# Patient Record
Sex: FEMALE | Race: WHITE | NOT HISPANIC OR LATINO | Employment: OTHER | URBAN - METROPOLITAN AREA
[De-identification: names, ages, dates, MRNs, and addresses within clinical notes are randomized per-mention and may not be internally consistent; named-entity substitution may affect disease eponyms.]

---

## 2018-03-07 ENCOUNTER — OFFICE VISIT (OUTPATIENT)
Dept: FAMILY MEDICINE CLINIC | Facility: CLINIC | Age: 78
End: 2018-03-07

## 2018-03-07 VITALS
DIASTOLIC BLOOD PRESSURE: 88 MMHG | OXYGEN SATURATION: 98 % | SYSTOLIC BLOOD PRESSURE: 168 MMHG | HEIGHT: 64 IN | WEIGHT: 149 LBS | RESPIRATION RATE: 16 BRPM | HEART RATE: 70 BPM | BODY MASS INDEX: 25.44 KG/M2 | TEMPERATURE: 98.2 F

## 2018-03-07 DIAGNOSIS — E78.00 PURE HYPERCHOLESTEROLEMIA: ICD-10-CM

## 2018-03-07 DIAGNOSIS — E11.9 TYPE 2 DIABETES MELLITUS WITHOUT COMPLICATION, WITHOUT LONG-TERM CURRENT USE OF INSULIN (HCC): ICD-10-CM

## 2018-03-07 DIAGNOSIS — I10 ESSENTIAL HYPERTENSION: Primary | ICD-10-CM

## 2018-03-07 PROBLEM — E78.5 HYPERLIPIDEMIA: Status: ACTIVE | Noted: 2018-03-07

## 2018-03-07 PROCEDURE — 99204 OFFICE O/P NEW MOD 45 MIN: CPT | Performed by: FAMILY MEDICINE

## 2018-03-07 RX ORDER — UBIDECARENONE 100 MG
100 CAPSULE ORAL DAILY
COMMUNITY
End: 2021-01-11

## 2018-03-07 RX ORDER — CLONIDINE HYDROCHLORIDE 0.1 MG/1
0.1 TABLET ORAL 2 TIMES DAILY
COMMUNITY
End: 2018-03-07

## 2018-03-07 RX ORDER — IRBESARTAN AND HYDROCHLOROTHIAZIDE 300; 12.5 MG/1; MG/1
TABLET, FILM COATED ORAL
COMMUNITY
Start: 2018-02-27 | End: 2018-03-07 | Stop reason: ALTCHOICE

## 2018-03-07 RX ORDER — TRIAMTERENE AND HYDROCHLOROTHIAZIDE 37.5; 25 MG/1; MG/1
1 TABLET ORAL DAILY
Qty: 30 TABLET | Refills: 6 | Status: SHIPPED | OUTPATIENT
Start: 2018-03-07 | End: 2018-03-27 | Stop reason: SDUPTHER

## 2018-03-07 RX ORDER — MELATONIN
1000 DAILY
COMMUNITY

## 2018-03-07 RX ORDER — DILTIAZEM HYDROCHLORIDE 180 MG/1
CAPSULE, EXTENDED RELEASE ORAL
COMMUNITY
Start: 2018-02-27 | End: 2018-03-27 | Stop reason: DRUGHIGH

## 2018-03-07 RX ORDER — POTASSIUM CHLORIDE 750 MG/1
TABLET, EXTENDED RELEASE ORAL
COMMUNITY
Start: 2018-01-19 | End: 2018-03-07 | Stop reason: DRUGHIGH

## 2018-03-07 RX ORDER — PRAVASTATIN SODIUM 40 MG
TABLET ORAL
COMMUNITY
Start: 2018-02-15 | End: 2018-03-27 | Stop reason: SDUPTHER

## 2018-03-07 RX ORDER — IRBESARTAN 300 MG/1
300 TABLET ORAL NIGHTLY
Qty: 90 TABLET | Refills: 1 | Status: SHIPPED | OUTPATIENT
Start: 2018-03-07 | End: 2018-03-27 | Stop reason: SDUPTHER

## 2018-03-07 RX ORDER — CARVEDILOL PHOSPHATE 10 MG/1
10 CAPSULE, EXTENDED RELEASE ORAL DAILY
Qty: 30 CAPSULE | Refills: 6 | Status: SHIPPED | OUTPATIENT
Start: 2018-03-07 | End: 2018-03-27 | Stop reason: SDUPTHER

## 2018-03-07 NOTE — PROGRESS NOTES
Subjective   Cheryl Tomas is a 77 y.o. female.     Pt is here to [facility].  Pt previously seen by Dr Slade in [place].  Pt sees Dr Stef GRIFFIN in [place].  Pt had pneumonia vaccine about 3 years ago. Not sure of one.    Pt is concerned about blood pressure. This morning bp was 180/98 this morning at [facility] which is what it has ran for past couple months since she moved here from [place] in [place]eme[place].    Hypertension   This is a new problem. The current episode started 1 to 4 weeks ago. The problem has been gradually worsening since onset. The problem is uncontrolled. Pertinent negatives include no anxiety, blurred vision, chest pain, headaches, malaise/fatigue, neck pain, palpitations, peripheral edema, PND, shortness of breath or sweats. There are no associated agents to hypertension. Risk factors for coronary artery disease include diabetes mellitus, dyslipidemia and family history. Past treatments include angiotensin blockers and diuretics. The current treatment provides no improvement. There are no compliance problems.  There is no history of angina, kidney disease, CAD/MI, CVA, heart failure, left ventricular hypertrophy, PVD or retinopathy. There is no history of chronic renal disease.   Hyperlipidemia   This is a new problem. The current episode started more than 1 year ago. The problem is controlled. Recent lipid tests were reviewed and are normal. Exacerbating diseases include diabetes. She has no history of chronic renal disease, hypothyroidism, liver disease, obesity or nephrotic syndrome. Pertinent negatives include no chest pain, focal weakness, leg pain or shortness of breath. Current antihyperlipidemic treatment includes statins. The current treatment provides moderate improvement of lipids. There are no compliance problems.  Risk factors for coronary artery disease include diabetes mellitus and dyslipidemia.        The following portions of the patient's history were reviewed and  "updated as appropriate: allergies, current medications, past family history, past medical history, past social history, past surgical history and problem list.    Review of Systems   Constitutional: Negative.  Negative for malaise/fatigue.   HENT: Negative.    Eyes: Negative.  Negative for blurred vision.   Respiratory: Negative.  Negative for shortness of breath.    Cardiovascular: Negative.  Negative for chest pain, palpitations and PND.   Gastrointestinal: Negative.    Endocrine: Negative.    Genitourinary: Negative.    Musculoskeletal: Negative.  Negative for neck pain.   Skin: Negative.    Allergic/Immunologic: Negative.    Neurological: Negative.  Negative for focal weakness and headaches.   Hematological: Negative.    Psychiatric/Behavioral: Negative.    All other systems reviewed and are negative.      Objective     Vitals:    03/07/18 1404   BP: 168/88   Pulse: 70   Resp: 16   Temp: 98.2 °F (36.8 °C)   SpO2: 98%   Weight: 67.6 kg (149 lb)   Height: 162.6 cm (64\")       Physical Exam   Constitutional: She is oriented to person, place, and time. She appears well-developed and well-nourished.   HENT:   Head: Normocephalic and atraumatic.   Eyes: EOM are normal. Pupils are equal, round, and reactive to light. Right eye exhibits no discharge. Left eye exhibits no discharge.   Neck: Normal range of motion. Neck supple.   Cardiovascular: Normal rate, regular rhythm, normal heart sounds and intact distal pulses.    Pulmonary/Chest: Effort normal and breath sounds normal.   Abdominal: Soft. Bowel sounds are normal. She exhibits no mass. There is no tenderness.   Musculoskeletal: Normal range of motion.        Right shoulder: She exhibits no swelling.   Neurological: She is alert and oriented to person, place, and time. She has normal reflexes.   Skin: Skin is warm and dry. No cyanosis. Nails show no clubbing.   Psychiatric: She has a normal mood and affect. Her behavior is normal. Judgment and thought content " normal.   Nursing note and vitals reviewed.      Assessment/Plan     Problem List Items Addressed This Visit        Cardiovascular and Mediastinum    Hypertension - Primary    Relevant Medications    CARTIA  MG 24 hr capsule    irbesartan (AVAPRO) 300 MG tablet    triamterene-hydrochlorothiazide (MAXZIDE-25) 37.5-25 MG per tablet    carvedilol CR (COREG CR) 10 MG 24 hr capsule    Hyperlipidemia    Relevant Medications    pravastatin (PRAVACHOL) 40 MG tablet       Endocrine    Diabetes mellitus    Relevant Medications    metFORMIN (GLUCOPHAGE) 1000 MG tablet        I have reviewed labs from prior pcp 1/18.

## 2018-03-27 ENCOUNTER — OFFICE VISIT (OUTPATIENT)
Dept: FAMILY MEDICINE CLINIC | Facility: CLINIC | Age: 78
End: 2018-03-27

## 2018-03-27 VITALS
HEIGHT: 64 IN | SYSTOLIC BLOOD PRESSURE: 164 MMHG | WEIGHT: 147.8 LBS | BODY MASS INDEX: 25.23 KG/M2 | HEART RATE: 75 BPM | DIASTOLIC BLOOD PRESSURE: 78 MMHG | OXYGEN SATURATION: 95 %

## 2018-03-27 DIAGNOSIS — I10 ESSENTIAL HYPERTENSION: ICD-10-CM

## 2018-03-27 DIAGNOSIS — F41.9 ANXIETY AND DEPRESSION: ICD-10-CM

## 2018-03-27 DIAGNOSIS — F32.A ANXIETY AND DEPRESSION: ICD-10-CM

## 2018-03-27 DIAGNOSIS — E78.00 PURE HYPERCHOLESTEROLEMIA: ICD-10-CM

## 2018-03-27 DIAGNOSIS — E11.9 TYPE 2 DIABETES MELLITUS WITHOUT COMPLICATION, WITHOUT LONG-TERM CURRENT USE OF INSULIN (HCC): Primary | ICD-10-CM

## 2018-03-27 LAB
EXPIRATION DATE: NORMAL
HBA1C MFR BLD: 5.6 %
Lab: NORMAL
POC CREATININE URINE: 200
POC MICROALBUMIN URINE: 30

## 2018-03-27 PROCEDURE — 83036 HEMOGLOBIN GLYCOSYLATED A1C: CPT | Performed by: FAMILY MEDICINE

## 2018-03-27 PROCEDURE — 82044 UR ALBUMIN SEMIQUANTITATIVE: CPT | Performed by: FAMILY MEDICINE

## 2018-03-27 PROCEDURE — 99214 OFFICE O/P EST MOD 30 MIN: CPT | Performed by: FAMILY MEDICINE

## 2018-03-27 RX ORDER — PRAVASTATIN SODIUM 40 MG
40 TABLET ORAL NIGHTLY
Qty: 90 TABLET | Refills: 3 | Status: SHIPPED | OUTPATIENT
Start: 2018-03-27 | End: 2019-02-20 | Stop reason: SDUPTHER

## 2018-03-27 RX ORDER — CARVEDILOL PHOSPHATE 10 MG/1
10 CAPSULE, EXTENDED RELEASE ORAL DAILY
Qty: 90 CAPSULE | Refills: 3 | Status: SHIPPED | OUTPATIENT
Start: 2018-03-27 | End: 2018-07-26 | Stop reason: DRUGHIGH

## 2018-03-27 RX ORDER — DILTIAZEM HYDROCHLORIDE 240 MG/1
240 CAPSULE, COATED, EXTENDED RELEASE ORAL DAILY
Qty: 90 CAPSULE | Refills: 3 | Status: SHIPPED | OUTPATIENT
Start: 2018-03-27 | End: 2019-06-03

## 2018-03-27 RX ORDER — TRIAMTERENE AND HYDROCHLOROTHIAZIDE 37.5; 25 MG/1; MG/1
1 TABLET ORAL DAILY
Qty: 90 TABLET | Refills: 3 | Status: SHIPPED | OUTPATIENT
Start: 2018-03-27 | End: 2019-02-20 | Stop reason: SDUPTHER

## 2018-03-27 RX ORDER — IRBESARTAN 300 MG/1
300 TABLET ORAL NIGHTLY
Qty: 90 TABLET | Refills: 3 | Status: SHIPPED | OUTPATIENT
Start: 2018-03-27 | End: 2019-02-20 | Stop reason: SDUPTHER

## 2018-03-27 RX ORDER — DILTIAZEM HYDROCHLORIDE 240 MG/1
240 CAPSULE, COATED, EXTENDED RELEASE ORAL DAILY
Qty: 90 CAPSULE | Refills: 2 | Status: SHIPPED | OUTPATIENT
Start: 2018-03-27 | End: 2018-03-27 | Stop reason: SDUPTHER

## 2018-03-27 NOTE — PROGRESS NOTES
Subjective   Cheryl Tomas is a 77 y.o. female.     Pt is here for fu on htn.    Has been runnings 150s/80s at home.    Diabetes   She presents for her follow-up diabetic visit. She has type 2 diabetes mellitus. Her disease course has been stable. There are no hypoglycemic associated symptoms. There are no diabetic associated symptoms. Pertinent negatives for diabetes include no blurred vision and no chest pain. There are no hypoglycemic complications. Symptoms are improving. There are no diabetic complications. Risk factors for coronary artery disease include diabetes mellitus, dyslipidemia and hypertension. Current diabetic treatment includes oral agent (monotherapy). She is compliant with treatment all of the time. Her weight is stable. She is following a generally healthy diet. She participates in exercise intermittently. There is no change in her home blood glucose trend. Her breakfast blood glucose is taken between 7-8 am. Her breakfast blood glucose range is generally 110-130 mg/dl. An ACE inhibitor/angiotensin II receptor blocker is being taken. She does not see a podiatrist.Eye exam is not current.   Hypertension   This is a chronic problem. The current episode started more than 1 year ago. The problem has been gradually improving since onset. The problem is controlled. Pertinent negatives include no anxiety, blurred vision, chest pain, malaise/fatigue, peripheral edema or shortness of breath. There are no associated agents to hypertension.        The following portions of the patient's history were reviewed and updated as appropriate: allergies, current medications, past family history, past medical history, past social history, past surgical history and problem list.    Review of Systems   Constitutional: Negative for malaise/fatigue.   Eyes: Negative for blurred vision.   Respiratory: Negative for shortness of breath.    Cardiovascular: Negative for chest pain.       Objective     Vitals:    03/27/18  "1332   BP: 164/78   Pulse: 75   SpO2: 95%   Weight: 67 kg (147 lb 12.8 oz)   Height: 162.6 cm (64\")       Physical Exam   Constitutional: She is oriented to person, place, and time. She appears well-developed and well-nourished.   HENT:   Head: Normocephalic and atraumatic.   Eyes: EOM are normal. Pupils are equal, round, and reactive to light. Right eye exhibits no discharge. Left eye exhibits no discharge.   Neck: Normal range of motion. Neck supple.   Cardiovascular: Normal rate, regular rhythm, normal heart sounds and intact distal pulses.    Pulmonary/Chest: Effort normal and breath sounds normal.   Abdominal: Soft. Bowel sounds are normal. She exhibits no mass. There is no tenderness.   Musculoskeletal: Normal range of motion.        Right shoulder: She exhibits no swelling.   Neurological: She is alert and oriented to person, place, and time. She has normal reflexes.   Skin: Skin is warm and dry. No cyanosis. Nails show no clubbing.   Psychiatric: She has a normal mood and affect. Her behavior is normal. Judgment and thought content normal.   Nursing note and vitals reviewed.      Assessment/Plan     Problem List Items Addressed This Visit        Cardiovascular and Mediastinum    Hypertension    Relevant Medications    diltiaZEM CD (CARTIA XT) 240 MG 24 hr capsule    triamterene-hydrochlorothiazide (MAXZIDE-25) 37.5-25 MG per tablet    irbesartan (AVAPRO) 300 MG tablet    carvedilol CR (COREG CR) 10 MG 24 hr capsule    Hyperlipidemia    Relevant Medications    pravastatin (PRAVACHOL) 40 MG tablet       Endocrine    Diabetes mellitus - Primary    Relevant Medications    metFORMIN (GLUCOPHAGE) 1000 MG tablet    Other Relevant Orders    POC Glycosylated Hemoglobin (Hb A1C) (Completed)    POC Microalbumin (Completed)       Other    Anxiety and depression    Relevant Medications    sertraline (ZOLOFT) 50 MG tablet      Other Visit Diagnoses    None.       Increase diltiazem XT to 240 mg from 180.    Fu 3 mo  a1c " today.   I have reviewed labs from 12/17  I have sent refills of all meds to mail order pharmacy.   Make fu appt for medicare wellness

## 2018-03-28 RX ORDER — DILTIAZEM HYDROCHLORIDE 240 MG/1
240 CAPSULE, EXTENDED RELEASE ORAL DAILY
Qty: 90 CAPSULE | Refills: 1 | Status: SHIPPED | OUTPATIENT
Start: 2018-03-28 | End: 2018-06-27 | Stop reason: SDUPTHER

## 2018-06-27 ENCOUNTER — OFFICE VISIT (OUTPATIENT)
Dept: FAMILY MEDICINE CLINIC | Facility: CLINIC | Age: 78
End: 2018-06-27

## 2018-06-27 VITALS
BODY MASS INDEX: 25.81 KG/M2 | HEIGHT: 64 IN | OXYGEN SATURATION: 95 % | SYSTOLIC BLOOD PRESSURE: 188 MMHG | TEMPERATURE: 98.8 F | HEART RATE: 70 BPM | DIASTOLIC BLOOD PRESSURE: 96 MMHG | WEIGHT: 151.2 LBS

## 2018-06-27 DIAGNOSIS — F32.A ANXIETY AND DEPRESSION: ICD-10-CM

## 2018-06-27 DIAGNOSIS — I10 ESSENTIAL HYPERTENSION: ICD-10-CM

## 2018-06-27 DIAGNOSIS — Z00.00 MEDICARE ANNUAL WELLNESS VISIT, INITIAL: Primary | ICD-10-CM

## 2018-06-27 DIAGNOSIS — Z23 NEED FOR VACCINATION WITH 13-POLYVALENT PNEUMOCOCCAL CONJUGATE VACCINE: ICD-10-CM

## 2018-06-27 DIAGNOSIS — F41.9 ANXIETY AND DEPRESSION: ICD-10-CM

## 2018-06-27 PROCEDURE — 99397 PER PM REEVAL EST PAT 65+ YR: CPT | Performed by: FAMILY MEDICINE

## 2018-06-27 PROCEDURE — G0439 PPPS, SUBSEQ VISIT: HCPCS | Performed by: FAMILY MEDICINE

## 2018-06-27 PROCEDURE — 90670 PCV13 VACCINE IM: CPT | Performed by: FAMILY MEDICINE

## 2018-06-27 PROCEDURE — G0009 ADMIN PNEUMOCOCCAL VACCINE: HCPCS | Performed by: FAMILY MEDICINE

## 2018-06-27 PROCEDURE — 96160 PT-FOCUSED HLTH RISK ASSMT: CPT | Performed by: FAMILY MEDICINE

## 2018-06-27 RX ORDER — SERTRALINE HYDROCHLORIDE 100 MG/1
100 TABLET, FILM COATED ORAL DAILY
Qty: 90 TABLET | Refills: 3 | Status: SHIPPED | OUTPATIENT
Start: 2018-06-27 | End: 2018-11-07

## 2018-06-27 RX ORDER — CARVEDILOL PHOSPHATE 20 MG/1
20 CAPSULE, EXTENDED RELEASE ORAL DAILY
Qty: 90 CAPSULE | Refills: 3 | Status: SHIPPED | OUTPATIENT
Start: 2018-06-27 | End: 2018-07-06 | Stop reason: SDUPTHER

## 2018-06-27 NOTE — PATIENT INSTRUCTIONS
Medicare Wellness  Personal Prevention Plan of Service     Date of Office Visit:  2018  Encounter Provider:  Marian Perea MD  Place of Service:  [facility]  Patient Name: Cheryl Tomas  :  1940    As part of the Medicare Wellness portion of your visit today, we are providing you with this personalized preventive plan of services (PPPS). This plan is based upon recommendations of the [place] Preventive Services Task Force (USPSTF) and the Advisory Committee on Immunization Practices (ACIP).    This lists the preventive care services that should be considered, and provides dates of when you are due. Items listed as completed are up-to-date and do not require any further intervention.    Health Maintenance   Topic Date Due   • PNEUMOCOCCAL VACCINES (65+ LOW/MEDIUM RISK) (1 of 2 - PCV13) 2007   • ZOSTER VACCINE (2 of 2) 2017   • DIABETIC FOOT EXAM  2018   • INFLUENZA VACCINE  2018   • HEMOGLOBIN A1C  2018   • LIPID PANEL  2019   • URINE MICROALBUMIN  2019   • MEDICARE ANNUAL WELLNESS  2019   • DXA SCAN  2019   • COLONOSCOPY  2026   • TDAP/TD VACCINES  Excluded       No orders of the defined types were placed in this encounter.      No Follow-up on file.      Pneumococcal Conjugate Vaccine (PCV13) What You Need to Know  1. Why get vaccinated?  Vaccination can protect both children and adults from pneumococcal disease.  Pneumococcal disease is caused by bacteria that can spread from person to person through close contact. It can cause ear infections, and it can also lead to more serious infections of the:  · Lungs (pneumonia),  · Blood (bacteremia), and  · Covering of the brain and spinal cord (meningitis).    Pneumococcal pneumonia is most common among adults. Pneumococcal meningitis can cause deafness and brain damage, and it kills about 1 child in 10 who get it.  Anyone can get pneumococcal disease,  but children under 2 years of age and adults 65 years and older, people with certain medical conditions, and cigarette smokers are at the highest risk.  Before there was a vaccine, the [place] saw:  · more than 700 cases of meningitis,  · about 13,000 blood infections,  · about 5 million ear infections, and  · about 200 deaths    in children under 5 each year from pneumococcal disease. Since vaccine became available, severe pneumococcal disease in these children has fallen by 88%.  About 18,000 older adults die of pneumococcal disease each year in the [place].  Treatment of pneumococcal infections with penicillin and other drugs is not as effective as it used to be, because some strains of the disease have become resistant to these drugs. This makes prevention of the disease, through vaccination, even more important.  2. PCV13 vaccine  Pneumococcal conjugate vaccine (called PCV13) protects against 13 types of pneumococcal bacteria.  PCV13 is routinely given to children at 2, 4, 6, and 12-15 months of age. It is also recommended for children and adults 2 to 64 years of age with certain health conditions, and for all adults 65 years of age and older. Your doctor can give you details.  3. Some people should not get this vaccine  Anyone who has ever had a life-threatening allergic reaction to a dose of this vaccine, to an earlier pneumococcal vaccine called PCV7, or to any vaccine containing diphtheria toxoid (for example, DTaP), should not get PCV13.  Anyone with a severe allergy to any component of PCV13 should not get the vaccine. Tell your doctor if the person being vaccinated has any severe allergies.  If the person scheduled for vaccination is not feeling well, your healthcare provider might decide to reschedule the shot on another day.  4. Risks of a vaccine reaction  With any medicine, including vaccines, there is a chance of reactions. These are usually mild and go away on their own, but serious  reactions are also possible.  Problems reported following PCV13 varied by age and dose in the series. The most common problems reported among children were:  · About half became drowsy after the shot, had a temporary loss of appetite, or had redness or tenderness where the shot was given.  · About 1 out of 3 had swelling where the shot was given.  · About 1 out of 3 had a mild fever, and about 1 in 20 had a fever over 102.2°F.  · Up to about 8 out of 10 became fussy or irritable.    Adults have reported pain, redness, and swelling where the shot was given; also mild fever, fatigue, headache, chills, or muscle pain.  Young children who get PCV13 along with inactivated flu vaccine at the same time may be at increased risk for seizures caused by fever. Ask your doctor for more information.  Problems that could happen after any vaccine:  · People sometimes faint after a medical procedure, including vaccination. Sitting or lying down for about 15 minutes can help prevent fainting, and injuries caused by a fall. Tell your doctor if you feel dizzy, or have vision changes or ringing in the ears.  · Some older children and adults get severe pain in the shoulder and have difficulty moving the arm where a shot was given. This happens very rarely.  · Any medication can cause a severe allergic reaction. Such reactions from a vaccine are very rare, estimated at about 1 in a million doses, and would happen within a few minutes to a few hours after the vaccination.  As with any medicine, there is a very small chance of a vaccine causing a serious injury or death.  The safety of vaccines is always being monitored. For more information, visit: www.cdc.gov/vaccinesafety/  5. What if there is a serious reaction?  What should I look for?  Look for anything that concerns you, such as signs of a severe allergic reaction, very high fever, or unusual behavior.  Signs of a severe allergic reaction can include hives, swelling of the face and  throat, difficulty breathing, a fast heartbeat, dizziness, and weakness--usually within a few minutes to a few hours after the vaccination.  What should I do?  · If you think it is a severe allergic reaction or other emergency that can’t wait, call 9-1-1 or get the person to the nearest hospital. Otherwise, call your doctor.  · Reactions should be reported to the Vaccine Adverse Event Reporting System (VAERS). Your doctor should file this report, or you can do it yourself through the VAERS web site at www.vaers.[facility].gov, or by calling 1-725.946.7998.  ? VAERS does not give medical advice.  6. The National Vaccine Injury Compensation Program  The National Vaccine Injury Compensation Program (VICP) is a federal program that was created to compensate people who may have been injured by certain vaccines.  Persons who believe they may have been injured by a vaccine can learn about the program and about filing a claim by calling 1-518.449.9816 or visiting the VICP website at www.[facility].gov/vaccinecompensation. There is a time limit to file a claim for compensation.  7. How can I learn more?  · Ask your healthcare provider. He or she can give you the vaccine package insert or suggest other sources of information.  · Call your local or state health department.  · Contact the Centers for Disease Control and Prevention (CDC):  ? Call 1-498.770.5363 (7-051-ZNR-INFO) or  ? Visit CDC's website at www.cdc.gov/vaccines  Vaccine Information Statement, PCV13 Vaccine (11/05/2015)  This information is not intended to replace advice given to you by your health care provider. Make sure you discuss any questions you have with your health care provider.  Document Released: 10/15/2007 Document Revised: 09/07/2017 Document Reviewed: 09/07/2017  [employer]vier Interactive Patient Education © 2017 [employer]

## 2018-06-27 NOTE — PROGRESS NOTES
QUICK REFERENCE INFORMATION:    Pt last wellness prior to establishing care March 2018.  Pt last mammogram 2017  Pt last pap not sure of last date.  Pt last colonoscopy 1/4/16.  Shingles vaccine 2/1/2017.  Pneumonia vaccine pt think she had 2 in the past however not sure.  tdap 10 years prior.  DEXA 12/4/17        The ABCs of the Annual Wellness Visit    Subsequent Medicare Wellness Visit    HEALTH RISK ASSESSMENT    1940    Recent Hospitalizations:  No hospitalization(s) within the last year..        Current Medical Providers:  Patient Care Team:  Marian Perea MD as PCP - General (Family Medicine)        Smoking Status:  History   Smoking Status   • Never Smoker   Smokeless Tobacco   • Never Used       Alcohol Consumption:  History   Alcohol Use No       Depression Screen:   PHQ-2/PHQ-9 Depression Screening 6/27/2018   Little interest or pleasure in doing things 0   Feeling down, depressed, or hopeless 0   Trouble falling or staying asleep, or sleeping too much -   Feeling tired or having little energy -   Poor appetite or overeating -   Feeling bad about yourself - or that you are a failure or have let yourself or your family down -   Trouble concentrating on things, such as reading the newspaper or watching television -   Moving or speaking so slowly that other people could have noticed. Or the opposite - being so fidgety or restless that you have been moving around a lot more than usual -   Thoughts that you would be better off dead, or of hurting yourself in some way -   Total Score 0   If you checked off any problems, how difficult have these problems made it for you to do your work, take care of things at home, or get along with other people? -       Health Habits and Functional and Cognitive Screening:  Functional & Cognitive Status 6/27/2018   Do you have difficulty preparing food and eating? No   Do you have difficulty bathing yourself, getting dressed or grooming yourself? No   Do you have  difficulty using the toilet? No   Do you have difficulty moving around from place to place? No   Do you have trouble with steps or getting out of a bed or a chair? Yes   In the past year have you fallen or experienced a near fall? No   Current Diet Well Balanced Diet   Dental Exam Up to date   Eye Exam Up to date   Exercise (times per week) 7 times per week   Current Exercise Activities Include Walking   Do you need help using the phone?  No   Are you deaf or do you have serious difficulty hearing?  Yes   Do you need help with transportation? No   Do you need help shopping? No   Do you need help preparing meals?  No   Do you need help with housework?  No   Do you need help with laundry? No   Do you need help taking your medications? No   Do you need help managing money? No   Do you ever drive or ride in a car without wearing a seat belt? No   Have you felt unusual stress, anger or loneliness in the last month? Yes   Who do you live with? Community   If you need help, do you have trouble finding someone available to you? No   Have you been bothered in the last four weeks by sexual problems? No   Do you have difficulty concentrating, remembering or making decisions? Yes           Does the patient have evidence of cognitive impairment? Yes    Aspirin use counseling: Taking ASA appropriately as indicated      Recent Lab Results:  CMP:     Lipid Panel:     HbA1c:  Lab Results   Component Value Date    HGBA1C 5.6 03/27/2018       Visual Acuity:   Visual Acuity Screening    Right eye Left eye Both eyes   Without correction:      With correction: 20/20 20/20 20/20       Age-appropriate Screening Schedule:  Refer to the list below for future screening recommendations based on patient's age, sex and/or medical conditions. Orders for these recommended tests are listed in the plan section. The patient has been provided with a written plan.    Health Maintenance   Topic Date Due   • PNEUMOCOCCAL VACCINES (65+ LOW/MEDIUM RISK) (1  of 2 - PCV13) 04/12/2007   • INFLUENZA VACCINE  08/01/2018   • HEMOGLOBIN A1C  09/27/2018   • LIPID PANEL  03/07/2019   • URINE MICROALBUMIN  03/27/2019   • DIABETIC FOOT EXAM  06/27/2019   • DXA SCAN  12/04/2019   • COLONOSCOPY  01/04/2026   • TDAP/TD VACCINES  Excluded   • ZOSTER VACCINE  Excluded        Subjective   History of Present Illness    Cheryl Tomas is a 78 y.o. female who presents for an Subsequent Wellness Visit.      The following portions of the patient's history were reviewed and updated as appropriate: allergies, current medications, past family history, past medical history, past social history, past surgical history and problem list.    Outpatient Medications Prior to Visit   Medication Sig Dispense Refill   • carvedilol CR (COREG CR) 10 MG 24 hr capsule Take 1 capsule by mouth Daily. 90 capsule 3   • cholecalciferol (VITAMIN D3) 1000 units tablet Take 1,000 Units by mouth Daily.     • coenzyme Q10 100 MG capsule Take 100 mg by mouth Daily.     • diltiaZEM CD (CARTIA XT) 240 MG 24 hr capsule Take 1 capsule by mouth Daily. 90 capsule 3   • irbesartan (AVAPRO) 300 MG tablet Take 1 tablet by mouth Every Night. 90 tablet 3   • metFORMIN (GLUCOPHAGE) 1000 MG tablet Take 1 tablet by mouth 2 (Two) Times a Day With Meals. 180 tablet 3   • pravastatin (PRAVACHOL) 40 MG tablet Take 1 tablet by mouth Every Night. 90 tablet 3   • triamterene-hydrochlorothiazide (MAXZIDE-25) 37.5-25 MG per tablet Take 1 tablet by mouth Daily. 90 tablet 3   • sertraline (ZOLOFT) 50 MG tablet Take 1 tablet by mouth Daily. 90 tablet 3   • diltiazem XR (DILACOR XR) 240 MG 24 hr capsule Take 1 capsule by mouth Daily. 90 capsule 1     No facility-administered medications prior to visit.        Patient Active Problem List   Diagnosis   • Hypertension   • Diabetes mellitus   • Hyperlipidemia   • Anxiety and depression   • Medicare annual wellness visit, initial       Advance Care Planning:  has an advance directive - a copy HAS NOT  "been provided. Have asked the patient to send this to us to add to record.    Identification of Risk Factors:  Risk factors include: increased fall risk, cognitive impairment, financial stress, vision limitations, hearing limitations and polypharmacy.    Review of Systems    Compared to one year ago, the patient feels her physical health is better.  Compared to one year ago, the patient feels her mental health is the same.    Objective     Physical Exam   Constitutional: She is oriented to person, place, and time. She appears well-developed and well-nourished.   HENT:   Head: Normocephalic and atraumatic.   Eyes: EOM are normal. Pupils are equal, round, and reactive to light. Right eye exhibits no discharge. Left eye exhibits no discharge.   Neck: Normal range of motion. Neck supple.   Cardiovascular: Normal rate, regular rhythm, normal heart sounds and intact distal pulses.    Pulmonary/Chest: Effort normal and breath sounds normal.   Abdominal: Soft. Bowel sounds are normal. She exhibits no mass. There is no tenderness.   Musculoskeletal: Normal range of motion.        Right shoulder: She exhibits no swelling.    Cheryl had a diabetic foot exam performed today.   During the foot exam she had a monofilament test performed.  Neurological: She is alert and oriented to person, place, and time. She has normal reflexes.   Skin: Skin is warm and dry. No cyanosis. Nails show no clubbing.   Psychiatric: She has a normal mood and affect. Her behavior is normal. Judgment and thought content normal.   Nursing note and vitals reviewed.      Vitals:    06/27/18 1300   BP: (!) 188/96   Pulse: 70   Temp: 98.8 °F (37.1 °C)   SpO2: 95%   Weight: 68.6 kg (151 lb 3.2 oz)   Height: 162.6 cm (64\")   PainSc: 0-No pain  Comment: no pain       Patient's Body mass index is 25.95 kg/m². BMI is within normal parameters. No follow-up required.      Assessment/Plan   Patient Self-Management and Personalized Health Advice  The patient has been " provided with information about: prevention of cardiac or vascular disease, fall prevention and mental health concerns and preventive services including:   · Fall Risk assessment done, Fall Risk plan of care done.    Visit Diagnoses:    ICD-10-CM ICD-9-CM   1. Medicare annual wellness visit, initial Z00.00 V70.0   2. Essential hypertension I10 401.9   3. Anxiety and depression F41.8 300.00     311   4. Need for vaccination with 13-polyvalent pneumococcal conjugate vaccine Z23 V03.82       No orders of the defined types were placed in this encounter.      Outpatient Encounter Prescriptions as of 6/27/2018   Medication Sig Dispense Refill   • carvedilol CR (COREG CR) 10 MG 24 hr capsule Take 1 capsule by mouth Daily. 90 capsule 3   • cholecalciferol (VITAMIN D3) 1000 units tablet Take 1,000 Units by mouth Daily.     • coenzyme Q10 100 MG capsule Take 100 mg by mouth Daily.     • diltiaZEM CD (CARTIA XT) 240 MG 24 hr capsule Take 1 capsule by mouth Daily. 90 capsule 3   • irbesartan (AVAPRO) 300 MG tablet Take 1 tablet by mouth Every Night. 90 tablet 3   • metFORMIN (GLUCOPHAGE) 1000 MG tablet Take 1 tablet by mouth 2 (Two) Times a Day With Meals. 180 tablet 3   • pravastatin (PRAVACHOL) 40 MG tablet Take 1 tablet by mouth Every Night. 90 tablet 3   • sertraline (ZOLOFT) 100 MG tablet Take 1 tablet by mouth Daily. 90 tablet 3   • triamterene-hydrochlorothiazide (MAXZIDE-25) 37.5-25 MG per tablet Take 1 tablet by mouth Daily. 90 tablet 3   • [DISCONTINUED] sertraline (ZOLOFT) 50 MG tablet Take 1 tablet by mouth Daily. 90 tablet 3   • carvedilol CR (COREG CR) 20 MG 24 hr capsule Take 1 capsule by mouth Daily. 90 capsule 3   • [DISCONTINUED] diltiazem XR (DILACOR XR) 240 MG 24 hr capsule Take 1 capsule by mouth Daily. 90 capsule 1     Facility-Administered Encounter Medications as of 6/27/2018   Medication Dose Route Frequency Provider Last Rate Last Dose   • pneumococcal conj. 13-valent (PREVNAR-13) vaccine 0.5 mL  0.5  mL Intramuscular Once Marian Perea MD         Reviewed use of high risk medication in the elderly: yes  Reviewed for potential of harmful drug interactions in the elderly: yes    Follow Up:  Return in about 3 months (around 9/27/2018) for Recheck.     An After Visit Summary and PPPS with all of these plans were given to the patient.       increase carvedilol cr to 20 mg.    Increase zoloft to 100 mg    MMSE 29/30 today.   Improve depression symptoms.

## 2018-07-06 ENCOUNTER — TELEPHONE (OUTPATIENT)
Dept: FAMILY MEDICINE CLINIC | Facility: CLINIC | Age: 78
End: 2018-07-06

## 2018-07-06 DIAGNOSIS — I10 ESSENTIAL HYPERTENSION: ICD-10-CM

## 2018-07-06 RX ORDER — CARVEDILOL PHOSPHATE 20 MG/1
20 CAPSULE, EXTENDED RELEASE ORAL DAILY
Qty: 90 CAPSULE | Refills: 3 | Status: SHIPPED | OUTPATIENT
Start: 2018-07-06 | End: 2018-10-01 | Stop reason: SDUPTHER

## 2018-07-06 NOTE — TELEPHONE ENCOUNTER
----- Message from Shahrzad Goldberg sent at 7/6/2018  9:11 AM EDT -----  Regarding: PLEASE CALL  Contact: [phone]  carvedilol CR (COREG CR) 20 MG 24 hr capsule WILL COST HER $500 AT [employer] DELIV[facility]. CAN SHE GET IT SENT TO [facility] ON [address] SO SHE CAN GET IT AT A LOWER COST?

## 2018-07-11 ENCOUNTER — TELEPHONE (OUTPATIENT)
Dept: FAMILY MEDICINE CLINIC | Facility: CLINIC | Age: 78
End: 2018-07-11

## 2018-07-26 ENCOUNTER — OFFICE VISIT (OUTPATIENT)
Dept: FAMILY MEDICINE CLINIC | Facility: CLINIC | Age: 78
End: 2018-07-26

## 2018-07-26 VITALS
DIASTOLIC BLOOD PRESSURE: 84 MMHG | WEIGHT: 153 LBS | HEART RATE: 68 BPM | OXYGEN SATURATION: 98 % | TEMPERATURE: 96.9 F | RESPIRATION RATE: 18 BRPM | SYSTOLIC BLOOD PRESSURE: 172 MMHG | HEIGHT: 64 IN | BODY MASS INDEX: 26.12 KG/M2

## 2018-07-26 DIAGNOSIS — F32.A ANXIETY AND DEPRESSION: ICD-10-CM

## 2018-07-26 DIAGNOSIS — F41.9 ANXIETY AND DEPRESSION: ICD-10-CM

## 2018-07-26 DIAGNOSIS — I10 ESSENTIAL HYPERTENSION: Primary | ICD-10-CM

## 2018-07-26 PROCEDURE — 99213 OFFICE O/P EST LOW 20 MIN: CPT | Performed by: NURSE PRACTITIONER

## 2018-07-26 NOTE — PATIENT INSTRUCTIONS

## 2018-07-26 NOTE — PROGRESS NOTES
Subjective   Cheryl Tomas is a 78 y.o. female.   Chief Complaint   Patient presents with   • Hypertension   • Anxiety      Hypertension   This is a chronic problem. The current episode started more than 1 year ago. The problem has been gradually improving since onset. The problem is uncontrolled. Associated symptoms include anxiety. Pertinent negatives include no blurred vision, headaches, malaise/fatigue, neck pain, orthopnea, palpitations, peripheral edema, PND, shortness of breath or sweats. Risk factors for coronary artery disease include diabetes mellitus, dyslipidemia, family history and sedentary lifestyle. Past treatments include ACE inhibitors, alpha 1 blockers, calcium channel blockers, diuretics and lifestyle changes. Current antihypertension treatment includes ACE inhibitors, alpha 1 blockers, calcium channel blockers, diuretics and lifestyle changes. The current treatment provides mild improvement. There are no compliance problems.  There is no history of angina, kidney disease, CAD/MI, CVA, heart failure, left ventricular hypertrophy, PVD or retinopathy.   Anxiety   Presents for follow-up visit. Patient reports no compulsions, confusion, decreased concentration, depressed mood, dizziness, dry mouth, excessive worry, feeling of choking, hyperventilation, impotence, insomnia, irritability, malaise, muscle tension, nausea, obsessions, palpitations, panic, restlessness, shortness of breath or suicidal ideas. Symptoms occur constantly. The severity of symptoms is mild. The quality of sleep is fair. Nighttime awakenings: occasional.     Compliance with medications is %. Side effects of treatment include visual problems (cataracts ).    Patient reports she has not taken her diltizem in the past weeks.   Her blood pressure is elevated this am. She reports she just took her medication 30 minutes prior to arrival.   Will recheck before leaving- at recheck her BP has improved. 172/84      The following  "portions of the patient's history were reviewed and updated as appropriate: allergies, current medications, past family history, past medical history, past social history, past surgical history and problem list.    Review of Systems   Constitutional: Negative for irritability and malaise/fatigue.   Eyes: Negative for blurred vision.   Respiratory: Negative for shortness of breath.    Cardiovascular: Negative for palpitations, orthopnea and PND.   Gastrointestinal: Negative for nausea.   Genitourinary: Negative for impotence.   Musculoskeletal: Negative for neck pain.   Neurological: Negative for dizziness and headaches.   Psychiatric/Behavioral: Negative for confusion, decreased concentration and suicidal ideas. The patient does not have insomnia.        Objective   Allergies   Allergen Reactions   • Keflex [Cephalexin] Itching and Rash     Visit Vitals  /84   Pulse 68   Temp 96.9 °F (36.1 °C)   Resp 18   Ht 162.6 cm (64\")   Wt 69.4 kg (153 lb)   SpO2 98%   BMI 26.26 kg/m²       Physical Exam   Constitutional: She is oriented to person, place, and time. She appears well-developed and well-nourished.   HENT:   Head: Normocephalic.   Cardiovascular: Normal rate and regular rhythm.    Pulmonary/Chest: Effort normal and breath sounds normal.   Neurological: She is alert and oriented to person, place, and time.   Skin: Skin is warm and dry. Capillary refill takes less than 2 seconds.   Psychiatric: She has a normal mood and affect. Her behavior is normal.   Patient is very pleasant   Calm cooperative.      Vitals reviewed.      Assessment/Plan   Cheryl was seen today for hypertension and anxiety.    Diagnoses and all orders for this visit:    Essential hypertension    Anxiety and depression        Follow up with Dr. Perea in 2 weeks for bp recheck   Continue to take - diltiazem  as per Dr. Perea instructions.   There is some confusion about the medication. On the AVS it said to stop the cartia 180 - but she " did not see it was to increase to 240 daily.   Discussed medications - any questions voiced were discussed.   See patient HTN instructions          Josey Gonzales, APRN

## 2018-08-07 ENCOUNTER — OFFICE VISIT (OUTPATIENT)
Dept: FAMILY MEDICINE CLINIC | Facility: CLINIC | Age: 78
End: 2018-08-07

## 2018-08-07 VITALS
TEMPERATURE: 98.4 F | WEIGHT: 156.8 LBS | HEART RATE: 67 BPM | SYSTOLIC BLOOD PRESSURE: 140 MMHG | OXYGEN SATURATION: 98 % | BODY MASS INDEX: 26.77 KG/M2 | DIASTOLIC BLOOD PRESSURE: 86 MMHG | HEIGHT: 64 IN

## 2018-08-07 DIAGNOSIS — E78.00 PURE HYPERCHOLESTEROLEMIA: ICD-10-CM

## 2018-08-07 DIAGNOSIS — R07.89 CHEST WALL DISCOMFORT: ICD-10-CM

## 2018-08-07 DIAGNOSIS — E11.9 TYPE 2 DIABETES MELLITUS WITHOUT COMPLICATION, WITHOUT LONG-TERM CURRENT USE OF INSULIN (HCC): ICD-10-CM

## 2018-08-07 DIAGNOSIS — I10 ESSENTIAL HYPERTENSION: Primary | ICD-10-CM

## 2018-08-07 PROCEDURE — 93000 ELECTROCARDIOGRAM COMPLETE: CPT | Performed by: FAMILY MEDICINE

## 2018-08-07 PROCEDURE — 99214 OFFICE O/P EST MOD 30 MIN: CPT | Performed by: FAMILY MEDICINE

## 2018-08-07 RX ORDER — ASPIRIN 81 MG/1
81 TABLET ORAL DAILY
Qty: 90 TABLET | Refills: 3 | Status: SHIPPED | OUTPATIENT
Start: 2018-08-07

## 2018-08-07 RX ORDER — NITROGLYCERIN 0.3 MG/1
0.3 TABLET SUBLINGUAL
Qty: 30 TABLET | Refills: 3 | Status: SHIPPED | OUTPATIENT
Start: 2018-08-07 | End: 2022-04-13

## 2018-08-07 NOTE — PROGRESS NOTES
Subjective   Cheryl Tomas is a 78 y.o. female. .    Pt is here for 2 week fu on htn.    Hypertension   This is a recurrent problem. The current episode started more than 1 month ago. The problem has been gradually improving since onset. The problem is controlled. Associated symptoms include chest pain. Pertinent negatives include no anxiety, malaise/fatigue, orthopnea or shortness of breath. There are no associated agents to hypertension. Risk factors for coronary artery disease include diabetes mellitus, dyslipidemia, family history and post-menopausal state. Past treatments include beta blockers, calcium channel blockers, angiotensin blockers and diuretics. Current antihypertension treatment includes beta blockers, angiotensin blockers, calcium channel blockers and diuretics. The current treatment provides mild improvement. There are no compliance problems.  There is no history of kidney disease, CAD/MI or heart failure. There is no history of sleep apnea or a thyroid problem.   Chest Pain    This is a recurrent problem. The current episode started more than 1 year ago. The onset quality is gradual. The problem occurs intermittently. The problem has been waxing and waning. The pain is present in the substernal region. The pain is at a severity of 3/10. The pain is mild. The quality of the pain is described as dull, heavy and pressure. The pain does not radiate. Pertinent negatives include no abdominal pain, back pain, claudication, diaphoresis, dizziness, exertional chest pressure, hemoptysis, irregular heartbeat, lower extremity edema, malaise/fatigue, nausea, orthopnea, shortness of breath or weakness. Associated with: only in mornings when waking and resolves after 10 min. She has tried nothing for the symptoms. The treatment provided no relief.   Pertinent negatives for past medical history include no aneurysm, no anxiety/panic attacks, no aortic aneurysm, no aortic dissection, no arrhythmia, no bicuspid  "aortic valve, no CAD, no congenital heart disease, no CHF, no sleep apnea and no thyroid problem. Prior diagnostic workup includes cardiac catheterization, chest x-ray, echocardiogram and exercise treadmill test.      6mo to 1 year.  Substernal fullness in morning when wakes.  No cordero.  No pnd.  No nv.  No pain just fullness.  Resolves spontaneously after she wakes up and moves around. She prev saw card for many years and has had extensive work up per her history.      The following portions of the patient's history were reviewed and updated as appropriate: allergies, current medications, past family history, past medical history, past social history, past surgical history and problem list.    Review of Systems   Constitutional: Negative for diaphoresis and malaise/fatigue.   Respiratory: Negative for hemoptysis and shortness of breath.    Cardiovascular: Positive for chest pain. Negative for orthopnea and claudication.   Gastrointestinal: Negative for abdominal pain and nausea.   Musculoskeletal: Negative for back pain.   Neurological: Negative for dizziness and weakness.       Objective     Vitals:    08/07/18 1059   BP: 140/86   Pulse: 67   Temp: 98.4 °F (36.9 °C)   SpO2: 98%   Weight: 71.1 kg (156 lb 12.8 oz)   Height: 162.6 cm (64\")       Physical Exam   Constitutional: She is oriented to person, place, and time. She appears well-developed and well-nourished.   HENT:   Head: Normocephalic and atraumatic.   Eyes: Pupils are equal, round, and reactive to light. EOM are normal. Right eye exhibits no discharge. Left eye exhibits no discharge.   Neck: Normal range of motion. Neck supple.   Cardiovascular: Normal rate, regular rhythm, normal heart sounds and intact distal pulses.    Pulmonary/Chest: Effort normal and breath sounds normal.   Abdominal: Soft. Bowel sounds are normal. She exhibits no mass. There is no tenderness.   Musculoskeletal: Normal range of motion.        Right shoulder: She exhibits no swelling. "   Neurological: She is alert and oriented to person, place, and time. She has normal reflexes.   Skin: Skin is warm and dry. No cyanosis. Nails show no clubbing.   Psychiatric: She has a normal mood and affect. Her behavior is normal. Judgment and thought content normal.   Nursing note and vitals reviewed.      ECG 12 Lead  Date/Time: 8/7/2018 11:12 AM  Performed by: IVAN THURSTON  Authorized by: IVAN THURSTON   Comparison: not compared with previous ECG   Previous ECG: no previous ECG available  Rhythm: sinus rhythm  Rate: normal  BPM: 62  Conduction: LAFB  ST Segments: ST segments normal  T Waves: T waves normal  Other: no other findings  Clinical impression: non-specific ECG  Comments: Chest discomfort             Assessment/Plan     Problem List Items Addressed This Visit        Cardiovascular and Mediastinum    Hypertension - Primary    Current Assessment & Plan     Hypertension is improving with treatment.  Continue current treatment regimen.  Weight loss.  Regular aerobic exercise.  Stop smoking.  Continue current medications.  Blood pressure will be reassessed at the next regular appointment.         Relevant Medications    aspirin 81 MG EC tablet    Other Relevant Orders    ECG 12 Lead    XR Chest PA & Lateral    Hyperlipidemia    Relevant Medications    aspirin 81 MG EC tablet    Other Relevant Orders    ECG 12 Lead       Endocrine    Diabetes mellitus (CMS/HCC)    Relevant Medications    aspirin 81 MG EC tablet    Other Relevant Orders    ECG 12 Lead       Nervous and Auditory    Chest wall discomfort    Relevant Medications    aspirin 81 MG EC tablet    nitroglycerin (NITROSTAT) 0.3 MG SL tablet    Other Relevant Orders    Ambulatory Referral to Cardiology    ECG 12 Lead    XR Chest PA & Lateral          She is going to go to pharm and get Hep A and shingles vaccines.  Refer cardiology  She has card risk factors.  I have given rx for nitro if cp reoccurs.

## 2018-08-07 NOTE — ASSESSMENT & PLAN NOTE
Hypertension is improving with treatment.  Continue current treatment regimen.  Weight loss.  Regular aerobic exercise.  Stop smoking.  Continue current medications.  Blood pressure will be reassessed at the next regular appointment.

## 2018-08-23 ENCOUNTER — HOSPITAL ENCOUNTER (OUTPATIENT)
Dept: GENERAL RADIOLOGY | Facility: HOSPITAL | Age: 78
Discharge: HOME | End: 2018-08-23
Attending: FAMILY MEDICINE | Admitting: FAMILY MEDICINE

## 2018-08-23 DIAGNOSIS — R07.89 CHEST WALL DISCOMFORT: ICD-10-CM

## 2018-08-23 DIAGNOSIS — I10 ESSENTIAL HYPERTENSION: ICD-10-CM

## 2018-08-23 PROCEDURE — 71046 X-RAY EXAM CHEST 2 VIEWS: CPT

## 2018-09-05 ENCOUNTER — TELEPHONE (OUTPATIENT)
Dept: FAMILY MEDICINE CLINIC | Facility: CLINIC | Age: 78
End: 2018-09-05

## 2018-09-05 DIAGNOSIS — I10 ESSENTIAL HYPERTENSION: ICD-10-CM

## 2018-09-05 DIAGNOSIS — R07.89 CHEST WALL DISCOMFORT: Primary | ICD-10-CM

## 2018-09-05 NOTE — TELEPHONE ENCOUNTER
LMOM @ 450 PM FOR PT TO RETURN CALL TO OFFICE REGARDING RECENT TEST RESULTS.  ----- Message from Marian Perea MD sent at 9/5/2018  2:44 PM EDT -----  5 the patient that her chest x-ray does show some enlargement of the heart and some chronic emphysematous changes in the lung fields.  We will proceed with a heart echo

## 2018-09-10 ENCOUNTER — TELEPHONE (OUTPATIENT)
Dept: FAMILY MEDICINE CLINIC | Facility: CLINIC | Age: 78
End: 2018-09-10

## 2018-09-10 NOTE — TELEPHONE ENCOUNTER
PT HAS APPOINTMENT WITH CARDIOLOGIST ON 9/18 AND HAS ECHO ORDERED.  ----- Message from Shahrzad Goldberg sent at 9/5/2018  5:14 PM EDT -----  Regarding: PLEASE CALL  Contact: [phone]  PT IS RETURNING YOUR CALL REGARDING CHEST XRAY RESULTS

## 2018-10-01 ENCOUNTER — OFFICE VISIT (OUTPATIENT)
Dept: FAMILY MEDICINE CLINIC | Facility: CLINIC | Age: 78
End: 2018-10-01

## 2018-10-01 VITALS
HEIGHT: 64 IN | TEMPERATURE: 99 F | DIASTOLIC BLOOD PRESSURE: 92 MMHG | WEIGHT: 158 LBS | OXYGEN SATURATION: 98 % | SYSTOLIC BLOOD PRESSURE: 152 MMHG | HEART RATE: 70 BPM | BODY MASS INDEX: 26.98 KG/M2

## 2018-10-01 DIAGNOSIS — E11.9 TYPE 2 DIABETES MELLITUS WITHOUT COMPLICATION, WITHOUT LONG-TERM CURRENT USE OF INSULIN (HCC): ICD-10-CM

## 2018-10-01 DIAGNOSIS — I10 ESSENTIAL HYPERTENSION: Primary | ICD-10-CM

## 2018-10-01 DIAGNOSIS — E78.00 PURE HYPERCHOLESTEROLEMIA: ICD-10-CM

## 2018-10-01 DIAGNOSIS — Z23 NEEDS FLU SHOT: ICD-10-CM

## 2018-10-01 PROCEDURE — 83036 HEMOGLOBIN GLYCOSYLATED A1C: CPT | Performed by: FAMILY MEDICINE

## 2018-10-01 PROCEDURE — 90662 IIV NO PRSV INCREASED AG IM: CPT | Performed by: FAMILY MEDICINE

## 2018-10-01 PROCEDURE — 99213 OFFICE O/P EST LOW 20 MIN: CPT | Performed by: FAMILY MEDICINE

## 2018-10-01 PROCEDURE — G0008 ADMIN INFLUENZA VIRUS VAC: HCPCS | Performed by: FAMILY MEDICINE

## 2018-10-01 RX ORDER — CARVEDILOL PHOSPHATE 20 MG/1
40 CAPSULE, EXTENDED RELEASE ORAL DAILY
Qty: 90 CAPSULE | Refills: 3 | Status: SHIPPED | OUTPATIENT
Start: 2018-10-01 | End: 2018-10-09 | Stop reason: SDUPTHER

## 2018-10-01 NOTE — PROGRESS NOTES
"Subjective   Cheryl Tomas is a 78 y.o. female.     Pt is here for routine fu. No problems to discuss today. Has blood pressure log with her that she is not happy with readings.    Hypertension   This is a recurrent problem. The current episode started more than 1 year ago. The problem has been gradually worsening since onset. The problem is uncontrolled. Pertinent negatives include no anxiety, blurred vision, chest pain, malaise/fatigue or shortness of breath. There are no associated agents to hypertension. Risk factors for coronary artery disease include diabetes mellitus and dyslipidemia. Past treatments include angiotensin blockers, diuretics and beta blockers. Current antihypertension treatment includes angiotensin blockers, diuretics and beta blockers. There are no compliance problems.  There is no history of angina or kidney disease. There is no history of chronic renal disease, sleep apnea or a thyroid problem.        The following portions of the patient's history were reviewed and updated as appropriate: allergies, current medications, past family history, past medical history, past social history, past surgical history and problem list.    Review of Systems   Constitutional: Negative for malaise/fatigue.   Eyes: Negative for blurred vision.   Respiratory: Negative for shortness of breath.    Cardiovascular: Negative for chest pain.       Objective     Vitals:    10/01/18 1340   BP: 152/92   Pulse: 70   Temp: 99 °F (37.2 °C)   SpO2: 98%   Weight: 71.7 kg (158 lb)   Height: 162.6 cm (64\")       Physical Exam   Constitutional: She is oriented to person, place, and time. She appears well-developed and well-nourished.   HENT:   Head: Normocephalic and atraumatic.   Eyes: Pupils are equal, round, and reactive to light. EOM are normal. Right eye exhibits no discharge. Left eye exhibits no discharge.   Neck: Normal range of motion. Neck supple.   Cardiovascular: Normal rate, regular rhythm, normal heart sounds " and intact distal pulses.    Pulmonary/Chest: Effort normal and breath sounds normal.   Abdominal: Soft. Bowel sounds are normal. She exhibits no mass. There is no tenderness.   Musculoskeletal: Normal range of motion.        Right shoulder: She exhibits no swelling.   Neurological: She is alert and oriented to person, place, and time. She has normal reflexes.   Skin: Skin is warm and dry. No cyanosis. Nails show no clubbing.   Psychiatric: She has a normal mood and affect. Her behavior is normal. Judgment and thought content normal.   Nursing note and vitals reviewed.      Assessment/Plan     Problem List Items Addressed This Visit        Cardiovascular and Mediastinum    Hypertension - Primary    Relevant Medications    carvedilol CR (COREG CR) 20 MG 24 hr capsule    Hyperlipidemia       Endocrine    Diabetes mellitus (CMS/HCC)    Relevant Orders    POC Glycosylated Hemoglobin (Hb A1C)       Other    Needs flu shot    Relevant Orders    Flu Vaccine High Dose PF 65YR+ ([id number]) (Completed)        Increase carvedilol 20 mg today.   Fu 3 mo  Keep bp log  Flu shot today

## 2018-10-02 LAB — HBA1C MFR BLD: 5.4 %

## 2018-10-09 ENCOUNTER — PRIOR AUTHORIZATION (OUTPATIENT)
Dept: FAMILY MEDICINE CLINIC | Facility: CLINIC | Age: 78
End: 2018-10-09

## 2018-10-09 DIAGNOSIS — I10 ESSENTIAL HYPERTENSION: ICD-10-CM

## 2018-10-09 RX ORDER — CARVEDILOL PHOSPHATE 40 MG/1
40 CAPSULE, EXTENDED RELEASE ORAL DAILY
Qty: 30 CAPSULE | Refills: 3 | Status: SHIPPED | OUTPATIENT
Start: 2018-10-09 | End: 2018-10-17 | Stop reason: SDUPTHER

## 2018-10-09 NOTE — TELEPHONE ENCOUNTER
PA submitted via [email or web address] 10-4-18.    PA denied 10-8-18, insurance will cover 40mg 1qd instead of 20mg 2 capsules qd. Spoke with Scarlet Pitts and she gave verbal ok to change this per Dr. Perea. New RX sent to pharmacy

## 2018-10-17 DIAGNOSIS — I10 ESSENTIAL HYPERTENSION: ICD-10-CM

## 2018-10-17 RX ORDER — CARVEDILOL PHOSPHATE 40 MG/1
40 CAPSULE, EXTENDED RELEASE ORAL DAILY
Qty: 30 CAPSULE | Refills: 3 | Status: SHIPPED | OUTPATIENT
Start: 2018-10-17 | End: 2019-02-17 | Stop reason: SDUPTHER

## 2018-10-22 DIAGNOSIS — Z12.4 PAP SMEAR FOR CERVICAL CANCER SCREENING: Primary | ICD-10-CM

## 2018-11-07 ENCOUNTER — OFFICE VISIT (OUTPATIENT)
Dept: FAMILY MEDICINE CLINIC | Facility: CLINIC | Age: 78
End: 2018-11-07

## 2018-11-07 VITALS
DIASTOLIC BLOOD PRESSURE: 90 MMHG | WEIGHT: 159.8 LBS | OXYGEN SATURATION: 98 % | TEMPERATURE: 98.3 F | SYSTOLIC BLOOD PRESSURE: 188 MMHG | BODY MASS INDEX: 27.28 KG/M2 | HEART RATE: 74 BPM | HEIGHT: 64 IN

## 2018-11-07 DIAGNOSIS — E78.00 PURE HYPERCHOLESTEROLEMIA: ICD-10-CM

## 2018-11-07 DIAGNOSIS — I10 ESSENTIAL HYPERTENSION: Primary | ICD-10-CM

## 2018-11-07 DIAGNOSIS — E11.9 TYPE 2 DIABETES MELLITUS WITHOUT COMPLICATION, WITHOUT LONG-TERM CURRENT USE OF INSULIN (HCC): ICD-10-CM

## 2018-11-07 PROCEDURE — 99213 OFFICE O/P EST LOW 20 MIN: CPT | Performed by: FAMILY MEDICINE

## 2018-11-07 RX ORDER — HYDRALAZINE HYDROCHLORIDE 10 MG/1
10 TABLET, FILM COATED ORAL 3 TIMES DAILY
Qty: 30 TABLET | Refills: 3 | Status: SHIPPED | OUTPATIENT
Start: 2018-11-07 | End: 2019-01-08 | Stop reason: SDUPTHER

## 2018-11-07 NOTE — PROGRESS NOTES
"Subjective   Cheryl Tomas is a 78 y.o. female.     Pt is here to fu on htn. Pt carvedilol dose was increased to 20mg.  Pt stopped taking sertraline due to not liking not having emotions. Pt states she is doing better by keeping busy.  yesteday bp 150/78.    Hypertension   This is a recurrent problem. The current episode started 1 to 4 weeks ago. The problem has been waxing and waning since onset. The problem is uncontrolled. Pertinent negatives include no anxiety, blurred vision, chest pain, headaches, malaise/fatigue, peripheral edema or shortness of breath. Risk factors for coronary artery disease include diabetes mellitus, dyslipidemia and post-menopausal state. Past treatments include beta blockers, calcium channel blockers, angiotensin blockers and diuretics. Current antihypertension treatment includes angiotensin blockers, beta blockers and calcium channel blockers. The current treatment provides mild improvement. There are no compliance problems.  There is no history of angina or kidney disease. There is no history of sleep apnea or a thyroid problem.        The following portions of the patient's history were reviewed and updated as appropriate: allergies, current medications, past family history, past medical history, past social history, past surgical history and problem list.    Review of Systems   Constitutional: Negative.  Negative for malaise/fatigue.   Eyes: Negative for blurred vision.   Respiratory: Negative.  Negative for shortness of breath.    Cardiovascular: Negative.  Negative for chest pain.   Neurological: Negative.  Negative for headaches.       Objective     Vitals:    11/07/18 1115   BP: (!) 188/90   Pulse: 74   Temp: 98.3 °F (36.8 °C)   SpO2: 98%   Weight: 72.5 kg (159 lb 12.8 oz)   Height: 162.6 cm (64\")       Physical Exam   Constitutional: She is oriented to person, place, and time. She appears well-developed and well-nourished.   HENT:   Head: Normocephalic and atraumatic.   Eyes: " Pupils are equal, round, and reactive to light. EOM are normal. Right eye exhibits no discharge. Left eye exhibits no discharge.   Neck: Normal range of motion. Neck supple.   Cardiovascular: Normal rate, regular rhythm, normal heart sounds and intact distal pulses.    Pulmonary/Chest: Effort normal and breath sounds normal.   Abdominal: Soft. Bowel sounds are normal. She exhibits no mass. There is no tenderness.   Musculoskeletal: Normal range of motion.        Right shoulder: She exhibits no swelling.   Neurological: She is alert and oriented to person, place, and time. She has normal reflexes.   Skin: Skin is warm and dry. No cyanosis. Nails show no clubbing.   Psychiatric: She has a normal mood and affect. Her behavior is normal. Judgment and thought content normal.   Nursing note and vitals reviewed.      Assessment/Plan     Problem List Items Addressed This Visit        Cardiovascular and Mediastinum    Hypertension - Primary    Current Assessment & Plan     Hypertension is worsening.  Continue current treatment regimen.  Regular aerobic exercise.  Medication changes per orders.  Blood pressure will be reassessed at the next regular appointment.         Relevant Medications    hydrALAZINE (APRESOLINE) 10 MG tablet    Hyperlipidemia       Endocrine    Diabetes mellitus (CMS/HCC)

## 2018-11-07 NOTE — ASSESSMENT & PLAN NOTE
Hypertension is worsening.  Continue current treatment regimen.  Regular aerobic exercise.  Medication changes per orders.  Blood pressure will be reassessed at the next regular appointment.

## 2018-12-03 ENCOUNTER — TRANSCRIBE ORDERS (OUTPATIENT)
Dept: ADMINISTRATIVE | Facility: HOSPITAL | Age: 78
End: 2018-12-03

## 2018-12-03 DIAGNOSIS — Z12.31 VISIT FOR SCREENING MAMMOGRAM: Primary | ICD-10-CM

## 2018-12-10 ENCOUNTER — OFFICE VISIT (OUTPATIENT)
Dept: FAMILY MEDICINE CLINIC | Facility: CLINIC | Age: 78
End: 2018-12-10

## 2018-12-10 VITALS
HEIGHT: 64 IN | BODY MASS INDEX: 27.01 KG/M2 | WEIGHT: 158.2 LBS | SYSTOLIC BLOOD PRESSURE: 128 MMHG | HEART RATE: 69 BPM | DIASTOLIC BLOOD PRESSURE: 68 MMHG | OXYGEN SATURATION: 98 %

## 2018-12-10 DIAGNOSIS — I10 ESSENTIAL HYPERTENSION: Primary | ICD-10-CM

## 2018-12-10 PROCEDURE — 99212 OFFICE O/P EST SF 10 MIN: CPT | Performed by: FAMILY MEDICINE

## 2018-12-10 NOTE — PROGRESS NOTES
"Subjective   Cheryl Tomas is a 78 y.o. female.     Pt is here to fu on htn.    Hypertension   This is a chronic problem. The current episode started more than 1 year ago. The problem has been gradually improving since onset. The problem is controlled. Pertinent negatives include no anxiety, chest pain, headaches, malaise/fatigue, peripheral edema or shortness of breath. There are no associated agents to hypertension. The current treatment provides moderate improvement. There are no compliance problems.  There is no history of angina or kidney disease.      At last appt she was started on hydralizine.      The following portions of the patient's history were reviewed and updated as appropriate: allergies, current medications, past family history, past medical history, past social history, past surgical history and problem list.    Review of Systems   Constitutional: Negative for malaise/fatigue.   Respiratory: Negative for shortness of breath.    Cardiovascular: Negative for chest pain.   Neurological: Negative for headaches.       Objective     Vitals:    12/10/18 1113   BP: 128/68   Pulse: 69   SpO2: 98%   Weight: 71.8 kg (158 lb 3.2 oz)   Height: 162.6 cm (64\")       Physical Exam   Constitutional: She is oriented to person, place, and time. She appears well-developed and well-nourished.   HENT:   Head: Normocephalic and atraumatic.   Eyes: EOM are normal. Pupils are equal, round, and reactive to light. Right eye exhibits no discharge. Left eye exhibits no discharge.   Neck: Normal range of motion. Neck supple.   Cardiovascular: Normal rate, regular rhythm, normal heart sounds and intact distal pulses.   Pulmonary/Chest: Effort normal and breath sounds normal.   Abdominal: Soft. Bowel sounds are normal. She exhibits no mass. There is no tenderness.   Musculoskeletal: Normal range of motion.        Right shoulder: She exhibits no swelling.   Neurological: She is alert and oriented to person, place, and time. She " has normal reflexes.   Skin: Skin is warm and dry. No cyanosis. Nails show no clubbing.   Psychiatric: She has a normal mood and affect. Her behavior is normal. Judgment and thought content normal.   Nursing note and vitals reviewed.      Assessment/Plan     Problem List Items Addressed This Visit        Cardiovascular and Mediastinum    Hypertension - Primary    Current Assessment & Plan     Hypertension is improving with treatment.  Continue current treatment regimen.  Continue current medications.  Blood pressure will be reassessed at the next regular appointment.

## 2019-01-08 DIAGNOSIS — I10 ESSENTIAL HYPERTENSION: ICD-10-CM

## 2019-01-08 RX ORDER — HYDRALAZINE HYDROCHLORIDE 10 MG/1
10 TABLET, FILM COATED ORAL 3 TIMES DAILY
Qty: 30 TABLET | Refills: 3 | Status: SHIPPED | OUTPATIENT
Start: 2019-01-08 | End: 2019-02-20 | Stop reason: SDUPTHER

## 2019-02-17 DIAGNOSIS — I10 ESSENTIAL HYPERTENSION: ICD-10-CM

## 2019-02-19 RX ORDER — CARVEDILOL PHOSPHATE 40 MG/1
CAPSULE, EXTENDED RELEASE ORAL
Qty: 30 CAPSULE | Refills: 3 | Status: SHIPPED | OUTPATIENT
Start: 2019-02-19 | End: 2019-06-03 | Stop reason: SDUPTHER

## 2019-02-20 DIAGNOSIS — E11.9 TYPE 2 DIABETES MELLITUS WITHOUT COMPLICATION, WITHOUT LONG-TERM CURRENT USE OF INSULIN (HCC): ICD-10-CM

## 2019-02-20 DIAGNOSIS — I10 ESSENTIAL HYPERTENSION: ICD-10-CM

## 2019-02-20 DIAGNOSIS — E78.00 PURE HYPERCHOLESTEROLEMIA: ICD-10-CM

## 2019-02-20 RX ORDER — TRIAMTERENE AND HYDROCHLOROTHIAZIDE 37.5; 25 MG/1; MG/1
TABLET ORAL
Qty: 90 TABLET | Refills: 3 | Status: SHIPPED | OUTPATIENT
Start: 2019-02-20 | End: 2019-06-03

## 2019-02-20 RX ORDER — IRBESARTAN 300 MG/1
300 TABLET ORAL NIGHTLY
Qty: 90 TABLET | Refills: 3 | Status: SHIPPED | OUTPATIENT
Start: 2019-02-20 | End: 2019-02-21 | Stop reason: SDUPTHER

## 2019-02-20 RX ORDER — HYDRALAZINE HYDROCHLORIDE 10 MG/1
TABLET, FILM COATED ORAL
Qty: 120 TABLET | Refills: 3 | Status: SHIPPED | OUTPATIENT
Start: 2019-02-20 | End: 2019-06-03

## 2019-02-20 RX ORDER — PRAVASTATIN SODIUM 40 MG
40 TABLET ORAL NIGHTLY
Qty: 90 TABLET | Refills: 3 | Status: SHIPPED | OUTPATIENT
Start: 2019-02-20 | End: 2019-02-21 | Stop reason: SDUPTHER

## 2019-02-21 DIAGNOSIS — E78.00 PURE HYPERCHOLESTEROLEMIA: ICD-10-CM

## 2019-02-21 DIAGNOSIS — I10 ESSENTIAL HYPERTENSION: ICD-10-CM

## 2019-02-25 RX ORDER — PRAVASTATIN SODIUM 40 MG
40 TABLET ORAL NIGHTLY
Qty: 90 TABLET | Refills: 3 | Status: SHIPPED | OUTPATIENT
Start: 2019-02-25 | End: 2020-02-12

## 2019-02-25 RX ORDER — IRBESARTAN 300 MG/1
TABLET ORAL
Qty: 90 TABLET | Refills: 3 | Status: SHIPPED | OUTPATIENT
Start: 2019-02-25 | End: 2019-06-03

## 2019-02-26 ENCOUNTER — OFFICE VISIT (OUTPATIENT)
Dept: FAMILY MEDICINE CLINIC | Facility: CLINIC | Age: 79
End: 2019-02-26

## 2019-02-26 VITALS
SYSTOLIC BLOOD PRESSURE: 130 MMHG | WEIGHT: 158 LBS | TEMPERATURE: 97.9 F | HEIGHT: 64 IN | DIASTOLIC BLOOD PRESSURE: 86 MMHG | HEART RATE: 90 BPM | OXYGEN SATURATION: 97 % | BODY MASS INDEX: 26.98 KG/M2

## 2019-02-26 DIAGNOSIS — M25.531 RIGHT WRIST PAIN: Primary | ICD-10-CM

## 2019-02-26 PROCEDURE — 99213 OFFICE O/P EST LOW 20 MIN: CPT | Performed by: FAMILY MEDICINE

## 2019-02-26 PROCEDURE — 36415 COLL VENOUS BLD VENIPUNCTURE: CPT | Performed by: FAMILY MEDICINE

## 2019-02-26 RX ORDER — PREDNISONE 20 MG/1
40 TABLET ORAL DAILY
Qty: 10 TABLET | Refills: 0 | Status: SHIPPED | OUTPATIENT
Start: 2019-02-26 | End: 2019-04-22

## 2019-02-26 NOTE — PROGRESS NOTES
"Subjective   Cheryl Tomas is a 78 y.o. female.     Right hand and wrist that started 3 days ago. Hurts to move fingers and having hard time lifiting with hand. Pt has been using friends cream for arthritis OTC and taking ibuprofen and helps some.    Wrist Pain    The pain is present in the right wrist. This is a new problem. The current episode started in the past 7 days. There has been no history of extremity trauma. The problem occurs constantly. The problem has been waxing and waning. The quality of the pain is described as aching. The pain is at a severity of 3/10. The pain is mild. Associated symptoms include joint swelling and stiffness. Pertinent negatives include no fever, inability to bear weight, itching, joint locking or limited range of motion. The symptoms are aggravated by activity. She has tried NSAIDS for the symptoms. The treatment provided moderate relief.        The following portions of the patient's history were reviewed and updated as appropriate: allergies, current medications, past family history, past medical history, past social history, past surgical history and problem list.    Review of Systems   Constitutional: Negative for fever.   Musculoskeletal: Positive for stiffness.   Skin: Negative for itching.       Objective     Vitals:    02/26/19 1503   BP: 130/86   Pulse: 90   Temp: 97.9 °F (36.6 °C)   SpO2: 97%   Weight: 71.7 kg (158 lb)   Height: 162.6 cm (64\")       Physical Exam   Constitutional: She appears well-developed and well-nourished.   Musculoskeletal: Normal range of motion. She exhibits tenderness. She exhibits no edema or deformity.   She has full range of motion of the wrist there is no swelling there is no erythema.  She is tender over the right hypothenar eminence.    Nursing note and vitals reviewed.      Assessment/Plan     Problem List Items Addressed This Visit        Nervous and Auditory    Right wrist pain - Primary    Relevant Medications    predniSONE " (DELTASONE) 20 MG tablet    Other Relevant Orders    XR Wrist 3+ View Right        Check a CBC CMP uric acid and CRP today.

## 2019-02-27 LAB
BASOPHILS # BLD AUTO: 0.04 10*3/MM3 (ref 0–0.2)
BASOPHILS NFR BLD AUTO: 0.5 % (ref 0–1)
CRP SERPL-MCNC: 0.19 MG/DL (ref 0–1)
DIFFERENTIAL COMMENT: NORMAL
EOSINOPHIL # BLD AUTO: 0.1 10*3/MM3 (ref 0–0.3)
EOSINOPHIL NFR BLD AUTO: 1.3 % (ref 0–3)
ERYTHROCYTE [DISTWIDTH] IN BLOOD BY AUTOMATED COUNT: 13.1 % (ref 11.3–14.5)
ERYTHROCYTE [SEDIMENTATION RATE] IN BLOOD BY WESTERGREN METHOD: 19 MM/HR (ref 0–30)
HCT VFR BLD AUTO: 40 % (ref 34.5–44)
HGB BLD-MCNC: 13 G/DL (ref 11.5–15.5)
IMM GRANULOCYTES # BLD AUTO: 0.05 10*3/MM3 (ref 0–0.05)
IMM GRANULOCYTES NFR BLD AUTO: 0.7 % (ref 0–0.6)
LYMPHOCYTES # BLD AUTO: 1.88 10*3/MM3 (ref 0.6–4.8)
LYMPHOCYTES NFR BLD AUTO: 25.1 % (ref 24–44)
MCH RBC QN AUTO: 28.8 PG (ref 27–31)
MCHC RBC AUTO-ENTMCNC: 32.5 G/DL (ref 32–36)
MCV RBC AUTO: 88.5 FL (ref 80–99)
MONOCYTES # BLD AUTO: 0.58 10*3/MM3 (ref 0–1)
MONOCYTES NFR BLD AUTO: 7.8 % (ref 0–12)
NEUTROPHILS # BLD AUTO: 4.88 10*3/MM3 (ref 1.5–8.3)
NEUTROPHILS NFR BLD AUTO: 65.3 % (ref 41–71)
PLATELET # BLD AUTO: 230 10*3/MM3 (ref 150–450)
PLATELET BLD QL SMEAR: NORMAL
RBC # BLD AUTO: 4.52 10*6/MM3 (ref 3.89–5.14)
RBC MORPH BLD: NORMAL
URATE SERPL-MCNC: 8.7 MG/DL (ref 3.1–7.8)
WBC # BLD AUTO: 7.48 10*3/MM3 (ref 3.5–10.8)

## 2019-03-04 ENCOUNTER — TELEPHONE (OUTPATIENT)
Dept: FAMILY MEDICINE CLINIC | Facility: CLINIC | Age: 79
End: 2019-03-04

## 2019-03-04 NOTE — TELEPHONE ENCOUNTER
Patient notified of results. States her wrist is better but if she needs to follow up she will call back.    ----- Message from Marian Perea MD sent at 3/4/2019  9:43 AM EST -----  Notify the patient that her lab results show an elevation of her uric acid level.  This was likely acute gout in her wrist.  If her symptoms have not resolved she should notify the office.

## 2019-04-22 ENCOUNTER — OFFICE VISIT (OUTPATIENT)
Dept: FAMILY MEDICINE CLINIC | Facility: CLINIC | Age: 79
End: 2019-04-22

## 2019-04-22 VITALS
SYSTOLIC BLOOD PRESSURE: 160 MMHG | WEIGHT: 157.8 LBS | TEMPERATURE: 97.8 F | HEART RATE: 67 BPM | HEIGHT: 64 IN | DIASTOLIC BLOOD PRESSURE: 78 MMHG | BODY MASS INDEX: 26.94 KG/M2 | OXYGEN SATURATION: 98 %

## 2019-04-22 DIAGNOSIS — Z12.11 COLON CANCER SCREENING: ICD-10-CM

## 2019-04-22 DIAGNOSIS — Z12.39 BREAST CANCER SCREENING: ICD-10-CM

## 2019-04-22 DIAGNOSIS — E11.9 TYPE 2 DIABETES MELLITUS WITHOUT COMPLICATION, WITHOUT LONG-TERM CURRENT USE OF INSULIN (HCC): ICD-10-CM

## 2019-04-22 DIAGNOSIS — I10 ESSENTIAL HYPERTENSION: Primary | ICD-10-CM

## 2019-04-22 DIAGNOSIS — E79.0 ELEVATED URIC ACID IN BLOOD: ICD-10-CM

## 2019-04-22 LAB
EXPIRATION DATE: NORMAL
HBA1C MFR BLD: 5.5 %
Lab: NORMAL

## 2019-04-22 PROCEDURE — 99214 OFFICE O/P EST MOD 30 MIN: CPT | Performed by: FAMILY MEDICINE

## 2019-04-22 PROCEDURE — 83036 HEMOGLOBIN GLYCOSYLATED A1C: CPT | Performed by: FAMILY MEDICINE

## 2019-04-22 RX ORDER — ALLOPURINOL 100 MG/1
100 TABLET ORAL DAILY
Qty: 30 TABLET | Refills: 5 | Status: SHIPPED | OUTPATIENT
Start: 2019-04-22 | End: 2019-06-03

## 2019-04-22 NOTE — PROGRESS NOTES
Subjective   Cheryl Tomas is a 79 y.o. female.     Pt is here fore 6 month fu on htn. Has been 140s/70s at home.  Pt would like order for colonoscopy and mammogram.    Pt would like moles and skin tags looked at due to some of them being painful and more appearing.    Hypertension   This is a chronic problem. The current episode started more than 1 year ago. The problem has been waxing and waning since onset. The problem is controlled. Associated symptoms include headaches, palpitations and peripheral edema. Pertinent negatives include no anxiety, blurred vision, chest pain, malaise/fatigue or shortness of breath. Risk factors for coronary artery disease include post-menopausal state and dyslipidemia. Current antihypertension treatment includes direct vasodilators, calcium channel blockers, beta blockers and angiotensin blockers. The current treatment provides mild improvement. There is no history of angina, kidney disease or CAD/MI. Identifiable causes of hypertension include a thyroid problem. There is no history of chronic renal disease.        Patient states that she has no medical concerns today. States that she may need to address her gout. States that she has had no exacerbated issues. States that she received a medication for a week that helped her symptoms. Note normal arthritic pain in her hands. Denies worsening symptoms. No pain in wrist.     States that she has her blood pressures taken twice per week. States that it has been around 140/50s but cant remember exactly.   Blood pressure recheck was 162/70.  The following portions of the patient's history were reviewed and updated as appropriate: allergies, current medications, past family history, past medical history, past social history, past surgical history and problem list.    Review of Systems   Constitutional: Negative for chills, fatigue, fever, malaise/fatigue and unexpected weight change.   HENT: Positive for rhinorrhea. Negative for  "congestion, ear pain, nosebleeds, sinus pressure, sneezing and sore throat.    Eyes: Negative for blurred vision and itching.   Respiratory: Negative for cough, chest tightness, shortness of breath and wheezing.    Cardiovascular: Positive for palpitations and leg swelling. Negative for chest pain.   Gastrointestinal: Positive for diarrhea. Negative for abdominal pain, constipation, nausea and vomiting.        Diarrhea with chronic diverticulosis     Genitourinary: Negative for difficulty urinating, frequency, hematuria and urgency.   Musculoskeletal: Positive for arthralgias. Negative for back pain, gait problem and myalgias.        Hand pain   Skin: Negative for rash and wound.   Allergic/Immunologic: Positive for environmental allergies.   Neurological: Positive for headaches. Negative for dizziness, weakness and numbness.   Psychiatric/Behavioral: Negative for agitation, confusion, decreased concentration and suicidal ideas. The patient is nervous/anxious.        Objective     Vitals:    04/22/19 1048   BP: 160/78   Pulse: 67   Temp: 97.8 °F (36.6 °C)   SpO2: 98%   Weight: 71.6 kg (157 lb 12.8 oz)   Height: 162.6 cm (64\")       Physical Exam   Constitutional: She is oriented to person, place, and time. She appears well-developed and well-nourished. No distress.   HENT:   Head: Normocephalic and atraumatic.   Mouth/Throat: Oropharynx is clear and moist.   Eyes: Conjunctivae, EOM and lids are normal. Pupils are equal, round, and reactive to light. Right eye exhibits no discharge. Left eye exhibits no discharge. No scleral icterus.   Neck: No JVD present. Carotid bruit is not present. No tracheal deviation present. No thyroid mass and no thyromegaly present.   Cardiovascular: Normal rate, regular rhythm, normal heart sounds and intact distal pulses. Exam reveals no gallop and no friction rub.   No murmur heard.  Pulmonary/Chest: Effort normal and breath sounds normal. No respiratory distress. She has no decreased " breath sounds. She has no wheezes. She has no rhonchi. She has no rales. She exhibits no tenderness.   Abdominal: Soft. Bowel sounds are normal. She exhibits no distension and no mass. There is no hepatosplenomegaly. There is no tenderness. There is no rebound, no guarding and no CVA tenderness.   Musculoskeletal: Normal range of motion. She exhibits no edema.      During the foot exam she had a monofilament test not performed.  Lymphadenopathy:     She has no cervical adenopathy.        Right: No supraclavicular adenopathy present.        Left: No supraclavicular adenopathy present.   Neurological: She is alert and oriented to person, place, and time. She has normal strength and normal reflexes.   Skin: No bruising and no rash noted. She is not diaphoretic. No cyanosis or erythema. Nails show no clubbing.   Psychiatric: She has a normal mood and affect. Her speech is normal and behavior is normal. Judgment and thought content normal. Cognition and memory are normal.       Assessment/Plan     Problem List Items Addressed This Visit        Cardiovascular and Mediastinum    Hypertension - Primary       Endocrine    Diabetes mellitus (CMS/HCC)    Relevant Orders    POC Glycosylated Hemoglobin (Hb A1C) (Completed)       Other    Elevated uric acid in blood    Relevant Medications    allopurinol (ZYLOPRIM) 100 MG tablet    Breast cancer screening    Relevant Orders    Mammo Screening Digital Tomosynthesis Bilateral With CAD    Colon cancer screening    Relevant Orders    Ambulatory Referral For Screening Colonoscopy        poct a1c 5.5  Her blood pressure is elevated today.  I have given her blood pressure log to have her blood pressure monitored daily.  Will recheck blood pressure in 4-6 weeks.  Consider nephrology referral for difficult to control blood pressure.

## 2019-06-03 ENCOUNTER — OFFICE VISIT (OUTPATIENT)
Dept: FAMILY MEDICINE CLINIC | Facility: CLINIC | Age: 79
End: 2019-06-03

## 2019-06-03 VITALS
HEART RATE: 68 BPM | BODY MASS INDEX: 27.09 KG/M2 | WEIGHT: 157.8 LBS | TEMPERATURE: 97.2 F | OXYGEN SATURATION: 99 % | DIASTOLIC BLOOD PRESSURE: 78 MMHG | SYSTOLIC BLOOD PRESSURE: 130 MMHG

## 2019-06-03 DIAGNOSIS — I10 ESSENTIAL HYPERTENSION: Primary | ICD-10-CM

## 2019-06-03 DIAGNOSIS — H16.139 ACTINIC KERATITIS, UNSPECIFIED LATERALITY: ICD-10-CM

## 2019-06-03 DIAGNOSIS — E79.0 ELEVATED URIC ACID IN BLOOD: ICD-10-CM

## 2019-06-03 PROCEDURE — 99213 OFFICE O/P EST LOW 20 MIN: CPT | Performed by: FAMILY MEDICINE

## 2019-06-03 RX ORDER — HYDRALAZINE HYDROCHLORIDE 10 MG/1
10 TABLET, FILM COATED ORAL 3 TIMES DAILY
Qty: 120 TABLET | Refills: 3 | Status: SHIPPED | OUTPATIENT
Start: 2019-06-03 | End: 2019-10-07 | Stop reason: SDUPTHER

## 2019-06-03 RX ORDER — ALLOPURINOL 100 MG/1
100 TABLET ORAL DAILY
Qty: 90 TABLET | Refills: 3 | Status: SHIPPED | OUTPATIENT
Start: 2019-06-03 | End: 2019-06-03

## 2019-06-03 RX ORDER — TRIAMTERENE AND HYDROCHLOROTHIAZIDE 37.5; 25 MG/1; MG/1
1 TABLET ORAL DAILY
Qty: 90 TABLET | Refills: 3 | Status: SHIPPED | OUTPATIENT
Start: 2019-06-03 | End: 2020-04-27

## 2019-06-03 RX ORDER — CARVEDILOL PHOSPHATE 40 MG/1
40 CAPSULE, EXTENDED RELEASE ORAL DAILY
Qty: 90 CAPSULE | Refills: 3 | Status: SHIPPED | OUTPATIENT
Start: 2019-06-03 | End: 2020-04-14 | Stop reason: SDUPTHER

## 2019-06-03 RX ORDER — IRBESARTAN 300 MG/1
300 TABLET ORAL NIGHTLY
Qty: 90 TABLET | Refills: 3 | Status: SHIPPED | OUTPATIENT
Start: 2019-06-03 | End: 2020-04-04

## 2019-06-03 RX ORDER — DILTIAZEM HYDROCHLORIDE 240 MG/1
240 CAPSULE, COATED, EXTENDED RELEASE ORAL DAILY
Qty: 90 CAPSULE | Refills: 3 | Status: SHIPPED | OUTPATIENT
Start: 2019-06-03 | End: 2020-04-14

## 2019-06-03 RX ORDER — ALLOPURINOL 300 MG/1
300 TABLET ORAL DAILY
Qty: 90 TABLET | Refills: 3 | Status: SHIPPED | OUTPATIENT
Start: 2019-06-03 | End: 2020-04-14

## 2019-06-03 NOTE — PROGRESS NOTES
Subjective   Cheryl Tomas is a 79 y.o. female.     Pt is here for 4 week fu on htn.    Refill needed on carvediolol diltiazem and allopurinol and hydralazine.    carvediolol to [facility] others to [facility].    History of Present Illness she is here for a hypertension follow-up.  She is tolerating her medications well.  She denies chest pain or shortness of breath.  She denies numbness tingling or weakness.    She is requesting a refill on carvedilol, diltiazem, and hydralazine for her blood pressure.  Apparently for cough she needs the carvedilol sent to [facility] and the others including allopurinol sent to [facility].    She is tolerating the allopurinol well no recent gout attacks or flares.  No swelling of the joints.    She also reports that she has some dry scaling skin lesions on her face and neck that she would like to have checked.    The following portions of the patient's history were reviewed and updated as appropriate: allergies, current medications, past family history, past medical history, past social history, past surgical history and problem list.    Review of Systems   Constitutional: Negative.    HENT: Negative.    Eyes: Negative.    Respiratory: Negative.    Cardiovascular: Negative.    Gastrointestinal: Negative.    Endocrine: Negative.    Genitourinary: Negative.    Musculoskeletal: Negative.    Skin: Negative.    Allergic/Immunologic: Negative.    Neurological: Negative.    Hematological: Negative.    Psychiatric/Behavioral: Negative.    All other systems reviewed and are negative.      Objective     Vitals:    06/03/19 1031   BP: 130/78   Pulse: 68   Temp: 97.2 °F (36.2 °C)   SpO2: 99%   Weight: 71.6 kg (157 lb 12.8 oz)       Physical Exam   Constitutional: She is oriented to person, place, and time. She appears well-developed and well-nourished.   HENT:   Head: Normocephalic and atraumatic.   Eyes: EOM are normal. Pupils are equal, round, and reactive to light. Right eye exhibits no discharge. Left  eye exhibits no discharge.   Neck: Normal range of motion. Neck supple.   Cardiovascular: Normal rate, regular rhythm, normal heart sounds and intact distal pulses.   Pulmonary/Chest: Effort normal and breath sounds normal.   Abdominal: Soft. Bowel sounds are normal. She exhibits no mass. There is no tenderness.   Musculoskeletal: Normal range of motion.        Right shoulder: She exhibits no swelling.   Neurological: She is alert and oriented to person, place, and time. She has normal reflexes.   Skin: Skin is warm and dry. No cyanosis. Nails show no clubbing.   Psychiatric: She has a normal mood and affect. Her behavior is normal. Judgment and thought content normal.   Nursing note and vitals reviewed.  She has a combination of some red erythematous rough scaly lesions on her cheeks and some stuck on keratotic lesions.      Assessment/Plan     Problem List Items Addressed This Visit        Cardiovascular and Mediastinum    Hypertension - Primary    Relevant Medications    diltiaZEM CD (CARTIA XT) 240 MG 24 hr capsule    hydrALAZINE (APRESOLINE) 10 MG tablet    irbesartan (AVAPRO) 300 MG tablet    triamterene-hydrochlorothiazide (MAXZIDE-25) 37.5-25 MG per tablet    carvedilol CR (COREG CR) 40 MG 24 hr capsule       Other    Elevated uric acid in blood    Relevant Medications    allopurinol (ZYLOPRIM) 300 MG tablet    Actinic keratitis    Relevant Orders    Ambulatory Referral to Dermatology

## 2019-06-05 ENCOUNTER — OFFICE VISIT (OUTPATIENT)
Dept: FAMILY MEDICINE CLINIC | Facility: CLINIC | Age: 79
End: 2019-06-05

## 2019-06-05 VITALS
SYSTOLIC BLOOD PRESSURE: 102 MMHG | OXYGEN SATURATION: 98 % | TEMPERATURE: 97.8 F | WEIGHT: 155.8 LBS | BODY MASS INDEX: 26.74 KG/M2 | DIASTOLIC BLOOD PRESSURE: 72 MMHG | HEART RATE: 75 BPM

## 2019-06-05 DIAGNOSIS — G44.201 ACUTE INTRACTABLE TENSION-TYPE HEADACHE: Primary | ICD-10-CM

## 2019-06-05 DIAGNOSIS — Z82.49 FAMILY HISTORY OF BRAIN ANEURYSM: ICD-10-CM

## 2019-06-05 PROBLEM — H16.139 ACTINIC KERATITIS: Status: ACTIVE | Noted: 2019-06-05

## 2019-06-05 PROCEDURE — 99214 OFFICE O/P EST MOD 30 MIN: CPT | Performed by: FAMILY MEDICINE

## 2019-06-05 NOTE — PROGRESS NOTES
Subjective   Cheryl Tomas is a 79 y.o. female.     Pt is here due to neck pain that radiates up behind ears on both sides that goes all the way to top of head. Was not able to sleep last night just flayed in bed due to horrible pain.   Pt is concerned bc daughter had an aneurysm.  Was seen at [facility] on Friday for same pain.    Headache    This is a new problem. The current episode started in the past 7 days. The problem occurs constantly. The problem has been gradually worsening (better now since she took aspirin). The pain is located in the bilateral, occipital and parietal region. The pain does not radiate. The pain quality is not similar to prior headaches. The quality of the pain is described as shooting. The pain is at a severity of 9/10. The pain is moderate. Pertinent negatives include no abdominal pain, blurred vision, coughing, dizziness, fever, hearing loss, insomnia, loss of balance, neck pain, numbness, phonophobia, photophobia, rhinorrhea, scalp tenderness, seizures, sinus pressure, tinnitus or visual change. Nothing aggravates the symptoms. Treatments tried: muscle relaxers and asa that helps with pain. The treatment provided mild relief. There is no history of cancer, migraine headaches, migraines in the family, pseudotumor cerebri or recent head traumas. (Daughter had anerysm.  )        The following portions of the patient's history were reviewed and updated as appropriate: allergies, current medications, past family history, past medical history, past social history, past surgical history and problem list.    Review of Systems   Constitutional: Negative for fever.   HENT: Negative for hearing loss, rhinorrhea, sinus pressure and tinnitus.    Eyes: Negative for blurred vision and photophobia.   Respiratory: Negative for cough.    Gastrointestinal: Negative for abdominal pain.   Musculoskeletal: Negative for neck pain.   Neurological: Positive for headaches. Negative for dizziness, seizures, numbness  and loss of balance.   Psychiatric/Behavioral: The patient does not have insomnia.        Objective     Vitals:    06/05/19 1604   BP: 102/72   Pulse: 75   Temp: 97.8 °F (36.6 °C)   SpO2: 98%   Weight: 70.7 kg (155 lb 12.8 oz)       Physical Exam   Constitutional: She is oriented to person, place, and time. She appears well-developed and well-nourished.   HENT:   Head: Normocephalic and atraumatic.   Right Ear: External ear normal.   Left Ear: External ear normal.   Nose: Nose normal.   Mouth/Throat: Oropharynx is clear and moist. No oropharyngeal exudate.   Eyes: EOM are normal. Pupils are equal, round, and reactive to light. Right eye exhibits no discharge. Left eye exhibits no discharge.   Fundoscopic exam:       The right eye shows no arteriolar narrowing, no AV nicking, no exudate, no hemorrhage and no papilledema. The right eye shows no red reflex and no venous pulsations.        The left eye shows no arteriolar narrowing, no AV nicking, no exudate, no hemorrhage and no papilledema. The left eye shows no red reflex and no venous pulsations.   Neck: Normal range of motion. Neck supple.   Cardiovascular: Normal rate, regular rhythm, normal heart sounds and intact distal pulses.   Pulmonary/Chest: Effort normal and breath sounds normal.   Abdominal: Soft. Bowel sounds are normal. She exhibits no mass. There is no tenderness.   Musculoskeletal: Normal range of motion.        Right shoulder: She exhibits no swelling.   Neurological: She is alert and oriented to person, place, and time. She has normal reflexes.   Skin: Skin is warm and dry. No cyanosis. Nails show no clubbing.   Psychiatric: She has a normal mood and affect. Her behavior is normal. Judgment and thought content normal.       Assessment/Plan     Problem List Items Addressed This Visit        Cardiovascular and Mediastinum    Family history of brain aneurysm       Nervous and Auditory    Acute intractable tension-type headache - Primary    Relevant  Orders    CT Head Without Contrast        While she has been taking aspirin the headache has been improved.  She denies any blurred vision.  No sensitivity to light or sound.    I have recommended if the headache worsens at all she should go to the ER we will schedule a stat CT scan to rule out aneurysm or hemorrhage.

## 2019-06-11 ENCOUNTER — APPOINTMENT (OUTPATIENT)
Dept: OTHER | Facility: HOSPITAL | Age: 79
End: 2019-06-11

## 2019-06-11 ENCOUNTER — HOSPITAL ENCOUNTER (OUTPATIENT)
Dept: MAMMOGRAPHY | Facility: HOSPITAL | Age: 79
Discharge: HOME | End: 2019-06-11
Admitting: FAMILY MEDICINE

## 2019-06-11 DIAGNOSIS — Z92.89 H/O MAMMOGRAM: ICD-10-CM

## 2019-06-11 DIAGNOSIS — Z12.39 BREAST CANCER SCREENING: ICD-10-CM

## 2019-06-11 PROCEDURE — 77067 SCR MAMMO BI INCL CAD: CPT | Performed by: RADIOLOGY

## 2019-06-11 PROCEDURE — 77063 BREAST TOMOSYNTHESIS BI: CPT

## 2019-06-11 PROCEDURE — 77063 BREAST TOMOSYNTHESIS BI: CPT | Performed by: RADIOLOGY

## 2019-06-11 PROCEDURE — 77067 SCR MAMMO BI INCL CAD: CPT

## 2019-06-17 ENCOUNTER — TELEPHONE (OUTPATIENT)
Dept: FAMILY MEDICINE CLINIC | Facility: CLINIC | Age: 79
End: 2019-06-17

## 2019-06-17 NOTE — TELEPHONE ENCOUNTER
PT NOTIFIED OF NORMAL CT SCAN OF HEAD. 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  ----- Message from Shahrzad Goldberg sent at 6/13/2019  3:02 PM EDT -----  Regarding: PLEASE CALL   Contact: [phone]  PLEASE CALL WITH CT RESULTS

## 2019-09-03 ENCOUNTER — OFFICE VISIT (OUTPATIENT)
Dept: FAMILY MEDICINE CLINIC | Facility: CLINIC | Age: 79
End: 2019-09-03

## 2019-09-03 VITALS
OXYGEN SATURATION: 97 % | HEIGHT: 64 IN | WEIGHT: 157.8 LBS | SYSTOLIC BLOOD PRESSURE: 142 MMHG | BODY MASS INDEX: 26.94 KG/M2 | DIASTOLIC BLOOD PRESSURE: 88 MMHG | HEART RATE: 77 BPM

## 2019-09-03 DIAGNOSIS — E79.0 ELEVATED URIC ACID IN BLOOD: ICD-10-CM

## 2019-09-03 DIAGNOSIS — H25.9 SENILE CATARACT, UNSPECIFIED AGE-RELATED CATARACT TYPE, UNSPECIFIED LATERALITY: ICD-10-CM

## 2019-09-03 DIAGNOSIS — E11.9 TYPE 2 DIABETES MELLITUS WITHOUT COMPLICATION, WITHOUT LONG-TERM CURRENT USE OF INSULIN (HCC): ICD-10-CM

## 2019-09-03 DIAGNOSIS — I10 ESSENTIAL HYPERTENSION: Primary | ICD-10-CM

## 2019-09-03 DIAGNOSIS — E78.00 PURE HYPERCHOLESTEROLEMIA: ICD-10-CM

## 2019-09-03 LAB
EXPIRATION DATE: NORMAL
Lab: NORMAL
POC CREATININE URINE: 100
POC MICROALBUMIN URINE: 10

## 2019-09-03 PROCEDURE — 99214 OFFICE O/P EST MOD 30 MIN: CPT | Performed by: FAMILY MEDICINE

## 2019-09-03 PROCEDURE — 36415 COLL VENOUS BLD VENIPUNCTURE: CPT | Performed by: FAMILY MEDICINE

## 2019-09-03 PROCEDURE — 82044 UR ALBUMIN SEMIQUANTITATIVE: CPT | Performed by: FAMILY MEDICINE

## 2019-09-03 NOTE — PROGRESS NOTES
Subjective   Cheryl Tomas is a 79 y.o. female.     Pt is here to fu on htn and dm. Pt woud like to discuss prolonged use of metformin and risk on kidneys.     Hypertension   This is a chronic problem. The current episode started more than 1 year ago. The problem has been waxing and waning since onset. The problem is controlled. Pertinent negatives include no anxiety, blurred vision, chest pain, headaches, malaise/fatigue, peripheral edema or shortness of breath. Risk factors for coronary artery disease include diabetes mellitus, dyslipidemia, family history and post-menopausal state. The current treatment provides mild improvement. There are no compliance problems.  There is no history of angina, kidney disease, CAD/MI or heart failure. There is no history of chronic renal disease, sleep apnea or a thyroid problem.   Diabetes   She presents for her follow-up diabetic visit. She has type 2 diabetes mellitus. Her disease course has been stable. Pertinent negatives for hypoglycemia include no confusion, headaches, mood changes or nervousness/anxiousness. Pertinent negatives for diabetes include no blurred vision, no chest pain, no fatigue, no foot paresthesias, no foot ulcerations, no polyphagia, no visual change and no weight loss. There are no hypoglycemic complications. Symptoms are stable. There are no known risk factors for coronary artery disease. Current diabetic treatment includes oral agent (monotherapy). She is compliant with treatment all of the time. Her weight is stable. She is following a generally healthy and diabetic diet. There is no change in her home blood glucose trend. Her breakfast blood glucose is taken between 7-8 am. Her breakfast blood glucose range is generally  mg/dl.        The following portions of the patient's history were reviewed and updated as appropriate: allergies, current medications, past family history, past medical history, past social history, past surgical history and  "problem list.    Review of Systems   Constitutional: Negative for fatigue, malaise/fatigue and weight loss.   Eyes: Negative for blurred vision.   Respiratory: Negative for shortness of breath.    Cardiovascular: Negative for chest pain.   Endocrine: Negative for polyphagia.   Neurological: Negative for headaches.   Psychiatric/Behavioral: Negative for confusion. The patient is not nervous/anxious.        Objective     Vitals:    09/03/19 1006   BP: 142/88   Pulse: 77   SpO2: 97%   Weight: 71.6 kg (157 lb 12.8 oz)   Height: 162.6 cm (64\")       Physical Exam   Constitutional: She is oriented to person, place, and time. She appears well-developed and well-nourished.   HENT:   Head: Normocephalic and atraumatic.   Eyes: EOM are normal. Pupils are equal, round, and reactive to light. Right eye exhibits no discharge. Left eye exhibits no discharge.   Neck: Normal range of motion. Neck supple.   Cardiovascular: Normal rate, regular rhythm, normal heart sounds and intact distal pulses.   Pulmonary/Chest: Effort normal and breath sounds normal.   Abdominal: Soft. Bowel sounds are normal. She exhibits no mass. There is no tenderness.   Musculoskeletal: Normal range of motion.        Right shoulder: She exhibits no swelling.   Neurological: She is alert and oriented to person, place, and time. She has normal reflexes.   Skin: Skin is warm and dry. No cyanosis. Nails show no clubbing.   Psychiatric: She has a normal mood and affect. Her behavior is normal. Judgment and thought content normal.   Nursing note and vitals reviewed.      Assessment/Plan     Problem List Items Addressed This Visit        Cardiovascular and Mediastinum    Hypertension - Primary    Current Assessment & Plan     Hypertension is improving with treatment.  Continue current treatment regimen.  Dietary sodium restriction.  Regular aerobic exercise.  Continue current medications.  Blood pressure will be reassessed at the next regular appointment.         " Hyperlipidemia    Current Assessment & Plan     Lipid abnormalities are improving with treatment.  Pharmacotherapy as ordered.  Lipids will be reassessed in 6 months.         Relevant Orders    Lipid Panel       Endocrine    Diabetes mellitus (CMS/[facility])    Current Assessment & Plan     Diabetes is improving with treatment.   Medication changes per orders.  Ophthalmology referral.  Diabetes will be reassessed in 6 months.         Relevant Orders    CBC & Differential    Comprehensive Metabolic Panel    POC Microalbumin (Completed)    Hemoglobin A1c    Ambulatory Referral to Ophthalmology       Other    Elevated uric acid in blood    Relevant Orders    Uric Acid    Age-related cataract    Relevant Orders    Ambulatory Referral to Ophthalmology        I have reviewed the patient's concerns regarding metformin today.  We will check her kidney function today in the office.  Continue to monitor blood pressure closely.  She is slightly elevated today.  We will recheck that in a couple months.  Her point-of-care microalbumin was normal today.  She reports that her insurance is changed and she needs a new referral for ophthalmologist.

## 2019-09-03 NOTE — ASSESSMENT & PLAN NOTE
Diabetes is improving with treatment.   Medication changes per orders.  Ophthalmology referral.  Diabetes will be reassessed in 6 months.

## 2019-09-03 NOTE — ASSESSMENT & PLAN NOTE
Hypertension is improving with treatment.  Continue current treatment regimen.  Dietary sodium restriction.  Regular aerobic exercise.  Continue current medications.  Blood pressure will be reassessed at the next regular appointment.

## 2019-09-04 LAB
ALBUMIN SERPL-MCNC: 4.5 G/DL (ref 3.5–5.2)
ALBUMIN/GLOB SERPL: 2 G/DL
ALP SERPL-CCNC: 62 U/L (ref 39–117)
ALT SERPL-CCNC: 10 U/L (ref 1–33)
AST SERPL-CCNC: 10 U/L (ref 1–32)
BASOPHILS # BLD AUTO: 0.05 10*3/MM3 (ref 0–0.2)
BASOPHILS NFR BLD AUTO: 0.7 % (ref 0–1.5)
BILIRUB SERPL-MCNC: 0.3 MG/DL (ref 0.2–1.2)
BUN SERPL-MCNC: 22 MG/DL (ref 8–23)
BUN/CREAT SERPL: 21.8 (ref 7–25)
CALCIUM SERPL-MCNC: 9.9 MG/DL (ref 8.6–10.5)
CHLORIDE SERPL-SCNC: 105 MMOL/L (ref 98–107)
CHOLEST SERPL-MCNC: 176 MG/DL (ref 0–200)
CO2 SERPL-SCNC: 23.3 MMOL/L (ref 22–29)
CREAT SERPL-MCNC: 1.01 MG/DL (ref 0.57–1)
EOSINOPHIL # BLD AUTO: 0.11 10*3/MM3 (ref 0–0.4)
EOSINOPHIL NFR BLD AUTO: 1.6 % (ref 0.3–6.2)
ERYTHROCYTE [DISTWIDTH] IN BLOOD BY AUTOMATED COUNT: 13.8 % (ref 12.3–15.4)
GLOBULIN SER CALC-MCNC: 2.3 GM/DL
GLUCOSE SERPL-MCNC: 104 MG/DL (ref 65–99)
HBA1C MFR BLD: 5.57 % (ref 4.8–5.6)
HCT VFR BLD AUTO: 42.8 % (ref 34–46.6)
HDLC SERPL-MCNC: 51 MG/DL (ref 40–60)
HGB BLD-MCNC: 13.2 G/DL (ref 12–15.9)
IMM GRANULOCYTES # BLD AUTO: 0.01 10*3/MM3 (ref 0–0.05)
IMM GRANULOCYTES NFR BLD AUTO: 0.1 % (ref 0–0.5)
LDLC SERPL CALC-MCNC: 101 MG/DL (ref 0–100)
LYMPHOCYTES # BLD AUTO: 1.81 10*3/MM3 (ref 0.7–3.1)
LYMPHOCYTES NFR BLD AUTO: 26.1 % (ref 19.6–45.3)
MCH RBC QN AUTO: 28.3 PG (ref 26.6–33)
MCHC RBC AUTO-ENTMCNC: 30.8 G/DL (ref 31.5–35.7)
MCV RBC AUTO: 91.6 FL (ref 79–97)
MONOCYTES # BLD AUTO: 0.51 10*3/MM3 (ref 0.1–0.9)
MONOCYTES NFR BLD AUTO: 7.3 % (ref 5–12)
NEUTROPHILS # BLD AUTO: 4.45 10*3/MM3 (ref 1.7–7)
NEUTROPHILS NFR BLD AUTO: 64.2 % (ref 42.7–76)
NRBC BLD AUTO-RTO: 0 /100 WBC (ref 0–0.2)
PLATELET # BLD AUTO: 258 10*3/MM3 (ref 140–450)
POTASSIUM SERPL-SCNC: 4.3 MMOL/L (ref 3.5–5.2)
PROT SERPL-MCNC: 6.8 G/DL (ref 6–8.5)
RBC # BLD AUTO: 4.67 10*6/MM3 (ref 3.77–5.28)
SODIUM SERPL-SCNC: 144 MMOL/L (ref 136–145)
TRIGL SERPL-MCNC: 121 MG/DL (ref 0–150)
URATE SERPL-MCNC: 4.1 MG/DL (ref 2.4–5.7)
VLDLC SERPL CALC-MCNC: 24.2 MG/DL
WBC # BLD AUTO: 6.94 10*3/MM3 (ref 3.4–10.8)

## 2019-09-09 ENCOUNTER — TELEPHONE (OUTPATIENT)
Dept: FAMILY MEDICINE CLINIC | Facility: CLINIC | Age: 79
End: 2019-09-09

## 2019-09-09 NOTE — TELEPHONE ENCOUNTER
PT NOTIFIED TO TAKE ALOLPURINOL 300MG ONCE DAILY.  ----- Message from Shahrzad Goldberg sent at 9/9/2019  9:15 AM EDT -----  Regarding: PLEASE CALL   Contact: [phone]  CAN YOU PLEASE CALL AND CLARIFY HOW DR CAMPOS WANTS HER TO TAKE allopurinol (ZYLOPRIM) 300 MG tablet

## 2019-10-07 DIAGNOSIS — I10 ESSENTIAL HYPERTENSION: ICD-10-CM

## 2019-10-07 RX ORDER — HYDRALAZINE HYDROCHLORIDE 10 MG/1
10 TABLET, FILM COATED ORAL 3 TIMES DAILY
Qty: 120 TABLET | Refills: 3 | Status: CANCELLED | OUTPATIENT
Start: 2019-10-07

## 2019-10-07 RX ORDER — HYDRALAZINE HYDROCHLORIDE 10 MG/1
10 TABLET, FILM COATED ORAL 3 TIMES DAILY
Qty: 120 TABLET | Refills: 3 | Status: SHIPPED | OUTPATIENT
Start: 2019-10-07 | End: 2020-04-14

## 2019-10-07 RX ORDER — HYDRALAZINE HYDROCHLORIDE 10 MG/1
TABLET, FILM COATED ORAL
Qty: 270 TABLET | Refills: 3 | OUTPATIENT
Start: 2019-10-07

## 2019-10-30 ENCOUNTER — OFFICE VISIT (OUTPATIENT)
Dept: FAMILY MEDICINE CLINIC | Facility: CLINIC | Age: 79
End: 2019-10-30

## 2019-10-30 VITALS
HEIGHT: 64 IN | HEART RATE: 74 BPM | SYSTOLIC BLOOD PRESSURE: 138 MMHG | DIASTOLIC BLOOD PRESSURE: 78 MMHG | OXYGEN SATURATION: 97 % | BODY MASS INDEX: 27.35 KG/M2 | WEIGHT: 160.2 LBS

## 2019-10-30 DIAGNOSIS — I13.0 HYPERTENSIVE HEART AND CHRONIC KIDNEY DISEASE WITH HEART FAILURE AND STAGE 1 THROUGH STAGE 4 CHRONIC KIDNEY DISEASE, OR UNSPECIFIED CHRONIC KIDNEY DISEASE (HCC): ICD-10-CM

## 2019-10-30 DIAGNOSIS — I45.2 RIGHT BUNDLE BRANCH BLOCK WITH LEFT ANTERIOR FASCICULAR BLOCK: ICD-10-CM

## 2019-10-30 DIAGNOSIS — M25.471 ANKLE EDEMA, BILATERAL: Primary | ICD-10-CM

## 2019-10-30 DIAGNOSIS — I10 ESSENTIAL HYPERTENSION: ICD-10-CM

## 2019-10-30 DIAGNOSIS — M25.472 ANKLE EDEMA, BILATERAL: Primary | ICD-10-CM

## 2019-10-30 DIAGNOSIS — R06.01 ORTHOPNEA: ICD-10-CM

## 2019-10-30 DIAGNOSIS — E11.9 TYPE 2 DIABETES MELLITUS WITHOUT COMPLICATION, WITHOUT LONG-TERM CURRENT USE OF INSULIN (HCC): ICD-10-CM

## 2019-10-30 LAB
HCT VFR BLDA CALC: 39.4 % (ref 38–51)
HGB BLDA-MCNC: 12.5 G/DL (ref 12–17)
MCH, POC: 28.8
MCHC, POC: 31.7
MCV, POC: 90.7
PLATELET # BLD AUTO: 278 10*3/MM3
PMV BLD: 8.1 FL
RBC, POC: 4.34
RDW, POC: 12.9
WBC # BLD: 6.7 10*3/UL

## 2019-10-30 PROCEDURE — 99214 OFFICE O/P EST MOD 30 MIN: CPT | Performed by: FAMILY MEDICINE

## 2019-10-30 PROCEDURE — 93000 ELECTROCARDIOGRAM COMPLETE: CPT | Performed by: FAMILY MEDICINE

## 2019-10-30 PROCEDURE — 36415 COLL VENOUS BLD VENIPUNCTURE: CPT | Performed by: FAMILY MEDICINE

## 2019-10-30 PROCEDURE — 85027 COMPLETE CBC AUTOMATED: CPT | Performed by: FAMILY MEDICINE

## 2019-10-30 RX ORDER — FUROSEMIDE 20 MG/1
20 TABLET ORAL DAILY
Qty: 10 TABLET | Refills: 0 | Status: SHIPPED | OUTPATIENT
Start: 2019-10-30 | End: 2019-12-04 | Stop reason: SDUPTHER

## 2019-10-30 NOTE — PROGRESS NOTES
"Subjective   Cheryl Tomas is a 79 y.o. female.     Pt is here due to bilateral swelling in ankles. She has cut back on salt with no change.  started about 3 weeks ago.    Had a recent bp reading of 168/72    Shortness of Breath   This is a new problem. The current episode started 1 to 4 weeks ago. The problem occurs intermittently. Associated symptoms include leg swelling and orthopnea. Pertinent negatives include no abdominal pain, chest pain, claudication, coryza, ear pain, fever, headaches, leg pain, neck pain, PND, rhinorrhea, sputum production or wheezing. The symptoms are aggravated by lying flat. The patient has no known risk factors for DVT/PE. She has tried body position changes for the symptoms. The treatment provided no relief. There is no history of allergies, aspirin allergies, asthma, chronic lung disease or COPD.    she sees cardiology her last echo was 2 years ago.  jordan fermin.  She reports more swelling in ankles and feet.  Worse throughout the day.  Better in mronings. No pain.  No soa.  Slight orthopnea when she first lays down that resolves after a few minutes.  No cp.     The following portions of the patient's history were reviewed and updated as appropriate: allergies, current medications, past family history, past medical history, past social history, past surgical history and problem list.    Review of Systems   Constitutional: Negative for fever.   HENT: Negative for ear pain and rhinorrhea.    Respiratory: Positive for shortness of breath. Negative for sputum production and wheezing.    Cardiovascular: Positive for orthopnea and leg swelling. Negative for chest pain, claudication and PND.   Gastrointestinal: Negative for abdominal pain.   Musculoskeletal: Negative for neck pain.   Neurological: Negative for headaches.       Objective     Vitals:    10/30/19 1109   BP: 138/78   Pulse: 74   SpO2: 97%   Weight: 72.7 kg (160 lb 3.2 oz)   Height: 162.6 cm (64\")       Physical Exam "   Constitutional: She is oriented to person, place, and time. She appears well-developed and well-nourished.   HENT:   Head: Normocephalic and atraumatic.   Eyes: EOM are normal. Pupils are equal, round, and reactive to light. Right eye exhibits no discharge. Left eye exhibits no discharge.   Neck: Normal range of motion. Neck supple.   Cardiovascular: Normal rate, regular rhythm, normal heart sounds and intact distal pulses.   No murmur heard.  Pulmonary/Chest: Effort normal and breath sounds normal. No stridor. No respiratory distress. She has no wheezes. She has no rales. She exhibits no tenderness.   Abdominal: Soft. Bowel sounds are normal. She exhibits no mass. There is no tenderness.   Musculoskeletal: Normal range of motion. She exhibits edema.        Right shoulder: She exhibits no swelling.   Trace edema bilateral ankles.  No pitting edema      Neurological: She is alert and oriented to person, place, and time. She has normal reflexes.   Skin: Skin is warm and dry. No cyanosis. Nails show no clubbing.   Psychiatric: She has a normal mood and affect. Her behavior is normal. Judgment and thought content normal.   Nursing note and vitals reviewed.      ECG 12 Lead  Date/Time: 10/30/2019 11:36 AM  Performed by: Marian Perea MD  Authorized by: Marian Perea MD   Comparison: compared with previous ECG from 8/7/2018  Rhythm: sinus bradycardia  Rate: bradycardic  BPM: 58  Conduction: right bundle branch block and left anterior fascicular block    Clinical impression: abnormal EKG            Assessment/Plan     Problem List Items Addressed This Visit        Cardiovascular and Mediastinum    Hypertension    Relevant Medications    furosemide (LASIX) 20 MG tablet    Other Relevant Orders    Adult Transthoracic Echo Complete W/ Cont if Necessary Per Protocol    ECG 12 Lead    XR Chest PA & Lateral    POC CBC    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, or unspecified chronic kidney disease (CMS/HCC)     Relevant Medications    furosemide (LASIX) 20 MG tablet    Other Relevant Orders    BNP    Adult Transthoracic Echo Complete W/ Cont if Necessary Per Protocol    ECG 12 Lead    XR Chest PA & Lateral    POC CBC    Right bundle branch block with left anterior fascicular block       Respiratory    Orthopnea    Relevant Medications    furosemide (LASIX) 20 MG tablet    Other Relevant Orders    Adult Transthoracic Echo Complete W/ Cont if Necessary Per Protocol    ECG 12 Lead    XR Chest PA & Lateral    POC CBC       Endocrine    Diabetes mellitus (CMS/[facility])    Relevant Medications    furosemide (LASIX) 20 MG tablet    Other Relevant Orders    ECG 12 Lead    XR Chest PA & Lateral    POC CBC       Other    Ankle edema, bilateral - Primary    Relevant Medications    furosemide (LASIX) 20 MG tablet    Other Relevant Orders    Basic Metabolic Panel    BNP    Adult Transthoracic Echo Complete W/ Cont if Necessary Per Protocol    ECG 12 Lead    XR Chest PA & Lateral    POC CBC        Pt to notify cardiology of new RBBB.  She is not experiencing any chest pain.  Echo and labs with cxr.    She is to call her cardiologist today   If she has chest pain go to ED  Lasix 20 mg daily for 5 days.   I have [facility] and made her appt to see cardiology monday 11/4 at 1:45

## 2019-10-31 LAB
BNP SERPL-MCNC: 106.2 PG/ML (ref 0–100)
BUN SERPL-MCNC: 23 MG/DL (ref 8–23)
BUN/CREAT SERPL: 23.2 (ref 7–25)
CALCIUM SERPL-MCNC: 9.6 MG/DL (ref 8.6–10.5)
CHLORIDE SERPL-SCNC: 102 MMOL/L (ref 98–107)
CO2 SERPL-SCNC: 22.1 MMOL/L (ref 22–29)
CREAT SERPL-MCNC: 0.99 MG/DL (ref 0.57–1)
GLUCOSE SERPL-MCNC: 57 MG/DL (ref 65–99)
POTASSIUM SERPL-SCNC: 4.7 MMOL/L (ref 3.5–5.2)
SODIUM SERPL-SCNC: 145 MMOL/L (ref 136–145)

## 2019-11-05 ENCOUNTER — TELEPHONE (OUTPATIENT)
Dept: FAMILY MEDICINE CLINIC | Facility: CLINIC | Age: 79
End: 2019-11-05

## 2019-11-05 NOTE — TELEPHONE ENCOUNTER
Patient states that she was sent to [facility] for EKG but when she got there she was informed that it hadn't been approved yet.     Patient wants to know if she was to receive insurance pre authorization if she could go ahead and get that EKG done.     Please advise.

## 2019-12-04 ENCOUNTER — OFFICE VISIT (OUTPATIENT)
Dept: FAMILY MEDICINE CLINIC | Facility: CLINIC | Age: 79
End: 2019-12-04

## 2019-12-04 VITALS
DIASTOLIC BLOOD PRESSURE: 66 MMHG | WEIGHT: 161.6 LBS | TEMPERATURE: 97.9 F | OXYGEN SATURATION: 99 % | HEIGHT: 64 IN | SYSTOLIC BLOOD PRESSURE: 128 MMHG | BODY MASS INDEX: 27.59 KG/M2 | HEART RATE: 60 BPM

## 2019-12-04 DIAGNOSIS — R06.01 ORTHOPNEA: ICD-10-CM

## 2019-12-04 DIAGNOSIS — M25.471 ANKLE EDEMA, BILATERAL: ICD-10-CM

## 2019-12-04 DIAGNOSIS — E11.9 TYPE 2 DIABETES MELLITUS WITHOUT COMPLICATION, WITHOUT LONG-TERM CURRENT USE OF INSULIN (HCC): Primary | ICD-10-CM

## 2019-12-04 DIAGNOSIS — M25.472 ANKLE EDEMA, BILATERAL: ICD-10-CM

## 2019-12-04 DIAGNOSIS — I10 ESSENTIAL HYPERTENSION: ICD-10-CM

## 2019-12-04 DIAGNOSIS — I13.0 HYPERTENSIVE HEART AND CHRONIC KIDNEY DISEASE WITH HEART FAILURE AND STAGE 1 THROUGH STAGE 4 CHRONIC KIDNEY DISEASE, OR UNSPECIFIED CHRONIC KIDNEY DISEASE (HCC): ICD-10-CM

## 2019-12-04 LAB
EXPIRATION DATE: NORMAL
HBA1C MFR BLD: 5.3 %
Lab: NORMAL

## 2019-12-04 PROCEDURE — 99213 OFFICE O/P EST LOW 20 MIN: CPT | Performed by: FAMILY MEDICINE

## 2019-12-04 PROCEDURE — 83036 HEMOGLOBIN GLYCOSYLATED A1C: CPT | Performed by: FAMILY MEDICINE

## 2019-12-04 RX ORDER — FUROSEMIDE 20 MG/1
20 TABLET ORAL DAILY
Qty: 10 TABLET | Refills: 3 | Status: SHIPPED | OUTPATIENT
Start: 2019-12-04 | End: 2020-06-16

## 2019-12-04 NOTE — PROGRESS NOTES
"Subjective   Cheryl Tomas is a 79 y.o. female.     Pt is here to fu on a1c and swelling in ankles. Swelling went down while on lasix however returned 2 days after taking it.  Would like to discuss decreasing metformin dosage.    History of Present Illness reports that she has been feeling well.  After she took the Lasix for couple days the swelling improved however it came back after a couple days.  She Marty has some compression hose that she wears that does help with the symptoms but she has a hard time getting the compression hose on and off.  She denies any chest pain or shortness of breath.  She does not get short of breath if she lays flat no paroxysmal nocturnal dyspnea no orthopnea.    She is also here to follow-up with diabetes.  She is tolerating metformin well she is requesting to decrease her metformin dose.  She denies any low glucose levels or high fingerstick blood glucose levels.  No polyuria or polydipsia.    The following portions of the patient's history were reviewed and updated as appropriate: allergies, current medications, past family history, past medical history, past social history, past surgical history and problem list.    Review of Systems   Constitutional: Negative.    HENT: Negative.    Eyes: Negative.    Respiratory: Negative.    Cardiovascular: Positive for leg swelling.   Gastrointestinal: Negative.    Endocrine: Negative.    Genitourinary: Negative.    Musculoskeletal: Negative.    Skin: Negative.    Allergic/Immunologic: Negative.    Neurological: Negative.    Hematological: Negative.    Psychiatric/Behavioral: Negative.    All other systems reviewed and are negative.      Objective     Vitals:    12/04/19 1023   BP: 128/66   Pulse: 60   Temp: 97.9 °F (36.6 °C)   SpO2: 99%   Weight: 73.3 kg (161 lb 9.6 oz)   Height: 162.6 cm (64\")       Physical Exam   Constitutional: She is oriented to person, place, and time. She appears well-developed and well-nourished.   HENT:   Head: " Normocephalic and atraumatic.   Eyes: EOM are normal. Pupils are equal, round, and reactive to light. Right eye exhibits no discharge. Left eye exhibits no discharge.   Neck: Normal range of motion. Neck supple.   Cardiovascular: Normal rate, regular rhythm, normal heart sounds and intact distal pulses.   Pulmonary/Chest: Effort normal and breath sounds normal. No stridor. No respiratory distress. She has no wheezes.   Abdominal: Soft. Bowel sounds are normal. She exhibits no mass. There is no tenderness.   Musculoskeletal: Normal range of motion.        Right shoulder: She exhibits no swelling.   Neurological: She is alert and oriented to person, place, and time. She has normal reflexes.   Skin: Skin is warm and dry. No rash noted. No cyanosis or erythema. Nails show no clubbing.   Psychiatric: She has a normal mood and affect. Her behavior is normal. Judgment and thought content normal.   Nursing note and vitals reviewed.      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   Decrease metformin to 1000 mg once daily.   Lasix as needed for swelling.  No more than 1 or 2 doses a week  Discussed kidney damage with lasix  Recheck a1c in 3 months.

## 2020-02-12 DIAGNOSIS — E11.9 TYPE 2 DIABETES MELLITUS WITHOUT COMPLICATION, WITHOUT LONG-TERM CURRENT USE OF INSULIN (HCC): ICD-10-CM

## 2020-02-12 DIAGNOSIS — E78.00 PURE HYPERCHOLESTEROLEMIA: ICD-10-CM

## 2020-02-12 RX ORDER — PRAVASTATIN SODIUM 40 MG
40 TABLET ORAL NIGHTLY
Qty: 90 TABLET | Refills: 3 | Status: SHIPPED | OUTPATIENT
Start: 2020-02-12 | End: 2020-12-09

## 2020-04-03 DIAGNOSIS — I10 ESSENTIAL HYPERTENSION: ICD-10-CM

## 2020-04-04 RX ORDER — IRBESARTAN 300 MG/1
TABLET ORAL
Qty: 90 TABLET | Refills: 3 | Status: SHIPPED | OUTPATIENT
Start: 2020-04-04 | End: 2021-01-25 | Stop reason: SDUPTHER

## 2020-04-14 ENCOUNTER — TELEPHONE (OUTPATIENT)
Dept: FAMILY MEDICINE CLINIC | Facility: CLINIC | Age: 80
End: 2020-04-14

## 2020-04-14 DIAGNOSIS — I10 ESSENTIAL HYPERTENSION: ICD-10-CM

## 2020-04-14 DIAGNOSIS — E79.0 ELEVATED URIC ACID IN BLOOD: ICD-10-CM

## 2020-04-14 RX ORDER — HYDRALAZINE HYDROCHLORIDE 10 MG/1
TABLET, FILM COATED ORAL
Qty: 270 TABLET | Refills: 3 | Status: SHIPPED | OUTPATIENT
Start: 2020-04-14 | End: 2020-04-21

## 2020-04-14 RX ORDER — DILTIAZEM HYDROCHLORIDE 240 MG/1
CAPSULE, COATED, EXTENDED RELEASE ORAL
Qty: 90 CAPSULE | Refills: 3 | Status: SHIPPED | OUTPATIENT
Start: 2020-04-14 | End: 2021-01-21

## 2020-04-14 RX ORDER — ALLOPURINOL 300 MG/1
TABLET ORAL
Qty: 90 TABLET | Refills: 3 | Status: SHIPPED | OUTPATIENT
Start: 2020-04-14 | End: 2021-01-21

## 2020-04-14 RX ORDER — CARVEDILOL PHOSPHATE 40 MG/1
40 CAPSULE, EXTENDED RELEASE ORAL DAILY
Qty: 90 CAPSULE | Refills: 3 | Status: SHIPPED | OUTPATIENT
Start: 2020-04-14 | End: 2020-06-16 | Stop reason: SDUPTHER

## 2020-04-14 NOTE — TELEPHONE ENCOUNTER
Patient requested a call back. Patient stated she would like to keep her prescription of carvedilol CR (COREG CR) 40 MG 24 hr capsule going to [facility] not through mail order. Patient stated she had talked to Larissa yesterday and found out how expensive this medication would be through mail order and would prefer to keep this at the [facility].    Please call and advise. Patient call back [phone]

## 2020-04-21 DIAGNOSIS — I10 ESSENTIAL HYPERTENSION: ICD-10-CM

## 2020-04-21 RX ORDER — HYDRALAZINE HYDROCHLORIDE 10 MG/1
TABLET, FILM COATED ORAL
Qty: 120 TABLET | Refills: 3 | Status: SHIPPED | OUTPATIENT
Start: 2020-04-21 | End: 2020-12-10 | Stop reason: SDUPTHER

## 2020-04-27 DIAGNOSIS — I10 ESSENTIAL HYPERTENSION: ICD-10-CM

## 2020-04-27 RX ORDER — TRIAMTERENE AND HYDROCHLOROTHIAZIDE 37.5; 25 MG/1; MG/1
TABLET ORAL
Qty: 90 TABLET | Refills: 0 | Status: SHIPPED | OUTPATIENT
Start: 2020-04-27 | End: 2020-08-06

## 2020-06-05 DIAGNOSIS — I10 ESSENTIAL HYPERTENSION: ICD-10-CM

## 2020-06-08 RX ORDER — HYDRALAZINE HYDROCHLORIDE 10 MG/1
TABLET, FILM COATED ORAL
Qty: 270 TABLET | OUTPATIENT
Start: 2020-06-08

## 2020-06-16 ENCOUNTER — OFFICE VISIT (OUTPATIENT)
Dept: FAMILY MEDICINE CLINIC | Facility: CLINIC | Age: 80
End: 2020-06-16

## 2020-06-16 VITALS
WEIGHT: 160 LBS | SYSTOLIC BLOOD PRESSURE: 118 MMHG | DIASTOLIC BLOOD PRESSURE: 84 MMHG | TEMPERATURE: 98.9 F | HEART RATE: 62 BPM | OXYGEN SATURATION: 97 % | BODY MASS INDEX: 27.31 KG/M2 | HEIGHT: 64 IN

## 2020-06-16 DIAGNOSIS — I10 ESSENTIAL HYPERTENSION: ICD-10-CM

## 2020-06-16 DIAGNOSIS — I45.2 RIGHT BUNDLE BRANCH BLOCK WITH LEFT ANTERIOR FASCICULAR BLOCK: ICD-10-CM

## 2020-06-16 DIAGNOSIS — E11.9 TYPE 2 DIABETES MELLITUS WITHOUT COMPLICATION, WITHOUT LONG-TERM CURRENT USE OF INSULIN (HCC): Primary | ICD-10-CM

## 2020-06-16 DIAGNOSIS — E78.00 PURE HYPERCHOLESTEROLEMIA: ICD-10-CM

## 2020-06-16 PROCEDURE — 99213 OFFICE O/P EST LOW 20 MIN: CPT | Performed by: FAMILY MEDICINE

## 2020-06-16 RX ORDER — CARVEDILOL PHOSPHATE 40 MG/1
40 CAPSULE, EXTENDED RELEASE ORAL DAILY
Qty: 90 CAPSULE | Refills: 3 | Status: SHIPPED | OUTPATIENT
Start: 2020-06-16 | End: 2021-06-21

## 2020-06-16 NOTE — PROGRESS NOTES
"Subjective   Cheryl Tomas is a 80 y.o. female.     Refill needed on carvediolol.  Has not been checking finger stick at home.    Diabetes   She presents for her follow-up diabetic visit. She has type 2 diabetes mellitus. Her disease course has been stable. There are no hypoglycemic associated symptoms. There are no diabetic associated symptoms. There are no hypoglycemic complications. Symptoms are stable. Risk factors for coronary artery disease include diabetes mellitus, dyslipidemia and hypertension. Current diabetic treatment includes oral agent (monotherapy). She is compliant with treatment all of the time. She is following a diabetic diet. Meal planning includes avoidance of concentrated sweets. She rarely participates in exercise. Home blood sugar record trend: not checking. An ACE inhibitor/angiotensin II receptor blocker is being taken.        The following portions of the patient's history were reviewed and updated as appropriate: allergies, current medications, past family history, past medical history, past social history, past surgical history and problem list.    Review of Systems   Constitutional: Negative.    HENT: Negative.    Eyes: Negative.    Respiratory: Negative.    Cardiovascular: Negative.    Gastrointestinal: Negative.    Endocrine: Negative.    Genitourinary: Negative.    Musculoskeletal: Negative.    Skin: Negative.    Allergic/Immunologic: Negative.    Neurological: Negative.    Hematological: Negative.    Psychiatric/Behavioral: Negative.    All other systems reviewed and are negative.      Objective     Vitals:    06/16/20 1324   BP: 118/84   Pulse: 62   Temp: 98.9 °F (37.2 °C)   SpO2: 97%   Weight: 72.6 kg (160 lb)   Height: 162.6 cm (64\")       Physical Exam   Constitutional: She is oriented to person, place, and time. She appears well-developed and well-nourished.   HENT:   Head: Normocephalic and atraumatic.   Eyes: Pupils are equal, round, and reactive to light. EOM are normal. " Right eye exhibits no discharge. Left eye exhibits no discharge.   Neck: Normal range of motion. Neck supple.   Cardiovascular: Normal rate, regular rhythm, normal heart sounds and intact distal pulses.   Pulmonary/Chest: Effort normal and breath sounds normal.   Abdominal: Soft. Bowel sounds are normal. She exhibits no mass. There is no tenderness.   Musculoskeletal: Normal range of motion.        Right shoulder: She exhibits no swelling.   Neurological: She is alert and oriented to person, place, and time. She has normal reflexes.   Skin: Skin is warm and dry. No cyanosis. Nails show no clubbing.   Psychiatric: She has a normal mood and affect. Her behavior is normal. Judgment and thought content normal.   Nursing note and vitals reviewed.      Assessment/Plan     Problem List Items Addressed This Visit        Cardiovascular and Mediastinum    Hypertension    Relevant Medications    carvedilol CR (COREG CR) 40 MG 24 hr capsule    Hyperlipidemia    Right bundle branch block with left anterior fascicular block    Relevant Medications    carvedilol CR (COREG CR) 40 MG 24 hr capsule       Endocrine    Diabetes mellitus (CMS/HCC) - Primary    Relevant Medications    metFORMIN (Glucophage) 500 MG tablet        poct a1c today 5.5  We will decrease metformin to 500 mg bid.    She will try to cut the 1000 mg tablets.

## 2020-06-24 DIAGNOSIS — I10 ESSENTIAL HYPERTENSION: ICD-10-CM

## 2020-06-25 RX ORDER — TRIAMTERENE AND HYDROCHLOROTHIAZIDE 37.5; 25 MG/1; MG/1
TABLET ORAL
Qty: 90 TABLET | Refills: 0 | OUTPATIENT
Start: 2020-06-25

## 2020-08-05 DIAGNOSIS — I10 ESSENTIAL HYPERTENSION: ICD-10-CM

## 2020-08-06 RX ORDER — TRIAMTERENE AND HYDROCHLOROTHIAZIDE 37.5; 25 MG/1; MG/1
TABLET ORAL
Qty: 90 TABLET | Refills: 1 | Status: SHIPPED | OUTPATIENT
Start: 2020-08-06 | End: 2021-06-24

## 2020-08-07 ENCOUNTER — TRANSCRIBE ORDERS (OUTPATIENT)
Dept: ADMINISTRATIVE | Facility: HOSPITAL | Age: 80
End: 2020-08-07

## 2020-08-07 DIAGNOSIS — Z12.31 VISIT FOR SCREENING MAMMOGRAM: Primary | ICD-10-CM

## 2020-09-16 ENCOUNTER — OFFICE VISIT (OUTPATIENT)
Dept: FAMILY MEDICINE CLINIC | Facility: CLINIC | Age: 80
End: 2020-09-16

## 2020-09-16 VITALS
DIASTOLIC BLOOD PRESSURE: 74 MMHG | OXYGEN SATURATION: 95 % | HEART RATE: 66 BPM | SYSTOLIC BLOOD PRESSURE: 156 MMHG | WEIGHT: 160.2 LBS | HEIGHT: 64 IN | BODY MASS INDEX: 27.35 KG/M2 | TEMPERATURE: 97.1 F

## 2020-09-16 DIAGNOSIS — E79.0 ELEVATED URIC ACID IN BLOOD: ICD-10-CM

## 2020-09-16 DIAGNOSIS — Z78.0 POSTMENOPAUSAL: ICD-10-CM

## 2020-09-16 DIAGNOSIS — I45.2 RIGHT BUNDLE BRANCH BLOCK WITH LEFT ANTERIOR FASCICULAR BLOCK: ICD-10-CM

## 2020-09-16 DIAGNOSIS — I10 ESSENTIAL HYPERTENSION: ICD-10-CM

## 2020-09-16 DIAGNOSIS — Z23 NEEDS FLU SHOT: Primary | ICD-10-CM

## 2020-09-16 DIAGNOSIS — E11.9 TYPE 2 DIABETES MELLITUS WITHOUT COMPLICATION, WITHOUT LONG-TERM CURRENT USE OF INSULIN (HCC): ICD-10-CM

## 2020-09-16 PROCEDURE — G0439 PPPS, SUBSEQ VISIT: HCPCS | Performed by: FAMILY MEDICINE

## 2020-09-16 PROCEDURE — 96160 PT-FOCUSED HLTH RISK ASSMT: CPT | Performed by: FAMILY MEDICINE

## 2020-09-16 PROCEDURE — G0008 ADMIN INFLUENZA VIRUS VAC: HCPCS | Performed by: FAMILY MEDICINE

## 2020-09-16 PROCEDURE — 90694 VACC AIIV4 NO PRSRV 0.5ML IM: CPT | Performed by: FAMILY MEDICINE

## 2020-09-17 ENCOUNTER — TELEPHONE (OUTPATIENT)
Dept: FAMILY MEDICINE CLINIC | Facility: CLINIC | Age: 80
End: 2020-09-17

## 2020-09-17 NOTE — TELEPHONE ENCOUNTER
PATIENT CALLED TO REQUEST MEDICATION TO HELP HER SLEEP.  STATES THAT SHE LIES AWAKE AT NIGHT AND IS TIRED ALL THROUGH THE DAY.  STATES THIS HAS BEEN ON GOING FOR SOME TIME NOW.   SHE FORGOT TO MENTION WHEN SHE WAS IN OFFICE YESTERDAY.  PATIENT HAS HAD SLEEP MEDICATION IN THE PAST BUT HAS NOT USED FOR FIVE OR SIX YEARS NOW.     PATIENT PH: [phone]       [address] [phone] [facility] [phone]

## 2020-09-21 ENCOUNTER — LAB (OUTPATIENT)
Dept: FAMILY MEDICINE CLINIC | Facility: CLINIC | Age: 80
End: 2020-09-21

## 2020-09-21 DIAGNOSIS — E11.9 TYPE 2 DIABETES MELLITUS WITHOUT COMPLICATION, WITHOUT LONG-TERM CURRENT USE OF INSULIN (HCC): ICD-10-CM

## 2020-09-21 DIAGNOSIS — E79.0 ELEVATED URIC ACID IN BLOOD: ICD-10-CM

## 2020-09-22 DIAGNOSIS — G47.00 INSOMNIA, UNSPECIFIED TYPE: Primary | ICD-10-CM

## 2020-09-22 LAB
ALBUMIN SERPL-MCNC: 4.8 G/DL (ref 3.5–5.2)
ALBUMIN/GLOB SERPL: 2.8 G/DL
ALP SERPL-CCNC: 79 U/L (ref 39–117)
ALT SERPL-CCNC: 11 U/L (ref 1–33)
AST SERPL-CCNC: 11 U/L (ref 1–32)
BASOPHILS # BLD AUTO: 0.06 10*3/MM3 (ref 0–0.2)
BASOPHILS NFR BLD AUTO: 0.9 % (ref 0–1.5)
BILIRUB SERPL-MCNC: 0.4 MG/DL (ref 0–1.2)
BUN SERPL-MCNC: 28 MG/DL (ref 8–23)
BUN/CREAT SERPL: 24.3 (ref 7–25)
CALCIUM SERPL-MCNC: 9.8 MG/DL (ref 8.6–10.5)
CHLORIDE SERPL-SCNC: 103 MMOL/L (ref 98–107)
CHOLEST SERPL-MCNC: 166 MG/DL (ref 0–200)
CO2 SERPL-SCNC: 26.3 MMOL/L (ref 22–29)
CREAT SERPL-MCNC: 1.15 MG/DL (ref 0.57–1)
EOSINOPHIL # BLD AUTO: 0.12 10*3/MM3 (ref 0–0.4)
EOSINOPHIL NFR BLD AUTO: 1.8 % (ref 0.3–6.2)
ERYTHROCYTE [DISTWIDTH] IN BLOOD BY AUTOMATED COUNT: 13.1 % (ref 12.3–15.4)
GLOBULIN SER CALC-MCNC: 1.7 GM/DL
GLUCOSE SERPL-MCNC: 117 MG/DL (ref 65–99)
HCT VFR BLD AUTO: 41.8 % (ref 34–46.6)
HCV AB S/CO SERPL IA: <0.1 S/CO RATIO (ref 0–0.9)
HDLC SERPL-MCNC: 45 MG/DL (ref 40–60)
HGB BLD-MCNC: 14 G/DL (ref 12–15.9)
IMM GRANULOCYTES # BLD AUTO: 0.01 10*3/MM3 (ref 0–0.05)
IMM GRANULOCYTES NFR BLD AUTO: 0.2 % (ref 0–0.5)
LDLC SERPL CALC-MCNC: 91 MG/DL (ref 0–100)
LYMPHOCYTES # BLD AUTO: 1.71 10*3/MM3 (ref 0.7–3.1)
LYMPHOCYTES NFR BLD AUTO: 26.3 % (ref 19.6–45.3)
MCH RBC QN AUTO: 29.3 PG (ref 26.6–33)
MCHC RBC AUTO-ENTMCNC: 33.5 G/DL (ref 31.5–35.7)
MCV RBC AUTO: 87.4 FL (ref 79–97)
MONOCYTES # BLD AUTO: 0.42 10*3/MM3 (ref 0.1–0.9)
MONOCYTES NFR BLD AUTO: 6.5 % (ref 5–12)
NEUTROPHILS # BLD AUTO: 4.19 10*3/MM3 (ref 1.7–7)
NEUTROPHILS NFR BLD AUTO: 64.3 % (ref 42.7–76)
NRBC BLD AUTO-RTO: 0 /100 WBC (ref 0–0.2)
PLATELET # BLD AUTO: 252 10*3/MM3 (ref 140–450)
POTASSIUM SERPL-SCNC: 4 MMOL/L (ref 3.5–5.2)
PROT SERPL-MCNC: 6.5 G/DL (ref 6–8.5)
RBC # BLD AUTO: 4.78 10*6/MM3 (ref 3.77–5.28)
SODIUM SERPL-SCNC: 142 MMOL/L (ref 136–145)
TRIGL SERPL-MCNC: 150 MG/DL (ref 0–150)
URATE SERPL-MCNC: 4.1 MG/DL (ref 2.4–5.7)
VLDLC SERPL CALC-MCNC: 30 MG/DL
WBC # BLD AUTO: 6.51 10*3/MM3 (ref 3.4–10.8)

## 2020-09-22 RX ORDER — QUETIAPINE FUMARATE 25 MG/1
25 TABLET, FILM COATED ORAL NIGHTLY
Qty: 20 TABLET | Refills: 0 | Status: SHIPPED | OUTPATIENT
Start: 2020-09-22 | End: 2020-10-12

## 2020-10-01 ENCOUNTER — TELEPHONE (OUTPATIENT)
Dept: FAMILY MEDICINE CLINIC | Facility: CLINIC | Age: 80
End: 2020-10-01

## 2020-10-01 NOTE — TELEPHONE ENCOUNTER
DELETE AFTER REVIEWING: Telephone encounter to be sent to the clinical pool.    Caller: Cheryl Tomas    Relationship: Self    Best call back number: [phone]    What test was performed: Blood Work     When was the test performed: 09/21/2020    Where was the test performed: in office    Additional notes: Patient is curious on the results of her last labs and is wanting to discuss them with the clinical staff.

## 2020-10-12 DIAGNOSIS — G47.00 INSOMNIA, UNSPECIFIED TYPE: ICD-10-CM

## 2020-10-12 RX ORDER — QUETIAPINE FUMARATE 25 MG/1
TABLET, FILM COATED ORAL
Qty: 30 TABLET | Refills: 3 | Status: SHIPPED | OUTPATIENT
Start: 2020-10-12 | End: 2021-01-11 | Stop reason: SDUPTHER

## 2020-10-19 ENCOUNTER — CONSULT (OUTPATIENT)
Dept: CARDIOLOGY | Facility: CLINIC | Age: 80
End: 2020-10-19

## 2020-10-19 VITALS
SYSTOLIC BLOOD PRESSURE: 178 MMHG | HEART RATE: 67 BPM | OXYGEN SATURATION: 98 % | WEIGHT: 157 LBS | DIASTOLIC BLOOD PRESSURE: 80 MMHG | BODY MASS INDEX: 24.64 KG/M2 | HEIGHT: 67 IN

## 2020-10-19 DIAGNOSIS — E78.00 PURE HYPERCHOLESTEROLEMIA: ICD-10-CM

## 2020-10-19 DIAGNOSIS — M25.472 ANKLE EDEMA, BILATERAL: ICD-10-CM

## 2020-10-19 DIAGNOSIS — M25.471 ANKLE EDEMA, BILATERAL: ICD-10-CM

## 2020-10-19 DIAGNOSIS — I10 ESSENTIAL HYPERTENSION: ICD-10-CM

## 2020-10-19 DIAGNOSIS — I47.1 PSVT (PAROXYSMAL SUPRAVENTRICULAR TACHYCARDIA) (HCC): Primary | ICD-10-CM

## 2020-10-19 DIAGNOSIS — I45.2 RIGHT BUNDLE BRANCH BLOCK WITH LEFT ANTERIOR FASCICULAR BLOCK: ICD-10-CM

## 2020-10-19 DIAGNOSIS — I35.1 NONRHEUMATIC AORTIC VALVE INSUFFICIENCY: ICD-10-CM

## 2020-10-19 PROBLEM — I47.10 PSVT (PAROXYSMAL SUPRAVENTRICULAR TACHYCARDIA): Status: ACTIVE | Noted: 2020-10-19

## 2020-10-19 PROCEDURE — 99204 OFFICE O/P NEW MOD 45 MIN: CPT | Performed by: INTERNAL MEDICINE

## 2020-10-19 PROCEDURE — 93000 ELECTROCARDIOGRAM COMPLETE: CPT | Performed by: INTERNAL MEDICINE

## 2020-10-19 NOTE — PROGRESS NOTES
[facility]  Consultation H&P  Cheryl Tomas  1940  [phone]  There is no work phone number on file..    VISIT DATE:  10/19/2020    PCP: Marian Perea MD  [address]  [place] [address]    CC:  Chief Complaint   Patient presents with   • Hypertension     consult     Previous cardiac studies and procedures:  November 2019 echo: EF 60%, mild concentric LVH, moderate aortic insufficiency.  LV end-diastolic dimension 4.3 cm.    ASSESSMENT:   Diagnosis Plan   1. PSVT (paroxysmal supraventricular tachycardia) (CMS/HCC)     2. Nonrheumatic aortic valve insufficiency     3. Essential hypertension     4. Right bundle branch block with left anterior fascicular block     5. Pure hypercholesterolemia     6. Ankle edema, bilateral         PLAN:  PSVT: Currently stable and asymptomatic.  Continue combination of beta-blockade and diltiazem.    Aortic insufficiency, moderate: Surveillance echocardiography every 1 to 2 years.  Annual clinical exam.  Currently asymptomatic.    Hypertension: Goal less than 130/80 mmHg.  Suspect whitecoat hypertension today.  Patient will keep a home blood pressure log and follow-up in 6 weeks to consider further medication titration if indicated.  Encouraged regular exercise.    Hyperlipidemia: Goal LDL less than 100, currently well controlled.  Continue pravastatin 40 mg p.o. daily.    Bilateral ankle edema: Mild venous insufficiency.  Continue triamterene hydrochlorothiazide, elevating feet when necessary, as needed use of compression stockings and limiting sodium intake.  Currently not affecting quality of life.  If worsens may need to down titrate diltiazem.    History of Present Illness   80-year-old female with history of hypertension, dyslipidemia, aortic insufficiency and PSVT.  Reports stable functional capacity.  Has not been participating in regular exercise but has no limitations.  Denies chest pain, dyspnea or or sustained  "palpitations.  Has not been tracking home blood pressures recently.  She does find it difficult to take the midday hydralazine but is otherwise compliant with medical therapy for hypertension.  EKG reveals stable bifascicular block.  Reviewed most recent laboratory evaluation.    PHYSICAL EXAMINATION:  Vitals:    10/19/20 1036   BP: 178/80   BP Location: Right arm   Patient Position: Sitting   Pulse: 67   SpO2: 98%   Weight: 71.2 kg (157 lb)   Height: 170.2 cm (67\")     General Appearance:    Alert, cooperative, no distress, appears stated age   Head:    Normocephalic, without obvious abnormality, atraumatic   Eyes:    conjunctiva/corneas clear, EOM's intact, fundi     benign, both eyes   Ears:    Normal TM's and external ear canals, both ears   Nose:   Nares normal, septum midline, mucosa normal, no drainage    or sinus tenderness   Throat:   Lips, mucosa, and tongue normal; teeth and gums normal   Neck:   Supple, symmetrical, trachea midline, no adenopathy;     thyroid:  no enlargement/tenderness/nodules; no carotid    bruit or JVD, prominent right carotid impulse   Back:     Symmetric, no curvature, ROM normal, no CVA tenderness   Lungs:     Clear to auscultation bilaterally, respirations unlabored   Chest Wall:    No tenderness or deformity    Heart:    Regular rate and rhythm, S1 and S2 normal, 2/6 holodiastolic murmur right upper sternal border, rub   or gallop, normal carotid impulse bilaterally without bruit.   Abdomen:     Soft, non-tender, bowel sounds active all four quadrants,     no masses, no organomegaly   Extremities:   Extremities normal, atraumatic, no cyanosis.  Trivial bilateral ankle edema   Pulses:   2+ and symmetric all extremities   Skin:   Skin color, texture, turgor normal, no rashes or lesions   Lymph nodes:   Cervical, supraclavicular, and axillary nodes normal   Neurologic:   normal strength, sensation intact     throughout       Diagnostic Data:    ECG 12 Lead    Date/Time: 10/19/2020 " 11:01 AM  Performed by: Jani Devi III, MD  Authorized by: Jani Devi III, MD   Comparison: compared with previous ECG from 10/30/2019  Similar to previous ECG  Rhythm: sinus rhythm  Conduction: right bundle branch block and left anterior fascicular block  Other findings: left ventricular hypertrophy    Clinical impression: abnormal EKG          Lab Results   Component Value Date    CHLPL 166 09/21/2020    TRIG 150 09/21/2020    HDL 45 09/21/2020     Lab Results   Component Value Date    BUN 28 (H) 09/21/2020    CREATININE 1.15 (H) 09/21/2020     09/21/2020    K 4.0 09/21/2020     09/21/2020    CO2 26.3 09/21/2020     Lab Results   Component Value Date    HGBA1C 5.3 12/04/2019     Lab Results   Component Value Date    WBC 6.51 09/21/2020    HGB 14.0 09/21/2020    HCT 41.8 09/21/2020     09/21/2020       PROBLEM LIST:  Patient Active Problem List   Diagnosis   • Hypertension   • Diabetes mellitus (CMS/[facility])   • Hyperlipidemia   • Anxiety and depression   • Medicare annual wellness visit, initial   • Chest wall discomfort   • Needs flu shot   • Right wrist pain   • Elevated uric acid in blood   • Breast cancer screening   • Colon cancer screening   • Actinic keratitis   • Acute intractable tension-type headache   • Family history of brain aneurysm   • Age-related cataract   • Ankle edema, bilateral   • Hypertensive heart and chronic kidney disease with heart failure and stage 1 through stage 4 chronic kidney disease, or unspecified chronic kidney disease (CMS/[facility])    • Orthopnea   • Right bundle branch block with left anterior fascicular block   • Postmenopausal   • PSVT (paroxysmal supraventricular tachycardia) (CMS/[facility])   • Nonrheumatic aortic valve insufficiency       PAST MEDICAL HX  Past Medical History:   Diagnosis Date   • Diabetes mellitus (CMS/[facility])    • Hyperlipidemia    • Hypertension    • Osteoporosis        Allergies  Allergies   Allergen Reactions   • Cephalexin Itching and Rash        Current Medications    Current Outpatient Medications:   •  allopurinol (ZYLOPRIM) 300 MG tablet, TAKE 1 TABLET EVERY DAY, Disp: 90 tablet, Rfl: 3  •  aspirin 81 MG EC tablet, Take 1 tablet by mouth Daily., Disp: 90 tablet, Rfl: 3  •  carvedilol CR (COREG CR) 40 MG 24 hr capsule, Take 1 capsule by mouth Daily., Disp: 90 capsule, Rfl: 3  •  cholecalciferol (VITAMIN D3) 1000 units tablet, Take 1,000 Units by mouth Daily., Disp: , Rfl:   •  coenzyme Q10 100 MG capsule, Take 100 mg by mouth Daily., Disp: , Rfl:   •  dilTIAZem CD (CARDIZEM CD) 240 MG 24 hr capsule, TAKE 1 CAPSULE EVERY DAY, Disp: 90 capsule, Rfl: 3  •  hydrALAZINE (APRESOLINE) 10 MG tablet, TAKE 1 TABLET BY MOUTH THREE TIMES DAILY, Disp: 120 tablet, Rfl: 3  •  irbesartan (AVAPRO) 300 MG tablet, TAKE 1 TABLET EVERY NIGHT, Disp: 90 tablet, Rfl: 3  •  metFORMIN (Glucophage) 500 MG tablet, Take 1 tablet by mouth 2 (Two) Times a Day With Meals., Disp: 180 tablet, Rfl: 3  •  nitroglycerin (NITROSTAT) 0.3 MG SL tablet, Place 1 tablet under the tongue Every 5 (Five) Minutes As Needed for Chest Pain. Take no more than 3 doses in 15 minutes., Disp: 30 tablet, Rfl: 3  •  pravastatin (PRAVACHOL) 40 MG tablet, TAKE 1 TABLET BY MOUTH EVERY NIGHT., Disp: 90 tablet, Rfl: 3  •  QUEtiapine (SEROquel) 25 MG tablet, TAKE 1 TABLET BY MOUTH ONCE DAILY AT NIGHT, Disp: 30 tablet, Rfl: 3  •  triamterene-hydrochlorothiazide (MAXZIDE-25) 37.5-25 MG per tablet, TAKE 1 TABLET EVERY DAY, Disp: 90 tablet, Rfl: 1         ROS  Review of Systems   Constitution: Negative for malaise/fatigue.   Cardiovascular: Positive for palpitations. Negative for chest pain and dyspnea on exertion.   Respiratory: Negative for cough and shortness of breath.        All other body systems reviewed and are negative    SOCIAL HX  Social History     Socioeconomic History   • Marital status:      Spouse name: Not on file   • Number of children: Not on file   • Years of education: Not on file   •  Highest education level: Not on file   Tobacco Use   • Smoking status: Never Smoker   • Smokeless tobacco: Never Used   Substance and Sexual Activity   • Alcohol use: No   • Drug use: No   • Sexual activity: Never       FAMILY HX  Family History   Problem Relation Age of Onset   • Heart disease Mother    • Hypertension Mother    • Breast cancer Mother         70's   • Heart disease Father    • Stroke Father    • Breast cancer Other         great aunt             Jani Devi III, MD, FACC

## 2020-11-10 ENCOUNTER — OFFICE VISIT (OUTPATIENT)
Dept: FAMILY MEDICINE CLINIC | Facility: CLINIC | Age: 80
End: 2020-11-10

## 2020-11-10 VITALS
OXYGEN SATURATION: 98 % | DIASTOLIC BLOOD PRESSURE: 52 MMHG | HEIGHT: 67 IN | RESPIRATION RATE: 18 BRPM | TEMPERATURE: 97.3 F | SYSTOLIC BLOOD PRESSURE: 132 MMHG | HEART RATE: 65 BPM | WEIGHT: 160 LBS | BODY MASS INDEX: 25.11 KG/M2

## 2020-11-10 DIAGNOSIS — R30.9 URINARY PAIN: ICD-10-CM

## 2020-11-10 DIAGNOSIS — R30.0 DYSURIA: Primary | ICD-10-CM

## 2020-11-10 DIAGNOSIS — M54.50 ACUTE BILATERAL LOW BACK PAIN WITHOUT SCIATICA: ICD-10-CM

## 2020-11-10 PROBLEM — G47.00 INSOMNIA: Status: ACTIVE | Noted: 2020-11-10

## 2020-11-10 PROBLEM — Z79.899 HIGH RISK MEDICATION USE: Status: ACTIVE | Noted: 2017-12-29

## 2020-11-10 PROBLEM — I99.8 OTHER SPECIFIED CIRCULATORY SYSTEM DISORDERS: Status: ACTIVE | Noted: 2020-11-10

## 2020-11-10 PROBLEM — E78.2 MIXED HYPERLIPIDEMIA: Status: ACTIVE | Noted: 2017-10-12

## 2020-11-10 PROBLEM — I10 BENIGN ESSENTIAL HYPERTENSION: Status: ACTIVE | Noted: 2017-10-12

## 2020-11-10 PROBLEM — Z79.899 ENCOUNTER FOR LONG-TERM (CURRENT) USE OF OTHER MEDICATIONS: Status: ACTIVE | Noted: 2020-11-10

## 2020-11-10 PROBLEM — R60.0 EDEMA OF LOWER EXTREMITY: Status: ACTIVE | Noted: 2019-11-04

## 2020-11-10 PROBLEM — IMO0002 SPRAIN AND STRAIN OF KNEE AND LEG: Status: ACTIVE | Noted: 2020-11-10

## 2020-11-10 PROBLEM — M89.49 OSTEOARTHROSIS MULTIPLE SITES, NOT SPECIFIED AS GENERALIZED: Status: ACTIVE | Noted: 2020-11-10

## 2020-11-10 PROBLEM — J40 BRONCHITIS: Status: ACTIVE | Noted: 2020-11-10

## 2020-11-10 PROBLEM — F32.89 OTHER DEPRESSIVE DISORDER: Status: ACTIVE | Noted: 2017-10-12

## 2020-11-10 PROBLEM — R55 SYNCOPE AND COLLAPSE: Status: ACTIVE | Noted: 2020-11-10

## 2020-11-10 PROBLEM — M81.8 OTHER OSTEOPOROSIS: Status: ACTIVE | Noted: 2020-11-10

## 2020-11-10 PROBLEM — E55.9 VITAMIN D DEFICIENCY: Status: ACTIVE | Noted: 2017-12-29

## 2020-11-10 PROBLEM — L57.0 ACTINIC KERATOSIS: Status: ACTIVE | Noted: 2020-11-10

## 2020-11-10 PROBLEM — E66.9 OBESITY, UNSPECIFIED: Status: ACTIVE | Noted: 2017-10-12

## 2020-11-10 PROBLEM — R53.83 FATIGUE: Status: ACTIVE | Noted: 2017-12-29

## 2020-11-10 LAB
BILIRUB BLD-MCNC: NEGATIVE MG/DL
CLARITY, POC: CLEAR
COLOR UR: YELLOW
GLUCOSE UR STRIP-MCNC: NEGATIVE MG/DL
KETONES UR QL: NEGATIVE
LEUKOCYTE EST, POC: ABNORMAL
NITRITE UR-MCNC: NEGATIVE MG/ML
PH UR: 5.5 [PH] (ref 5–8)
PROT UR STRIP-MCNC: NEGATIVE MG/DL
RBC # UR STRIP: NEGATIVE /UL
SP GR UR: 1.02 (ref 1–1.03)
UROBILINOGEN UR QL: NORMAL

## 2020-11-10 PROCEDURE — 81003 URINALYSIS AUTO W/O SCOPE: CPT | Performed by: NURSE PRACTITIONER

## 2020-11-10 PROCEDURE — 99213 OFFICE O/P EST LOW 20 MIN: CPT | Performed by: NURSE PRACTITIONER

## 2020-11-10 RX ORDER — NITROFURANTOIN 25; 75 MG/1; MG/1
100 CAPSULE ORAL 2 TIMES DAILY
Qty: 14 CAPSULE | Refills: 0 | Status: SHIPPED | OUTPATIENT
Start: 2020-11-10 | End: 2021-01-11

## 2020-11-10 RX ORDER — SENNOSIDES 8.6 MG
1300 CAPSULE ORAL EVERY 8 HOURS PRN
COMMUNITY

## 2020-11-10 NOTE — PROGRESS NOTES
Subjective   Cheryl Tomas is a 80 y.o. female.     History of Present Illness Complains of low back pain that is worse with movement and activity. Symptoms for 4 days. Sharp in nature. Has not had this in the past. No dysuria or frequency. No change in odor or color of urine. Treated with Tylenol 650mg with relief. Pain is worse with movement. Hard to move. No new activities that would have aggravated her back. Has history of renal stones.      Outpatient Encounter Medications as of 11/10/2020   Medication Sig Dispense Refill   • acetaminophen (TYLENOL) 650 MG 8 hr tablet Take 1,300 mg by mouth Every 8 (Eight) Hours As Needed for Mild Pain .     • allopurinol (ZYLOPRIM) 300 MG tablet TAKE 1 TABLET EVERY DAY 90 tablet 3   • aspirin 81 MG EC tablet Take 1 tablet by mouth Daily. 90 tablet 3   • carvedilol CR (COREG CR) 40 MG 24 hr capsule Take 1 capsule by mouth Daily. 90 capsule 3   • cholecalciferol (VITAMIN D3) 1000 units tablet Take 1,000 Units by mouth Daily.     • coenzyme Q10 100 MG capsule Take 100 mg by mouth Daily.     • dilTIAZem CD (CARDIZEM CD) 240 MG 24 hr capsule TAKE 1 CAPSULE EVERY DAY 90 capsule 3   • hydrALAZINE (APRESOLINE) 10 MG tablet TAKE 1 TABLET BY MOUTH THREE TIMES DAILY 120 tablet 3   • irbesartan (AVAPRO) 300 MG tablet TAKE 1 TABLET EVERY NIGHT 90 tablet 3   • metFORMIN (Glucophage) 500 MG tablet Take 1 tablet by mouth 2 (Two) Times a Day With Meals. 180 tablet 3   • nitroglycerin (NITROSTAT) 0.3 MG SL tablet Place 1 tablet under the tongue Every 5 (Five) Minutes As Needed for Chest Pain. Take no more than 3 doses in 15 minutes. 30 tablet 3   • pravastatin (PRAVACHOL) 40 MG tablet TAKE 1 TABLET BY MOUTH EVERY NIGHT. 90 tablet 3   • QUEtiapine (SEROquel) 25 MG tablet TAKE 1 TABLET BY MOUTH ONCE DAILY AT NIGHT 30 tablet 3   • triamterene-hydrochlorothiazide (MAXZIDE-25) 37.5-25 MG per tablet TAKE 1 TABLET EVERY DAY 90 tablet 1   • Coenzyme Q10 10 MG capsule coenzyme Q10   100mg     •  "nitrofurantoin, macrocrystal-monohydrate, (Macrobid) 100 MG capsule Take 1 capsule by mouth 2 (Two) Times a Day. 14 capsule 0     No facility-administered encounter medications on file as of 11/10/2020.        The following portions of the patient's history were reviewed and updated as appropriate: allergies, current medications, past family history, past medical history, past social history, past surgical history and problem list.    Review of Systems   Constitutional: Negative for appetite change, fever, unexpected weight gain and unexpected weight loss.   HENT: Negative for congestion, nosebleeds, sore throat and trouble swallowing.    Eyes: Negative for visual disturbance.   Respiratory: Negative for cough, shortness of breath and wheezing.    Cardiovascular: Negative for chest pain, palpitations and leg swelling.   Gastrointestinal: Negative for abdominal pain, blood in stool, constipation, diarrhea, nausea and vomiting.   Endocrine: Negative for polydipsia, polyphagia and polyuria.   Genitourinary: Negative for dysuria, frequency and hematuria.   Musculoskeletal: Positive for back pain. Negative for arthralgias, joint swelling and myalgias.   Skin: Negative for rash.   Neurological: Negative for dizziness, seizures, syncope and numbness.   Hematological: Negative for adenopathy. Does not bruise/bleed easily.   Psychiatric/Behavioral: Negative for behavioral problems, sleep disturbance and depressed mood. The patient is not nervous/anxious.        Objective     Visit Vitals  /52   Pulse 65   Temp 97.3 °F (36.3 °C)   Resp 18   Ht 170.2 cm (67\")   Wt 72.6 kg (160 lb)   SpO2 98%   BMI 25.06 kg/m²       Physical Exam  Vitals signs and nursing note reviewed.   Constitutional:       General: She is not in acute distress.     Appearance: She is well-developed.   HENT:      Head: Normocephalic and atraumatic.      Right Ear: External ear normal.      Left Ear: External ear normal.      Nose: Nose normal.   Eyes: "      General: No scleral icterus.        Right eye: No discharge.         Left eye: No discharge.      Conjunctiva/sclera: Conjunctivae normal.      Pupils: Pupils are equal, round, and reactive to light.   Neck:      Musculoskeletal: Neck supple.   Cardiovascular:      Rate and Rhythm: Normal rate and regular rhythm.   Pulmonary:      Effort: Pulmonary effort is normal.   Abdominal:      General: Abdomen is flat. Bowel sounds are normal. There is no distension.      Palpations: There is no mass.      Tenderness: There is no abdominal tenderness. There is no right CVA tenderness, left CVA tenderness, guarding or rebound.      Hernia: No hernia is present.   Musculoskeletal:         General: Tenderness present. No deformity.      Comments: Grimaces with laying on the table.  Neg SLR test bilat. +2 patellar DTR bilat. Strong dorsiflexion of the toes bilat. Toe/heel gait is intact. Pain with palpation of bilateral SI joint   Skin:     General: Skin is warm and dry.      Findings: No rash.   Neurological:      Mental Status: She is alert and oriented to person, place, and time.      Motor: No abnormal muscle tone.      Coordination: Coordination normal.   Psychiatric:         Behavior: Behavior normal.         Thought Content: Thought content normal.         Judgment: Judgment normal.           Assessment/Plan   Diagnoses and all orders for this visit:    1. Dysuria (Primary)  -     nitrofurantoin, macrocrystal-monohydrate, (Macrobid) 100 MG capsule; Take 1 capsule by mouth 2 (Two) Times a Day.  Dispense: 14 capsule; Refill: 0    2. Urinary pain  -     POC Urinalysis Dipstick, Automated    3. Acute bilateral low back pain without sciatica    Increase water. Decrease caffeine. Discussed proper personal hygiene. Follow up for fever, nausea, flank pain or worsening symptoms.  Likely musculoskeletal.  Use tylenol 650mg po q4-6 hours prn with heat.  Follow up if symptoms persist  Drink plenty of water.

## 2020-12-01 ENCOUNTER — APPOINTMENT (OUTPATIENT)
Dept: MAMMOGRAPHY | Facility: HOSPITAL | Age: 80
End: 2020-12-01

## 2020-12-09 DIAGNOSIS — E78.00 PURE HYPERCHOLESTEROLEMIA: ICD-10-CM

## 2020-12-09 RX ORDER — PRAVASTATIN SODIUM 40 MG
40 TABLET ORAL NIGHTLY
Qty: 90 TABLET | Refills: 1 | Status: SHIPPED | OUTPATIENT
Start: 2020-12-09 | End: 2021-04-15

## 2020-12-10 DIAGNOSIS — I10 ESSENTIAL HYPERTENSION: ICD-10-CM

## 2020-12-10 RX ORDER — HYDRALAZINE HYDROCHLORIDE 10 MG/1
10 TABLET, FILM COATED ORAL 3 TIMES DAILY
Qty: 120 TABLET | Refills: 0 | Status: SHIPPED | OUTPATIENT
Start: 2020-12-10 | End: 2020-12-16 | Stop reason: SDUPTHER

## 2020-12-16 ENCOUNTER — TELEPHONE (OUTPATIENT)
Dept: FAMILY MEDICINE CLINIC | Facility: CLINIC | Age: 80
End: 2020-12-16

## 2020-12-16 DIAGNOSIS — I10 ESSENTIAL HYPERTENSION: ICD-10-CM

## 2020-12-16 RX ORDER — HYDRALAZINE HYDROCHLORIDE 10 MG/1
10 TABLET, FILM COATED ORAL 3 TIMES DAILY
Qty: 120 TABLET | Refills: 0 | Status: CANCELLED | OUTPATIENT
Start: 2020-12-16

## 2020-12-16 RX ORDER — HYDRALAZINE HYDROCHLORIDE 10 MG/1
10 TABLET, FILM COATED ORAL 3 TIMES DAILY
Qty: 90 TABLET | Refills: 0 | Status: SHIPPED | OUTPATIENT
Start: 2020-12-16 | End: 2021-01-11 | Stop reason: SDUPTHER

## 2020-12-16 NOTE — TELEPHONE ENCOUNTER
Pharmacy Name: [facility] - [facility] [address] - [phone]  - [phone]      Pharmacy representative name: TITUS    Pharmacy representative phone number: [phone]       What medication are you calling in regards to: hydrALAZINE (APRESOLINE) 10 MG tablet    What question does the pharmacy have: PATIENT NEEDS REFILLS    Who is the provider that prescribed the medication: IVAN THURSTON    Additional notes:

## 2021-01-11 ENCOUNTER — OFFICE VISIT (OUTPATIENT)
Dept: FAMILY MEDICINE CLINIC | Facility: CLINIC | Age: 81
End: 2021-01-11

## 2021-01-11 VITALS
RESPIRATION RATE: 18 BRPM | SYSTOLIC BLOOD PRESSURE: 128 MMHG | WEIGHT: 160 LBS | TEMPERATURE: 97.3 F | HEART RATE: 74 BPM | OXYGEN SATURATION: 99 % | BODY MASS INDEX: 25.11 KG/M2 | DIASTOLIC BLOOD PRESSURE: 72 MMHG | HEIGHT: 67 IN

## 2021-01-11 DIAGNOSIS — I10 ESSENTIAL HYPERTENSION: ICD-10-CM

## 2021-01-11 DIAGNOSIS — G47.00 INSOMNIA, UNSPECIFIED TYPE: ICD-10-CM

## 2021-01-11 DIAGNOSIS — E11.9 TYPE 2 DIABETES MELLITUS WITHOUT COMPLICATION, WITHOUT LONG-TERM CURRENT USE OF INSULIN (HCC): Primary | ICD-10-CM

## 2021-01-11 LAB
HBA1C MFR BLD: 5.7 %
POC CREATININE URINE: 300
POC MICROALBUMIN URINE: 30

## 2021-01-11 PROCEDURE — 83036 HEMOGLOBIN GLYCOSYLATED A1C: CPT | Performed by: NURSE PRACTITIONER

## 2021-01-11 PROCEDURE — 99214 OFFICE O/P EST MOD 30 MIN: CPT | Performed by: NURSE PRACTITIONER

## 2021-01-11 PROCEDURE — 82044 UR ALBUMIN SEMIQUANTITATIVE: CPT | Performed by: NURSE PRACTITIONER

## 2021-01-11 RX ORDER — HYDRALAZINE HYDROCHLORIDE 10 MG/1
10 TABLET, FILM COATED ORAL 3 TIMES DAILY
Qty: 90 TABLET | Refills: 3 | Status: SHIPPED | OUTPATIENT
Start: 2021-01-11 | End: 2021-05-17

## 2021-01-11 RX ORDER — QUETIAPINE FUMARATE 25 MG/1
25 TABLET, FILM COATED ORAL NIGHTLY
Qty: 90 TABLET | Refills: 3 | Status: SHIPPED | OUTPATIENT
Start: 2021-01-11 | End: 2021-10-18 | Stop reason: SDUPTHER

## 2021-01-11 NOTE — PROGRESS NOTES
"Chief Complaint  Diabetes    Subjective          Cheryl Tomas presents to [facility] for   History of Present Illness Kyle Tomas is a patient of Dr Perea who lives at [facility] with her cat Marissa.  She is in good health and has not concerns. She is here today to follow up on diabetes htn, and insomnia.    Currently taking metformin. Compliant with meds, no side effects. Tries to eat a healthy diet but finds it difficult to cook for one. No exercise due to weather.  Taking seroquel for insomnia and it works great. Sleeps will with no drowsiness in am.  Taking maxzide for htn. Compliant with meds. No side effects. Denies headaches, chest pain and edema. Also taking coreg, Asa, cardizem, Avapro and hydralazine      Objective   Vital Signs:   /72   Pulse 74   Temp 97.3 °F (36.3 °C)   Resp 18   Ht 170.2 cm (67\")   Wt 72.6 kg (160 lb)   SpO2 99%   BMI 25.06 kg/m²     Physical Exam  Vitals signs and nursing note reviewed.   Constitutional:       General: She is not in acute distress.     Appearance: She is well-developed.   HENT:      Head: Normocephalic and atraumatic.      Right Ear: Tympanic membrane and external ear normal.      Left Ear: Tympanic membrane and external ear normal.      Nose: Nose normal.      Mouth/Throat:      Pharynx: No oropharyngeal exudate.   Eyes:      General: No scleral icterus.        Right eye: No discharge.         Left eye: No discharge.      Conjunctiva/sclera: Conjunctivae normal.      Pupils: Pupils are equal, round, and reactive to light.   Neck:      Musculoskeletal: Neck supple.      Thyroid: No thyromegaly.      Trachea: No tracheal deviation.   Cardiovascular:      Rate and Rhythm: Normal rate and regular rhythm.      Heart sounds: Normal heart sounds. No murmur. No friction rub. No gallop.    Pulmonary:      Effort: Pulmonary effort is normal. No respiratory distress.      Breath sounds: Normal breath sounds. No " wheezing.   Abdominal:      General: Bowel sounds are normal. There is no distension.      Palpations: Abdomen is soft. There is no mass.      Tenderness: There is no abdominal tenderness.   Musculoskeletal:         General: No deformity.   Lymphadenopathy:      Cervical: No cervical adenopathy.   Skin:     General: Skin is warm and dry.      Capillary Refill: Capillary refill takes less than 2 seconds.      Findings: No erythema or rash.   Neurological:      Mental Status: She is alert and oriented to person, place, and time.      Coordination: Coordination normal.   Psychiatric:         Speech: Speech normal.         Behavior: Behavior normal.         Thought Content: Thought content normal.         Judgment: Judgment normal.        Result Review :       \plain     POC Glycosylated Hemoglobin (Hb A1C) (12/04/2019 10:43)       Assessment and Plan    Problem List Items Addressed This Visit        Cardiac and Vasculature    Hypertension    Relevant Medications    hydrALAZINE (APRESOLINE) 10 MG tablet       Endocrine and Metabolic    Diabetes mellitus (CMS/HCC) - Primary    Relevant Orders    POC Microalbumin (Completed)    POC Glycosylated Hemoglobin (Hb A1C) (Completed)    Comprehensive Metabolic Panel       Sleep    Insomnia    Relevant Medications    QUEtiapine (SEROquel) 25 MG tablet      Discussed the nature of the disease including, risks, complications, implications, management, safe and proper use of medications. Encouraged therapeutic lifestyle changes including low calorie diet with plenty of fruits and vegetables, daily exercise, medication compliance, and keeping scheduled follow up appointments with me and any other providers. Encouraged patient to have appointment for complete physical, fasting labs, appropriate screenings, and immunizations on an annual basis.    Discussed the importance of low carb diet, annual dilated eye exam, nightly foot checks, annual dentist visit, daily exercise. Monitor blood  sugar at least twice a week. Maintain BMI under 25. Have regular follow up appointments for labs and screenings.  Discussed the nature of the disease including, risks, complications, implications, management, safe and proper use of medications. Encouraged therapeutic lifestyle changes including low calorie diet with plenty of fruits and vegetables, daily exercise, medication compliance, and keeping scheduled follow up appointments with me and any other providers. Encouraged patient to have appointment for complete physical, fasting labs, appropriate screenings, and immunizations on an annual basis.      Follow Up   Return in about 3 months (around 4/11/2021).  Patient was given instructions and counseling regarding her condition or for health maintenance advice. Please see specific information pulled into the AVS if appropriate.

## 2021-01-12 ENCOUNTER — TELEPHONE (OUTPATIENT)
Dept: FAMILY MEDICINE CLINIC | Facility: CLINIC | Age: 81
End: 2021-01-12

## 2021-01-12 LAB
ALBUMIN SERPL-MCNC: 4.4 G/DL (ref 3.5–5.2)
ALBUMIN/GLOB SERPL: 2 G/DL
ALP SERPL-CCNC: 74 U/L (ref 39–117)
ALT SERPL-CCNC: 12 U/L (ref 1–33)
AST SERPL-CCNC: 20 U/L (ref 1–32)
BILIRUB SERPL-MCNC: 0.4 MG/DL (ref 0–1.2)
BUN SERPL-MCNC: 22 MG/DL (ref 8–23)
BUN/CREAT SERPL: 18.6 (ref 7–25)
CALCIUM SERPL-MCNC: 9.9 MG/DL (ref 8.6–10.5)
CHLORIDE SERPL-SCNC: 105 MMOL/L (ref 98–107)
CO2 SERPL-SCNC: 26.8 MMOL/L (ref 22–29)
CREAT SERPL-MCNC: 1.18 MG/DL (ref 0.57–1)
GLOBULIN SER CALC-MCNC: 2.2 GM/DL
GLUCOSE SERPL-MCNC: 102 MG/DL (ref 65–99)
POTASSIUM SERPL-SCNC: 4.7 MMOL/L (ref 3.5–5.2)
PROT SERPL-MCNC: 6.6 G/DL (ref 6–8.5)
SODIUM SERPL-SCNC: 143 MMOL/L (ref 136–145)

## 2021-01-12 NOTE — TELEPHONE ENCOUNTER
Left message for patient to call. eGFR is lower. A1c < 6.0. Decrease metformin to 500mg daily and repeat labs in 3 months. Letter sent.

## 2021-01-19 DIAGNOSIS — E79.0 ELEVATED URIC ACID IN BLOOD: ICD-10-CM

## 2021-01-19 DIAGNOSIS — I10 ESSENTIAL HYPERTENSION: ICD-10-CM

## 2021-01-21 RX ORDER — ALLOPURINOL 300 MG/1
TABLET ORAL
Qty: 90 TABLET | Refills: 3 | Status: SHIPPED | OUTPATIENT
Start: 2021-01-21 | End: 2021-11-01

## 2021-01-21 RX ORDER — DILTIAZEM HYDROCHLORIDE 240 MG/1
CAPSULE, COATED, EXTENDED RELEASE ORAL
Qty: 90 CAPSULE | Refills: 3 | Status: SHIPPED | OUTPATIENT
Start: 2021-01-21 | End: 2021-11-01

## 2021-01-22 DIAGNOSIS — I10 ESSENTIAL HYPERTENSION: ICD-10-CM

## 2021-01-25 DIAGNOSIS — I10 ESSENTIAL HYPERTENSION: ICD-10-CM

## 2021-01-25 RX ORDER — IRBESARTAN 300 MG/1
TABLET ORAL
Qty: 90 TABLET | Refills: 3 | Status: SHIPPED | OUTPATIENT
Start: 2021-01-25 | End: 2021-10-18 | Stop reason: SDUPTHER

## 2021-01-25 RX ORDER — IRBESARTAN 300 MG/1
300 TABLET ORAL NIGHTLY
Qty: 90 TABLET | Refills: 3 | Status: SHIPPED | OUTPATIENT
Start: 2021-01-25 | End: 2021-01-25 | Stop reason: SDUPTHER

## 2021-01-25 NOTE — TELEPHONE ENCOUNTER
Caller: Cheryl Tomas    Relationship: Self    Best call back number: [phone]    Medication needed:   Requested Prescriptions     Pending Prescriptions Disp Refills   • irbesartan (AVAPRO) 300 MG tablet 90 tablet 3     Sig: Take 1 tablet by mouth Every Night.       When do you need the refill by: 01/26/21    What details did the patient provide when requesting the medication:     Does the patient have less than a 3 day supply:  [x] Yes  [] No    What is the patient's preferred pharmacy: [facility] - [facility] [address] - [phone]  - [phone] FX

## 2021-02-26 DIAGNOSIS — E11.9 TYPE 2 DIABETES MELLITUS WITHOUT COMPLICATION, WITHOUT LONG-TERM CURRENT USE OF INSULIN (HCC): ICD-10-CM

## 2021-03-02 ENCOUNTER — TELEPHONE (OUTPATIENT)
Dept: FAMILY MEDICINE CLINIC | Facility: CLINIC | Age: 81
End: 2021-03-02

## 2021-03-02 NOTE — TELEPHONE ENCOUNTER
PATIENT CALLED STATING THAT SHE RECEIVED A LETTER STATING THAT DR. COREY WAS LEAVING THE PRACTICE AND SHE NEEDS TO SELECT A NEW PCP.    HOWEVER PATIENT STATED SHE ONLY SEEN HER ABOUT 2-3 TIMES AND DR. IVAN THURSTON IS HER PRIMARY DOCTOR AND SHE WOULD LIKE THAT TO REMAIN.    ANY QUESTIONS AND OR CONCERNS PLEASE CONTACT PATIENT [phone]

## 2021-03-25 ENCOUNTER — OFFICE VISIT (OUTPATIENT)
Dept: FAMILY MEDICINE CLINIC | Facility: CLINIC | Age: 81
End: 2021-03-25

## 2021-03-25 VITALS
BODY MASS INDEX: 25.27 KG/M2 | DIASTOLIC BLOOD PRESSURE: 78 MMHG | WEIGHT: 161 LBS | RESPIRATION RATE: 20 BRPM | HEART RATE: 74 BPM | SYSTOLIC BLOOD PRESSURE: 130 MMHG | OXYGEN SATURATION: 98 % | HEIGHT: 67 IN | TEMPERATURE: 97.7 F

## 2021-03-25 DIAGNOSIS — G89.29 CHRONIC PAIN OF LEFT KNEE: Primary | ICD-10-CM

## 2021-03-25 DIAGNOSIS — I83.92 VARICOSE VEINS OF LEFT LOWER EXTREMITY, UNSPECIFIED WHETHER COMPLICATED: ICD-10-CM

## 2021-03-25 DIAGNOSIS — M25.562 CHRONIC PAIN OF LEFT KNEE: Primary | ICD-10-CM

## 2021-03-25 DIAGNOSIS — Z87.828 HISTORY OF TORN MENISCUS OF LEFT KNEE: ICD-10-CM

## 2021-03-25 PROCEDURE — 99214 OFFICE O/P EST MOD 30 MIN: CPT | Performed by: NURSE PRACTITIONER

## 2021-03-25 PROCEDURE — 96372 THER/PROPH/DIAG INJ SC/IM: CPT | Performed by: NURSE PRACTITIONER

## 2021-03-25 RX ORDER — METHYLPREDNISOLONE ACETATE 40 MG/ML
40 INJECTION, SUSPENSION INTRA-ARTICULAR; INTRALESIONAL; INTRAMUSCULAR; SOFT TISSUE ONCE
Status: COMPLETED | OUTPATIENT
Start: 2021-03-25 | End: 2021-03-25

## 2021-03-25 RX ADMIN — METHYLPREDNISOLONE ACETATE 40 MG: 40 INJECTION, SUSPENSION INTRA-ARTICULAR; INTRALESIONAL; INTRAMUSCULAR; SOFT TISSUE at 15:03

## 2021-03-27 NOTE — PATIENT INSTRUCTIONS
Chronic Knee Pain, Adult  Chronic knee pain is pain in one or both knees that lasts longer than 3 months. Symptoms of chronic knee pain may include swelling, stiffness, and discomfort. Age-related wear and tear (osteoarthritis) of the knee joint is the most common cause of chronic knee pain. Other possible causes include:  · A long-term immune-related disease that causes inflammation of the knee (rheumatoid arthritis). This usually affects both knees.  · Inflammatory arthritis, such as gout or pseudogout.  · An injury to the knee that causes arthritis.  · An injury to the knee that damages the ligaments. Ligaments are strong tissues that connect bones to each other.  · Runner's knee or pain behind the kneecap.  Treatment for chronic knee pain depends on the cause. The main treatments for chronic knee pain are physical therapy and weight loss. This condition may also be treated with medicines, injections, a knee sleeve or brace, and by using crutches. Rest, ice, compression (pressure), and elevation (RICE) therapy may also be recommended.  Follow these instructions at home:  If you have a knee sleeve or brace:    · Wear it as told by your health care provider. Remove it only as told by your health care provider.  · Loosen it if your toes tingle, become numb, or turn cold and blue.  · Keep it clean.  · If the sleeve or brace is not waterproof:  ? Do not let it get wet.  ? Remove it if allowed by your health care provider, or cover it with a watertight covering when you take a bath or a shower.  Managing pain, stiffness, and swelling         · If directed, apply heat to the affected area as often as told by your health care provider. Use the heat source that your health care provider recommends, such as a moist heat pack or a heating pad.  ? If you have a removable sleeve or brace, remove it as told by your health care provider.  ? Place a towel between your skin and the heat source.  ? Leave the heat on for 20-30  minutes.  ? Remove the heat if your skin turns bright red. This is especially important if you are unable to feel pain, heat, or cold. You may have a greater risk of getting burned.  · If directed, put ice on the affected area.  ? If you have a removable sleeve or brace, remove it as told by your health care provider.  ? Put ice in a plastic bag.  ? Place a towel between your skin and the bag.  ? Leave the ice on for 20 minutes, 2-3 times a day.  · Move your toes often to reduce stiffness and swelling.  · Raise (elevate) the injured area above the level of your heart while you are sitting or lying down.  Activity  · Avoid activities where both feet leave the ground at the same time (high-impact activities). Examples are running, jumping rope, and doing jumping jacks.  · Return to your normal activities as told by your health care provider. Ask your health care provider what activities are safe for you.  · Follow the exercise plan that your health care provider designed for you. Your health care provider may suggest that you:  ? Avoid activities that make knee pain worse. This may require you to change your exercise routines, sport participation, or job duties.  ? Wear shoes with cushioned soles.  ? Avoid high-impact activities or sports that require running and sudden changes in direction.  ? Do physical therapy as told by your health care provider. Physical therapy is planned to match your needs and abilities. It may include exercises for strength, flexibility, stability, and endurance.  ? Do exercises that increase balance and strength, such as anthony chi and yoga.  · Do not use the injured limb to support your body weight until your health care provider says that you can. Use crutches, a cane, or a walker, as told by your health care provider.  General instructions  · Take over-the-counter and prescription medicines only as told by your health care provider.  · Lose weight if you are overweight. Losing even a little  weight can reduce knee pain. Ask your health care provider what your ideal weight is, and how to safely lose extra weight. A food expert (dietitian) may be able to help you plan your meals.  · Do not use any products that contain nicotine or tobacco, such as cigarettes, e-cigarettes, and chewing tobacco. These can delay healing. If you need help quitting, ask your health care provider.  · Keep all follow-up visits as told by your health care provider. This is important.  Contact a health care provider if:  · You have knee pain that is not getting better or gets worse.  · You are unable to do your physical therapy exercises due to knee pain.  Get help right away if:  · Your knee swells and the swelling becomes worse.  · You cannot move your knee.  · You have severe knee pain.  Summary  · Knee pain that lasts more than 3 months is considered chronic knee pain.  · The main treatments for chronic knee pain are physical therapy and weight loss. You may also need to take medicines, wear a knee sleeve or brace, use crutches, and apply ice or heat.  · Losing even a little weight can reduce knee pain. Ask your health care provider what your ideal weight is, and how to safely lose extra weight. A food expert (dietitian) may be able to help you plan your meals.  · Work with a physical therapist to make a safe exercise program, as told by your health care provider.  This information is not intended to replace advice given to you by your health care provider. Make sure you discuss any questions you have with your health care provider.  Document Revised: 02/27/2020 Document Reviewed: 02/27/2020  [employer]vier Patient Education © 2021 [employer]    Nonsurgical Procedures for Varicose Veins  Various nonsurgical procedures can be used to treat varicose veins. Varicose veins are swollen, twisted veins that are visible under the skin. They occur most often in the legs. These veins may appear blue and bulging.  Varicose veins are caused  by damage to the valves in veins. All veins have a valve that makes blood flow in only one direction. If a valve gets weak or damaged, blood can pool and cause varicose veins.  You may need a procedure to treat your varicose veins if they are causing symptoms or complications, or if lifestyle changes have not helped. These procedures can reduce pain, aching, and the risk of bleeding and blood clots. They can also improve the way the affected area looks (cosmetic appearance).  The three common nonsurgical procedures are:  · Sclerotherapy. A chemical is injected to close off a vein.  · Laser treatment. Light energy is applied to close off the vein.  · Radiofrequency vein ablation. Electrical energy is used to produce heat that closes off the vein.  Your health care provider will discuss the method that is best for you based on your condition.  Tell a health care provider about:  · Any allergies you have.  · All medicines you are taking, including vitamins, herbs, eye drops, creams, and over-the-counter medicines.  · Any problems you or family members have had with anesthetic medicines.  · Any blood disorders you have.  · Any surgeries you have had.  · Any medical conditions you have.  · Whether you are pregnant or may be pregnant.  What are the risks?  Generally, this is a safe procedure. However, problems may occur, including:  · Damage to nearby nerves, tissues, or veins.  · Skin irritation, sores, or dark spots.  · Numbness.  · Clotting.  · Infection.  · Allergic reactions to medicines.  · Scarring.  · Leg swelling.  · Need for additional treatments.  · Bruising.  What happens before the procedure?  · Ask your health care provider about:  ? Changing or stopping your regular medicines. This is especially important if you are taking diabetes medicines or blood thinners.  ? Taking over-the-counter medicines, vitamins, herbs, and supplements.  ? Taking medicines such as aspirin and ibuprofen. These medicines can thin  your blood. Do not take these medicines unless your health care provider tells you to take them.  · You may have an exam or testing. This can include a tests to:  ? Check for clots and check blood flow using sound waves (Doppler ultrasound).  ? Observe how blood flows through your veins by injecting a dye that outlines your veins on X-rays (angiogram). This test is used in rare cases.  What happens during the procedure?  One of the following procedures will be performed:  Sclerotherapy  This procedure is often used for small to medium veins.  · A chemical (sclerosant) that irritates the lining of the vein will be injected into the vein. This will cause the varicose vein to be closed off. Sclerosants in different amounts and strengths can be used, depending on the size and location of the vein.  · All of the varicose vein sites will be injected. You may need more than one treatment because new varicose veins may develop, or more than one injection may be needed for each varicose vein.    Laser treatment  There are two ways that lasers are used to treat varicose veins:  · Light energy from a laser may be directed onto the vein through the skin.  · A needle may be used to pass a thin laser catheter into the vein to cause it to close.  You may need more than one treatment if the vein re-opens. In some cases, laser treatment may be combined with sclerotherapy.  Radiofrequency vein ablation    · You will be given a medicine that numbs the area (local anesthetic).  · A small incision will be made near the varicose vein.  · A thin tube (catheter) will be threaded into your vein.  · The tip of the catheter will deploy electrodes.  · The electrodes will deliver electrical energy to produce heat that closes off the vein.  What happens after the procedure?  · A bandage (dressing) may be used to cover the injection site or incisions.  · You may have to wear compression stockings. These stockings help to prevent blood clots and  reduce swelling in your legs.  · Return to your normal activities as told by your health care provider.  Summary  · Varicose veins are swollen, twisted veins that are visible under the skin. They occur most often in the legs.  · Various procedures can be used to treat varicose veins. You may need a procedure to treat your varicose veins if they are causing symptoms or complications, or if lifestyle changes have not helped.  · Your health care provider will discuss the method that is best for you based on your condition.  This information is not intended to replace advice given to you by your health care provider. Make sure you discuss any questions you have with your health care provider.  Document Revised: 04/10/2020 Document Reviewed: 03/30/2018  [employer] Patient Education © 2021 [employer]    Varicose Veins  Varicose veins are veins that have become enlarged, bulged, and twisted. They most often appear in the legs.  What are the causes?  This condition is caused by damage to the valves in the vein. These valves help blood return to your heart. When they are damaged and they stop working properly, blood may flow backward and back up in the veins near the skin, causing the veins to get larger and appear twisted.  The condition can result from any issue that causes blood to back up, like pregnancy, prolonged standing, or obesity.  What increases the risk?  This condition is more likely to develop in people who are:  · On their feet a lot.  · Pregnant.  · Overweight.  What are the signs or symptoms?  Symptoms of this condition include:  · Bulging, twisted, and bluish veins.  · A feeling of heaviness. This may be worse at the end of the day.  · Leg pain. This may be worse at the end of the day.  · Swelling in the leg.  · Changes in skin color over the veins.  How is this diagnosed?  This condition may be diagnosed based on your symptoms, a physical exam, and an ultrasound test.  How is this treated?  Treatment for  this condition may involve:  · Avoiding sitting or standing in one position for long periods of time.  · Wearing compression stockings. These stockings help to prevent blood clots and reduce swelling in the legs.  · Raising (elevating) the legs when resting.  · Losing weight.  · Exercising regularly.  If you have persistent symptoms or want to improve the way your varicose veins look, you may choose to have a procedure to close the varicose veins off or to remove them.  Treatments to close off the veins include:  · Sclerotherapy. In this treatment, a solution is injected into a vein to close it off.  · Laser treatment. In this treatment, the vein is heated with a laser to close it off.  · Radiofrequency vein ablation. In this treatment, an electrical current produced by radio waves is used to close off the vein.  Treatments to remove the veins include:  · Phlebectomy. In this treatment, the veins are removed through small incisions made over the veins.  · Vein ligation and stripping. In this treatment, incisions are made over the veins. The veins are then removed after being tied (ligated) with stitches (sutures).  Follow these instructions at home:  Activity  · Walk as much as possible. Walking increases blood flow. This helps blood return to the heart and takes pressure off your veins. It also increases your cardiovascular strength.  · Follow your health care provider's instructions about exercising.  · Do not stand or sit in one position for a long period of time.  · Do not sit with your legs crossed.  · Rest with your legs raised during the day.  General instructions    · Follow any diet instructions given to you by your health care provider.  · Wear compression stockings as directed by your health care provider. Do not wear other kinds of tight clothing around your legs, pelvis, or waist.  · Elevate your legs at night to above the level of your heart.  · If you get a cut in the skin over the varicose vein and  the vein bleeds:  ? Lie down with your leg raised.  ? Apply firm pressure to the cut with a clean cloth until the bleeding stops.  ? Place a bandage (dressing) on the cut.  Contact a health care provider if:  · The skin around your varicose veins starts to break down.  · You have pain, redness, tenderness, or hard swelling over a vein.  · You are uncomfortable because of pain.  · You get a cut in the skin over a varicose vein and it will not stop bleeding.  Summary  · Varicose veins are veins that have become enlarged, bulged, and twisted. They most often appear in the legs.  · This condition is caused by damage to the valves in the vein. These valves help blood return to your heart.  · Treatment for this condition includes frequent movements, wearing compression stockings, losing weight, and exercising regularly. In some cases, procedures are done to close off or remove the veins.  · Treatment for this condition may include wearing compression stockings, elevating the legs, losing weight, and engaging in regular activity. In some cases, procedures are done to close off or remove the veins.  This information is not intended to replace advice given to you by your health care provider. Make sure you discuss any questions you have with your health care provider.  Document Revised: 02/13/2020 Document Reviewed: 01/10/2018  [employer] Patient Education © 2021 [employer]    Surgical Procedures for Varicose Veins    Surgical procedures can be used to treat varicose veins. Varicose veins are swollen, twisted veins that are visible under the skin. They occur most often in the legs. These veins may appear blue and bulging.  Varicose veins are caused by damage to the valves in veins. All veins have a valve that keeps blood flowing in only one direction. If a valve gets weak or damaged, blood can pool and cause varicose veins.  You may need surgery to treat your varicose veins if they are causing symptoms or complications, or  if lifestyle changes have not helped. These procedures can lower the risk of bleeding and blood clots, and reduce pain and aching. They can also improve the way the affected area looks (cosmetic appearance).  Two common surgical procedures are:  · Phlebectomy. The vein is surgically removed through a small incision. This procedure is used to remove the veins closest to the skin.  · Vein ligation and stripping. The vein is surgically removed through incisions after the vein has been tied off. This procedure is used to treat severe cases.  Based on your overall condition, your health care provider will discuss the method that is best for you.  Tell a health care provider about:  · Any allergies you have.  · All medicines you are taking, including vitamins, herbs, eye drops, creams, and over-the-counter medicines.  · Any problems you or family members have had with anesthetic medicines.  · Any blood disorders you have.  · Any surgeries you have had.  · Any medical conditions you have.  · Whether you are pregnant or may be pregnant.  What are the risks?  Generally, this is a safe procedure. However, problems may occur, including:  · Damage to nearby nerves, tissues, or veins.  · Skin irritation, sores, or dark spots.  · Numbness.  · Clotting.  · Infection.  · Allergic reactions to medicines.  · Scarring.  · Leg swelling.  · Need for additional treatments.  What happens before the procedure?  · Follow instructions from your health care provider about eating or drinking restrictions.  · Ask your health care provider about:  ? Changing or stopping your regular medicines. This is especially important if you are taking diabetes medicines or blood thinners.  ? Taking over-the-counter medicines, vitamins, herbs, and supplements.  ? Taking medicines such as aspirin and ibuprofen. These medicines can thin your blood. Do not take these medicines unless your health care provider tells you to take them.  · You may have an exam or  testing. This can include a test to:  ? Check for clots and check blood flow using sound waves (Doppler ultrasound).  ? Observe how blood flows through your veins by injecting a dye that outlines your veins on X-rays (angiogram). This test is used in rare cases.  · Plan to have someone take you home from the hospital or clinic.  · Ask your health care provider how your surgical site will be marked or identified.  · You may be given antibiotic medicine to help prevent infection.  · You may be asked to shower with a germ-killing soap.  What happens during the procedure?  · To lower your risk of infection:  ? Your health care team will wash or sanitize their hands.  ? Hair may be removed from the surgical area.  ? Your skin will be washed with soap.  · One of the following procedures will be performed:  Phlebectomy  · You will be given a medicine to numb the area (local anesthetic).  · A small puncture will be made close to each of the varicose veins.  · A tiny hook will be used to pull out the vein.  · Bandages (dressings) or adhesive strips will be used to cover the puncture sites.  Vein ligation and stripping  · You will be given a medicine to make you fall asleep (general anesthetic).  · A small incision will be made near the vein in your groin (saphenous vein).  · The surgeon will tie off (ligate) the vein.  · Several more incisions will then be made along the vein.  · The vein will be removed (stripped).  · The incisions will be closed with stitches (sutures) under the skin.  · Bandages or adhesive strips will be used to cover the incision sites.  What happens after the procedure?  · Your blood pressure, heart rate, breathing rate, and blood oxygen level will be monitored until the medicines you were given have worn off.  · You may have to wear compression stockings. These stockings help to prevent blood clots and reduce swelling in your legs.  Summary  · Varicose veins are swollen, twisted veins that are visible  under the skin. They occur most often in the legs.  · You may need surgery to treat your varicose veins if they are causing symptoms or complications, or if lifestyle changes have not helped.  · Your health care provider will discuss the method that is best for you based on your condition.  This information is not intended to replace advice given to you by your health care provider. Make sure you discuss any questions you have with your health care provider.  Document Revised: 11/30/2018 Document Reviewed: 03/16/2018  [employer] Patient Education © 2021 [employer]  Methylprednisolone Suspension for Injection  What is this medicine?  METHYLPREDNISOLONE (meth ill pred NISS oh lone) is a corticosteroid. It is commonly used to treat inflammation of the skin, joints, lungs, and other organs. Common conditions treated include asthma, allergies, and arthritis. It is also used for other conditions, such as blood disorders and diseases of the adrenal glands.  This medicine may be used for other purposes; ask your health care provider or pharmacist if you have questions.  COMMON BRAND NAME(S): Depmedalone-40, Depmedalone-80, Depo-Medrol  What should I tell my health care provider before I take this medicine?  They need to know if you have any of these conditions:  · Cushing's syndrome  · eye disease, vision problems  · diabetes  · glaucoma  · heart disease  · high blood pressure  · infection (especially a virus infection such as chickenpox, cold sores, or herpes)  · liver disease  · mental illness  · myasthenia gravis  · osteoporosis  · recently received or scheduled to receive a vaccine  · seizures  · stomach or intestine problems  · thyroid disease  · an unusual or allergic reaction to lactose, methylprednisolone, other medicines, foods, dyes, or preservatives  · pregnant or trying to get pregnant  · breast-feeding  How should I use this medicine?  This medicine is for injection into a muscle, joint, or other tissue. It is  given by a health care professional in a hospital or clinic setting.  Talk to your pediatrician regarding the use of this medicine in children. While this drug may be prescribed for selected conditions, precautions do apply.  Overdosage: If you think you have taken too much of this medicine contact a poison control center or emergency room at once.  NOTE: This medicine is only for you. Do not share this medicine with others.  What if I miss a dose?  This does not apply.  What may interact with this medicine?  Do not take this medicine with any of the following medications:  · alefacept  · echinacea  · iopamidol  · live virus vaccines  · metyrapone  · mifepristone  This medicine may also interact with the following medications:  · amphotericin B  · aspirin and aspirin-like medicines  · certain antibiotics like erythromycin, clarithromycin, troleandomycin  · certain medicines for diabetes  · certain medicines for fungal infections like ketoconazole  · certain medicines for seizures like carbamazepine, phenobarbital, phenytoin  · certain medicines that treat or prevent blood clots like warfarin  · cyclosporine  · digoxin  · diuretics  · female hormones, like estrogens and birth control pills  · isoniazid  · NSAIDs, medicines for pain inflammation, like ibuprofen or naproxen  · other medicines for myasthenia gravis  · rifampin  · vaccines  This list may not describe all possible interactions. Give your health care provider a list of all the medicines, herbs, non-prescription drugs, or dietary supplements you use. Also tell them if you smoke, drink alcohol, or use illegal drugs. Some items may interact with your medicine.  What should I watch for while using this medicine?  Tell your doctor or healthcare professional if your symptoms do not start to get better or if they get worse. Do not stop taking except on your doctor's advice. You may develop a severe reaction. Your doctor will tell you how much medicine to  take.  Your condition will be monitored carefully while you are receiving this medicine.  This medicine may increase your risk of getting an infection. Tell your doctor or health care professional if you are around anyone with measles or chickenpox, or if you develop sores or blisters that do not heal properly.  This medicine may increase blood sugar. Ask your healthcare provider if changes in diet or medicines are needed if you have diabetes.  Tell your doctor or health care professional right away if you have any change in your eyesight.  Using this medicine for a long time may increase your risk of low bone mass. Talk to your doctor about bone health.  What side effects may I notice from receiving this medicine?  Side effects that you should report to your doctor or health care professional as soon as possible:  · allergic reactions like skin rash, itching or hives, swelling of the face, lips, or tongue  · bloody or tarry stools  · hallucination, loss of contact with reality  · muscle cramps  · muscle pain  · palpitations  · signs and symptoms of high blood sugar such as being more thirsty or hungry or having to urinate more than normal. You may also feel very tired or have blurry vision.  · signs and symptoms of infection like fever or chills; cough; sore throat; pain or trouble passing urine  · trouble passing urine  Side effects that usually do not require medical attention (report to your doctor or health care professional if they continue or are bothersome):  · changes in emotions or mood  · constipation  · diarrhea  · excessive hair growth on the face or body  · headache  · nausea, vomiting  · pain, redness, or irritation at site where injected  · trouble sleeping  · weight gain  This list may not describe all possible side effects. Call your doctor for medical advice about side effects. You may report side effects to FDA at 8-909-FDA-7642.  Where should I keep my medicine?  This drug is given in a hospital  or clinic and will not be stored at home.  NOTE: This sheet is a summary. It may not cover all possible information. If you have questions about this medicine, talk to your doctor, pharmacist, or health care provider.  © 2021 Elsevier/Gold Standard (2019-09-19 09:16:30)

## 2021-03-27 NOTE — PROGRESS NOTES
Follow Up Office Note     Patient Name: Cheryl Tomas  : 1940   MRN: [id number]     Chief Complaint:    Chief Complaint   Patient presents with   • Varicose Veins   • Knee Pain     pt would like referral       History of Present Illness: Cheryl Tomas is a 80 y.o. female who presents today with c/o chronic left knee pain. Patient has a history of torn meniscus left knee. Patient also has a new complaint of varicosities in her left lower leg. She is concerned because the veins are protruding significantly and she is having some mild pain. She is requesting a referral to a vascular specialist.    Knee Pain   Incident onset: more than one year ago. The pain is present in the left knee. The pain is moderate. Pertinent negatives include no inability to bear weight, loss of motion, loss of sensation, muscle weakness, numbness or tingling. She reports no foreign bodies present. The symptoms are aggravated by weight bearing. She has tried acetaminophen for the symptoms. The treatment provided no relief.   Varicose Veins  This is a new problem. The current episode started more than 1 month ago. The problem occurs daily. The problem has been gradually worsening. Associated symptoms include arthralgias (left knee pain). Pertinent negatives include no abdominal pain, chest pain, chills, diaphoresis, fatigue, fever, joint swelling or numbness. Nothing aggravates the symptoms. She has tried nothing for the symptoms.        Subjective      Review of Systems:   Review of Systems   Constitutional: Negative for activity change, appetite change, chills, diaphoresis, fatigue, fever and unexpected weight change.   Respiratory: Negative.  Negative for chest tightness and shortness of breath.    Cardiovascular: Negative for chest pain, palpitations and leg swelling.   Gastrointestinal: Negative.  Negative for abdominal pain.   Musculoskeletal: Positive for arthralgias (left knee pain). Negative for joint swelling.   Skin:  Negative.    Neurological: Negative.  Negative for tingling and numbness.   Hematological:        Varicose veins left lower leg        Past Medical History:   Past Medical History:   Diagnosis Date   • Diabetes mellitus (CMS/HCC)    • Hyperlipidemia    • Hypertension    • Osteoporosis          Medications:     Current Outpatient Medications:   •  acetaminophen (TYLENOL) 650 MG 8 hr tablet, Take 1,300 mg by mouth Every 8 (Eight) Hours As Needed for Mild Pain ., Disp: , Rfl:   •  allopurinol (ZYLOPRIM) 300 MG tablet, TAKE 1 TABLET EVERY DAY, Disp: 90 tablet, Rfl: 3  •  aspirin 81 MG EC tablet, Take 1 tablet by mouth Daily., Disp: 90 tablet, Rfl: 3  •  carvedilol CR (COREG CR) 40 MG 24 hr capsule, Take 1 capsule by mouth Daily., Disp: 90 capsule, Rfl: 3  •  cholecalciferol (VITAMIN D3) 1000 units tablet, Take 1,000 Units by mouth Daily., Disp: , Rfl:   •  Coenzyme Q10 10 MG capsule, coenzyme Q10  100mg, Disp: , Rfl:   •  dilTIAZem CD (CARDIZEM CD) 240 MG 24 hr capsule, TAKE 1 CAPSULE EVERY DAY, Disp: 90 capsule, Rfl: 3  •  hydrALAZINE (APRESOLINE) 10 MG tablet, Take 1 tablet by mouth 3 (Three) Times a Day., Disp: 90 tablet, Rfl: 3  •  irbesartan (AVAPRO) 300 MG tablet, TAKE 1 TABLET EVERY NIGHT, Disp: 90 tablet, Rfl: 3  •  metFORMIN (GLUCOPHAGE) 500 MG tablet, TAKE 1 TABLET BY MOUTH 2 (TWO) TIMES A DAY WITH MEALS., Disp: 180 tablet, Rfl: 3  •  nitroglycerin (NITROSTAT) 0.3 MG SL tablet, Place 1 tablet under the tongue Every 5 (Five) Minutes As Needed for Chest Pain. Take no more than 3 doses in 15 minutes., Disp: 30 tablet, Rfl: 3  •  pravastatin (PRAVACHOL) 40 MG tablet, TAKE 1 TABLET BY MOUTH EVERY NIGHT., Disp: 90 tablet, Rfl: 1  •  QUEtiapine (SEROquel) 25 MG tablet, Take 1 tablet by mouth Every Night., Disp: 90 tablet, Rfl: 3  •  triamterene-hydrochlorothiazide (MAXZIDE-25) 37.5-25 MG per tablet, TAKE 1 TABLET EVERY DAY, Disp: 90 tablet, Rfl: 1    Allergies:   Allergies   Allergen Reactions   • Cephalexin Itching  "and Rash         Objective     Physical Exam:  Vital Signs:   Vitals:    03/25/21 1408   BP: 130/78   BP Location: Left arm   Patient Position: Sitting   Cuff Size: Adult   Pulse: 74   Resp: 20   Temp: 97.7 °F (36.5 °C)   SpO2: 98%   Weight: 73 kg (161 lb)   Height: 170.2 cm (67\")   PainSc: 8  Comment: knee     Body mass index is 25.22 kg/m².     Physical Exam  Vitals and nursing note reviewed.   Constitutional:       General: She is not in acute distress.     Appearance: Normal appearance. She is well-developed. She is not ill-appearing, toxic-appearing or diaphoretic.   HENT:      Head: Normocephalic and atraumatic.   Cardiovascular:      Rate and Rhythm: Normal rate and regular rhythm.      Heart sounds: Normal heart sounds.   Pulmonary:      Effort: Pulmonary effort is normal. No respiratory distress.      Breath sounds: Normal breath sounds. No stridor. No wheezing.   Musculoskeletal:      Right knee: No swelling, effusion or crepitus. Normal range of motion. No tenderness.      Left knee: Crepitus present. No swelling or effusion. Decreased range of motion. No tenderness.   Skin:     General: Skin is warm and dry.          Neurological:      General: No focal deficit present.      Mental Status: She is alert and oriented to person, place, and time.   Psychiatric:         Mood and Affect: Mood normal.         Behavior: Behavior normal. Behavior is cooperative.         Thought Content: Thought content normal.         Judgment: Judgment normal.         Assessment / Plan      Assessment/Plan:   Diagnoses and all orders for this visit:    1. Chronic pain of left knee (Primary)  -     methylPREDNISolone acetate (DEPO-medrol) injection 40 mg        -     Patient declines ortho referral at this time    2. History of torn meniscus of left knee  -     methylPREDNISolone acetate (DEPO-medrol) injection 40 mg    3. Varicose veins of left lower extremity, unspecified whether complicated  -     Ambulatory Referral to " Vascular Surgery       Follow Up:   PRN and at next scheduled appointment(s) with PCP Dr. Perea    Discussed the nature of the medical condition(s) risks, complications, implications, management, safe and proper use of medications. Encouraged medication compliance, and keeping scheduled follow up appointments with me and any other providers.      RTC if symptoms fail to improve, to ER if symptoms worsen.      JESSICA Abbasi  [facility]       Please note that portions of this note may have been completed with a voice recognition program. Efforts were made to edit the dictations, but occasionally words are mistranscribed.

## 2021-04-05 ENCOUNTER — OFFICE VISIT (OUTPATIENT)
Dept: CARDIOLOGY | Facility: CLINIC | Age: 81
End: 2021-04-05

## 2021-04-05 VITALS
HEART RATE: 60 BPM | WEIGHT: 155 LBS | SYSTOLIC BLOOD PRESSURE: 136 MMHG | HEIGHT: 67 IN | BODY MASS INDEX: 24.33 KG/M2 | OXYGEN SATURATION: 97 % | DIASTOLIC BLOOD PRESSURE: 70 MMHG

## 2021-04-05 DIAGNOSIS — I10 BENIGN ESSENTIAL HYPERTENSION: Primary | ICD-10-CM

## 2021-04-05 DIAGNOSIS — I35.1 NONRHEUMATIC AORTIC VALVE INSUFFICIENCY: ICD-10-CM

## 2021-04-05 DIAGNOSIS — I47.1 PSVT (PAROXYSMAL SUPRAVENTRICULAR TACHYCARDIA) (HCC): ICD-10-CM

## 2021-04-05 DIAGNOSIS — E78.00 PURE HYPERCHOLESTEROLEMIA: ICD-10-CM

## 2021-04-05 PROCEDURE — 99214 OFFICE O/P EST MOD 30 MIN: CPT | Performed by: INTERNAL MEDICINE

## 2021-04-05 NOTE — PROGRESS NOTES
[facility]  Office visit  Cheryl Tomas  1940  [phone]  There is no work phone number on file.    VISIT DATE:  4/5/2021    PCP: Marian Perea MD  [address]  [place] [address]    CC:  Chief Complaint   Patient presents with   • Hypertension   • Hyperlipidemia       Previous cardiac studies and procedures:  November 2019 echo: EF 60%, mild concentric LVH, moderate aortic insufficiency.  LV end-diastolic dimension 4.3 cm.      ASSESSMENT:   Diagnosis Plan   1. Benign essential hypertension     2. Pure hypercholesterolemia     3. Nonrheumatic aortic valve insufficiency     4. PSVT (paroxysmal supraventricular tachycardia) (CMS/[facility])         PLAN:  PSVT: Currently stable and asymptomatic.  Continue combination of beta-blockade and diltiazem.     Aortic insufficiency, moderate: Surveillance echocardiography every 1 to 2 years.  Repeat assessment pending prior to follow-up in 6 months. Annual clinical exam.  Currently asymptomatic.     Hypertension: Goal less than 130/80 mmHg.    Well controlled, continue current medical therapy.  Encouraged regular exercise.     Hyperlipidemia: Goal LDL less than 100, currently well controlled.  Continue pravastatin 40 mg p.o. daily.     Bilateral ankle edema: Mild venous insufficiency.    Associated varicosities.  Continue triamterene hydrochlorothiazide, elevating feet when necessary, daily use of compression stockings and limiting sodium intake.  Currently not affecting quality of life.     Subjective  Interval assessment: Blood pressures running less than 130/80 mmHg.  Reports stable functional capacity.  She is using her thigh-high compression stockings on a regular basis and has received some relief from symptomatic varicosities.  Denies sustained palpitations.    Initial consultation: 80-year-old female with history of hypertension, dyslipidemia, aortic insufficiency and PSVT.  Reports stable functional capacity.   "Has not been participating in regular exercise but has no limitations.  Denies chest pain, dyspnea or or sustained palpitations.  Has not been tracking home blood pressures recently.  She does find it difficult to take the midday hydralazine but is otherwise compliant with medical therapy for hypertension.  EKG reveals stable bifascicular block.  Reviewed most recent laboratory evaluation.    PHYSICAL EXAMINATION:  Vitals:    04/05/21 1420   BP: 136/70   BP Location: Left arm   Patient Position: Sitting   Pulse: 60   SpO2: 97%   Weight: 70.3 kg (155 lb)   Height: 170.2 cm (67\")     General Appearance:    Alert, cooperative, no distress, appears stated age   Head:    Normocephalic, without obvious abnormality, atraumatic   Eyes:    conjunctiva/corneas clear   Nose:   Nares normal, septum midline, mucosa normal, no drainage   Throat:   Lips, teeth and gums normal   Neck:   Supple, symmetrical, trachea midline, no carotid    bruit or JVD   Lungs:     Clear to auscultation bilaterally, respirations unlabored   Chest Wall:    No tenderness or deformity    Heart:    Regular rate and rhythm, S1 and S2 normal, no murmur, rub   or gallop, normal carotid impulse bilaterally without bruit.   Abdomen:     Soft, non-tender   Extremities:   Extremities normal, atraumatic, no cyanosis or edema   Pulses:   2+ and symmetric all extremities   Skin:   Skin color, texture, turgor normal, no rashes or lesions       Diagnostic Data:  Procedures  Lab Results   Component Value Date    CHLPL 166 09/21/2020    TRIG 150 09/21/2020    HDL 45 09/21/2020     Lab Results   Component Value Date    BUN 22 01/11/2021    CREATININE 1.18 (H) 01/11/2021     01/11/2021    K 4.7 01/11/2021     01/11/2021    CO2 26.8 01/11/2021     Lab Results   Component Value Date    HGBA1C 5.7 01/11/2021     Lab Results   Component Value Date    WBC 6.51 09/21/2020    HGB 14.0 09/21/2020    HCT 41.8 09/21/2020     09/21/2020 "       Allergies  Allergies   Allergen Reactions   • Cephalexin Itching and Rash       Current Medications    Current Outpatient Medications:   •  acetaminophen (TYLENOL) 650 MG 8 hr tablet, Take 1,300 mg by mouth Every 8 (Eight) Hours As Needed for Mild Pain ., Disp: , Rfl:   •  allopurinol (ZYLOPRIM) 300 MG tablet, TAKE 1 TABLET EVERY DAY, Disp: 90 tablet, Rfl: 3  •  aspirin 81 MG EC tablet, Take 1 tablet by mouth Daily., Disp: 90 tablet, Rfl: 3  •  carvedilol CR (COREG CR) 40 MG 24 hr capsule, Take 1 capsule by mouth Daily., Disp: 90 capsule, Rfl: 3  •  cholecalciferol (VITAMIN D3) 1000 units tablet, Take 1,000 Units by mouth Daily., Disp: , Rfl:   •  Coenzyme Q10 10 MG capsule, coenzyme Q10  100mg, Disp: , Rfl:   •  dilTIAZem CD (CARDIZEM CD) 240 MG 24 hr capsule, TAKE 1 CAPSULE EVERY DAY, Disp: 90 capsule, Rfl: 3  •  hydrALAZINE (APRESOLINE) 10 MG tablet, Take 1 tablet by mouth 3 (Three) Times a Day., Disp: 90 tablet, Rfl: 3  •  irbesartan (AVAPRO) 300 MG tablet, TAKE 1 TABLET EVERY NIGHT, Disp: 90 tablet, Rfl: 3  •  metFORMIN (GLUCOPHAGE) 500 MG tablet, TAKE 1 TABLET BY MOUTH 2 (TWO) TIMES A DAY WITH MEALS., Disp: 180 tablet, Rfl: 3  •  nitroglycerin (NITROSTAT) 0.3 MG SL tablet, Place 1 tablet under the tongue Every 5 (Five) Minutes As Needed for Chest Pain. Take no more than 3 doses in 15 minutes., Disp: 30 tablet, Rfl: 3  •  pravastatin (PRAVACHOL) 40 MG tablet, TAKE 1 TABLET BY MOUTH EVERY NIGHT., Disp: 90 tablet, Rfl: 1  •  QUEtiapine (SEROquel) 25 MG tablet, Take 1 tablet by mouth Every Night., Disp: 90 tablet, Rfl: 3  •  triamterene-hydrochlorothiazide (MAXZIDE-25) 37.5-25 MG per tablet, TAKE 1 TABLET EVERY DAY, Disp: 90 tablet, Rfl: 1          ROS  ROS      SOCIAL HX  Social History     Socioeconomic History   • Marital status:      Spouse name: Not on file   • Number of children: Not on file   • Years of education: Not on file   • Highest education level: Not on file   Tobacco Use   • Smoking  status: Never Smoker   • Smokeless tobacco: Never Used   Substance and Sexual Activity   • Alcohol use: No   • Drug use: No   • Sexual activity: Never       FAMILY HX  Family History   Problem Relation Age of Onset   • Heart disease Mother    • Hypertension Mother    • Breast cancer Mother         70's   • Heart disease Father    • Stroke Father    • Breast cancer Other         great aunt             Jani Devi III, MD, FACC

## 2021-04-14 ENCOUNTER — OFFICE VISIT (OUTPATIENT)
Dept: FAMILY MEDICINE CLINIC | Facility: CLINIC | Age: 81
End: 2021-04-14

## 2021-04-14 VITALS
BODY MASS INDEX: 24.33 KG/M2 | DIASTOLIC BLOOD PRESSURE: 80 MMHG | TEMPERATURE: 98 F | HEIGHT: 67 IN | WEIGHT: 155 LBS | SYSTOLIC BLOOD PRESSURE: 130 MMHG | HEART RATE: 71 BPM

## 2021-04-14 DIAGNOSIS — Z78.0 POSTMENOPAUSAL: ICD-10-CM

## 2021-04-14 DIAGNOSIS — I83.812 VARICOSE VEINS OF LEFT LOWER EXTREMITY WITH PAIN: Primary | ICD-10-CM

## 2021-04-14 PROCEDURE — 99213 OFFICE O/P EST LOW 20 MIN: CPT | Performed by: FAMILY MEDICINE

## 2021-04-14 NOTE — PROGRESS NOTES
"Subjective   Cheryl Tomas is a 81 y.o. female.     History of Present Illness left leg varicose veins.  Were hurting.  1 month ago.  She was given a steroid injection that was alleviated with knee pain and she has not had pain in vein.      Saw dr mackey and has appt for echo in 10/2021.    [facility].     The following portions of the patient's history were reviewed and updated as appropriate: allergies, current medications, past family history, past medical history, past social history, past surgical history and problem list.    Review of Systems   Constitutional: Negative.    HENT: Negative.    Eyes: Negative.    Respiratory: Negative.    Cardiovascular: Negative.    Gastrointestinal: Negative.    Endocrine: Negative.    Genitourinary: Negative.    Musculoskeletal: Negative.    Skin: Negative.    Allergic/Immunologic: Negative.    Neurological: Negative.    Hematological: Negative.    Psychiatric/Behavioral: Negative.    All other systems reviewed and are negative.      Objective     Vitals:    04/14/21 1315   BP: 130/80   Pulse: 71   Temp: 98 °F (36.7 °C)   Weight: 70.3 kg (155 lb)   Height: 170.2 cm (67\")       Physical Exam  Vitals and nursing note reviewed.   Musculoskeletal:         General: No tenderness or signs of injury.      Right lower leg: No edema.   Skin:     General: Skin is warm.      Findings: No bruising, erythema, lesion or rash.         Assessment/Plan     Problem List Items Addressed This Visit        Cardiac and Vasculature    Varicose veins of left lower extremity with pain - Primary       Genitourinary and Reproductive     Postmenopausal    Relevant Orders    DEXA Bone Density Axial        I have recommended she go ahead and keep the appointment for vascular surgery she just wants to make sure this does not increase her risk of having a blood clot.  She is no longer having pain in her knee that she was experiencing when she first came in and that has been resolved after the " steroid injection.  Otherwise she does not report any new complaints today.

## 2021-04-15 DIAGNOSIS — E78.00 PURE HYPERCHOLESTEROLEMIA: ICD-10-CM

## 2021-04-15 RX ORDER — PRAVASTATIN SODIUM 40 MG
TABLET ORAL
Qty: 90 TABLET | Refills: 1 | Status: SHIPPED | OUTPATIENT
Start: 2021-04-15 | End: 2021-09-14 | Stop reason: SDUPTHER

## 2021-05-04 ENCOUNTER — HOSPITAL ENCOUNTER (OUTPATIENT)
Dept: GENERAL RADIOLOGY | Facility: HOSPITAL | Age: 81
Discharge: HOME | End: 2021-05-04
Admitting: NURSE PRACTITIONER

## 2021-05-04 ENCOUNTER — OFFICE VISIT (OUTPATIENT)
Dept: FAMILY MEDICINE CLINIC | Facility: CLINIC | Age: 81
End: 2021-05-04

## 2021-05-04 VITALS
BODY MASS INDEX: 24.23 KG/M2 | HEIGHT: 67 IN | SYSTOLIC BLOOD PRESSURE: 126 MMHG | DIASTOLIC BLOOD PRESSURE: 60 MMHG | HEART RATE: 87 BPM | TEMPERATURE: 96.8 F | OXYGEN SATURATION: 96 % | RESPIRATION RATE: 20 BRPM | WEIGHT: 154.4 LBS

## 2021-05-04 DIAGNOSIS — R22.31 LOCALIZED SWELLING OF FINGER OF RIGHT HAND: ICD-10-CM

## 2021-05-04 DIAGNOSIS — R22.31 LOCALIZED SWELLING OF FINGER OF RIGHT HAND: Primary | ICD-10-CM

## 2021-05-04 PROCEDURE — 73130 X-RAY EXAM OF HAND: CPT

## 2021-05-04 PROCEDURE — 99213 OFFICE O/P EST LOW 20 MIN: CPT | Performed by: NURSE PRACTITIONER

## 2021-05-06 ENCOUNTER — TELEPHONE (OUTPATIENT)
Dept: FAMILY MEDICINE CLINIC | Facility: CLINIC | Age: 81
End: 2021-05-06

## 2021-05-06 NOTE — TELEPHONE ENCOUNTER
Contacted pt and notified her of the finding on her xray of her hand. Also notified pt that xray and message regarding xray from Josey, will be visible in her my chart account. Pt verbally understood.

## 2021-05-06 NOTE — TELEPHONE ENCOUNTER
Caller: Cheryl Tomas    Relationship: Self    Best call back number:[phone]     Caller requesting test results: SELF    What test was performed: X RAY ON FINGERS    When was the test performed:5/4/21    Where was the test performed:[facility]     Additional notes: PATIENT STATED THAT SHE WOULD LIKE TO KNOW HER TEST RESULTS WHEN AVAILABLE

## 2021-05-14 DIAGNOSIS — I10 ESSENTIAL HYPERTENSION: ICD-10-CM

## 2021-05-17 ENCOUNTER — TELEPHONE (OUTPATIENT)
Dept: FAMILY MEDICINE CLINIC | Facility: CLINIC | Age: 81
End: 2021-05-17

## 2021-05-17 DIAGNOSIS — I10 ESSENTIAL HYPERTENSION: ICD-10-CM

## 2021-05-17 RX ORDER — HYDRALAZINE HYDROCHLORIDE 10 MG/1
TABLET, FILM COATED ORAL
Qty: 270 TABLET | Refills: 3 | Status: SHIPPED | OUTPATIENT
Start: 2021-05-17 | End: 2022-02-15

## 2021-05-17 NOTE — TELEPHONE ENCOUNTER
Called pharmacy and they stated this rx has been shipped out today. LVM for pt that rx has been shipped advised to call back with any questions

## 2021-05-17 NOTE — TELEPHONE ENCOUNTER
PATIENT CALLED TO REPORT THAT PHARMACY WAS UNABLE TO FILL HER hydrALAZINE (APRESOLINE) 10 MG tablet. PATIENT IS ASKING THAT PCP CONTACT THE [facility] - [place], [place] - [address] - [phone]  - [phone] FX    TO FIND OUT WHAT EXACTLY IT IS THAT SHE IS NEEDING. PATIENT CALL BACK NUMBER VERIFIED FOR QUESTIONS [phone]. PATIENT IS NOT OUT OF THE MEDICATIONS BUT IT USUALLY IS TAKEN CARE OF BEFORE SHE RUNS OUT. PLEASE RETURN CALL.

## 2021-06-04 ENCOUNTER — OFFICE VISIT (OUTPATIENT)
Dept: FAMILY MEDICINE CLINIC | Facility: CLINIC | Age: 81
End: 2021-06-04

## 2021-06-04 VITALS
OXYGEN SATURATION: 98 % | DIASTOLIC BLOOD PRESSURE: 62 MMHG | BODY MASS INDEX: 24.48 KG/M2 | TEMPERATURE: 97 F | HEART RATE: 69 BPM | SYSTOLIC BLOOD PRESSURE: 122 MMHG | WEIGHT: 156 LBS | HEIGHT: 67 IN

## 2021-06-04 DIAGNOSIS — M54.50 ACUTE RIGHT-SIDED LOW BACK PAIN WITHOUT SCIATICA: Primary | ICD-10-CM

## 2021-06-04 DIAGNOSIS — N30.00 ACUTE CYSTITIS WITHOUT HEMATURIA: ICD-10-CM

## 2021-06-04 LAB
BILIRUB BLD-MCNC: NEGATIVE MG/DL
CLARITY, POC: CLEAR
COLOR UR: YELLOW
EXPIRATION DATE: ABNORMAL
GLUCOSE UR STRIP-MCNC: NEGATIVE MG/DL
KETONES UR QL: NEGATIVE
LEUKOCYTE EST, POC: ABNORMAL
Lab: ABNORMAL
NITRITE UR-MCNC: NEGATIVE MG/ML
PH UR: 5.5 [PH] (ref 5–8)
PROT UR STRIP-MCNC: NEGATIVE MG/DL
RBC # UR STRIP: NEGATIVE /UL
SP GR UR: 1.02 (ref 1–1.03)
UROBILINOGEN UR QL: NORMAL

## 2021-06-04 PROCEDURE — 81003 URINALYSIS AUTO W/O SCOPE: CPT | Performed by: NURSE PRACTITIONER

## 2021-06-04 PROCEDURE — 87086 URINE CULTURE/COLONY COUNT: CPT | Performed by: NURSE PRACTITIONER

## 2021-06-04 PROCEDURE — 99213 OFFICE O/P EST LOW 20 MIN: CPT | Performed by: NURSE PRACTITIONER

## 2021-06-04 RX ORDER — NITROFURANTOIN 25; 75 MG/1; MG/1
100 CAPSULE ORAL 2 TIMES DAILY
Qty: 14 CAPSULE | Refills: 0 | Status: SHIPPED | OUTPATIENT
Start: 2021-06-04 | End: 2021-06-11

## 2021-06-04 NOTE — PATIENT INSTRUCTIONS

## 2021-06-04 NOTE — PROGRESS NOTES
"Chief Complaint  Back Pain    Subjective          Cheryl Tomas presents to [facility]  Back Pain  This is a new problem. The current episode started in the past 7 days. The problem occurs constantly. The quality of the pain is described as aching. The pain does not radiate. The pain is at a severity of 3/10 (was 7 or 8 yesterday. ). The pain is mild. The pain is the same all the time. The symptoms are aggravated by position. Stiffness is present in the morning. Pertinent negatives include no dysuria, fever or leg pain. (More urgency, frequency , fatigue ) Risk factors include sedentary lifestyle. Treatments tried: increased fluids. tylenol   The treatment provided mild relief.       Objective   Vital Signs:   /62   Pulse 69   Temp 97 °F (36.1 °C)   Ht 170.2 cm (67\")   Wt 70.8 kg (156 lb)   SpO2 98%   BMI 24.43 kg/m²     Physical Exam  Vitals reviewed.   Constitutional:       Appearance: She is well-developed. She is not ill-appearing.   HENT:      Head: Normocephalic.   Eyes:      Conjunctiva/sclera: Conjunctivae normal.      Pupils: Pupils are equal, round, and reactive to light.   Cardiovascular:      Rate and Rhythm: Normal rate.   Pulmonary:      Effort: Pulmonary effort is normal.   Abdominal:      General: Bowel sounds are normal.      Tenderness: There is no abdominal tenderness. There is no right CVA tenderness or left CVA tenderness.   Musculoskeletal:      Cervical back: Normal range of motion.      Lumbar back: Tenderness present.   Lymphadenopathy:      Cervical: No cervical adenopathy.   Skin:     General: Skin is warm and dry.      Capillary Refill: Capillary refill takes less than 2 seconds.   Neurological:      Mental Status: She is alert and oriented to person, place, and time.   Psychiatric:         Mood and Affect: Mood normal.         Speech: Speech normal.         Behavior: Behavior normal. Behavior is cooperative.         Thought Content: " Thought content normal.         Judgment: Judgment normal.        Result Review :                 Assessment and Plan    Diagnoses and all orders for this visit:    1. Acute right-sided low back pain without sciatica (Primary)  -     POC Urinalysis Dipstick, Automated    2. Acute cystitis without hematuria  -     Urine Culture - Urine, Urine, Clean Catch  -     nitrofurantoin, macrocrystal-monohydrate, (Macrobid) 100 MG capsule; Take 1 capsule by mouth 2 (Two) Times a Day for 7 days.  Dispense: 14 capsule; Refill: 0        POCT urinalysis: trace leukoctyes.   Will send for urine culture. Will treat based on symptoms.   Follow up is no improvement.   Will notify if needing to change antibiotics.   Continue to increase fluids.   Tylenol or ibuprofen prn for back pain.       Follow Up   Return if symptoms worsen or fail to improve.  Patient was given instructions and counseling regarding her condition or for health maintenance advice. Please see specific information pulled into the AVS if appropriate.

## 2021-06-05 LAB — BACTERIA SPEC AEROBE CULT: NO GROWTH

## 2021-06-08 ENCOUNTER — TELEPHONE (OUTPATIENT)
Dept: FAMILY MEDICINE CLINIC | Facility: CLINIC | Age: 81
End: 2021-06-08

## 2021-06-08 NOTE — TELEPHONE ENCOUNTER
Caller: Cheryl Tomas    Relationship: Self    Best call back number: [phone]    Caller requesting test results: YES    What test was performed: URINALYSIS    When was the test performed: 6/4/21    Additional notes: PATIENT WOULD LIKE A CALL BACK WITH HER RESULTS AS SOON AS POSSIBLE

## 2021-06-08 NOTE — TELEPHONE ENCOUNTER
Pt notified of results. Pt was asking if you wouldn't mind to place an order for her mammogram. She stated she did not get one done last year because of covid. Please advise.

## 2021-06-09 DIAGNOSIS — Z12.31 ENCOUNTER FOR SCREENING MAMMOGRAM FOR MALIGNANT NEOPLASM OF BREAST: Primary | ICD-10-CM

## 2021-06-20 DIAGNOSIS — I10 ESSENTIAL HYPERTENSION: ICD-10-CM

## 2021-06-21 RX ORDER — CARVEDILOL PHOSPHATE 40 MG/1
CAPSULE, EXTENDED RELEASE ORAL
Qty: 30 CAPSULE | Refills: 5 | Status: SHIPPED | OUTPATIENT
Start: 2021-06-21 | End: 2021-10-18 | Stop reason: SDUPTHER

## 2021-06-23 DIAGNOSIS — I10 ESSENTIAL HYPERTENSION: ICD-10-CM

## 2021-06-24 RX ORDER — TRIAMTERENE AND HYDROCHLOROTHIAZIDE 37.5; 25 MG/1; MG/1
TABLET ORAL
Qty: 90 TABLET | Refills: 1 | Status: SHIPPED | OUTPATIENT
Start: 2021-06-24 | End: 2021-10-18 | Stop reason: SDUPTHER

## 2021-07-07 ENCOUNTER — HOSPITAL ENCOUNTER (OUTPATIENT)
Dept: BONE DENSITY | Facility: HOSPITAL | Age: 81
Discharge: HOME | End: 2021-07-07
Admitting: FAMILY MEDICINE

## 2021-07-07 DIAGNOSIS — Z78.0 POSTMENOPAUSAL: ICD-10-CM

## 2021-07-07 PROCEDURE — 77080 DXA BONE DENSITY AXIAL: CPT

## 2021-08-10 ENCOUNTER — HOSPITAL ENCOUNTER (OUTPATIENT)
Dept: MAMMOGRAPHY | Facility: HOSPITAL | Age: 81
Discharge: HOME | End: 2021-08-10
Admitting: FAMILY MEDICINE

## 2021-08-10 DIAGNOSIS — Z12.31 ENCOUNTER FOR SCREENING MAMMOGRAM FOR MALIGNANT NEOPLASM OF BREAST: ICD-10-CM

## 2021-08-10 PROCEDURE — 77067 SCR MAMMO BI INCL CAD: CPT

## 2021-08-10 PROCEDURE — 77067 SCR MAMMO BI INCL CAD: CPT | Performed by: RADIOLOGY

## 2021-08-10 PROCEDURE — 77063 BREAST TOMOSYNTHESIS BI: CPT

## 2021-08-10 PROCEDURE — 77063 BREAST TOMOSYNTHESIS BI: CPT | Performed by: RADIOLOGY

## 2021-09-14 DIAGNOSIS — E78.00 PURE HYPERCHOLESTEROLEMIA: ICD-10-CM

## 2021-09-14 RX ORDER — PRAVASTATIN SODIUM 40 MG
40 TABLET ORAL NIGHTLY
Qty: 90 TABLET | Refills: 0 | Status: SHIPPED | OUTPATIENT
Start: 2021-09-14 | End: 2021-11-15

## 2021-09-14 NOTE — TELEPHONE ENCOUNTER
Caller: Cheryl Tomas    Relationship: Self    Best call back number: [phone]    Medication needed:   Requested Prescriptions     Pending Prescriptions Disp Refills   • pravastatin (PRAVACHOL) 40 MG tablet 90 tablet 1     Sig: Take 1 tablet by mouth Every Night.       When do you need the refill by: ASAP    What additional details did the patient provide when requesting the medication: PATIENT STATES THAT SHE HAS A WEEK SUPPLY.    Does the patient have less than a 3 day supply:  [] Yes  [x] No    What is the patient's preferred pharmacy: [facility] - [facility] [address] - [phone]  - [phone] FX

## 2021-10-11 ENCOUNTER — OFFICE VISIT (OUTPATIENT)
Dept: CARDIOLOGY | Facility: CLINIC | Age: 81
End: 2021-10-11

## 2021-10-11 ENCOUNTER — HOSPITAL ENCOUNTER (OUTPATIENT)
Dept: CARDIOLOGY | Facility: HOSPITAL | Age: 81
Discharge: HOME | End: 2021-10-11
Admitting: INTERNAL MEDICINE

## 2021-10-11 VITALS — BODY MASS INDEX: 24.48 KG/M2 | WEIGHT: 156 LBS | HEIGHT: 67 IN

## 2021-10-11 VITALS
BODY MASS INDEX: 26.46 KG/M2 | HEART RATE: 65 BPM | OXYGEN SATURATION: 95 % | DIASTOLIC BLOOD PRESSURE: 70 MMHG | HEIGHT: 64 IN | WEIGHT: 155 LBS | SYSTOLIC BLOOD PRESSURE: 144 MMHG

## 2021-10-11 DIAGNOSIS — I10 PRIMARY HYPERTENSION: Primary | ICD-10-CM

## 2021-10-11 DIAGNOSIS — R06.09 DYSPNEA ON EXERTION: ICD-10-CM

## 2021-10-11 DIAGNOSIS — E78.2 MIXED HYPERLIPIDEMIA: ICD-10-CM

## 2021-10-11 DIAGNOSIS — I35.1 NONRHEUMATIC AORTIC VALVE INSUFFICIENCY: ICD-10-CM

## 2021-10-11 DIAGNOSIS — R42 DIZZINESS: ICD-10-CM

## 2021-10-11 DIAGNOSIS — I10 BENIGN ESSENTIAL HYPERTENSION: ICD-10-CM

## 2021-10-11 LAB
ASCENDING AORTA: 3.6 CM
BH CV ECHO MEAS - AI DEC SLOPE: 244.5 CM/SEC^2
BH CV ECHO MEAS - AI MAX PG: 81.7 MMHG
BH CV ECHO MEAS - AI MAX VEL: 451.7 CM/SEC
BH CV ECHO MEAS - AI P1/2T: 541.1 MSEC
BH CV ECHO MEAS - AO MAX PG (FULL): 1.8 MMHG
BH CV ECHO MEAS - AO MAX PG: 13 MMHG
BH CV ECHO MEAS - AO MEAN PG (FULL): 0.21 MMHG
BH CV ECHO MEAS - AO MEAN PG: 5.7 MMHG
BH CV ECHO MEAS - AO ROOT AREA (BSA CORRECTED): 2
BH CV ECHO MEAS - AO ROOT AREA: 10.5 CM^2
BH CV ECHO MEAS - AO ROOT DIAM: 3.6 CM
BH CV ECHO MEAS - AO V2 MAX: 180.7 CM/SEC
BH CV ECHO MEAS - AO V2 MEAN: 110.2 CM/SEC
BH CV ECHO MEAS - AO V2 VTI: 40 CM
BH CV ECHO MEAS - ASC AORTA: 3.6 CM
BH CV ECHO MEAS - AVA(I,A): 3 CM^2
BH CV ECHO MEAS - AVA(I,D): 3 CM^2
BH CV ECHO MEAS - AVA(V,A): 2.9 CM^2
BH CV ECHO MEAS - AVA(V,D): 2.9 CM^2
BH CV ECHO MEAS - BSA(HAYCOCK): 1.8 M^2
BH CV ECHO MEAS - BSA: 1.8 M^2
BH CV ECHO MEAS - BZI_BMI: 24.4 KILOGRAMS/M^2
BH CV ECHO MEAS - BZI_METRIC_HEIGHT: 170.2 CM
BH CV ECHO MEAS - BZI_METRIC_WEIGHT: 70.8 KG
BH CV ECHO MEAS - EDV(CUBED): 163.9 ML
BH CV ECHO MEAS - EDV(MOD-SP2): 120 ML
BH CV ECHO MEAS - EDV(MOD-SP4): 128 ML
BH CV ECHO MEAS - EDV(TEICH): 145.7 ML
BH CV ECHO MEAS - EF(CUBED): 88.2 %
BH CV ECHO MEAS - EF(MOD-BP): 60.3 %
BH CV ECHO MEAS - EF(MOD-SP2): 61.5 %
BH CV ECHO MEAS - EF(MOD-SP4): 59.7 %
BH CV ECHO MEAS - EF(TEICH): 81.7 %
BH CV ECHO MEAS - ESV(CUBED): 19.3 ML
BH CV ECHO MEAS - ESV(MOD-SP2): 46.2 ML
BH CV ECHO MEAS - ESV(MOD-SP4): 51.6 ML
BH CV ECHO MEAS - ESV(TEICH): 26.6 ML
BH CV ECHO MEAS - FS: 50.9 %
BH CV ECHO MEAS - IVS/LVPW: 0.79
BH CV ECHO MEAS - IVSD: 0.94 CM
BH CV ECHO MEAS - LA DIMENSION: 3.7 CM
BH CV ECHO MEAS - LA/AO: 1
BH CV ECHO MEAS - LAD MAJOR: 5.8 CM
BH CV ECHO MEAS - LAT PEAK E' VEL: 9.1 CM/SEC
BH CV ECHO MEAS - LATERAL E/E' RATIO: 11.2
BH CV ECHO MEAS - LV DIASTOLIC VOL/BSA (35-75): 70.4 ML/M^2
BH CV ECHO MEAS - LV IVRT: 0.09 SEC
BH CV ECHO MEAS - LV MASS(C)D: 230.4 GRAMS
BH CV ECHO MEAS - LV MASS(C)DI: 126.6 GRAMS/M^2
BH CV ECHO MEAS - LV MAX PG: 11.2 MMHG
BH CV ECHO MEAS - LV MEAN PG: 5.5 MMHG
BH CV ECHO MEAS - LV SYSTOLIC VOL/BSA (12-30): 28.4 ML/M^2
BH CV ECHO MEAS - LV V1 MAX: 167.5 CM/SEC
BH CV ECHO MEAS - LV V1 MEAN: 106 CM/SEC
BH CV ECHO MEAS - LV V1 VTI: 38.1 CM
BH CV ECHO MEAS - LVIDD: 5.5 CM
BH CV ECHO MEAS - LVIDS: 2.7 CM
BH CV ECHO MEAS - LVLD AP2: 8.9 CM
BH CV ECHO MEAS - LVLD AP4: 8.8 CM
BH CV ECHO MEAS - LVLS AP2: 7.7 CM
BH CV ECHO MEAS - LVLS AP4: 7.5 CM
BH CV ECHO MEAS - LVOT AREA (M): 3.1 CM^2
BH CV ECHO MEAS - LVOT AREA: 3.1 CM^2
BH CV ECHO MEAS - LVOT DIAM: 2 CM
BH CV ECHO MEAS - LVPWD: 1.2 CM
BH CV ECHO MEAS - MED PEAK E' VEL: 6.2 CM/SEC
BH CV ECHO MEAS - MEDIAL E/E' RATIO: 16.5
BH CV ECHO MEAS - MV A MAX VEL: 88.1 CM/SEC
BH CV ECHO MEAS - MV DEC SLOPE: 697 CM/SEC^2
BH CV ECHO MEAS - MV DEC TIME: 0.16 SEC
BH CV ECHO MEAS - MV E MAX VEL: 102 CM/SEC
BH CV ECHO MEAS - MV E/A: 1.2
BH CV ECHO MEAS - MV MAX PG: 3.4 MMHG
BH CV ECHO MEAS - MV MEAN PG: 1 MMHG
BH CV ECHO MEAS - MV P1/2T MAX VEL: 99.9 CM/SEC
BH CV ECHO MEAS - MV P1/2T: 42 MSEC
BH CV ECHO MEAS - MV V2 MAX: 92.1 CM/SEC
BH CV ECHO MEAS - MV V2 MEAN: 46.3 CM/SEC
BH CV ECHO MEAS - MV V2 VTI: 30.1 CM
BH CV ECHO MEAS - MVA P1/2T LCG: 2.2 CM^2
BH CV ECHO MEAS - MVA(P1/2T): 5.2 CM^2
BH CV ECHO MEAS - MVA(VTI): 4 CM^2
BH CV ECHO MEAS - PA ACC TIME: 0.12 SEC
BH CV ECHO MEAS - PA MAX PG: 2.7 MMHG
BH CV ECHO MEAS - PA PR(ACCEL): 23.5 MMHG
BH CV ECHO MEAS - PA V2 MAX: 81.7 CM/SEC
BH CV ECHO MEAS - PI END-D VEL: 51.7 CM/SEC
BH CV ECHO MEAS - RAP SYSTOLE: 3 MMHG
BH CV ECHO MEAS - RVSP: 38 MMHG
BH CV ECHO MEAS - SI(AO): 229.6 ML/M^2
BH CV ECHO MEAS - SI(CUBED): 79.4 ML/M^2
BH CV ECHO MEAS - SI(LVOT): 65.8 ML/M^2
BH CV ECHO MEAS - SI(MOD-SP2): 40.6 ML/M^2
BH CV ECHO MEAS - SI(MOD-SP4): 42 ML/M^2
BH CV ECHO MEAS - SI(TEICH): 65.5 ML/M^2
BH CV ECHO MEAS - SV(AO): 417.7 ML
BH CV ECHO MEAS - SV(CUBED): 144.5 ML
BH CV ECHO MEAS - SV(LVOT): 119.8 ML
BH CV ECHO MEAS - SV(MOD-SP2): 73.8 ML
BH CV ECHO MEAS - SV(MOD-SP4): 76.4 ML
BH CV ECHO MEAS - SV(TEICH): 119.1 ML
BH CV ECHO MEAS - TAPSE (>1.6): 2.3 CM
BH CV ECHO MEAS - TR MAX PG: 35 MMHG
BH CV ECHO MEAS - TR MAX VEL: 294 CM/SEC
BH CV ECHO MEASUREMENTS AVERAGE E/E' RATIO: 13.33
BH CV VAS BP RIGHT ARM: NORMAL MMHG
BH CV XLRA - RV BASE: 3.6 CM
BH CV XLRA - RV LENGTH: 6.7 CM
BH CV XLRA - RV MID: 2.2 CM
BH CV XLRA - TDI S': 15.5 CM/SEC
IVRT: 90 MSEC
LEFT ATRIUM VOLUME INDEX: 35.4 ML/M^2
LEFT ATRIUM VOLUME: 64.4 ML

## 2021-10-11 PROCEDURE — 99214 OFFICE O/P EST MOD 30 MIN: CPT | Performed by: INTERNAL MEDICINE

## 2021-10-11 PROCEDURE — 93306 TTE W/DOPPLER COMPLETE: CPT | Performed by: INTERNAL MEDICINE

## 2021-10-11 PROCEDURE — 93306 TTE W/DOPPLER COMPLETE: CPT

## 2021-10-11 RX ORDER — SPIRONOLACTONE 25 MG/1
25 TABLET ORAL DAILY
Qty: 30 TABLET | Refills: 11 | Status: SHIPPED | OUTPATIENT
Start: 2021-10-11 | End: 2021-10-18

## 2021-10-11 NOTE — PROGRESS NOTES
[facility] at [facility]  Office visit  Cheryl Tomas  1940  [phone]  There is no work phone number on file.    VISIT DATE:  10/11/2021    PCP: Marian Perea MD  [address]  [facility] [address]    CC:  Chief Complaint   Patient presents with   • Hypertension       Previous cardiac studies and procedures:  November 2019 echo: EF 60%, mild concentric LVH, moderate aortic insufficiency.  LV end-diastolic dimension 4.3 cm.      ASSESSMENT:   Diagnosis Plan   1. Primary hypertension     2. Nonrheumatic aortic valve insufficiency     3. Mixed hyperlipidemia     4. Dizziness  Holter Monitor - 72 Hour Up To 15 Days   5. Dyspnea on exertion  Stress Test With Myocardial Perfusion (1 Day)   6. Benign essential hypertension         PLAN:  PSVT: Currently stable and asymptomatic.  Continue combination of beta-blockade and diltiazem.     Aortic insufficiency, moderate: Surveillance echocardiography every 2 years.  Stable on echocardiographic assessment today. Annual clinical exam.  Currently asymptomatic.     Hypertension: Goal less than 130/80 mmHg.    Intermittent episodes of dyspnea and presyncope following exertion.  Unclear whether this was related to symptomatic hypertension, hypotension, arrhythmia or ischemia.  She will keep a blood pressure log.  Mee scan myocardial perfusion imaging pending.  Starting Aldactone 25 mg p.o. daily.  Has follow-up with PCP next week with lab work.  She will be more diligent about keeping a home blood pressure log.     Hyperlipidemia: Goal LDL less than 100, currently well controlled.  Continue pravastatin 40 mg p.o. daily.     Bilateral ankle edema: Mild venous insufficiency.    Associated varicosities.  Continue triamterene hydrochlorothiazide, elevating feet when necessary, daily use of compression stockings and limiting sodium intake.  Currently not affecting quality of life.     Pericardial effusion, small: Currently symptomatic  "and idiopathic.  No evidence for tamponade.  Continue surveillance echocardiography.  Laboratory evaluation pending next week.    Subjective  Interval assessment: She reports that her blood pressures have frequently running high recently.  She is also had episodes in which she develops shortness of breath with lightheadedness and has to sit down after such activities as grocery shopping.  She appears compliant with medical therapy.  Personally reviewed transthoracic echo imaging with patient today.    Initial consultation: 80-year-old female with history of hypertension, dyslipidemia, aortic insufficiency and PSVT.  Reports stable functional capacity.  Has not been participating in regular exercise but has no limitations.  Denies chest pain, dyspnea or or sustained palpitations.  Has not been tracking home blood pressures recently.  She does find it difficult to take the midday hydralazine but is otherwise compliant with medical therapy for hypertension.  EKG reveals stable bifascicular block.  Reviewed most recent laboratory evaluation.    PHYSICAL EXAMINATION:  Vitals:    10/11/21 1310   BP: 144/70   BP Location: Right arm   Patient Position: Sitting   Pulse: 65   SpO2: 95%   Weight: 70.3 kg (155 lb)   Height: 162.6 cm (64\")     General Appearance:    Alert, cooperative, no distress, appears stated age   Head:    Normocephalic, without obvious abnormality, atraumatic   Eyes:    conjunctiva/corneas clear   Nose:   Nares normal, septum midline, mucosa normal, no drainage   Throat:   Lips, teeth and gums normal   Neck:   Supple, symmetrical, trachea midline, no carotid    bruit or JVD   Lungs:     Clear to auscultation bilaterally, respirations unlabored   Chest Wall:    No tenderness or deformity    Heart:    Regular rate and rhythm, S1 and S2 normal, no murmur, rub   or gallop, normal carotid impulse bilaterally without bruit.   Abdomen:     Soft, non-tender   Extremities:   Extremities normal, atraumatic, no " cyanosis or edema   Pulses:   2+ and symmetric all extremities   Skin:   Skin color, texture, turgor normal, no rashes or lesions       Diagnostic Data:  Procedures  Lab Results   Component Value Date    CHLPL 166 09/21/2020    TRIG 150 09/21/2020    HDL 45 09/21/2020     Lab Results   Component Value Date    BUN 22 01/11/2021    CREATININE 1.18 (H) 01/11/2021     01/11/2021    K 4.7 01/11/2021     01/11/2021    CO2 26.8 01/11/2021     Lab Results   Component Value Date    HGBA1C 5.7 01/11/2021     Lab Results   Component Value Date    WBC 6.51 09/21/2020    HGB 14.0 09/21/2020    HCT 41.8 09/21/2020     09/21/2020       Allergies  Allergies   Allergen Reactions   • Cephalexin Itching and Rash       Current Medications    Current Outpatient Medications:   •  acetaminophen (TYLENOL) 650 MG 8 hr tablet, Take 1,300 mg by mouth Every 8 (Eight) Hours As Needed for Mild Pain ., Disp: , Rfl:   •  allopurinol (ZYLOPRIM) 300 MG tablet, TAKE 1 TABLET EVERY DAY, Disp: 90 tablet, Rfl: 3  •  aspirin 81 MG EC tablet, Take 1 tablet by mouth Daily., Disp: 90 tablet, Rfl: 3  •  carvedilol CR (COREG CR) 40 MG 24 hr capsule, Take 1 capsule by mouth once daily, Disp: 30 capsule, Rfl: 5  •  cholecalciferol (VITAMIN D3) 1000 units tablet, Take 1,000 Units by mouth Daily., Disp: , Rfl:   •  Coenzyme Q10 10 MG capsule, coenzyme Q10  100mg, Disp: , Rfl:   •  dilTIAZem CD (CARDIZEM CD) 240 MG 24 hr capsule, TAKE 1 CAPSULE EVERY DAY, Disp: 90 capsule, Rfl: 3  •  hydrALAZINE (APRESOLINE) 10 MG tablet, TAKE 1 TABLET THREE TIMES DAILY, Disp: 270 tablet, Rfl: 3  •  irbesartan (AVAPRO) 300 MG tablet, TAKE 1 TABLET EVERY NIGHT, Disp: 90 tablet, Rfl: 3  •  metFORMIN (GLUCOPHAGE) 500 MG tablet, TAKE 1 TABLET BY MOUTH 2 (TWO) TIMES A DAY WITH MEALS., Disp: 180 tablet, Rfl: 3  •  nitroglycerin (NITROSTAT) 0.3 MG SL tablet, Place 1 tablet under the tongue Every 5 (Five) Minutes As Needed for Chest Pain. Take no more than 3 doses in 15  minutes., Disp: 30 tablet, Rfl: 3  •  pravastatin (PRAVACHOL) 40 MG tablet, Take 1 tablet by mouth Every Night., Disp: 90 tablet, Rfl: 0  •  QUEtiapine (SEROquel) 25 MG tablet, Take 1 tablet by mouth Every Night., Disp: 90 tablet, Rfl: 3  •  triamterene-hydrochlorothiazide (MAXZIDE-25) 37.5-25 MG per tablet, TAKE 1 TABLET EVERY DAY, Disp: 90 tablet, Rfl: 1  •  spironolactone (ALDACTONE) 25 MG tablet, Take 1 tablet by mouth Daily., Disp: 30 tablet, Rfl: 11          ROS  ROS      SOCIAL HX  Social History     Socioeconomic History   • Marital status:    Tobacco Use   • Smoking status: Never Smoker   • Smokeless tobacco: Never Used   Substance and Sexual Activity   • Alcohol use: No   • Drug use: No   • Sexual activity: Never       FAMILY HX  Family History   Problem Relation Age of Onset   • Heart disease Mother    • Hypertension Mother    • Breast cancer Mother         70's   • Heart disease Father    • Stroke Father    • Breast cancer Other         great aunt             Jani Devi III, MD, FACC

## 2021-10-18 ENCOUNTER — OFFICE VISIT (OUTPATIENT)
Dept: FAMILY MEDICINE CLINIC | Facility: CLINIC | Age: 81
End: 2021-10-18

## 2021-10-18 VITALS
DIASTOLIC BLOOD PRESSURE: 80 MMHG | WEIGHT: 154 LBS | RESPIRATION RATE: 16 BRPM | BODY MASS INDEX: 26.29 KG/M2 | TEMPERATURE: 97.8 F | HEART RATE: 60 BPM | HEIGHT: 64 IN | OXYGEN SATURATION: 95 % | SYSTOLIC BLOOD PRESSURE: 140 MMHG

## 2021-10-18 DIAGNOSIS — E78.00 PURE HYPERCHOLESTEROLEMIA: ICD-10-CM

## 2021-10-18 DIAGNOSIS — E11.9 TYPE 2 DIABETES MELLITUS WITHOUT COMPLICATION, WITHOUT LONG-TERM CURRENT USE OF INSULIN (HCC): Primary | ICD-10-CM

## 2021-10-18 DIAGNOSIS — I13.0 HYPERTENSIVE HEART AND CHRONIC KIDNEY DISEASE WITH HEART FAILURE AND STAGE 1 THROUGH STAGE 4 CHRONIC KIDNEY DISEASE, OR UNSPECIFIED CHRONIC KIDNEY DISEASE (HCC): Chronic | ICD-10-CM

## 2021-10-18 DIAGNOSIS — G47.00 INSOMNIA, UNSPECIFIED TYPE: ICD-10-CM

## 2021-10-18 DIAGNOSIS — I10 BENIGN ESSENTIAL HYPERTENSION: Chronic | ICD-10-CM

## 2021-10-18 DIAGNOSIS — I10 ESSENTIAL HYPERTENSION: ICD-10-CM

## 2021-10-18 PROBLEM — E78.2 MIXED HYPERLIPIDEMIA: Chronic | Status: ACTIVE | Noted: 2017-10-12

## 2021-10-18 PROBLEM — E78.5 HYPERLIPIDEMIA: Chronic | Status: ACTIVE | Noted: 2018-03-07

## 2021-10-18 PROCEDURE — 99213 OFFICE O/P EST LOW 20 MIN: CPT | Performed by: FAMILY MEDICINE

## 2021-10-18 RX ORDER — TRIAMTERENE AND HYDROCHLOROTHIAZIDE 37.5; 25 MG/1; MG/1
1 TABLET ORAL DAILY
Qty: 90 TABLET | Refills: 3 | Status: SHIPPED | OUTPATIENT
Start: 2021-10-18 | End: 2022-09-07

## 2021-10-18 RX ORDER — CARVEDILOL PHOSPHATE 40 MG/1
40 CAPSULE, EXTENDED RELEASE ORAL DAILY
Qty: 30 CAPSULE | Refills: 5 | Status: SHIPPED | OUTPATIENT
Start: 2021-10-18 | End: 2022-06-17

## 2021-10-18 RX ORDER — QUETIAPINE FUMARATE 25 MG/1
25 TABLET, FILM COATED ORAL NIGHTLY
Qty: 90 TABLET | Refills: 3 | Status: SHIPPED | OUTPATIENT
Start: 2021-10-18 | End: 2021-10-18 | Stop reason: SDUPTHER

## 2021-10-18 RX ORDER — QUETIAPINE FUMARATE 25 MG/1
25 TABLET, FILM COATED ORAL NIGHTLY
Qty: 90 TABLET | Refills: 3 | Status: SHIPPED | OUTPATIENT
Start: 2021-10-18 | End: 2022-10-10

## 2021-10-18 RX ORDER — IRBESARTAN 300 MG/1
300 TABLET ORAL NIGHTLY
Qty: 90 TABLET | Refills: 3 | Status: SHIPPED | OUTPATIENT
Start: 2021-10-18 | End: 2022-09-07

## 2021-10-19 LAB
ALBUMIN SERPL-MCNC: 4.8 G/DL (ref 3.5–5.2)
ALBUMIN/GLOB SERPL: 2.3 G/DL
ALP SERPL-CCNC: 82 U/L (ref 39–117)
ALT SERPL-CCNC: 12 U/L (ref 1–33)
AST SERPL-CCNC: 14 U/L (ref 1–32)
BASOPHILS # BLD AUTO: 0.06 10*3/MM3 (ref 0–0.2)
BASOPHILS NFR BLD AUTO: 0.8 % (ref 0–1.5)
BILIRUB SERPL-MCNC: 0.4 MG/DL (ref 0–1.2)
BUN SERPL-MCNC: 27 MG/DL (ref 8–23)
BUN/CREAT SERPL: 23.3 (ref 7–25)
CALCIUM SERPL-MCNC: 10 MG/DL (ref 8.6–10.5)
CHLORIDE SERPL-SCNC: 105 MMOL/L (ref 98–107)
CHOLEST SERPL-MCNC: 191 MG/DL (ref 0–200)
CO2 SERPL-SCNC: 25.7 MMOL/L (ref 22–29)
CREAT SERPL-MCNC: 1.16 MG/DL (ref 0.57–1)
EOSINOPHIL # BLD AUTO: 0.15 10*3/MM3 (ref 0–0.4)
EOSINOPHIL NFR BLD AUTO: 1.9 % (ref 0.3–6.2)
ERYTHROCYTE [DISTWIDTH] IN BLOOD BY AUTOMATED COUNT: 13.2 % (ref 12.3–15.4)
GLOBULIN SER CALC-MCNC: 2.1 GM/DL
GLUCOSE SERPL-MCNC: 107 MG/DL (ref 65–99)
HCT VFR BLD AUTO: 41.6 % (ref 34–46.6)
HDLC SERPL-MCNC: 49 MG/DL (ref 40–60)
HGB BLD-MCNC: 13.9 G/DL (ref 12–15.9)
IMM GRANULOCYTES # BLD AUTO: 0.03 10*3/MM3 (ref 0–0.05)
IMM GRANULOCYTES NFR BLD AUTO: 0.4 % (ref 0–0.5)
LDLC SERPL CALC-MCNC: 118 MG/DL (ref 0–100)
LYMPHOCYTES # BLD AUTO: 1.91 10*3/MM3 (ref 0.7–3.1)
LYMPHOCYTES NFR BLD AUTO: 24.2 % (ref 19.6–45.3)
MCH RBC QN AUTO: 29.4 PG (ref 26.6–33)
MCHC RBC AUTO-ENTMCNC: 33.4 G/DL (ref 31.5–35.7)
MCV RBC AUTO: 87.9 FL (ref 79–97)
MONOCYTES # BLD AUTO: 0.59 10*3/MM3 (ref 0.1–0.9)
MONOCYTES NFR BLD AUTO: 7.5 % (ref 5–12)
NEUTROPHILS # BLD AUTO: 5.15 10*3/MM3 (ref 1.7–7)
NEUTROPHILS NFR BLD AUTO: 65.2 % (ref 42.7–76)
NRBC BLD AUTO-RTO: 0 /100 WBC (ref 0–0.2)
PLATELET # BLD AUTO: 220 10*3/MM3 (ref 140–450)
POTASSIUM SERPL-SCNC: 5.9 MMOL/L (ref 3.5–5.2)
PROT SERPL-MCNC: 6.9 G/DL (ref 6–8.5)
RBC # BLD AUTO: 4.73 10*6/MM3 (ref 3.77–5.28)
SODIUM SERPL-SCNC: 142 MMOL/L (ref 136–145)
TRIGL SERPL-MCNC: 134 MG/DL (ref 0–150)
VLDLC SERPL CALC-MCNC: 24 MG/DL (ref 5–40)
WBC # BLD AUTO: 7.89 10*3/MM3 (ref 3.4–10.8)

## 2021-10-28 DIAGNOSIS — I10 ESSENTIAL HYPERTENSION: ICD-10-CM

## 2021-10-28 DIAGNOSIS — E79.0 ELEVATED URIC ACID IN BLOOD: ICD-10-CM

## 2021-10-29 ENCOUNTER — OFFICE VISIT (OUTPATIENT)
Dept: FAMILY MEDICINE CLINIC | Facility: CLINIC | Age: 81
End: 2021-10-29

## 2021-10-29 VITALS
HEIGHT: 64 IN | OXYGEN SATURATION: 98 % | TEMPERATURE: 98 F | DIASTOLIC BLOOD PRESSURE: 60 MMHG | RESPIRATION RATE: 16 BRPM | BODY MASS INDEX: 26.63 KG/M2 | SYSTOLIC BLOOD PRESSURE: 132 MMHG | HEART RATE: 66 BPM | WEIGHT: 156 LBS

## 2021-10-29 DIAGNOSIS — R42 DIZZINESS: Primary | ICD-10-CM

## 2021-10-29 DIAGNOSIS — E87.5 SERUM POTASSIUM ELEVATED: ICD-10-CM

## 2021-10-29 DIAGNOSIS — E11.9 TYPE 2 DIABETES MELLITUS WITHOUT COMPLICATION, WITHOUT LONG-TERM CURRENT USE OF INSULIN (HCC): ICD-10-CM

## 2021-10-29 LAB
EXPIRATION DATE: NORMAL
HBA1C MFR BLD: 5.6 %
Lab: NORMAL

## 2021-10-29 PROCEDURE — 36415 COLL VENOUS BLD VENIPUNCTURE: CPT | Performed by: NURSE PRACTITIONER

## 2021-10-29 PROCEDURE — 99213 OFFICE O/P EST LOW 20 MIN: CPT | Performed by: NURSE PRACTITIONER

## 2021-10-29 PROCEDURE — 83036 HEMOGLOBIN GLYCOSYLATED A1C: CPT | Performed by: NURSE PRACTITIONER

## 2021-10-29 PROCEDURE — 3044F HG A1C LEVEL LT 7.0%: CPT | Performed by: NURSE PRACTITIONER

## 2021-10-29 PROCEDURE — 80053 COMPREHEN METABOLIC PANEL: CPT | Performed by: NURSE PRACTITIONER

## 2021-10-29 RX ORDER — ANTIARTHRITIC COMBINATION NO.2 900 MG
5000 TABLET ORAL
COMMUNITY
End: 2022-04-13

## 2021-10-29 RX ORDER — ASCORBIC ACID 500 MG
500 TABLET ORAL DAILY
COMMUNITY
End: 2022-04-13

## 2021-10-30 LAB
ALBUMIN SERPL-MCNC: 4.6 G/DL (ref 3.5–5.2)
ALBUMIN/GLOB SERPL: 2.6 G/DL
ALP SERPL-CCNC: 69 U/L (ref 39–117)
ALT SERPL W P-5'-P-CCNC: 9 U/L (ref 1–33)
ANION GAP SERPL CALCULATED.3IONS-SCNC: 13.5 MMOL/L (ref 5–15)
AST SERPL-CCNC: 8 U/L (ref 1–32)
BILIRUB SERPL-MCNC: 0.2 MG/DL (ref 0–1.2)
BUN SERPL-MCNC: 36 MG/DL (ref 8–23)
BUN/CREAT SERPL: 24.2 (ref 7–25)
CALCIUM SPEC-SCNC: 9.6 MG/DL (ref 8.6–10.5)
CHLORIDE SERPL-SCNC: 107 MMOL/L (ref 98–107)
CO2 SERPL-SCNC: 23.5 MMOL/L (ref 22–29)
CREAT SERPL-MCNC: 1.49 MG/DL (ref 0.57–1)
GFR SERPL CREATININE-BSD FRML MDRD: 34 ML/MIN/1.73
GLOBULIN UR ELPH-MCNC: 1.8 GM/DL
GLUCOSE SERPL-MCNC: 106 MG/DL (ref 65–99)
POTASSIUM SERPL-SCNC: 5 MMOL/L (ref 3.5–5.2)
PROT SERPL-MCNC: 6.4 G/DL (ref 6–8.5)
SODIUM SERPL-SCNC: 144 MMOL/L (ref 136–145)

## 2021-11-01 RX ORDER — DILTIAZEM HYDROCHLORIDE 240 MG/1
CAPSULE, COATED, EXTENDED RELEASE ORAL
Qty: 90 CAPSULE | Refills: 3 | Status: SHIPPED | OUTPATIENT
Start: 2021-11-01 | End: 2022-09-07

## 2021-11-01 RX ORDER — ALLOPURINOL 300 MG/1
TABLET ORAL
Qty: 90 TABLET | Refills: 3 | Status: SHIPPED | OUTPATIENT
Start: 2021-11-01 | End: 2022-09-07

## 2021-11-03 PROBLEM — E87.5 SERUM POTASSIUM ELEVATED: Status: ACTIVE | Noted: 2021-11-03

## 2021-11-03 PROBLEM — R42 DIZZINESS: Status: ACTIVE | Noted: 2021-11-03

## 2021-11-03 NOTE — ASSESSMENT & PLAN NOTE
Diabetes is improving with treatment.   Continue current treatment regimen.  Diabetes will be reassessed at next appointment with PCP upcoming.   Reassessed HGB A1C today with improvement from previous results.

## 2021-11-03 NOTE — ASSESSMENT & PLAN NOTE
Labs today  Reviewed blood pressure log  Reviewed last cardiology visit notes  Instructed patient to hold new Aldactone for now as symptoms started after start of this medication and she is taking another diuretic for HTN with noted elevated creatinine levels and decreased GFR.   She wants new cardiologist and plans to call cardiology office requesting change of physician and new appointment.  Follow up with PCP as planned upcoming  Stay well hydrated  Go to ER if chest pain, weakness or shortness of breath.

## 2021-11-03 NOTE — PROGRESS NOTES
"Chief Complaint  Fatigue (felt tired alot, bp issues, felt like she was going to pass out at times, said today is better than when this started on Tuesday)    Subjective          Cheryl Tomas presents to [facility]  Patient presents to office with complaints of dizziness and low blood pressure readings. She has history of type 2 Diabetes and this has been well controlled with Hgb A1C level 5.6 today which has improved from recent results. States she had recent cardiology visit and was started on Aldactone. She is also taking Maxide, Avapro and Cardizem. She brings blood pressure log for review showing decreased BP of 86/49, 108/69, 116/60 and 115 /62 about 30 minutes after Aldactone administration. BP is 132/60 in office and she denies dizziness, chest pain, shortness of breath or heart palpitations at this time. States she has been drinking plenty of fluids and glucose has not dropped causing dizzy episodes. Admits start of aldactone is only recent change.     Fatigue  This is a new problem. The current episode started in the past 7 days. The problem occurs daily. The problem has been unchanged. Associated symptoms include fatigue and vertigo. Pertinent negatives include no abdominal pain, chest pain, chills, fever, headaches, nausea, visual change or vomiting. The symptoms are aggravated by walking, bending and standing. She has tried rest and lying down for the symptoms. The treatment provided no relief.       Objective   Vital Signs:   /60 (BP Location: Left leg, Patient Position: Sitting, Cuff Size: Adult)   Pulse 66   Temp 98 °F (36.7 °C) (Temporal)   Resp 16   Ht 162.6 cm (64\")   Wt 70.8 kg (156 lb)   SpO2 98%   BMI 26.78 kg/m²     Physical Exam  Vitals and nursing note reviewed.   Constitutional:       General: She is not in acute distress.     Appearance: Normal appearance.   HENT:      Head: Normocephalic.      Right Ear: Tympanic membrane, ear canal and " external ear normal. There is no impacted cerumen.      Left Ear: Tympanic membrane, ear canal and external ear normal. There is no impacted cerumen.      Nose: Nose normal.   Eyes:      Extraocular Movements: Extraocular movements intact.      Conjunctiva/sclera: Conjunctivae normal.      Pupils: Pupils are equal, round, and reactive to light.   Neck:      Vascular: No carotid bruit.   Cardiovascular:      Rate and Rhythm: Normal rate and regular rhythm.      Pulses: Normal pulses.      Heart sounds: Normal heart sounds.   Pulmonary:      Effort: Pulmonary effort is normal. No respiratory distress.      Breath sounds: Normal breath sounds. No wheezing, rhonchi or rales.   Musculoskeletal:      Cervical back: Normal range of motion and neck supple. No rigidity or tenderness.      Right lower leg: No edema.      Left lower leg: No edema.   Skin:     General: Skin is warm and dry.   Neurological:      Mental Status: She is alert and oriented to person, place, and time.   Psychiatric:         Mood and Affect: Mood normal.         Behavior: Behavior normal.         Thought Content: Thought content normal.         Judgment: Judgment normal.        Result Review :     Common labs    Common Labsle 1/11/21 1/11/21 10/18/21 10/18/21 10/18/21 10/29/21 10/29/21    1110 1127 1315 1315 1315 1530 2045   Glucose  102 (A)  107 (A)   106 (A)   BUN  22  27 (A)   36 (A)   Creatinine  1.18 (A)  1.16 (A)   1.49 (A)   eGFR Non  Am  44 (A)  45 (A)   34 (A)   eGFR  Am  54 (A)  55 (A)      Sodium  143  142   144   Potassium  4.7  5.9 (A)   5.0   Chloride  105  105   107   Calcium  9.9  10.0   9.6   Total Protein  6.6  6.9      Albumin  4.40  4.80   4.60   Total Bilirubin  0.4  0.4   0.2   Alkaline Phosphatase  74  82   69   AST (SGOT)  20  14   8   ALT (SGPT)  12  12   9   WBC   7.89       Hemoglobin   13.9       Hematocrit   41.6       Platelets   220       Total Cholesterol     191     Triglycerides     134     HDL  Cholesterol     49     LDL Cholesterol      118 (A)     Hemoglobin A1C 5.7     5.6    (A) Abnormal value       Comments are available for some flowsheets but are not being displayed.           Electrolytes    Electrolytes 1/11/21 10/18/21 10/29/21   Sodium 143 142 144   Potassium 4.7 5.9 (A) 5.0   Chloride 105 105 107   Calcium 9.9 10.0 9.6   (A) Abnormal value       Comments are available for some flowsheets but are not being displayed.           Most Recent A1C    HGBA1C Most Recent 10/29/21   Hemoglobin A1C 5.6           Data reviewed: Cardiology studies Recent cardiology visit notes          Assessment and Plan    Diagnoses and all orders for this visit:    1. Dizziness (Primary)  Assessment & Plan:  Labs today  Reviewed blood pressure log  Reviewed last cardiology visit notes  Instructed patient to hold new Aldactone for now as symptoms started after start of this medication and she is taking another diuretic for HTN with noted elevated creatinine levels and decreased GFR.   She wants new cardiologist and plans to call cardiology office requesting change of physician and new appointment.  Follow up with PCP as planned upcoming  Stay well hydrated  Go to ER if chest pain, weakness or shortness of breath.    Orders:  -     POC Glycosylated Hemoglobin (Hb A1C)  -     Cancel: Comprehensive metabolic panel  -     Comprehensive Metabolic Panel; Future  -     Comprehensive Metabolic Panel    2. Type 2 diabetes mellitus without complication, without long-term current use of insulin (HCC)  Assessment & Plan:  Diabetes is improving with treatment.   Continue current treatment regimen.  Diabetes will be reassessed at next appointment with PCP upcoming.   Reassessed HGB A1C today with improvement from previous results.    Orders:  -     POC Glycosylated Hemoglobin (Hb A1C)    3. Serum potassium elevated  Assessment & Plan:  Reassess lab to check potassium level due to recent elevation with labs.    Orders:  -     Cancel:  Comprehensive metabolic panel  -     Comprehensive Metabolic Panel; Future  -     Comprehensive Metabolic Panel      Follow Up   Return if symptoms worsen or fail to improve, for as planned upcoming with PCP.  Patient was given instructions and counseling regarding her condition or for health maintenance advice. Please see specific information pulled into the AVS if appropriate.

## 2021-11-04 ENCOUNTER — TELEPHONE (OUTPATIENT)
Dept: CARDIOLOGY | Facility: CLINIC | Age: 81
End: 2021-11-04

## 2021-11-09 DIAGNOSIS — E87.5 SERUM POTASSIUM ELEVATED: Primary | ICD-10-CM

## 2021-11-15 ENCOUNTER — OFFICE VISIT (OUTPATIENT)
Dept: FAMILY MEDICINE CLINIC | Facility: CLINIC | Age: 81
End: 2021-11-15

## 2021-11-15 VITALS
HEART RATE: 62 BPM | SYSTOLIC BLOOD PRESSURE: 150 MMHG | BODY MASS INDEX: 26.29 KG/M2 | DIASTOLIC BLOOD PRESSURE: 68 MMHG | OXYGEN SATURATION: 96 % | WEIGHT: 154 LBS | HEIGHT: 64 IN | RESPIRATION RATE: 14 BRPM | TEMPERATURE: 97.3 F

## 2021-11-15 DIAGNOSIS — I13.0 HYPERTENSIVE HEART AND CHRONIC KIDNEY DISEASE WITH HEART FAILURE AND STAGE 1 THROUGH STAGE 4 CHRONIC KIDNEY DISEASE, OR UNSPECIFIED CHRONIC KIDNEY DISEASE (HCC): Chronic | ICD-10-CM

## 2021-11-15 DIAGNOSIS — E78.00 PURE HYPERCHOLESTEROLEMIA: ICD-10-CM

## 2021-11-15 DIAGNOSIS — I10 BENIGN ESSENTIAL HYPERTENSION: Primary | Chronic | ICD-10-CM

## 2021-11-15 DIAGNOSIS — Z00.00 MEDICARE ANNUAL WELLNESS VISIT, SUBSEQUENT: ICD-10-CM

## 2021-11-15 DIAGNOSIS — E78.2 MIXED HYPERLIPIDEMIA: Chronic | ICD-10-CM

## 2021-11-15 PROCEDURE — 1126F AMNT PAIN NOTED NONE PRSNT: CPT | Performed by: FAMILY MEDICINE

## 2021-11-15 PROCEDURE — 96160 PT-FOCUSED HLTH RISK ASSMT: CPT | Performed by: FAMILY MEDICINE

## 2021-11-15 PROCEDURE — G0439 PPPS, SUBSEQ VISIT: HCPCS | Performed by: FAMILY MEDICINE

## 2021-11-15 PROCEDURE — 1159F MED LIST DOCD IN RCRD: CPT | Performed by: FAMILY MEDICINE

## 2021-11-15 PROCEDURE — 1170F FXNL STATUS ASSESSED: CPT | Performed by: FAMILY MEDICINE

## 2021-11-15 RX ORDER — PRAVASTATIN SODIUM 40 MG
TABLET ORAL
Qty: 90 TABLET | Refills: 0 | Status: SHIPPED | OUTPATIENT
Start: 2021-11-15 | End: 2022-02-01

## 2021-11-15 NOTE — PROGRESS NOTES
The ABCs of the Annual Wellness Visit  Subsequent Medicare Wellness Visit    Chief Complaint   Patient presents with   • Medicare Wellness-subsequent      Subjective    History of Present Illness:  Cheryl Tomas is a 81 y.o. female who presents for a Subsequent Medicare Wellness Visit.    The following portions of the patient's history were reviewed and   updated as appropriate: allergies, current medications, past family history, past medical history, past social history, past surgical history and problem list.    Compared to one year ago, the patient feels her physical   health is the same.    Compared to one year ago, the patient feels her mental   health is the same.    Recent Hospitalizations:  She was not admitted to the hospital during the last year.       Current Medical Providers:  Patient Care Team:  Marian Perea MD as PCP - General (Family Medicine)  Sharon Salas PA as Physician Assistant (Cardiology)    Outpatient Medications Prior to Visit   Medication Sig Dispense Refill   • acetaminophen (TYLENOL) 650 MG 8 hr tablet Take 1,300 mg by mouth Every 8 (Eight) Hours As Needed for Mild Pain .     • allopurinol (ZYLOPRIM) 300 MG tablet TAKE 1 TABLET EVERY DAY 90 tablet 3   • ascorbic acid (VITAMIN C) 500 MG tablet Take 500 mg by mouth Daily.     • aspirin 81 MG EC tablet Take 1 tablet by mouth Daily. 90 tablet 3   • Biotin 5000 MCG tablet Take 5,000 mcg by mouth.     • carvedilol CR (COREG CR) 40 MG 24 hr capsule Take 1 capsule by mouth Daily. 30 capsule 5   • cholecalciferol (VITAMIN D3) 1000 units tablet Take 1,000 Units by mouth Daily.     • Coenzyme Q10 10 MG capsule coenzyme Q10   100mg     • dilTIAZem CD (CARDIZEM CD) 240 MG 24 hr capsule TAKE 1 CAPSULE EVERY DAY 90 capsule 3   • hydrALAZINE (APRESOLINE) 10 MG tablet TAKE 1 TABLET THREE TIMES DAILY 270 tablet 3   • irbesartan (AVAPRO) 300 MG tablet Take 1 tablet by mouth Every Night. 90 tablet 3   • metFORMIN (GLUCOPHAGE) 500 MG tablet  TAKE 1 TABLET BY MOUTH 2 (TWO) TIMES A DAY WITH MEALS. 180 tablet 3   • nitroglycerin (NITROSTAT) 0.3 MG SL tablet Place 1 tablet under the tongue Every 5 (Five) Minutes As Needed for Chest Pain. Take no more than 3 doses in 15 minutes. 30 tablet 3   • pravastatin (PRAVACHOL) 40 MG tablet Take 1 tablet by mouth Every Night. 90 tablet 0   • QUEtiapine (SEROquel) 25 MG tablet Take 1 tablet by mouth Every Night. 90 tablet 3   • triamterene-hydrochlorothiazide (MAXZIDE-25) 37.5-25 MG per tablet Take 1 tablet by mouth Daily. 90 tablet 3     No facility-administered medications prior to visit.       No opioid medication identified on active medication list. I have reviewed chart for other potential  high risk medication/s and harmful drug interactions in the elderly.          Aspirin is on active medication list. Aspirin use is indicated based on review of current medical condition/s. Pros and cons of this therapy have been discussed today. Benefits of this medication outweigh potential harm.  Patient has been encouraged to continue taking this medication.  .      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, or unspecified chronic kidney disease (CMS/HCC)    • Orthopnea   • Right bundle branch block with left anterior fascicular block   • Postmenopausal   • PSVT (paroxysmal supraventricular tachycardia) ([facility])   • Nonrheumatic aortic valve insufficiency   • Osteoarthrosis  "multiple sites, not specified as generalized   • Actinic keratosis   • Body mass index (BMI) of 25.0 to 25.9 in adult   • Fatigue   • High risk medication use   • Insomnia   • Obesity, unspecified   • Other osteoporosis   • Sprain and strain of knee and leg   • Syncope and collapse   • Vitamin D deficiency   • Bronchitis   • Edema of lower extremity   • Other specified circulatory system disorders   • Hypertensive disorder   • Encounter for long-term (current) use of other medications   • Other depressive disorder   • Mixed hyperlipidemia   • Benign essential hypertension   • Supraventricular tachycardia (HCC)   • Varicose veins of left lower extremity with pain   • Essential hypertension   • Dizziness   • Serum potassium elevated   • Medicare annual wellness visit, subsequent     Advance Care Planning  Advance Directive is on file.  ACP discussion was held with the patient during this visit. Patient has an advance directive in EMR which is still valid.           Objective    Vitals:    11/15/21 1329   BP: 150/68   Pulse: 62   Resp: 14   Temp: 97.3 °F (36.3 °C)   SpO2: 96%   Weight: 69.9 kg (154 lb)   Height: 162.6 cm (64\")   PainSc: 0-No pain     BMI Readings from Last 1 Encounters:   11/15/21 26.43 kg/m²   BMI is above normal parameters. Recommendations include: nutrition counseling    Does the patient have evidence of cognitive impairment? No    Physical Exam  Vitals and nursing note reviewed.   Constitutional:       Appearance: She is well-developed.   HENT:      Head: Normocephalic and atraumatic.   Eyes:      General:         Right eye: No discharge.         Left eye: No discharge.      Pupils: Pupils are equal, round, and reactive to light.   Cardiovascular:      Rate and Rhythm: Normal rate and regular rhythm.      Heart sounds: Normal heart sounds.   Pulmonary:      Effort: Pulmonary effort is normal.      Breath sounds: Normal breath sounds.   Abdominal:      General: Bowel sounds are normal.      " Palpations: Abdomen is soft. There is no mass.      Tenderness: There is no abdominal tenderness.   Musculoskeletal:         General: Normal range of motion.      Right shoulder: No swelling.      Cervical back: Normal range of motion and neck supple.   Skin:     General: Skin is warm and dry.      Nails: There is no clubbing.   Neurological:      Mental Status: She is alert and oriented to person, place, and time.      Deep Tendon Reflexes: Reflexes are normal and symmetric.   Psychiatric:         Behavior: Behavior normal.         Thought Content: Thought content normal.         Judgment: Judgment normal.       Lab Results   Component Value Date    CHLPL 191 10/18/2021    TRIG 134 10/18/2021    HDL 49 10/18/2021     (H) 10/18/2021    VLDL 24 10/18/2021    HGBA1C 5.6 10/29/2021            HEALTH RISK ASSESSMENT    Smoking Status:  Social History     Tobacco Use   Smoking Status Never Smoker   Smokeless Tobacco Never Used     Alcohol Consumption:  Social History     Substance and Sexual Activity   Alcohol Use No     Fall Risk Screen:    [facility], and patient is at LOW risk for falls.Assessment completed on:11/15/2021    Depression Screening:  PHQ-2/PHQ-9 Depression Screening 11/15/2021   Little interest or pleasure in doing things 0   Feeling down, depressed, or hopeless 0   Trouble falling or staying asleep, or sleeping too much -   Feeling tired or having little energy -   Poor appetite or overeating -   Feeling bad about yourself - or that you are a failure or have let yourself or your family down -   Trouble concentrating on things, such as reading the newspaper or watching television -   Moving or speaking so slowly that other people could have noticed. Or the opposite - being so fidgety or restless that you have been moving around a lot more than usual -   Thoughts that you would be better off dead, or of hurting yourself in some way -   Total Score 0   If you checked  off any problems, how difficult have these problems made it for you to do your work, take care of things at home, or get along with other people? -       Health Habits and Functional and Cognitive Screening:  Functional & Cognitive Status 11/15/2021   Do you have difficulty preparing food and eating? No   Do you have difficulty bathing yourself, getting dressed or grooming yourself? No   Do you have difficulty using the toilet? No   Do you have difficulty moving around from place to place? No   Do you have trouble with steps or getting out of a bed or a chair? Yes   Current Diet Well Balanced Diet   Dental Exam Up to date   Eye Exam Not up to date   Exercise (times per week) 5 times per week   Current Exercises Include Walking   Current Exercise Activities Include -   Do you need help using the phone?  No   Are you deaf or do you have serious difficulty hearing?  Yes   Do you need help with transportation? No   Do you need help shopping? No   Do you need help preparing meals?  No   Do you need help with housework?  No   Do you need help with laundry? No   Do you need help taking your medications? No   Do you need help managing money? No   Do you ever drive or ride in a car without wearing a seat belt? No   Have you felt unusual stress, anger or loneliness in the last month? -   Who do you live with? -   If you need help, do you have trouble finding someone available to you? -   Have you been bothered in the last four weeks by sexual problems? -   Do you have difficulty concentrating, remembering or making decisions? -       Age-appropriate Screening Schedule:  Refer to the list below for future screening recommendations based on patient's age, sex and/or medical conditions. Orders for these recommended tests are listed in the plan section. The patient has been provided with a written plan.    Health Maintenance   Topic Date Due   • DIABETIC EYE EXAM  10/04/2020   • URINE MICROALBUMIN  01/11/2022   • HEMOGLOBIN A1C   04/29/2022   • LIPID PANEL  10/18/2022   • DXA SCAN  07/07/2023   • INFLUENZA VACCINE  Completed   • TDAP/TD VACCINES  Discontinued   • ZOSTER VACCINE  Discontinued              Assessment/Plan   CMS Preventative Services Quick Reference  Risk Factors Identified During Encounter  Cardiovascular Disease  Obesity/Overweight   Polypharmacy  The above risks/problems have been discussed with the patient.  Follow up actions/plans if indicated are seen below in the Assessment/Plan Section.  Pertinent information has been shared with the patient in the After Visit Summary.    Diagnoses and all orders for this visit:    1. Benign essential hypertension (Primary)  -     Basic Metabolic Panel    2. Mixed hyperlipidemia    3. 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 Medicare annual wellness visit, subsequent        Follow Up:   No follow-ups on file.     An After Visit Summary and PPPS were made available to the patient.

## 2021-11-16 LAB
BUN SERPL-MCNC: 24 MG/DL (ref 8–23)
BUN/CREAT SERPL: 19.7 (ref 7–25)
CALCIUM SERPL-MCNC: 9.6 MG/DL (ref 8.6–10.5)
CHLORIDE SERPL-SCNC: 107 MMOL/L (ref 98–107)
CO2 SERPL-SCNC: 25.8 MMOL/L (ref 22–29)
CREAT SERPL-MCNC: 1.22 MG/DL (ref 0.57–1)
GLUCOSE SERPL-MCNC: 80 MG/DL (ref 65–99)
POTASSIUM SERPL-SCNC: 5.9 MMOL/L (ref 3.5–5.2)
SODIUM SERPL-SCNC: 143 MMOL/L (ref 136–145)

## 2021-11-18 DIAGNOSIS — E87.5 SERUM POTASSIUM ELEVATED: Primary | ICD-10-CM

## 2021-11-18 NOTE — PROGRESS NOTES
Notify pt kidney function is improved some.  Potassium is still reading high.  Will need to recheck in couple weeks.

## 2021-11-24 ENCOUNTER — APPOINTMENT (OUTPATIENT)
Dept: CARDIOLOGY | Facility: HOSPITAL | Age: 81
End: 2021-11-24

## 2021-12-14 ENCOUNTER — TELEPHONE (OUTPATIENT)
Dept: FAMILY MEDICINE CLINIC | Facility: CLINIC | Age: 81
End: 2021-12-14

## 2021-12-14 NOTE — TELEPHONE ENCOUNTER
Caller: Cheryl Tomas    Relationship: Self    Best call back number: [phone]    What was the call regarding:   PATIENT STATED THAT SHE DROPPED OFF PAPERWORK AT THE OFFICE REGARDING PATIENT HAVING JURY DUTY AND PATIENT WOULD LIKE A CALL BACK REGARDING THIS PAPERWORK AND GETTING A LETTER TO COVER PATIENT TO BE EXEMPT FROM J[employer]Y DUTY     Do you require a callback: YES

## 2021-12-17 ENCOUNTER — TELEPHONE (OUTPATIENT)
Dept: FAMILY MEDICINE CLINIC | Facility: CLINIC | Age: 81
End: 2021-12-17

## 2021-12-17 NOTE — TELEPHONE ENCOUNTER
Hub staff attempted to follow warm transfer process and was unsuccessful     Caller: Cheryl Tomas    Relationship to patient: Self    Best call back number: [phone]    Patient is needing: SHE DROPPED OFF PAPERWORK FOR A JURY DUTY EXCUSE. SHE IS CHECKING ON THE STATUS. SHE ONLY HAD A 5 DAY TIMEFRAME.

## 2022-02-01 DIAGNOSIS — E78.00 PURE HYPERCHOLESTEROLEMIA: ICD-10-CM

## 2022-02-01 RX ORDER — PRAVASTATIN SODIUM 40 MG
TABLET ORAL
Qty: 90 TABLET | Refills: 0 | Status: SHIPPED | OUTPATIENT
Start: 2022-02-01 | End: 2022-04-06

## 2022-02-04 NOTE — TELEPHONE ENCOUNTER
----- Message from Jani Devi III, MD sent at 11/4/2021  9:10 AM EDT -----  Intermittent brief fast heart rates coming from the top chambers of the heart which did not appear to bother her while being monitored.  Continue current medical therapy   06-Feb-2022 21:00

## 2022-02-14 DIAGNOSIS — I10 ESSENTIAL HYPERTENSION: ICD-10-CM

## 2022-02-15 RX ORDER — HYDRALAZINE HYDROCHLORIDE 10 MG/1
TABLET, FILM COATED ORAL
Qty: 270 TABLET | Refills: 1 | Status: SHIPPED | OUTPATIENT
Start: 2022-02-15 | End: 2022-04-22 | Stop reason: DRUGHIGH

## 2022-02-15 NOTE — TELEPHONE ENCOUNTER
Last appointment: 11/15/21  Next appointment: N/A    Last Refill: 05/17/21  Quantity: 270  Refills: 3

## 2022-03-03 DIAGNOSIS — E11.9 TYPE 2 DIABETES MELLITUS WITHOUT COMPLICATION, WITHOUT LONG-TERM CURRENT USE OF INSULIN: ICD-10-CM

## 2022-04-05 DIAGNOSIS — E78.00 PURE HYPERCHOLESTEROLEMIA: ICD-10-CM

## 2022-04-06 RX ORDER — PRAVASTATIN SODIUM 40 MG
TABLET ORAL
Qty: 90 TABLET | Refills: 0 | Status: SHIPPED | OUTPATIENT
Start: 2022-04-06 | End: 2022-06-14

## 2022-04-13 ENCOUNTER — OFFICE VISIT (OUTPATIENT)
Dept: FAMILY MEDICINE CLINIC | Facility: CLINIC | Age: 82
End: 2022-04-13

## 2022-04-13 VITALS
OXYGEN SATURATION: 98 % | HEIGHT: 64 IN | SYSTOLIC BLOOD PRESSURE: 138 MMHG | BODY MASS INDEX: 27.01 KG/M2 | RESPIRATION RATE: 16 BRPM | WEIGHT: 158.2 LBS | TEMPERATURE: 97.7 F | DIASTOLIC BLOOD PRESSURE: 68 MMHG | HEART RATE: 59 BPM

## 2022-04-13 DIAGNOSIS — I10 BENIGN ESSENTIAL HYPERTENSION: Chronic | ICD-10-CM

## 2022-04-13 DIAGNOSIS — E11.9 TYPE 2 DIABETES MELLITUS WITHOUT COMPLICATION, WITHOUT LONG-TERM CURRENT USE OF INSULIN: Primary | ICD-10-CM

## 2022-04-13 DIAGNOSIS — I13.0 HYPERTENSIVE HEART AND CHRONIC KIDNEY DISEASE WITH HEART FAILURE AND STAGE 1 THROUGH STAGE 4 CHRONIC KIDNEY DISEASE, OR UNSPECIFIED CHRONIC KIDNEY DISEASE: Chronic | ICD-10-CM

## 2022-04-13 DIAGNOSIS — E78.2 MIXED HYPERLIPIDEMIA: Chronic | ICD-10-CM

## 2022-04-13 DIAGNOSIS — R07.89 SENSATION OF CHEST PRESSURE: ICD-10-CM

## 2022-04-13 LAB
EXPIRATION DATE: NORMAL
HBA1C MFR BLD: 5.8 %
Lab: NORMAL

## 2022-04-13 PROCEDURE — 99214 OFFICE O/P EST MOD 30 MIN: CPT | Performed by: FAMILY MEDICINE

## 2022-04-13 PROCEDURE — 83036 HEMOGLOBIN GLYCOSYLATED A1C: CPT | Performed by: FAMILY MEDICINE

## 2022-04-13 PROCEDURE — 3044F HG A1C LEVEL LT 7.0%: CPT | Performed by: FAMILY MEDICINE

## 2022-04-13 NOTE — PROGRESS NOTES
Subjective   Cheryl Tomas is a 82 y.o. female.     Diabetes  She presents for her follow-up diabetic visit. She has type 2 diabetes mellitus. Her disease course has been stable. There are no hypoglycemic associated symptoms. Associated symptoms include chest pain. There are no hypoglycemic complications. Symptoms are stable. Risk factors for coronary artery disease include diabetes mellitus, dyslipidemia, family history and hypertension. Current diabetic treatment includes oral agent (dual therapy). She is compliant with treatment all of the time. She is following a generally healthy diet. Meal planning includes avoidance of concentrated sweets. She participates in exercise intermittently. An ACE inhibitor/angiotensin II receptor blocker is being taken.      She does not report that she has had any chest pain or shortness of breath.  She does not have any orthopnea or paroxysmal nocturnal dyspnea.  But she does report some swelling in the ankles left ankle is worse than right swelling seems to be worse in the evenings after she has been on her feet she does have some compression hose but has not been wearing them she is agreeable to trying some more compression hose she does not report any shortness of breath but she does have an upcoming appointment with her cardiologist that she scheduled due to some substernal chest pressure she is not experiencing that pain right now and has not had it in the last few days but she has an appointment on the 23rd with her cardiologist to discuss that.      The following portions of the patient's history were reviewed and updated as appropriate: allergies, current medications, past family history, past medical history, past social history, past surgical history and problem list.    Review of Systems   Constitutional: Negative.    HENT: Negative.    Eyes: Negative.    Respiratory: Negative for chest tightness and shortness of breath.    Cardiovascular: Positive for chest pain  "and leg swelling. Negative for palpitations.   Gastrointestinal: Negative.    Endocrine: Negative.    Genitourinary: Negative.    Musculoskeletal: Negative.    Skin: Negative.    Allergic/Immunologic: Negative.    Neurological: Negative.    Hematological: Negative.    Psychiatric/Behavioral: Negative.    All other systems reviewed and are negative.      Objective     Vitals:    04/13/22 1117   BP: 138/68   BP Location: Right arm   Patient Position: Sitting   Cuff Size: Adult   Pulse: 59   Resp: 16   Temp: 97.7 °F (36.5 °C)   TempSrc: Temporal   SpO2: 98%   Weight: 71.8 kg (158 lb 3.2 oz)   Height: 162.6 cm (64.02\")       Physical Exam  Vitals and nursing note reviewed.   Constitutional:       Appearance: She is well-developed.   HENT:      Head: Normocephalic and atraumatic.   Eyes:      General:         Right eye: No discharge.         Left eye: No discharge.      Pupils: Pupils are equal, round, and reactive to light.   Cardiovascular:      Rate and Rhythm: Normal rate and regular rhythm.      Heart sounds: Normal heart sounds.   Pulmonary:      Effort: Pulmonary effort is normal.      Breath sounds: Normal breath sounds.   Abdominal:      General: Bowel sounds are normal.      Palpations: Abdomen is soft. There is no mass.      Tenderness: There is no abdominal tenderness.   Musculoskeletal:         General: Normal range of motion.      Right shoulder: No swelling.      Cervical back: Normal range of motion and neck supple.   Skin:     General: Skin is warm and dry.      Nails: There is no clubbing.   Neurological:      Mental Status: She is alert and oriented to person, place, and time.      Deep Tendon Reflexes: Reflexes are normal and symmetric.   Psychiatric:         Behavior: Behavior normal.         Thought Content: Thought content normal.         Judgment: Judgment normal.       She has chronic venous stasis changes bilateral lower extremities there is trace pitting edema bilaterally left ankle is more " swollen than right.  Negative Homans' sign no calf tenderness.  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  She has an appointment to see cardiology in the next 10 days do not feel like she is obviously fluid or volume overloaded at this time.  We discussed giving her some Lasix to have on occasion with her kidney function I am hesitant to do that today.  Her lungs sound clear she does not have any chest pain or shortness of breath at this time.  I have asked her to do some compression hose.  Her hemoglobin A1c is controlled.  We will follow her up in 3 months.  Keep follow-up appointment with cardiology and call 911 or go to the ER if chest pain reoccurs.

## 2022-04-22 ENCOUNTER — OFFICE VISIT (OUTPATIENT)
Dept: CARDIOLOGY | Facility: CLINIC | Age: 82
End: 2022-04-22

## 2022-04-22 VITALS
HEIGHT: 64 IN | WEIGHT: 159 LBS | OXYGEN SATURATION: 95 % | BODY MASS INDEX: 27.14 KG/M2 | HEART RATE: 63 BPM | DIASTOLIC BLOOD PRESSURE: 68 MMHG | SYSTOLIC BLOOD PRESSURE: 176 MMHG

## 2022-04-22 DIAGNOSIS — R03.0 ELEVATED BLOOD-PRESSURE READING, WITHOUT DIAGNOSIS OF HYPERTENSION: ICD-10-CM

## 2022-04-22 DIAGNOSIS — I35.1 NONRHEUMATIC AORTIC VALVE INSUFFICIENCY: ICD-10-CM

## 2022-04-22 DIAGNOSIS — R42 DIZZINESS: ICD-10-CM

## 2022-04-22 DIAGNOSIS — I10 PRIMARY HYPERTENSION: Primary | ICD-10-CM

## 2022-04-22 DIAGNOSIS — E78.2 MIXED HYPERLIPIDEMIA: ICD-10-CM

## 2022-04-22 PROCEDURE — 99214 OFFICE O/P EST MOD 30 MIN: CPT | Performed by: INTERNAL MEDICINE

## 2022-04-22 RX ORDER — HYDRALAZINE HYDROCHLORIDE 25 MG/1
25 TABLET, FILM COATED ORAL 3 TIMES DAILY
Qty: 270 TABLET | Refills: 3 | Status: SHIPPED | OUTPATIENT
Start: 2022-04-22 | End: 2022-07-13 | Stop reason: SDUPTHER

## 2022-04-22 NOTE — PROGRESS NOTES
Subjective:     Encounter Date:04/22/2022    Patient ID: Cheryl Tomas is a 82 y.o.  white female from [place], [place], retired .    PHYSICIAN: Marian Perea MD  FORMER CARDIOLOGIST: Jani Devi III, MD, [facility]    Chief Complaint:   Chief Complaint   Patient presents with   • PSVT (paroxysmal supraventricular tachycardia)   • Hypertension   • Chest pressure       Problem List:  1. Chronic Hypertension- probable essential  a. Remote stress test apparently acceptable-data deficit  b. Echocardiogram November 2019: EF 60%, mild concentric LVH, moderate aortic insufficiency.  LV end-diastolic dimension 4.3 cm.  c. Lexiscan myocardial perfusion imaging ordered but declined October 2021  d. Echocardiogram 10/11/2021: LVEF 61-65%, LV diastolic function consistent with grade 2 with high LAP pseudonormalization, LA mildly increased, moderate AR, RVSP 35-45 mmHg, small less than 1 cm pericardial effusion adjacent to the RV and LV  e. Residual class I symptoms April 2022  2. PVST  a. Abnormal holter monitor study with frequent PSVT consistent with nonsustained atrial tachycardia with maximum heart rate 173 bpm, November 2021    b. Infrequent palpitations April 2022  3. Nonrheumatic aortic insufficiency  4. Mixed hyperlipidemia; on statin therapy  5. Type 2 diabetes mellitus; hemoglobin A1c 5.8% April 2022  6. Dizziness  7. Stage III chronic kidney disease  8. Overweight, BMI 27.72  9. Systolic heart murmur  10. History of PIH and preeclampsia  11. Surgical history:  a. Appendectomy  b. Lithotripsy  c. Hysterectomy    Allergies   Allergen Reactions   • Cephalexin Itching and Rash       Current Outpatient Medications:    •  acetaminophen (TYLENOL) 650 MG 8 hr tablet, Take 1,300 mg by mouth Every 8 (Eight) Hours As Needed for Mild Pain ., Disp: , Rfl:   •  allopurinol (ZYLOPRIM) 300 MG tablet, TAKE 1 TABLET EVERY DAY, Disp: 90 tablet, Rfl: 3  •  aspirin 81 MG EC tablet, Take 1 tablet by mouth  Daily., Disp: 90 tablet, Rfl: 3  •  carvedilol CR (COREG CR) 40 MG 24 hr capsule, Take 1 capsule by mouth Daily., Disp: 30 capsule, Rfl: 5  •  cholecalciferol (VITAMIN D3) 1000 units tablet, Take 1,000 Units by mouth Daily., Disp: , Rfl:   •  Coenzyme Q10 10 MG capsule, Take 10 mg by mouth Daily., Disp: , Rfl:   •  dilTIAZem CD (CARDIZEM CD) 240 MG 24 hr capsule, TAKE 1 CAPSULE EVERY DAY, Disp: 90 capsule, Rfl: 3  •  hydrALAZINE (APRESOLINE) 10 MG tablet, TAKE 1 TABLET THREE TIMES DAILY, Disp: 270 tablet, Rfl: 1  •  irbesartan (AVAPRO) 300 MG tablet, Take 1 tablet by mouth Every Night., Disp: 90 tablet, Rfl: 3  •  metFORMIN (GLUCOPHAGE) 500 MG tablet, Take 1 tablet by mouth 2 (Two) Times a Day With Meals., Disp: 60 tablet, Rfl: 0  •  pravastatin (PRAVACHOL) 40 MG tablet, TAKE 1 TABLET EVERY NIGHT, Disp: 90 tablet, Rfl: 0  •  QUEtiapine (SEROquel) 25 MG tablet, Take 1 tablet by mouth Every Night., Disp: 90 tablet, Rfl: 3  •  triamterene-hydrochlorothiazide (MAXZIDE-25) 37.5-25 MG per tablet, Take 1 tablet by mouth Daily., Disp: 90 tablet, Rfl: 3    History of Present Illness: Patient has not been seen by Dr. Blandon. She was last seen by Dr. Devi October 2021 for hypertension.  She developed hypertension in her 30s.  She also had PIH and preeclampsia in her pregnancies.  She did not require bed rest or hospitalization and this occurred close to due date.  Her last hemoglobin A1c was 5.8% April 2022.  She has some chest discomfort/heaviness at night when she is lying down.  She does not experience this during the day or with any type of activity.  She admits that she probably eats too late at night.  She was scheduled to have a stress test fall 2021 but she declined because she did not like the way that it made her feel the last time that she had a stress test remotely; apparently this test was acceptable-data deficit.  She denies any increased shortness of breath or syncope.  Infrequently she will feel palpitations  "but she feels that these are mostly suppressed with her diltiazem and Coreg.  She denies any prior MIs, CVAs, TIAs, seizures, DVTs, PEs, rheumatic fever, thyroid dysfunction, or kidney dysfunction.  She has brief episodes occasionally when she will feel presyncopal for one second but then it resolves.  She has slept better since she has started using Seroquel.  She monitors her blood pressure at home but she cannot recall what her blood pressure readings have been.  She has never had a renal artery duplex in the past.  She has a known heart murmur.  Both of her parents had hypertension.  She has never been a smoker. She is unaware of snoring or apneic spells, and does not have excessive daytime sleepiness.      Review of Systems   Cardiovascular: Positive for chest pain (at night).      Obtained and negative except as outlined in problem list and HPI.    Procedures       Objective:       Vitals:    04/22/22 1452 04/22/22 1456   BP: (!) 184/72 176/68   BP Location: Right arm Right arm   Patient Position: Sitting Standing   Pulse: 65 63   SpO2: 95%    Weight: 72.1 kg (159 lb)    Height: 161.3 cm (63.5\")    Recheck blood pressure right arm sitting was 160/60  Body mass index is 27.72 kg/m².  Last weight: 155 lbs     Vitals reviewed.   Constitutional:       Appearance: Well-developed.   Neck:      Thyroid: No thyromegaly.      Vascular: No carotid bruit or JVD.      Lymphadenopathy: No cervical adenopathy.   Pulmonary:      Effort: Pulmonary effort is normal.      Breath sounds: Normal breath sounds. No wheezing. No rhonchi. No rales.   Cardiovascular:      Regular rhythm.      Murmurs: There is a grade 2/6 systolic murmur at the URSB and LLSB, radiating to the neck.      No gallop. No S3 gallop.   Pulses:     Dorsalis pedis: 2+ bilaterally.     Posterior tibial: 2+ bilaterally.  Edema:     Ankle: bilateral trace edema of the ankle.     Feet: bilateral trace edema of the feet.  Abdominal:      Palpations: Abdomen is " soft. There is no abdominal mass.      Tenderness: There is no abdominal tenderness.   Musculoskeletal: Normal range of motion. Skin:     General: Skin is warm and dry.      Findings: No rash.   Neurological:      Mental Status: Alert and oriented to person, place, and time.           Lab Review:   Lab Results   Component Value Date    GLUCOSE 80 11/15/2021    BUN 24 (H) 11/15/2021    CREATININE 1.22 (H) 11/15/2021    EGFRIFNONA 43 (L) 11/15/2021    EGFRIFAFRI 52 (L) 11/15/2021    BCR 19.7 11/15/2021     11/15/2021    K 5.9 (H) 11/15/2021     11/15/2021    CO2 25.8 11/15/2021    CALCIUM 9.6 11/15/2021    PROTENTOTREF 6.9 10/18/2021    ALBUMIN 4.60 10/29/2021    LABIL2 2.3 10/18/2021    AST 8 10/29/2021    ALT 9 10/29/2021       Lab Results   Component Value Date    WBC 7.89 10/18/2021    HGB 13.9 10/18/2021    HCT 41.6 10/18/2021    MCV 87.9 10/18/2021     10/18/2021       Lab Results   Component Value Date    HGBA1C 5.8 04/13/2022       Lab Results   Component Value Date    CHLPL 191 10/18/2021    CHLPL 166 09/21/2020    CHLPL 176 09/03/2019     Lab Results   Component Value Date    TRIG 134 10/18/2021    TRIG 150 09/21/2020    TRIG 121 09/03/2019     Lab Results   Component Value Date    HDL 49 10/18/2021    HDL 45 09/21/2020    HDL 51 09/03/2019     Lab Results   Component Value Date     (H) 10/18/2021    LDL 91 09/21/2020     (H) 09/03/2019     Holter Monitor 11/03/2021:  · Impression: An abnormal monitor study. Frequent PSVT, consistent with nonsustained atrial tachycardia, max heart rate 173 bpm, max duration 23 beats.        Assessment:       Patient with uncontrolled hypertension; we will order a renal artery duplex to rule out renal artery stenosis.  We will increase her hydralazine to 25 mg 3 times daily and asked the patient to continue to monitor her blood pressure at home and call us in 1-2 weeks.  We will plan for a 24-hour ambulatory blood pressure monitoring 2-3  weeks.  Her last echocardiogram October 2021 showed EF 61 to 65%, moderate AR, and mild pulmonary hypertension.  If her renal artery duplex is acceptable, she may be a candidate eventually for Entresto if her blood pressure remains uncontrolled and renal function stable.     Diagnosis Plan   1. Primary hypertension  Duplex Renal Artery - Bilateral Complete CAR   2. Nonrheumatic aortic valve insufficiency   Her last echocardiogram October 2021 showed EF 61 to 65%, moderate AR, and mild pulmonary hypertension.    3. Mixed hyperlipidemia   Abnormal lipid panel April 2022, continue pravastatin   4. Dizziness  Abnormal holter monitor study with frequent PSVT consistent with nonsustained atrial tachycardia with maximum heart rate 173 bpm, November 2021, continue diltiazem CD, Coreg CR   5. Elevated blood-pressure reading, without diagnosis of hypertension   24 Hour Blood Pressure Monitor          Plan:         1. Patient to continue current medications and close follow up with the above providers with the following recommendations:  A. 24 hour ambulatory bp monitor in 2-3 weeks   B. Renal artery duplex  C. Increase hydralazine to 25 mg 3 times daily continue monitoring blood pressure at home and call in 1-2 weeks   D. Tentative cardiology follow up in 6 weeks or patient may return sooner PRN.  2. +/-OP sleep study     Scribed for Bradley Blandon MD by Vero Fish, APRN. 4/22/2022  15:36 EDT    I, Bradley Blandon MD, [facility], personally performed the services described in this documentation as scribed by the above named individual in my presence, and it is both accurate and complete. At 15:46 EDT on 04/22/2022

## 2022-04-27 ENCOUNTER — CLINICAL SUPPORT (OUTPATIENT)
Dept: CARDIOLOGY | Facility: HOSPITAL | Age: 82
End: 2022-04-27

## 2022-04-27 ENCOUNTER — HOSPITAL ENCOUNTER (OUTPATIENT)
Dept: CARDIOLOGY | Facility: HOSPITAL | Age: 82
Discharge: HOME | End: 2022-04-27

## 2022-04-27 DIAGNOSIS — R03.0 ELEVATED BLOOD-PRESSURE READING, WITHOUT DIAGNOSIS OF HYPERTENSION: ICD-10-CM

## 2022-04-27 PROCEDURE — 93786 AMBL BP MNTR W/SW REC ONLY: CPT

## 2022-04-27 NOTE — PROGRESS NOTES
Cheryl Tomas  : 1940  MRN: [id number]  Today's Date: 22    Bedtime __________    I woke up at _______      [facility]  [address] [place] [address], [address] [address]  [phone]    During the day, the monitor will pump air and the cuff will inflate approximately every twenty minutes.  In the evening, the pump-up interval changes to every thirty minutes.  In the late evening (9:00 p.m.--10:00 p.m.), the cuff will inflate every hour until 8:00 a.m. the following morning when the twenty-minute interval begins again.    When you feel the cuff inflating, stop what you are doing, straighten your arm, and be still.  If while the cuff is inflated, your arm is bent or there is too much movement, the cuff will re-inflate and attempt another reading.  When the cuff deflates, resume your normal activity.    The monitor is self-contained and records and displays all readings.  Every time the cuff deflates, your blood pressure and heart rate will be displayed twice.    If you bathe or change clothing, remove the monitor.  It is difficult to re-wrap or re-apply the cuff; before removing it, make sure someone is available to  help you.  Whether the cuff is on your left or right arm, the gray tube must be directly above the bend of your elbow.    If the cuff inflates when it is not wrapped around your arm, let it deflate and disconnect it from the black tube, push out any remaining air, reconnect the tubing, and wrap it around your arm again.  The monitor will resume readings at the next proper time.    After wearing the cuff for twenty-four hours, turn the monitor off by holding the button for several seconds, or by removing the batteries.            BPMONITORINSTRUCTIONS 2019                  Ambulatory Blood Pressure Monitor Patient Agreement    I, Cheryl Tomas, 1940, [id number] assume full responsibility for the safekeeping and care of the monitor while it  is in my possession.      I have been advised that it is not waterproof and that any water that comes in contact with the monitor may cause damage that could require the unit to be replaced.    Until I return the monitor and its attachments to [facility], I will assume responsibility for any damage to the monitor due to neglect or misuse of same.    I understand that I am responsible for returning the monitor or I will be responsible for all costs for replacing the equipment.  Failure to return the monitor will result in a replacement charge of $1800.00 which will be billed to me five business days following the date of hookup.    Unless instructed otherwise, I will wear the monitor for 24 hours.    I was given the opportunity to ask questions and left the office with the device instructions.    I will return the monitor no later than 4/28/22 to:    [facility], [address] [place] [address], [address], [place] [address]    Date of Hookup: 04/27/22    Ordered by: Dr Blandon    Hooked up by: Grace Castillo MA Cuff placed on left arm.    Serial Number: [id number]    Termination Date: 04/28/22  Time: 1000    Patient or Guardian Signature: ______________________, 04/27/22    Date Monitor Returned: __________________            BPMONITORINSTRUCTIONS 8.12.2019

## 2022-05-05 ENCOUNTER — HOSPITAL ENCOUNTER (OUTPATIENT)
Dept: CARDIOLOGY | Facility: HOSPITAL | Age: 82
Discharge: HOME | End: 2022-05-05
Admitting: INTERNAL MEDICINE

## 2022-05-05 DIAGNOSIS — I10 PRIMARY HYPERTENSION: ICD-10-CM

## 2022-05-05 LAB
BH CV ECHO MEAS - DIST REN A EDV LEFT: 6 CM/S
BH CV ECHO MEAS - DIST REN A PSV LEFT: 48 CM/S
BH CV ECHO MEAS - MID REN A EDV LEFT: 6 CM/S
BH CV ECHO MEAS - MID REN A PSV LEFT: 65 CM/S
BH CV ECHO MEAS - PROX REN A EDV LEFT: 7 CM/S
BH CV ECHO MEAS - PROX REN A PSV LEFT: 69 CM/S
BH CV VAS BP LEFT ARM: NORMAL MMHG
BH CV VAS KIDNEY HEIGHT LEFT: 4.1 CM
BH CV VAS RENAL AORTIC MID EDV: 6 CM/S
BH CV VAS RENAL AORTIC MID PSV: 84 CM/S
BH CV VAS RENAL HILUM LEFT EDV: 9 CM/S
BH CV VAS RENAL HILUM LEFT PSV: 49 CM/S
BH CV VAS RENAL HILUM RIGHT EDV: 6 CM/S
BH CV VAS RENAL HILUM RIGHT PSV: 40 CM/S
BH CV XLRA MEAS - KID L LEFT: 11.5 CM
BH CV XLRA MEAS DIST REN A EDV RIGHT: 6 CM/S
BH CV XLRA MEAS DIST REN A PSV RIGHT: 41 CM/S
BH CV XLRA MEAS KID H RIGHT: 4.5 CM
BH CV XLRA MEAS KID L RIGHT: 10.8 CM
BH CV XLRA MEAS KID W RIGHT: 5.1 CM
BH CV XLRA MEAS MID REN A EDV RIGHT: 4 CM/S
BH CV XLRA MEAS MID REN A PSV RIGHT: 60 CM/S
BH CV XLRA MEAS PROX REN A EDV RIGHT: 10 CM/S
BH CV XLRA MEAS PROX REN A PSV RIGHT: 92 CM/S
BH CV XLRA MEAS RAR LEFT: 0.89
BH CV XLRA MEAS RAR RIGHT: 1.38
BH CV XLRA MEAS RENAL A ORG EDV LEFT: 10 CM/S
BH CV XLRA MEAS RENAL A ORG EDV RIGHT: 8 CM/S
BH CV XLRA MEAS RENAL A ORG PSV LEFT: 75 CM/S
BH CV XLRA MEAS RENAL A ORG PSV RIGHT: 112 CM/S
LEFT KIDNEY WIDTH: 5.4 CM
LEFT RENAL UPPER PARENCHYMA MAX: 25 CM/S
LEFT RENAL UPPER PARENCHYMA MIN: 5 CM/S
LEFT RENAL UPPER PARENCHYMA RI: 0.8
RIGHT RENAL UPPER PARENCHYMA MAX: 20 CM/S
RIGHT RENAL UPPER PARENCHYMA MIN: 4 CM/S
RIGHT RENAL UPPER PARENCHYMA RI: 0.8

## 2022-05-05 PROCEDURE — 93975 VASCULAR STUDY: CPT

## 2022-06-06 ENCOUNTER — PATIENT OUTREACH (OUTPATIENT)
Dept: PHARMACY | Facility: HOSPITAL | Age: 82
End: 2022-06-06

## 2022-06-06 NOTE — OUTREACH NOTE
Medication Adherence Call    Patient was called to discuss medication adherence, as she was identified as having care opportunities. I specifically called about metformin as per [employer] claims, she last fill was in March.     I called multiple times and left a voicemail with no response.    Izabela Burgos, PharmD  Population Health Pharmacist  06/06/22

## 2022-06-14 DIAGNOSIS — E78.00 PURE HYPERCHOLESTEROLEMIA: ICD-10-CM

## 2022-06-14 RX ORDER — PRAVASTATIN SODIUM 40 MG
TABLET ORAL
Qty: 90 TABLET | Refills: 0 | Status: SHIPPED | OUTPATIENT
Start: 2022-06-14 | End: 2022-11-16

## 2022-06-16 ENCOUNTER — OFFICE VISIT (OUTPATIENT)
Dept: FAMILY MEDICINE CLINIC | Facility: CLINIC | Age: 82
End: 2022-06-16

## 2022-06-16 VITALS
HEIGHT: 64 IN | SYSTOLIC BLOOD PRESSURE: 120 MMHG | BODY MASS INDEX: 27.14 KG/M2 | DIASTOLIC BLOOD PRESSURE: 60 MMHG | TEMPERATURE: 96.9 F | HEART RATE: 73 BPM | OXYGEN SATURATION: 96 % | WEIGHT: 159 LBS

## 2022-06-16 DIAGNOSIS — R82.90 BAD ODOR OF URINE: Primary | ICD-10-CM

## 2022-06-16 LAB
BILIRUB BLD-MCNC: NEGATIVE MG/DL
CLARITY, POC: CLEAR
COLOR UR: YELLOW
EXPIRATION DATE: NORMAL
GLUCOSE UR STRIP-MCNC: NEGATIVE MG/DL
KETONES UR QL: NEGATIVE
LEUKOCYTE EST, POC: NEGATIVE
Lab: NORMAL
NITRITE UR-MCNC: NEGATIVE MG/ML
PH UR: 6 [PH] (ref 5–8)
PROT UR STRIP-MCNC: NEGATIVE MG/DL
RBC # UR STRIP: NEGATIVE /UL
SP GR UR: 1.01 (ref 1–1.03)
UROBILINOGEN UR QL: NORMAL

## 2022-06-16 PROCEDURE — 99213 OFFICE O/P EST LOW 20 MIN: CPT | Performed by: PHYSICIAN ASSISTANT

## 2022-06-16 PROCEDURE — 81003 URINALYSIS AUTO W/O SCOPE: CPT | Performed by: PHYSICIAN ASSISTANT

## 2022-06-16 NOTE — PROGRESS NOTES
Follow Up Office Visit      Date: 2022   Patient Name: Cheryl Tomas  : 1940   MRN: [id number]     Chief Complaint:    Chief Complaint   Patient presents with   • Urinary Tract Infection       History of Present Illness: Cheryl Tomas is a 82 y.o. female who is here today to follow up with a couple days of odorous urine.  This happened a few days ago and she does want to make sure she did not have an infection.  She does admit that she usually drinks water every day all day but over the past week has been drinking more of a sugary cranberry juice for lunch.  She denies any fever no low back pain and no abdominal pain.  No hematuria.      Subjective      Review of systems:  Review of Systems   Constitutional: Negative for fatigue and fever.   Genitourinary: Negative for dysuria, frequency and pelvic pressure.   Musculoskeletal: Negative for back pain.        I have reviewed and the following portions of the patient's history were updated as appropriate: past family history, past medical history, past social history, past surgical history and problem list.    Medications:     Current Outpatient Medications:   •  acetaminophen (TYLENOL) 650 MG 8 hr tablet, Take 1,300 mg by mouth Every 8 (Eight) Hours As Needed for Mild Pain ., Disp: , Rfl:   •  allopurinol (ZYLOPRIM) 300 MG tablet, TAKE 1 TABLET EVERY DAY, Disp: 90 tablet, Rfl: 3  •  aspirin 81 MG EC tablet, Take 1 tablet by mouth Daily., Disp: 90 tablet, Rfl: 3  •  carvedilol CR (COREG CR) 40 MG 24 hr capsule, Take 1 capsule by mouth Daily., Disp: 30 capsule, Rfl: 5  •  cholecalciferol (VITAMIN D3) 1000 units tablet, Take 1,000 Units by mouth Daily., Disp: , Rfl:   •  Coenzyme Q10 10 MG capsule, Take 10 mg by mouth Daily., Disp: , Rfl:   •  dilTIAZem CD (CARDIZEM CD) 240 MG 24 hr capsule, TAKE 1 CAPSULE EVERY DAY, Disp: 90 capsule, Rfl: 3  •  hydrALAZINE (APRESOLINE) 25 MG tablet, Take 1 tablet by mouth 3 (Three) Times a Day., Disp: 270  "tablet, Rfl: 3  •  irbesartan (AVAPRO) 300 MG tablet, Take 1 tablet by mouth Every Night., Disp: 90 tablet, Rfl: 3  •  metFORMIN (GLUCOPHAGE) 500 MG tablet, Take 1 tablet by mouth 2 (Two) Times a Day With Meals., Disp: 60 tablet, Rfl: 0  •  pravastatin (PRAVACHOL) 40 MG tablet, TAKE 1 TABLET EVERY NIGHT, Disp: 90 tablet, Rfl: 0  •  QUEtiapine (SEROquel) 25 MG tablet, Take 1 tablet by mouth Every Night., Disp: 90 tablet, Rfl: 3  •  triamterene-hydrochlorothiazide (MAXZIDE-25) 37.5-25 MG per tablet, Take 1 tablet by mouth Daily., Disp: 90 tablet, Rfl: 3    Allergies:   Allergies   Allergen Reactions   • Cephalexin Itching and Rash       Objective     Vital Signs:   Vitals:    06/16/22 1003   BP: 120/60   Pulse: 73   Temp: 96.9 °F (36.1 °C)   SpO2: 96%   Weight: 72.1 kg (159 lb)   Height: 161.3 cm (63.5\")   PainSc: 0-No pain     Body mass index is 27.72 kg/m².   BMI is >= 25 and <30. (Overweight) The following options were offered after discussion;: weight loss educational material (shared in after visit summary)      Physical Exam:   Physical Exam  Vitals and nursing note reviewed.   HENT:      Head: Normocephalic and atraumatic.   Cardiovascular:      Rate and Rhythm: Normal rate and regular rhythm.   Pulmonary:      Effort: Pulmonary effort is normal.      Breath sounds: Normal breath sounds.   Musculoskeletal:      Cervical back: Neck supple.          Assessment / Plan      Assessment/Plan:   Diagnoses and all orders for this visit:    1. Bad odor of urine (Primary)  -     POCT urinalysis dipstick, automated    Urine dipstick is good today.  No sign of infection.  She will resume her usual water intake and notify me if symptoms return.    Follow Up:   No follow-ups on file.    Afua Jung PA-C   [facility] [place]  "

## 2022-06-17 DIAGNOSIS — I10 ESSENTIAL HYPERTENSION: ICD-10-CM

## 2022-06-17 RX ORDER — CARVEDILOL PHOSPHATE 40 MG/1
CAPSULE, EXTENDED RELEASE ORAL
Qty: 30 CAPSULE | Refills: 0 | Status: SHIPPED | OUTPATIENT
Start: 2022-06-17 | End: 2022-07-13 | Stop reason: SDUPTHER

## 2022-07-08 ENCOUNTER — TRANSCRIBE ORDERS (OUTPATIENT)
Dept: FAMILY MEDICINE CLINIC | Facility: CLINIC | Age: 82
End: 2022-07-08

## 2022-07-08 DIAGNOSIS — Z12.31 VISIT FOR SCREENING MAMMOGRAM: Primary | ICD-10-CM

## 2022-07-13 ENCOUNTER — OFFICE VISIT (OUTPATIENT)
Dept: FAMILY MEDICINE CLINIC | Facility: CLINIC | Age: 82
End: 2022-07-13

## 2022-07-13 VITALS
TEMPERATURE: 97.1 F | SYSTOLIC BLOOD PRESSURE: 140 MMHG | DIASTOLIC BLOOD PRESSURE: 60 MMHG | HEART RATE: 62 BPM | OXYGEN SATURATION: 95 % | WEIGHT: 160 LBS | RESPIRATION RATE: 15 BRPM | BODY MASS INDEX: 28.35 KG/M2 | HEIGHT: 63 IN

## 2022-07-13 DIAGNOSIS — I10 ESSENTIAL HYPERTENSION: ICD-10-CM

## 2022-07-13 DIAGNOSIS — H26.9 CATARACT OF BOTH EYES, UNSPECIFIED CATARACT TYPE: ICD-10-CM

## 2022-07-13 DIAGNOSIS — E11.9 TYPE 2 DIABETES MELLITUS WITHOUT COMPLICATION, WITHOUT LONG-TERM CURRENT USE OF INSULIN: Primary | ICD-10-CM

## 2022-07-13 DIAGNOSIS — K21.9 GASTROESOPHAGEAL REFLUX DISEASE, UNSPECIFIED WHETHER ESOPHAGITIS PRESENT: ICD-10-CM

## 2022-07-13 DIAGNOSIS — I10 BENIGN ESSENTIAL HYPERTENSION: Chronic | ICD-10-CM

## 2022-07-13 DIAGNOSIS — N13.30 HYDRONEPHROSIS, LEFT: ICD-10-CM

## 2022-07-13 DIAGNOSIS — E78.2 MIXED HYPERLIPIDEMIA: Chronic | ICD-10-CM

## 2022-07-13 LAB
EXPIRATION DATE: NORMAL
HBA1C MFR BLD: 5.8 %
Lab: NORMAL

## 2022-07-13 PROCEDURE — 3044F HG A1C LEVEL LT 7.0%: CPT | Performed by: FAMILY MEDICINE

## 2022-07-13 PROCEDURE — 83036 HEMOGLOBIN GLYCOSYLATED A1C: CPT | Performed by: FAMILY MEDICINE

## 2022-07-13 PROCEDURE — 99214 OFFICE O/P EST MOD 30 MIN: CPT | Performed by: FAMILY MEDICINE

## 2022-07-13 RX ORDER — CARVEDILOL PHOSPHATE 40 MG/1
40 CAPSULE, EXTENDED RELEASE ORAL DAILY
Qty: 90 CAPSULE | Refills: 3 | Status: SHIPPED | OUTPATIENT
Start: 2022-07-13 | End: 2022-09-21

## 2022-07-13 RX ORDER — HYDRALAZINE HYDROCHLORIDE 25 MG/1
25 TABLET, FILM COATED ORAL 3 TIMES DAILY
Qty: 270 TABLET | Refills: 3 | Status: SHIPPED | OUTPATIENT
Start: 2022-07-13 | End: 2022-08-01 | Stop reason: SDUPTHER

## 2022-07-13 RX ORDER — OMEPRAZOLE 20 MG/1
20 CAPSULE, DELAYED RELEASE ORAL DAILY
Qty: 90 CAPSULE | Refills: 1 | Status: SHIPPED | OUTPATIENT
Start: 2022-07-13 | End: 2023-01-18

## 2022-07-13 NOTE — PROGRESS NOTES
"Subjective   Cheryl Tomas is a 82 y.o. female.     History of Present Illness overall diab doing ok.  She is [facility].      She is following with card.  She is doing well from cardiac stand point.   He ordered us kidneys she sees ashwin. Had rad that showed normal arteries but some possible hydronephrosis on left kidney. Card considering entresto if bp remains elevated. I have reviewed 4/22 note.     She reports some pressure in chest when she lays flat.  No soa.  No weakness.      The following portions of the patient's history were reviewed and updated as appropriate: allergies, current medications, past family history, past medical history, past social history, past surgical history and problem list.    Review of Systems   Constitutional: Negative.    HENT: Negative.    Eyes: Negative.    Respiratory: Negative.    Cardiovascular: Negative.         Pressure when laying flat     Gastrointestinal: Negative.    Endocrine: Negative.    Genitourinary: Negative.    Musculoskeletal: Negative.    Skin: Negative.    Allergic/Immunologic: Negative.    Neurological: Negative.    Hematological: Negative.    Psychiatric/Behavioral: Negative.        Objective     Vitals:    07/13/22 0856   BP: 140/60   Pulse: 62   Resp: 15   Temp: 97.1 °F (36.2 °C)   SpO2: 95%   Weight: 72.6 kg (160 lb)   Height: 160 cm (63\")       Physical Exam  Vitals and nursing note reviewed.   Constitutional:       Appearance: She is well-developed.   HENT:      Head: Normocephalic and atraumatic.   Eyes:      General:         Right eye: No discharge.         Left eye: No discharge.      Pupils: Pupils are equal, round, and reactive to light.   Cardiovascular:      Rate and Rhythm: Normal rate and regular rhythm.      Heart sounds: Normal heart sounds.   Pulmonary:      Effort: Pulmonary effort is normal.      Breath sounds: Normal breath sounds.   Abdominal:      General: Bowel sounds are normal.      Palpations: Abdomen is soft. There is no mass.    "   Tenderness: There is no abdominal tenderness.   Musculoskeletal:         General: Normal range of motion.      Right shoulder: No swelling.      Cervical back: Normal range of motion and neck supple.   Skin:     General: Skin is warm and dry.      Nails: There is no clubbing.   Neurological:      Mental Status: She is alert and oriented to person, place, and time.      Deep Tendon Reflexes: Reflexes are normal and symmetric.   Psychiatric:         Behavior: Behavior normal.         Thought Content: Thought content normal.         Judgment: Judgment normal.         Assessment & Plan     Problem List Items Addressed This Visit        Cardiac and Vasculature    Hyperlipidemia (Chronic)    Benign essential hypertension (Chronic)    Relevant Medications    carvedilol CR (COREG CR) 40 MG 24 hr capsule    hydrALAZINE (APRESOLINE) 25 MG tablet    Essential hypertension    Relevant Medications    carvedilol CR (COREG CR) 40 MG 24 hr capsule    hydrALAZINE (APRESOLINE) 25 MG tablet       Endocrine and Metabolic    Diabetes mellitus (HCC) - Primary (Chronic)    Relevant Orders    POC Glycosylated Hemoglobin (Hb A1C) (Completed)      Other Visit Diagnoses     Hydronephrosis, left        Relevant Orders    US Renal Bilateral    Gastroesophageal reflux disease, unspecified whether esophagitis present        Relevant Medications    omeprazole (priLOSEC) 20 MG capsule    Cataract of both eyes, unspecified cataract type        Relevant Orders    Ambulatory Referral to Ophthalmology        poct a1c 5.8 today.    Fu 3 mo.  Start omeprazole today for possible reflux sx when laying supine.  Fu 3 mo to see if those sx improved.   Fu card  Order us kidneys to eval hydronephrosis.  Refer optho for cataracts.

## 2022-08-01 DIAGNOSIS — I10 BENIGN ESSENTIAL HYPERTENSION: Chronic | ICD-10-CM

## 2022-08-01 RX ORDER — HYDRALAZINE HYDROCHLORIDE 25 MG/1
25 TABLET, FILM COATED ORAL 3 TIMES DAILY
Qty: 270 TABLET | Refills: 3 | Status: SHIPPED | OUTPATIENT
Start: 2022-08-01

## 2022-08-09 ENCOUNTER — HOSPITAL ENCOUNTER (OUTPATIENT)
Dept: ULTRASOUND IMAGING | Facility: HOSPITAL | Age: 82
Discharge: HOME | End: 2022-08-09
Admitting: FAMILY MEDICINE

## 2022-08-09 DIAGNOSIS — N13.30 HYDRONEPHROSIS, LEFT: ICD-10-CM

## 2022-08-09 PROCEDURE — 76775 US EXAM ABDO BACK WALL LIM: CPT

## 2022-08-10 NOTE — PROGRESS NOTES
Notify the patient that the renal ultrasound shows no hydronephrosis and just some bilateral renal cysts that are nothing to be concerned about at this point.

## 2022-08-31 DIAGNOSIS — E11.9 TYPE 2 DIABETES MELLITUS WITHOUT COMPLICATION, WITHOUT LONG-TERM CURRENT USE OF INSULIN: ICD-10-CM

## 2022-08-31 NOTE — TELEPHONE ENCOUNTER
Rx Refill Note  Requested Prescriptions     Pending Prescriptions Disp Refills   • metFORMIN (GLUCOPHAGE) 500 MG tablet [Pharmacy Med Name: metFORMIN HCl 500 MG Oral Tablet] 60 tablet 0     Sig: TAKE 1 TABLET BY MOUTH TWICE DAILY WITH MEALS      Last office visit with prescribing clinician: 7/13/2022      Next office visit with prescribing clinician: 11/30/2022            Shiv Saba MA  08/31/22, 14:36 EDT

## 2022-09-07 DIAGNOSIS — E79.0 ELEVATED URIC ACID IN BLOOD: ICD-10-CM

## 2022-09-07 DIAGNOSIS — I10 ESSENTIAL HYPERTENSION: ICD-10-CM

## 2022-09-07 RX ORDER — ALLOPURINOL 300 MG/1
TABLET ORAL
Qty: 90 TABLET | Refills: 3 | Status: SHIPPED | OUTPATIENT
Start: 2022-09-07

## 2022-09-07 RX ORDER — IRBESARTAN 300 MG/1
300 TABLET ORAL NIGHTLY
Qty: 90 TABLET | Refills: 3 | Status: SHIPPED | OUTPATIENT
Start: 2022-09-07

## 2022-09-07 RX ORDER — DILTIAZEM HYDROCHLORIDE 240 MG/1
CAPSULE, COATED, EXTENDED RELEASE ORAL
Qty: 90 CAPSULE | Refills: 3 | Status: SHIPPED | OUTPATIENT
Start: 2022-09-07

## 2022-09-07 RX ORDER — TRIAMTERENE AND HYDROCHLOROTHIAZIDE 37.5; 25 MG/1; MG/1
TABLET ORAL
Qty: 90 TABLET | Refills: 3 | Status: SHIPPED | OUTPATIENT
Start: 2022-09-07

## 2022-09-07 NOTE — TELEPHONE ENCOUNTER
Rx Refill Note  Requested Prescriptions     Pending Prescriptions Disp Refills   • irbesartan (AVAPRO) 300 MG tablet [Pharmacy Med Name: IRBESARTAN 300 MG Tablet] 90 tablet 3     Sig: TAKE 1 TABLET BY MOUTH EVERY NIGHT.   • triamterene-hydrochlorothiazide (MAXZIDE-25) 37.5-25 MG per tablet [Pharmacy Med Name: TRIAMTERENE/HYDROCHLOROTHIAZIDE 37.5-25 MG Tablet] 90 tablet 3     Sig: TAKE 1 TABLET EVERY DAY   • dilTIAZem CD (CARDIZEM CD) 240 MG 24 hr capsule [Pharmacy Med Name: DILTIAZEM HYDROCHLORIDE  MG Capsule Extended Release 24 Hour] 90 capsule 3     Sig: TAKE 1 CAPSULE EVERY DAY   • allopurinol (ZYLOPRIM) 300 MG tablet [Pharmacy Med Name: ALLOPURINOL 300 MG Tablet] 90 tablet 3     Sig: TAKE 1 TABLET EVERY DAY      Last office visit with prescribing clinician: 7/13/2022      Next office visit with prescribing clinician: 11/30/2022            Dustin Zambrano MA  09/07/22, 11:35 EDT

## 2022-09-09 ENCOUNTER — HOSPITAL ENCOUNTER (OUTPATIENT)
Dept: MAMMOGRAPHY | Facility: HOSPITAL | Age: 82
Discharge: HOME | End: 2022-09-09
Admitting: FAMILY MEDICINE

## 2022-09-09 DIAGNOSIS — Z12.31 VISIT FOR SCREENING MAMMOGRAM: ICD-10-CM

## 2022-09-09 PROCEDURE — 77067 SCR MAMMO BI INCL CAD: CPT | Performed by: RADIOLOGY

## 2022-09-09 PROCEDURE — 77063 BREAST TOMOSYNTHESIS BI: CPT | Performed by: RADIOLOGY

## 2022-09-09 PROCEDURE — 77063 BREAST TOMOSYNTHESIS BI: CPT

## 2022-09-09 PROCEDURE — 77067 SCR MAMMO BI INCL CAD: CPT

## 2022-09-21 ENCOUNTER — OFFICE VISIT (OUTPATIENT)
Dept: CARDIOLOGY | Facility: CLINIC | Age: 82
End: 2022-09-21

## 2022-09-21 VITALS
BODY MASS INDEX: 28.17 KG/M2 | DIASTOLIC BLOOD PRESSURE: 70 MMHG | OXYGEN SATURATION: 95 % | HEART RATE: 63 BPM | WEIGHT: 159 LBS | SYSTOLIC BLOOD PRESSURE: 132 MMHG | HEIGHT: 63 IN

## 2022-09-21 DIAGNOSIS — E78.2 MIXED HYPERLIPIDEMIA: Chronic | ICD-10-CM

## 2022-09-21 DIAGNOSIS — I47.1 SUPRAVENTRICULAR TACHYCARDIA: ICD-10-CM

## 2022-09-21 DIAGNOSIS — I10 ESSENTIAL HYPERTENSION: Primary | ICD-10-CM

## 2022-09-21 DIAGNOSIS — I35.1 NONRHEUMATIC AORTIC VALVE INSUFFICIENCY: ICD-10-CM

## 2022-09-21 PROCEDURE — 99214 OFFICE O/P EST MOD 30 MIN: CPT | Performed by: INTERNAL MEDICINE

## 2022-09-21 RX ORDER — CARVEDILOL 12.5 MG/1
12.5 TABLET ORAL 2 TIMES DAILY
Qty: 180 TABLET | Refills: 3 | Status: SHIPPED | OUTPATIENT
Start: 2022-09-21 | End: 2023-09-16

## 2022-09-21 NOTE — PROGRESS NOTES
Established Patient Office Visit    Patient Name: Cheryl Tomas  : 1940   MRN: [id number]   Care Team: Patient Care Team:  Marian Perea MD as PCP - General (Family Medicine)  Sharon Salas PA as Physician Assistant (Cardiology)  Bradley Blandon MD as Consulting Physician (Cardiology)    Chief Complaint   Patient presents with   • Hypertension   • Hyperlipidemia     Patient ID: Cheryl Tomas is a 82 y.o.  white female from [place], [place], retired .    1. Chronic Hypertension- probable essential  a. Remote stress test apparently acceptable-data deficit  b. Echocardiogram 2019: EF 60%, mild concentric LVH, moderate aortic insufficiency.  LV end-diastolic dimension 4.3 cm.  c. Lexiscan myocardial perfusion imaging ordered but declined 2021  d. Echocardiogram 10/11/2021: LVEF 61-65%, LV diastolic function consistent with grade 2 with high LAP pseudonormalization, LA mildly increased, moderate AR, RVSP 35-45 mmHg, small less than 1 cm pericardial effusion adjacent to the RV and LV  e. Residual class I symptoms 2022  2. PVST  a. Abnormal holter monitor study with frequent PSVT consistent with nonsustained atrial tachycardia with maximum heart rate 173 bpm, 2021    b. Infrequent palpitations 2022  3. Nonrheumatic aortic insufficiency  4. Mixed hyperlipidemia; on statin therapy  5. Type 2 diabetes mellitus; hemoglobin A1c 5.8% 2022  6. Dizziness  7. Stage III chronic kidney disease  8. History of PIH and preeclampsia  9. Surgical history:  a. Appendectomy  b. Lithotripsy  c. Hysterectomy      HPI: Cheryl Tomas is a 82 y.o. female with a history of moderate aortic regurgitation, grade II diastolic dysfunction, hypertension. who presents today for routine follow-up. She was last seen by Dr. Blandon in 2022. At that visit she had undergone ambulatory BP monitoring which was acceptable and renal artery duplex which did not  have evidence of ZOLTAN. Today she reports feeling well overall. She does have mild LE swelling L>R however this has been stable. She denies any recurrence of palpitations with her current medication regimen. She does note that her carvedilol is quite expensive, around $100 per month.     Subjective   Review of Systems   Constitutional: Negative for activity change and fatigue.   Respiratory: Negative for chest tightness and shortness of breath.    Cardiovascular: Positive for chest pain and leg swelling. Negative for palpitations.   Neurological: Negative for dizziness and syncope.       Social History     Tobacco Use   Smoking Status Never Smoker   Smokeless Tobacco Never Used        Allergies   Allergen Reactions   • Cephalexin Itching and Rash         Current Outpatient Medications:   •  acetaminophen (TYLENOL) 650 MG 8 hr tablet, Take 1,300 mg by mouth Every 8 (Eight) Hours As Needed for Mild Pain ., Disp: , Rfl:   •  allopurinol (ZYLOPRIM) 300 MG tablet, TAKE 1 TABLET EVERY DAY, Disp: 90 tablet, Rfl: 3  •  aspirin 81 MG EC tablet, Take 1 tablet by mouth Daily., Disp: 90 tablet, Rfl: 3  •  cholecalciferol (VITAMIN D3) 1000 units tablet, Take 1,000 Units by mouth Daily., Disp: , Rfl:   •  Coenzyme Q10 10 MG capsule, Take 10 mg by mouth Daily., Disp: , Rfl:   •  dilTIAZem CD (CARDIZEM CD) 240 MG 24 hr capsule, TAKE 1 CAPSULE EVERY DAY, Disp: 90 capsule, Rfl: 3  •  hydrALAZINE (APRESOLINE) 25 MG tablet, Take 1 tablet by mouth 3 (Three) Times a Day., Disp: 270 tablet, Rfl: 3  •  irbesartan (AVAPRO) 300 MG tablet, TAKE 1 TABLET BY MOUTH EVERY NIGHT., Disp: 90 tablet, Rfl: 3  •  metFORMIN (GLUCOPHAGE) 500 MG tablet, TAKE 1 TABLET BY MOUTH TWICE DAILY WITH MEALS, Disp: 60 tablet, Rfl: 0  •  omeprazole (priLOSEC) 20 MG capsule, Take 1 capsule by mouth Daily., Disp: 90 capsule, Rfl: 1  •  pravastatin (PRAVACHOL) 40 MG tablet, TAKE 1 TABLET EVERY NIGHT, Disp: 90 tablet, Rfl: 0  •  QUEtiapine (SEROquel) 25 MG tablet, Take 1  "tablet by mouth Every Night., Disp: 90 tablet, Rfl: 3  •  triamterene-hydrochlorothiazide (MAXZIDE-25) 37.5-25 MG per tablet, TAKE 1 TABLET EVERY DAY, Disp: 90 tablet, Rfl: 3  •  carvedilol (COREG) 12.5 MG tablet, Take 1 tablet by mouth 2 (Two) Times a Day for 360 days., Disp: 180 tablet, Rfl: 3      Objective     Vitals:    09/21/22 1348   BP: 132/70   BP Location: Right arm   Patient Position: Sitting   Pulse: 63   SpO2: 95%   Weight: 72.1 kg (159 lb)   Height: 160 cm (62.99\")     Body mass index is 28.17 kg/m².    Gen: well developed, older WF, comfortable appearing  HEENT: MMM, sclera anicteric, conjunctiva normal  CV: regular rate, regular rhythm, II/VI diastolic murmur at RUSB, normal radial and DP pulses  Pulm: RA, normal work of breathing, no wheezes, rales, rhonchi  Abd: soft, non-tender, non-distended, +bowel sounds  Ext: normal bulk for age, normal tone, no dependent edema  Neuro: alert, oriented, face symmetrical, moving all extremities well  Psych: normal mood, appropriate affect    RESULTS:   Procedures    Results for orders placed during the hospital encounter of 10/11/21    Adult Transthoracic Echo Complete W/ Cont if Necessary Per Protocol    Interpretation Summary  · Left ventricular ejection fraction appears to be 61 - 65%. Left ventricular systolic function is normal.  · Left ventricular diastolic function is consistent with (grade II w/high LAP) pseudonormalization.  · Left atrial volume is mildly increased.  · Moderate aortic valve regurgitation is present.  · Estimated right ventricular systolic pressure from tricuspid regurgitation is mildly elevated (35-45 mmHg).  · Mild pulmonary hypertension is present.  · There is a small (<1cm) pericardial effusion adjacent to the right ventricle and left ventricle.      Labs:  Lab Results   Component Value Date    WBC 7.89 10/18/2021    HGB 13.9 10/18/2021    HCT 41.6 10/18/2021    MCV 87.9 10/18/2021     10/18/2021     Lab Results   Component " Value Date    GLUCOSE 80 11/15/2021    BUN 24 (H) 11/15/2021    CREATININE 1.22 (H) 11/15/2021    EGFRIFNONA 43 (L) 11/15/2021    EGFRIFAFRI 52 (L) 11/15/2021    BCR 19.7 11/15/2021    K 5.9 (H) 11/15/2021    CO2 25.8 11/15/2021    CALCIUM 9.6 11/15/2021    PROTENTOTREF 6.9 10/18/2021    ALBUMIN 4.60 10/29/2021    LABIL2 2.3 10/18/2021    AST 8 10/29/2021    ALT 9 10/29/2021     Lab Results   Component Value Date    HGBA1C 5.8 07/13/2022     Lab Results   Component Value Date    CHLPL 191 10/18/2021    TRIG 134 10/18/2021    HDL 49 10/18/2021     (H) 10/18/2021       Most recent PCP note, imaging tests, and labs reviewed.      Assessment & Plan       ICD-10-CM ICD-9-CM   1. Essential hypertension  I10 401.9   2. Mixed hyperlipidemia  E78.2 272.2   3. Nonrheumatic aortic valve insufficiency  I35.1 424.1   4. Supraventricular tachycardia (HCC)  I47.1 427.89      Primary hypertension   - acceptable control on current regiment   - will change to BID carvedilol instead of CR to help with costs   - Renal US negative for ZOLTAN    Moderate AR   - no new symptoms, last echo Oct 2021    HLD   - continue pravastatin, no recent lipid panel for review today     Palpitations / PSVT / AT   - well controlled on carvedilol and diltiazem  Return in about 6 months (around 3/21/2023).    DUARTE Epperson MD, MS  09/21/22    [facility]  [address]  [address] [place] [id number] [phone]

## 2022-09-26 DIAGNOSIS — E11.9 TYPE 2 DIABETES MELLITUS WITHOUT COMPLICATION, WITHOUT LONG-TERM CURRENT USE OF INSULIN: ICD-10-CM

## 2022-10-10 DIAGNOSIS — G47.00 INSOMNIA, UNSPECIFIED TYPE: ICD-10-CM

## 2022-10-10 RX ORDER — QUETIAPINE FUMARATE 25 MG/1
25 TABLET, FILM COATED ORAL NIGHTLY
Qty: 90 TABLET | Refills: 3 | Status: SHIPPED | OUTPATIENT
Start: 2022-10-10

## 2022-10-10 NOTE — TELEPHONE ENCOUNTER
Rx Refill Note  Requested Prescriptions     Pending Prescriptions Disp Refills   • QUEtiapine (SEROquel) 25 MG tablet [Pharmacy Med Name: QUETIAPINE FUMARATE 25 MG Tablet] 90 tablet 3     Sig: TAKE 1 TABLET BY MOUTH EVERY NIGHT.      Last office visit with prescribing clinician: 7/13/2022      Next office visit with prescribing clinician: 11/30/2022            Yola Gallegos MA  10/10/22, 13:06 EDT

## 2022-10-24 DIAGNOSIS — E11.9 TYPE 2 DIABETES MELLITUS WITHOUT COMPLICATION, WITHOUT LONG-TERM CURRENT USE OF INSULIN: ICD-10-CM

## 2022-10-24 NOTE — TELEPHONE ENCOUNTER
Rx Refill Note  Requested Prescriptions     Pending Prescriptions Disp Refills   • metFORMIN (GLUCOPHAGE) 500 MG tablet 60 tablet 0     Sig: Take 1 tablet by mouth 2 (Two) Times a Day With Meals.      Last office visit with prescribing clinician: 7/13/2022      Next office visit with prescribing clinician: 11/30/2022            Dustin Zambrano MA  10/24/22, 10:41 EDT

## 2022-10-28 DIAGNOSIS — E11.9 TYPE 2 DIABETES MELLITUS WITHOUT COMPLICATION, WITHOUT LONG-TERM CURRENT USE OF INSULIN: ICD-10-CM

## 2022-10-31 NOTE — TELEPHONE ENCOUNTER
Rx Refill Note  Requested Prescriptions     Pending Prescriptions Disp Refills   • metFORMIN (GLUCOPHAGE) 500 MG tablet [Pharmacy Med Name: metFORMIN HCl 500 MG Oral Tablet] 60 tablet 0     Sig: TAKE 1 TABLET BY MOUTH TWICE DAILY WITH MEALS      Last office visit with prescribing clinician: 7/13/2022      Next office visit with prescribing clinician: 10/31/2022            Kelvin Martinez MA  10/31/22, 10:50 EDT

## 2022-11-16 DIAGNOSIS — E78.00 PURE HYPERCHOLESTEROLEMIA: ICD-10-CM

## 2022-11-16 RX ORDER — PRAVASTATIN SODIUM 40 MG
TABLET ORAL
Qty: 90 TABLET | Refills: 0 | Status: SHIPPED | OUTPATIENT
Start: 2022-11-16

## 2022-11-16 NOTE — TELEPHONE ENCOUNTER
Rx Refill Note  Requested Prescriptions     Pending Prescriptions Disp Refills   • pravastatin (PRAVACHOL) 40 MG tablet [Pharmacy Med Name: PRAVASTATIN SODIUM 40 MG Tablet] 90 tablet 0     Sig: TAKE 1 TABLET EVERY NIGHT      Last office visit with prescribing clinician: 7/13/2022      Next office visit with prescribing clinician: 11/30/2022            Rosi Mendez  11/16/22, 13:28 EST

## 2022-11-30 ENCOUNTER — OFFICE VISIT (OUTPATIENT)
Dept: FAMILY MEDICINE CLINIC | Facility: CLINIC | Age: 82
End: 2022-11-30

## 2022-11-30 ENCOUNTER — LAB (OUTPATIENT)
Dept: LAB | Facility: HOSPITAL | Age: 82
End: 2022-11-30

## 2022-11-30 VITALS
OXYGEN SATURATION: 95 % | TEMPERATURE: 97.7 F | BODY MASS INDEX: 27.46 KG/M2 | WEIGHT: 155 LBS | RESPIRATION RATE: 15 BRPM | SYSTOLIC BLOOD PRESSURE: 160 MMHG | DIASTOLIC BLOOD PRESSURE: 60 MMHG | HEIGHT: 63 IN | HEART RATE: 63 BPM

## 2022-11-30 DIAGNOSIS — Z91.81 AT HIGH RISK FOR FALLS: ICD-10-CM

## 2022-11-30 DIAGNOSIS — F33.8 OTHER RECURRENT DEPRESSIVE DISORDERS: ICD-10-CM

## 2022-11-30 DIAGNOSIS — I10 BENIGN ESSENTIAL HYPERTENSION: Chronic | ICD-10-CM

## 2022-11-30 DIAGNOSIS — E11.9 TYPE 2 DIABETES MELLITUS WITHOUT COMPLICATION, WITHOUT LONG-TERM CURRENT USE OF INSULIN: ICD-10-CM

## 2022-11-30 DIAGNOSIS — I13.0 HYPERTENSIVE HEART AND CHRONIC KIDNEY DISEASE WITH HEART FAILURE AND STAGE 1 THROUGH STAGE 4 CHRONIC KIDNEY DISEASE, OR UNSPECIFIED CHRONIC KIDNEY DISEASE: Chronic | ICD-10-CM

## 2022-11-30 DIAGNOSIS — Z00.00 MEDICARE ANNUAL WELLNESS VISIT, SUBSEQUENT: Primary | ICD-10-CM

## 2022-11-30 DIAGNOSIS — E79.0 ELEVATED URIC ACID IN BLOOD: ICD-10-CM

## 2022-11-30 LAB
EXPIRATION DATE: NORMAL
HBA1C MFR BLD: 5.7 %
Lab: NORMAL

## 2022-11-30 PROCEDURE — 83036 HEMOGLOBIN GLYCOSYLATED A1C: CPT | Performed by: FAMILY MEDICINE

## 2022-11-30 PROCEDURE — 3044F HG A1C LEVEL LT 7.0%: CPT | Performed by: FAMILY MEDICINE

## 2022-11-30 PROCEDURE — 96160 PT-FOCUSED HLTH RISK ASSMT: CPT | Performed by: FAMILY MEDICINE

## 2022-11-30 PROCEDURE — 1125F AMNT PAIN NOTED PAIN PRSNT: CPT | Performed by: FAMILY MEDICINE

## 2022-11-30 PROCEDURE — 1159F MED LIST DOCD IN RCRD: CPT | Performed by: FAMILY MEDICINE

## 2022-11-30 PROCEDURE — 1170F FXNL STATUS ASSESSED: CPT | Performed by: FAMILY MEDICINE

## 2022-11-30 PROCEDURE — G0439 PPPS, SUBSEQ VISIT: HCPCS | Performed by: FAMILY MEDICINE

## 2022-11-30 RX ORDER — SERTRALINE HYDROCHLORIDE 25 MG/1
25 TABLET, FILM COATED ORAL DAILY
Qty: 90 TABLET | Refills: 1 | Status: SHIPPED | OUTPATIENT
Start: 2022-11-30 | End: 2023-01-18

## 2022-11-30 NOTE — PROGRESS NOTES
The ABCs of the Annual Wellness Visit  Subsequent Medicare Wellness Visit    Chief Complaint   Patient presents with   • Medicare Wellness-subsequent      Subjective    History of Present Illness:  Cheryl Tomas is a 82 y.o. female who presents for a Subsequent Medicare Wellness Visit.    The following portions of the patient's history were reviewed and   updated as appropriate: allergies, current medications, past family history, past medical history, past social history, past surgical history and problem list.    Compared to one year ago, the patient feels her physical   health is worse. Doing less walking and not getting out as much. Does not like driving anymore.     Compared to one year ago, the patient feels her mental   health is worse. Had diff 4-5 months due to health of former .      Recent Hospitalizations:  She was not admitted to the hospital during the last year.       Current Medical Providers:  Patient Care Team:  Marian Perea MD as PCP - General (Family Medicine)  Sharon Salas PA as Physician Assistant (Cardiology)  Bradley Blandon MD as Consulting Physician (Cardiology)    Outpatient Medications Prior to Visit   Medication Sig Dispense Refill   • acetaminophen (TYLENOL) 650 MG 8 hr tablet Take 1,300 mg by mouth Every 8 (Eight) Hours As Needed for Mild Pain .     • allopurinol (ZYLOPRIM) 300 MG tablet TAKE 1 TABLET EVERY DAY 90 tablet 3   • aspirin 81 MG EC tablet Take 1 tablet by mouth Daily. 90 tablet 3   • carvedilol (COREG) 12.5 MG tablet Take 1 tablet by mouth 2 (Two) Times a Day for 360 days. 180 tablet 3   • cholecalciferol (VITAMIN D3) 1000 units tablet Take 1,000 Units by mouth Daily.     • Coenzyme Q10 10 MG capsule Take 10 mg by mouth Daily.     • dilTIAZem CD (CARDIZEM CD) 240 MG 24 hr capsule TAKE 1 CAPSULE EVERY DAY 90 capsule 3   • hydrALAZINE (APRESOLINE) 25 MG tablet Take 1 tablet by mouth 3 (Three) Times a Day. 270 tablet 3   • irbesartan (AVAPRO) 300 MG  tablet TAKE 1 TABLET BY MOUTH EVERY NIGHT. 90 tablet 3   • metFORMIN (GLUCOPHAGE) 500 MG tablet TAKE 1 TABLET BY MOUTH TWICE DAILY WITH MEALS 60 tablet 0   • omeprazole (priLOSEC) 20 MG capsule Take 1 capsule by mouth Daily. 90 capsule 1   • pravastatin (PRAVACHOL) 40 MG tablet TAKE 1 TABLET EVERY NIGHT 90 tablet 0   • QUEtiapine (SEROquel) 25 MG tablet TAKE 1 TABLET BY MOUTH EVERY NIGHT. 90 tablet 3   • triamterene-hydrochlorothiazide (MAXZIDE-25) 37.5-25 MG per tablet TAKE 1 TABLET EVERY DAY 90 tablet 3     No facility-administered medications prior to visit.       No opioid medication identified on active medication list. I have reviewed chart for other potential  high risk medication/s and harmful drug interactions in the elderly.          Aspirin is on active medication list. Aspirin use is indicated based on review of current medical condition/s. Pros and cons of this therapy have been discussed today. Benefits of this medication outweigh potential harm.  Patient has been encouraged to continue taking this medication.  .      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, or unspecified chronic kidney disease (CMS/HCC)    • Orthopnea   • Right bundle branch block with left anterior fascicular block   • Postmenopausal   • PSVT (paroxysmal supraventricular tachycardia) ([facility])   • Nonrheumatic aortic valve insufficiency   • Osteoarthrosis multiple sites, not specified  "as generalized   • Actinic keratosis   • Body mass index (BMI) of 25.0 to 25.9 in adult   • Fatigue   • High risk medication use   • Insomnia   • Obesity, unspecified   • Other osteoporosis   • Sprain and strain of knee and leg   • Syncope and collapse   • Vitamin D deficiency   • Bronchitis   • Edema of lower extremity   • Other specified circulatory system disorders   • Hypertensive disorder   • Encounter for long-term (current) use of other medications   • Other depressive disorder   • Mixed hyperlipidemia   • Benign essential hypertension   • Supraventricular tachycardia (HCC)   • Varicose veins of left lower extremity with pain   • Essential hypertension   • Dizziness   • Serum potassium elevated   • Medicare annual wellness visit, subsequent   • At high risk for falls     Advance Care Planning  Advance Directive is on file.  ACP discussion was held with the patient during this visit. Patient has an advance directive in EMR which is still valid.           Objective    Vitals:    11/30/22 1352   BP: 160/60   Pulse: 63   Resp: 15   Temp: 97.7 °F (36.5 °C)   SpO2: 95%   Weight: 70.3 kg (155 lb)   Height: 160 cm (63\")   PainSc: 0-No pain     Estimated body mass index is 27.46 kg/m² as calculated from the following:    Height as of this encounter: 160 cm (63\").    Weight as of this encounter: 70.3 kg (155 lb).    BMI is >= 25 and <30. (Overweight) The following options were offered after discussion;: weight loss educational material (shared in after visit summary)      Does the patient have evidence of cognitive impairment? No    Physical Exam  Vitals and nursing note reviewed.   Constitutional:       Appearance: She is well-developed.   HENT:      Head: Normocephalic and atraumatic.   Eyes:      General:         Right eye: No discharge.         Left eye: No discharge.      Pupils: Pupils are equal, round, and reactive to light.   Cardiovascular:      Rate and Rhythm: Normal rate and regular rhythm.      Heart " sounds: Normal heart sounds.   Pulmonary:      Effort: Pulmonary effort is normal.      Breath sounds: Normal breath sounds.   Abdominal:      General: Bowel sounds are normal.      Palpations: Abdomen is soft. There is no mass.      Tenderness: There is no abdominal tenderness.   Musculoskeletal:         General: Normal range of motion.      Right shoulder: No swelling.      Cervical back: Normal range of motion and neck supple.   Skin:     General: Skin is warm and dry.      Nails: There is no clubbing.   Neurological:      Mental Status: She is alert and oriented to person, place, and time.      Deep Tendon Reflexes: Reflexes are normal and symmetric.   Psychiatric:         Behavior: Behavior normal.         Thought Content: Thought content normal.         Judgment: Judgment normal.       Lab Results   Component Value Date    HGBA1C 5.7 11/30/2022            HEALTH RISK ASSESSMENT    Smoking Status:  Social History     Tobacco Use   Smoking Status Never   Smokeless Tobacco Never     Alcohol Consumption:  Social History     Substance and Sexual Activity   Alcohol Use No     Fall Risk Screen:    Critical access hospital Fall Risk Assessment was completed, and patient is at HIGH risk for falls. Assessment completed on:11/30/2022    Depression Screening:  PHQ-2/PHQ-9 Depression Screening 11/30/2022   Retired PHQ-9 Total Score -   Retired Total Score -   Little Interest or Pleasure in Doing Things 0-->not at all   Feeling Down, Depressed or Hopeless 0-->not at all   PHQ-9: Brief Depression Severity Measure Score 0       Health Habits and Functional and Cognitive Screening:  Functional & Cognitive Status 11/30/2022   Do you have difficulty preparing food and eating? No   Do you have difficulty bathing yourself, getting dressed or grooming yourself? No   Do you have difficulty using the toilet? No   Do you have difficulty moving around from place to place? No   Do you have trouble with steps or getting out of a bed or a chair? Yes    Current Diet Well Balanced Diet   Dental Exam Up to date   Eye Exam Up to date   Exercise (times per week) 0 times per week   Current Exercises Include No Regular Exercise   Current Exercise Activities Include -   Do you need help using the phone?  No   Are you deaf or do you have serious difficulty hearing?  No   Do you need help with transportation? No   Do you need help shopping? No   Do you need help preparing meals?  No   Do you need help with housework?  No   Do you need help with laundry? No   Do you need help taking your medications? No   Do you need help managing money? No   Do you ever drive or ride in a car without wearing a seat belt? No   Have you felt unusual stress, anger or loneliness in the last month? -   Who do you live with? -   If you need help, do you have trouble finding someone available to you? -   Have you been bothered in the last four weeks by sexual problems? -   Do you have difficulty concentrating, remembering or making decisions? -       Age-appropriate Screening Schedule:  Refer to the list below for future screening recommendations based on patient's age, sex and/or medical conditions. Orders for these recommended tests are listed in the plan section. The patient has been provided with a written plan.    Health Maintenance   Topic Date Due   • URINE MICROALBUMIN  01/11/2022   • LIPID PANEL  10/18/2022   • HEMOGLOBIN A1C  05/30/2023   • DXA SCAN  07/07/2023   • DIABETIC EYE EXAM  11/03/2023   • INFLUENZA VACCINE  Completed   • TDAP/TD VACCINES  Discontinued   • ZOSTER VACCINE  Discontinued              Assessment & Plan   CMS Preventative Services Quick Reference  Risk Factors Identified During Encounter  Cardiovascular Disease  Depression/Dysphoria  Fall Risk-High or Moderate  Inactivity/Sedentary  Polypharmacy  The above risks/problems have been discussed with the patient.  Follow up actions/plans if indicated are seen below in the Assessment/Plan Section.  Pertinent information  has been shared with the patient in the After Visit Summary.    Diagnoses and all orders for this visit:    1. Medicare annual wellness visit, subsequent (Primary)    2. Type 2 diabetes mellitus without complication, without long-term current use of insulin (HCC)  -     POC Glycosylated Hemoglobin (Hb A1C)  -     CBC & Differential; Future  -     Comprehensive Metabolic Panel; Future  -     Lipid Panel; Future  -     MicroAlbumin, Urine, Random - Urine, Clean Catch; Future  -     Urinalysis With Culture If Indicated - Urine, Clean Catch; Future    3. At high risk for falls  -     Ambulatory Referral to Physical Therapy Evaluate and treat    4. Benign essential hypertension    5. Hypertensive heart and chronic kidney disease with heart failure and stage 1 through stage 4 chronic kidney disease, or unspecified chronic kidney disease (CMS/HCC)     6. Elevated uric acid in blood  -     Uric Acid; Future    7. Other recurrent depressive disorders (HCC)  -     sertraline (Zoloft) 25 MG tablet; Take 1 tablet by mouth Daily.  Dispense: 90 tablet; Refill: 1        Follow Up:   Return in about 8 weeks (around 1/25/2023) for Recheck.     An After Visit Summary and PPPS were made available to the patient.

## 2022-11-30 NOTE — PATIENT INSTRUCTIONS
Fall Prevention in the Home, Adult  Falls can cause injuries and affect people of all ages. There are many simple things that you can do to make your home safe and to help prevent falls. Ask for help when making these changes, if needed.  What actions can I take to prevent falls?  General instructions  • Use good lighting in all rooms. Replace any light bulbs that burn out, turn on lights if it is dark, and use night-lights.  • Place frequently used items in easy-to-reach places. Lower the shelves around your home if necessary.  • Set up furniture so that there are clear paths around it. Avoid moving your furniture around.  • Remove throw rugs and other tripping hazards from the floor.  • Avoid walking on wet floors.  • Fix any uneven floor surfaces.  • Add color or contrast paint or tape to grab bars and handrails in your home. Place contrasting color strips on the first and last steps of staircases.  • When you use a stepladder, make sure that it is completely opened and that the sides and supports are firmly locked. Have someone hold the ladder while you are using it. Do not climb a closed stepladder.  • Know where your pets are when moving through your home.  What can I do in the bathroom?     • Keep the floor dry. Immediately clean up any water that is on the floor.  • Remove soap buildup in the tub or shower regularly.  • Use nonskid mats or decals on the floor of the tub or shower.  • Attach bath mats securely with double-sided, nonslip rug tape.  • If you need to sit down while you are in the shower, use a plastic, nonslip stool.  • Install grab bars by the toilet and in the tub and shower. Do not use towel bars as grab bars.  What can I do in the bedroom?  • Make sure that a bedside light is easy to reach.  • Do not use oversized bedding that reaches the floor.  • Have a firm chair that has side arms to use for getting dressed.  What can I do in the kitchen?  • Clean up any spills right away.  • If you  need to reach for something above you, use a sturdy step stool that has a grab bar.  • Keep electrical cables out of the way.  • Do not use floor polish or wax that makes floors slippery. If you must use wax, make sure that it is non-skid floor wax.  What can I do with my stairs?  • Do not leave any items on the stairs.  • Make sure that you have a light switch at the top and the bottom of the stairs. Have them installed if you do not have them.  • Make sure that there are handrails on both sides of the stairs. Fix handrails that are broken or loose. Make sure that handrails are as long as the staircases.  • Install non-slip stair treads on all stairs in your home.  • Avoid having throw rugs at the top or bottom of stairs, or secure the rugs with carpet tape to prevent them from moving.  • Choose a carpet design that does not hide the edge of steps on the stairs.  • Check any carpeting to make sure that it is firmly attached to the stairs. Fix any carpet that is loose or worn.  What can I do on the outside of my home?  • Use bright outdoor lighting.  • Regularly repair the edges of walkways and driveways and fix any cracks.  • Remove high doorway thresholds.  • Trim any shrubbery on the main path into your home.  • Regularly check that handrails are securely fastened and in good repair. Both sides of all steps should have handrails.  • Install guardrails along the edges of any raised decks or porches.  • Clear walkways of debris and clutter, including tools and rocks.  • Have leaves, snow, and ice cleared regularly.  • Use sand or salt on walkways during winter months.  • In the garage, clean up any spills right away, including grease or oil spills.  What other actions can I take?  • Wear closed-toe shoes that fit well and support your feet. Wear shoes that have rubber soles or low heels.  • Use mobility aids as needed, such as canes, walkers, scooters, and crutches.  • Review your medicines with your health care  provider. Some medicines can cause dizziness or changes in blood pressure, which increase your risk of falling.  Talk with your health care provider about other ways that you can decrease your risk of falls. This may include working with a physical therapist or  to improve your strength, balance, and endurance.  Where to find more information  • Centers for Disease Control and Prevention, [place]: www.cdc.gov  • [employer] [place] [employer]: www.galilea.nih.gov  Contact a health care provider if:  • You are afraid of falling at home.  • You feel weak, drowsy, or dizzy at home.  • You fall at home.  Summary  • There are many simple things that you can do to make your home safe and to help prevent falls.  • Ways to make your home safe include removing tripping hazards and installing grab bars in the bathroom.  • Ask for help when making these changes in your home.  This information is not intended to replace advice given to you by your health care provider. Make sure you discuss any questions you have with your health care provider.  Document Revised: 07/21/2021 Document Reviewed: 07/21/2021  Elsevier Patient Education © 2022 [employer]

## 2022-12-01 LAB
ALBUMIN SERPL-MCNC: 4.6 G/DL (ref 3.5–5.2)
ALBUMIN/GLOB SERPL: 2.2 G/DL
ALP SERPL-CCNC: 80 U/L (ref 39–117)
ALT SERPL-CCNC: 9 U/L (ref 1–33)
AST SERPL-CCNC: 11 U/L (ref 1–32)
BASOPHILS # BLD AUTO: 0.08 10*3/MM3 (ref 0–0.2)
BASOPHILS NFR BLD AUTO: 0.9 % (ref 0–1.5)
BILIRUB SERPL-MCNC: 0.3 MG/DL (ref 0–1.2)
BUN SERPL-MCNC: 19 MG/DL (ref 8–23)
BUN/CREAT SERPL: 12.9 (ref 7–25)
CALCIUM SERPL-MCNC: 10.2 MG/DL (ref 8.6–10.5)
CHLORIDE SERPL-SCNC: 106 MMOL/L (ref 98–107)
CHOLEST SERPL-MCNC: 168 MG/DL (ref 0–200)
CO2 SERPL-SCNC: 25.7 MMOL/L (ref 22–29)
CREAT SERPL-MCNC: 1.47 MG/DL (ref 0.57–1)
EGFRCR SERPLBLD CKD-EPI 2021: 35.9 ML/MIN/1.73
EOSINOPHIL # BLD AUTO: 0.13 10*3/MM3 (ref 0–0.4)
EOSINOPHIL NFR BLD AUTO: 1.5 % (ref 0.3–6.2)
ERYTHROCYTE [DISTWIDTH] IN BLOOD BY AUTOMATED COUNT: 13.4 % (ref 12.3–15.4)
GLOBULIN SER CALC-MCNC: 2.1 GM/DL
GLUCOSE SERPL-MCNC: 105 MG/DL (ref 65–99)
HCT VFR BLD AUTO: 40.5 % (ref 34–46.6)
HDLC SERPL-MCNC: 45 MG/DL (ref 40–60)
HGB BLD-MCNC: 13.5 G/DL (ref 12–15.9)
IMM GRANULOCYTES # BLD AUTO: 0.01 10*3/MM3 (ref 0–0.05)
IMM GRANULOCYTES NFR BLD AUTO: 0.1 % (ref 0–0.5)
LDLC SERPL CALC-MCNC: 99 MG/DL (ref 0–100)
LYMPHOCYTES # BLD AUTO: 2.19 10*3/MM3 (ref 0.7–3.1)
LYMPHOCYTES NFR BLD AUTO: 25.1 % (ref 19.6–45.3)
MCH RBC QN AUTO: 29.5 PG (ref 26.6–33)
MCHC RBC AUTO-ENTMCNC: 33.3 G/DL (ref 31.5–35.7)
MCV RBC AUTO: 88.4 FL (ref 79–97)
MONOCYTES # BLD AUTO: 0.47 10*3/MM3 (ref 0.1–0.9)
MONOCYTES NFR BLD AUTO: 5.4 % (ref 5–12)
NEUTROPHILS # BLD AUTO: 5.86 10*3/MM3 (ref 1.7–7)
NEUTROPHILS NFR BLD AUTO: 67 % (ref 42.7–76)
NRBC BLD AUTO-RTO: 0 /100 WBC (ref 0–0.2)
PLATELET # BLD AUTO: 238 10*3/MM3 (ref 140–450)
POTASSIUM SERPL-SCNC: 4 MMOL/L (ref 3.5–5.2)
PROT SERPL-MCNC: 6.7 G/DL (ref 6–8.5)
RBC # BLD AUTO: 4.58 10*6/MM3 (ref 3.77–5.28)
SODIUM SERPL-SCNC: 146 MMOL/L (ref 136–145)
TRIGL SERPL-MCNC: 136 MG/DL (ref 0–150)
URATE SERPL-MCNC: 4.4 MG/DL (ref 2.4–5.7)
VLDLC SERPL CALC-MCNC: 24 MG/DL (ref 5–40)
WBC # BLD AUTO: 8.74 10*3/MM3 (ref 3.4–10.8)

## 2022-12-04 LAB
APPEARANCE UR: ABNORMAL
BACTERIA #/AREA URNS HPF: NORMAL /[HPF]
BACTERIA UR CULT: ABNORMAL
BILIRUB UR QL STRIP: NEGATIVE
CASTS URNS QL MICRO: NORMAL /LPF
COLOR UR: YELLOW
EPI CELLS #/AREA URNS HPF: NORMAL /HPF (ref 0–10)
GLUCOSE UR QL STRIP: NEGATIVE
HGB UR QL STRIP: NEGATIVE
KETONES UR QL STRIP: NEGATIVE
LEUKOCYTE ESTERASE UR QL STRIP: ABNORMAL
MICRO URNS: ABNORMAL
MICROALBUMIN UR-MCNC: 3.9 UG/ML
NITRITE UR QL STRIP: NEGATIVE
OTHER ANTIBIOTIC SUSC ISLT: ABNORMAL
PH UR STRIP: 5.5 [PH] (ref 5–7.5)
PROT UR QL STRIP: NEGATIVE
RBC #/AREA URNS HPF: NORMAL /HPF (ref 0–2)
SP GR UR STRIP: 1.01 (ref 1–1.03)
URINALYSIS REFLEX: ABNORMAL
UROBILINOGEN UR STRIP-MCNC: 0.2 MG/DL (ref 0.2–1)
WBC #/AREA URNS HPF: NORMAL /HPF (ref 0–5)

## 2022-12-05 DIAGNOSIS — A49.8 BACTERIAL INFECTION DUE TO MORGANELLA MORGANII: Primary | ICD-10-CM

## 2022-12-05 RX ORDER — SULFAMETHOXAZOLE AND TRIMETHOPRIM 800; 160 MG/1; MG/1
1 TABLET ORAL 2 TIMES DAILY
Qty: 10 TABLET | Refills: 0 | Status: SHIPPED | OUTPATIENT
Start: 2022-12-05 | End: 2022-12-28 | Stop reason: SDUPTHER

## 2022-12-21 DIAGNOSIS — E11.9 TYPE 2 DIABETES MELLITUS WITHOUT COMPLICATION, WITHOUT LONG-TERM CURRENT USE OF INSULIN: ICD-10-CM

## 2022-12-21 NOTE — TELEPHONE ENCOUNTER
Rx Refill Note  Requested Prescriptions     Pending Prescriptions Disp Refills   • metFORMIN (GLUCOPHAGE) 500 MG tablet 60 tablet 0     Sig: Take 1 tablet by mouth 2 (Two) Times a Day With Meals.      Last office visit with prescribing clinician: 11/30/2022   Last telemedicine visit with prescribing clinician: 12/28/2022   Next office visit with prescribing clinician: 12/28/2022                         Would you like a call back once the refill request has been completed: [] Yes [] No    If the office needs to give you a call back, can they leave a voicemail: [] Yes [] No    Coco Moralez MA  12/21/22, 15:52 EST

## 2022-12-25 DIAGNOSIS — E11.9 TYPE 2 DIABETES MELLITUS WITHOUT COMPLICATION, WITHOUT LONG-TERM CURRENT USE OF INSULIN: ICD-10-CM

## 2022-12-27 NOTE — TELEPHONE ENCOUNTER
Rx Refill Note  Requested Prescriptions     Pending Prescriptions Disp Refills   • metFORMIN (GLUCOPHAGE) 500 MG tablet [Pharmacy Med Name: metFORMIN HCl 500 MG Oral Tablet] 60 tablet 0     Sig: TAKE 1 TABLET BY MOUTH TWICE DAILY WITH MEALS      Last office visit with prescribing clinician: 11/30/2022   Last telemedicine visit with prescribing clinician: 12/28/2022   Next office visit with prescribing clinician: 12/28/2022                         Would you like a call back once the refill request has been completed: [] Yes [] No    If the office needs to give you a call back, can they leave a voicemail: [] Yes [] No    Mahsa Caballero LPN  12/27/22, 09:27 EST

## 2022-12-28 ENCOUNTER — OFFICE VISIT (OUTPATIENT)
Dept: FAMILY MEDICINE CLINIC | Facility: CLINIC | Age: 82
End: 2022-12-28
Payer: MEDICARE

## 2022-12-28 VITALS
WEIGHT: 155 LBS | DIASTOLIC BLOOD PRESSURE: 60 MMHG | RESPIRATION RATE: 12 BRPM | TEMPERATURE: 97.3 F | HEART RATE: 63 BPM | SYSTOLIC BLOOD PRESSURE: 150 MMHG | OXYGEN SATURATION: 97 % | BODY MASS INDEX: 27.46 KG/M2

## 2022-12-28 DIAGNOSIS — E11.9 TYPE 2 DIABETES MELLITUS WITHOUT COMPLICATION, WITHOUT LONG-TERM CURRENT USE OF INSULIN: ICD-10-CM

## 2022-12-28 DIAGNOSIS — A49.8 BACTERIAL INFECTION DUE TO MORGANELLA MORGANII: ICD-10-CM

## 2022-12-28 DIAGNOSIS — I10 BENIGN ESSENTIAL HYPERTENSION: Primary | Chronic | ICD-10-CM

## 2022-12-28 LAB
BILIRUB BLD-MCNC: NEGATIVE MG/DL
CLARITY, POC: ABNORMAL
COLOR UR: YELLOW
EXPIRATION DATE: ABNORMAL
GLUCOSE UR STRIP-MCNC: NEGATIVE MG/DL
KETONES UR QL: NEGATIVE
LEUKOCYTE EST, POC: ABNORMAL
Lab: ABNORMAL
NITRITE UR-MCNC: NEGATIVE MG/ML
PH UR: 6 [PH] (ref 5–8)
PROT UR STRIP-MCNC: NEGATIVE MG/DL
RBC # UR STRIP: NEGATIVE /UL
SP GR UR: 1.02 (ref 1–1.03)
UROBILINOGEN UR QL: ABNORMAL

## 2022-12-28 PROCEDURE — 81003 URINALYSIS AUTO W/O SCOPE: CPT | Performed by: FAMILY MEDICINE

## 2022-12-28 PROCEDURE — 99213 OFFICE O/P EST LOW 20 MIN: CPT | Performed by: FAMILY MEDICINE

## 2022-12-28 RX ORDER — SULFAMETHOXAZOLE AND TRIMETHOPRIM 800; 160 MG/1; MG/1
1 TABLET ORAL 2 TIMES DAILY
Qty: 10 TABLET | Refills: 0 | Status: SHIPPED | OUTPATIENT
Start: 2022-12-28 | End: 2023-01-18

## 2022-12-30 ENCOUNTER — TELEPHONE (OUTPATIENT)
Dept: FAMILY MEDICINE CLINIC | Facility: CLINIC | Age: 82
End: 2022-12-30

## 2022-12-30 NOTE — TELEPHONE ENCOUNTER
Caller: Cheryl Tomas BETH    Relationship: Self    Best call back number: [phone]    What medications are you currently taking:   Current Outpatient Medications on File Prior to Visit   Medication Sig Dispense Refill   • acetaminophen (TYLENOL) 650 MG 8 hr tablet Take 1,300 mg by mouth Every 8 (Eight) Hours As Needed for Mild Pain .     • allopurinol (ZYLOPRIM) 300 MG tablet TAKE 1 TABLET EVERY DAY 90 tablet 3   • aspirin 81 MG EC tablet Take 1 tablet by mouth Daily. 90 tablet 3   • carvedilol (COREG) 12.5 MG tablet Take 1 tablet by mouth 2 (Two) Times a Day for 360 days. 180 tablet 3   • cholecalciferol (VITAMIN D3) 1000 units tablet Take 1,000 Units by mouth Daily.     • Coenzyme Q10 10 MG capsule Take 10 mg by mouth Daily.     • dilTIAZem CD (CARDIZEM CD) 240 MG 24 hr capsule TAKE 1 CAPSULE EVERY DAY 90 capsule 3   • hydrALAZINE (APRESOLINE) 25 MG tablet Take 1 tablet by mouth 3 (Three) Times a Day. 270 tablet 3   • irbesartan (AVAPRO) 300 MG tablet TAKE 1 TABLET BY MOUTH EVERY NIGHT. 90 tablet 3   • metFORMIN (GLUCOPHAGE) 500 MG tablet Take 1 tablet by mouth 2 (Two) Times a Day With Meals. 180 tablet 3   • omeprazole (priLOSEC) 20 MG capsule Take 1 capsule by mouth Daily. 90 capsule 1   • pravastatin (PRAVACHOL) 40 MG tablet TAKE 1 TABLET EVERY NIGHT 90 tablet 0   • QUEtiapine (SEROquel) 25 MG tablet TAKE 1 TABLET BY MOUTH EVERY NIGHT. 90 tablet 3   • sertraline (Zoloft) 25 MG tablet Take 1 tablet by mouth Daily. 90 tablet 1   • sulfamethoxazole-trimethoprim (Bactrim DS) 800-160 MG per tablet Take 1 tablet by mouth 2 (Two) Times a Day. 10 tablet 0   • triamterene-hydrochlorothiazide (MAXZIDE-25) 37.5-25 MG per tablet TAKE 1 TABLET EVERY DAY 90 tablet 3     No current facility-administered medications on file prior to visit.          When did you start taking these medications: 12/29/2022    Which medication are you concerned about: LUIS ARMANDO    Who prescribed you this medication: DR THURSTON    What are your  concerns: PATIENT STARTED THIS MEDICATION ON 12/29/2022 AND HAS BEEN WEAK, UNCONTROLLED BLADDER, CHILLS AND PATIENT DOES NOT WANT TO TAKE THIS MEDICATION., IS THERE SOMETHING ELSE THAT CAN BE PRESCRIBED

## 2022-12-31 ENCOUNTER — APPOINTMENT (OUTPATIENT)
Dept: CT IMAGING | Facility: HOSPITAL | Age: 82
End: 2022-12-31
Payer: MEDICARE

## 2022-12-31 ENCOUNTER — DOCUMENTATION (OUTPATIENT)
Dept: FAMILY MEDICINE CLINIC | Facility: CLINIC | Age: 82
End: 2022-12-31

## 2022-12-31 ENCOUNTER — HOSPITAL ENCOUNTER (EMERGENCY)
Facility: HOSPITAL | Age: 82
Discharge: HOME | End: 2022-12-31
Attending: EMERGENCY MEDICINE | Admitting: EMERGENCY MEDICINE
Payer: MEDICARE

## 2022-12-31 VITALS
TEMPERATURE: 98 F | RESPIRATION RATE: 17 BRPM | DIASTOLIC BLOOD PRESSURE: 64 MMHG | HEART RATE: 76 BPM | SYSTOLIC BLOOD PRESSURE: 147 MMHG | WEIGHT: 155 LBS | OXYGEN SATURATION: 93 % | HEIGHT: 63 IN | BODY MASS INDEX: 27.46 KG/M2

## 2022-12-31 DIAGNOSIS — Z86.79 HISTORY OF HYPERTENSION: ICD-10-CM

## 2022-12-31 DIAGNOSIS — Z86.39 HISTORY OF DIABETES MELLITUS: ICD-10-CM

## 2022-12-31 DIAGNOSIS — K52.9 GASTROENTERITIS: ICD-10-CM

## 2022-12-31 DIAGNOSIS — T50.905A MEDICATION SIDE EFFECT, INITIAL ENCOUNTER: ICD-10-CM

## 2022-12-31 DIAGNOSIS — Z86.39 HISTORY OF HYPERLIPIDEMIA: ICD-10-CM

## 2022-12-31 DIAGNOSIS — R11.2 NAUSEA AND VOMITING, UNSPECIFIED VOMITING TYPE: Primary | ICD-10-CM

## 2022-12-31 LAB
ALBUMIN SERPL-MCNC: 3.9 G/DL (ref 3.5–5.2)
ALBUMIN/GLOB SERPL: 1.2 G/DL
ALP SERPL-CCNC: 79 U/L (ref 39–117)
ALT SERPL W P-5'-P-CCNC: 49 U/L (ref 1–33)
ANION GAP SERPL CALCULATED.3IONS-SCNC: 15 MMOL/L (ref 5–15)
AST SERPL-CCNC: 47 U/L (ref 1–32)
BACTERIA UR QL AUTO: ABNORMAL /HPF
BASOPHILS # BLD AUTO: 0.04 10*3/MM3 (ref 0–0.2)
BASOPHILS NFR BLD AUTO: 0.6 % (ref 0–1.5)
BILIRUB SERPL-MCNC: 0.5 MG/DL (ref 0–1.2)
BILIRUB UR QL STRIP: NEGATIVE
BUN SERPL-MCNC: 27 MG/DL (ref 8–23)
BUN/CREAT SERPL: 19.7 (ref 7–25)
CALCIUM SPEC-SCNC: 9.2 MG/DL (ref 8.6–10.5)
CHLORIDE SERPL-SCNC: 99 MMOL/L (ref 98–107)
CLARITY UR: ABNORMAL
CO2 SERPL-SCNC: 25 MMOL/L (ref 22–29)
COLOR UR: ABNORMAL
CREAT SERPL-MCNC: 1.37 MG/DL (ref 0.57–1)
D-LACTATE SERPL-SCNC: 1.9 MMOL/L (ref 0.5–2)
DEPRECATED RDW RBC AUTO: 43.4 FL (ref 37–54)
EGFRCR SERPLBLD CKD-EPI 2021: 39.1 ML/MIN/1.73
EOSINOPHIL # BLD AUTO: 0 10*3/MM3 (ref 0–0.4)
EOSINOPHIL NFR BLD AUTO: 0 % (ref 0.3–6.2)
ERYTHROCYTE [DISTWIDTH] IN BLOOD BY AUTOMATED COUNT: 13.3 % (ref 12.3–15.4)
FLUAV RNA RESP QL NAA+PROBE: NOT DETECTED
FLUBV RNA RESP QL NAA+PROBE: NOT DETECTED
GLOBULIN UR ELPH-MCNC: 3.2 GM/DL
GLUCOSE SERPL-MCNC: 155 MG/DL (ref 65–99)
GLUCOSE UR STRIP-MCNC: NEGATIVE MG/DL
GRAN CASTS URNS QL MICRO: ABNORMAL /LPF
HCT VFR BLD AUTO: 41.8 % (ref 34–46.6)
HGB BLD-MCNC: 14 G/DL (ref 12–15.9)
HGB UR QL STRIP.AUTO: NEGATIVE
HOLD SPECIMEN: NORMAL
HYALINE CASTS UR QL AUTO: ABNORMAL /LPF
IMM GRANULOCYTES # BLD AUTO: 0.03 10*3/MM3 (ref 0–0.05)
IMM GRANULOCYTES NFR BLD AUTO: 0.5 % (ref 0–0.5)
KETONES UR QL STRIP: ABNORMAL
LEUKOCYTE ESTERASE UR QL STRIP.AUTO: NEGATIVE
LIPASE SERPL-CCNC: 27 U/L (ref 13–60)
LYMPHOCYTES # BLD AUTO: 0.32 10*3/MM3 (ref 0.7–3.1)
LYMPHOCYTES NFR BLD AUTO: 5 % (ref 19.6–45.3)
MCH RBC QN AUTO: 29.9 PG (ref 26.6–33)
MCHC RBC AUTO-ENTMCNC: 33.5 G/DL (ref 31.5–35.7)
MCV RBC AUTO: 89.3 FL (ref 79–97)
MONOCYTES # BLD AUTO: 0.08 10*3/MM3 (ref 0.1–0.9)
MONOCYTES NFR BLD AUTO: 1.3 % (ref 5–12)
NEUTROPHILS NFR BLD AUTO: 5.93 10*3/MM3 (ref 1.7–7)
NEUTROPHILS NFR BLD AUTO: 92.6 % (ref 42.7–76)
NITRITE UR QL STRIP: NEGATIVE
NRBC BLD AUTO-RTO: 0 /100 WBC (ref 0–0.2)
PH UR STRIP.AUTO: <=5 [PH] (ref 5–8)
PLAT MORPH BLD: NORMAL
PLATELET # BLD AUTO: 159 10*3/MM3 (ref 140–450)
PMV BLD AUTO: 10.1 FL (ref 6–12)
POTASSIUM SERPL-SCNC: 3.6 MMOL/L (ref 3.5–5.2)
PROT SERPL-MCNC: 7.1 G/DL (ref 6–8.5)
PROT UR QL STRIP: ABNORMAL
RBC # BLD AUTO: 4.68 10*6/MM3 (ref 3.77–5.28)
RBC # UR STRIP: ABNORMAL /HPF
RBC MORPH BLD: NORMAL
REF LAB TEST METHOD: ABNORMAL
SARS-COV-2 RNA RESP QL NAA+PROBE: NOT DETECTED
SODIUM SERPL-SCNC: 139 MMOL/L (ref 136–145)
SP GR UR STRIP: >=1.03 (ref 1–1.03)
SQUAMOUS #/AREA URNS HPF: ABNORMAL /HPF
UROBILINOGEN UR QL STRIP: ABNORMAL
WBC # UR STRIP: ABNORMAL /HPF
WBC MORPH BLD: NORMAL
WBC NRBC COR # BLD: 6.4 10*3/MM3 (ref 3.4–10.8)
WHOLE BLOOD HOLD COAG: NORMAL
WHOLE BLOOD HOLD SPECIMEN: NORMAL

## 2022-12-31 PROCEDURE — 85025 COMPLETE CBC W/AUTO DIFF WBC: CPT

## 2022-12-31 PROCEDURE — 74176 CT ABD & PELVIS W/O CONTRAST: CPT

## 2022-12-31 PROCEDURE — 83690 ASSAY OF LIPASE: CPT

## 2022-12-31 PROCEDURE — 87636 SARSCOV2 & INF A&B AMP PRB: CPT | Performed by: EMERGENCY MEDICINE

## 2022-12-31 PROCEDURE — 85007 BL SMEAR W/DIFF WBC COUNT: CPT

## 2022-12-31 PROCEDURE — 25010000002 ONDANSETRON PER 1 MG: Performed by: EMERGENCY MEDICINE

## 2022-12-31 PROCEDURE — 99283 EMERGENCY DEPT VISIT LOW MDM: CPT

## 2022-12-31 PROCEDURE — 96374 THER/PROPH/DIAG INJ IV PUSH: CPT

## 2022-12-31 PROCEDURE — 36415 COLL VENOUS BLD VENIPUNCTURE: CPT

## 2022-12-31 PROCEDURE — 81001 URINALYSIS AUTO W/SCOPE: CPT | Performed by: EMERGENCY MEDICINE

## 2022-12-31 PROCEDURE — 83605 ASSAY OF LACTIC ACID: CPT

## 2022-12-31 PROCEDURE — 80053 COMPREHEN METABOLIC PANEL: CPT

## 2022-12-31 RX ORDER — SODIUM CHLORIDE 9 MG/ML
10 INJECTION INTRAVENOUS AS NEEDED
Status: DISCONTINUED | OUTPATIENT
Start: 2022-12-31 | End: 2023-01-01 | Stop reason: HOSPADM

## 2022-12-31 RX ORDER — SODIUM CHLORIDE 0.9 % (FLUSH) 0.9 %
10 SYRINGE (ML) INJECTION AS NEEDED
Status: DISCONTINUED | OUTPATIENT
Start: 2022-12-31 | End: 2023-01-01 | Stop reason: HOSPADM

## 2022-12-31 RX ORDER — ONDANSETRON 2 MG/ML
4 INJECTION INTRAMUSCULAR; INTRAVENOUS ONCE
Status: COMPLETED | OUTPATIENT
Start: 2022-12-31 | End: 2022-12-31

## 2022-12-31 RX ORDER — ONDANSETRON 4 MG/1
4 TABLET, ORALLY DISINTEGRATING ORAL EVERY 8 HOURS PRN
Qty: 9 TABLET | Refills: 0 | Status: SHIPPED | OUTPATIENT
Start: 2022-12-31 | End: 2023-01-03

## 2022-12-31 RX ADMIN — ONDANSETRON 4 MG: 2 INJECTION INTRAMUSCULAR; INTRAVENOUS at 22:36

## 2022-12-31 RX ADMIN — SODIUM CHLORIDE 1000 ML: 9 INJECTION, SOLUTION INTRAVENOUS at 22:35

## 2022-12-31 NOTE — PROGRESS NOTES
Patients daughter called with concerns as the patient has not been taking her meds since Thursday including antibiotic and seems confused. Advised her to take patient to ER. She is agreeable and patient will go to er for further evaluation today.

## 2023-01-01 NOTE — ED PROVIDER NOTES
Subjective   History of Present Illness    Review of Systems    Past Medical History:   Diagnosis Date   • Diabetes mellitus (HCC)    • Hyperlipidemia    • Hypertension    • Osteoporosis        Allergies   Allergen Reactions   • Cephalexin Itching and Rash       Past Surgical History:   Procedure Laterality Date   • APPENDECTOMY     • BREAST BIOPSY     • FIBULA FRACTURE SURGERY     • HYSTERECTOMY     • KIDNEY STONE SURGERY         Family History   Problem Relation Age of Onset   • Heart disease Mother    • Hypertension Mother    • Breast cancer Mother         70's   • Heart disease Father    • Stroke Father    • Breast cancer Other         great aunt       Social History     Socioeconomic History   • Marital status:    Tobacco Use   • Smoking status: Never   • Smokeless tobacco: Never   Vaping Use   • Vaping Use: Never used   Substance and Sexual Activity   • Alcohol use: No   • Drug use: No   • Sexual activity: Never           Objective   Physical Exam    Procedures           ED Course  ED Course as of 01/22/23 1008   Sat Dec 31, 2022   2322 In summary this is an 82-year-old female who presents to the emergency department with complaints of nausea and vomiting, symptoms subsiding and now with symptoms of dry heaves.  Feeling better on presentation to the emergency department.  Responded well to IV fluids and antiemetics while in the ED. Labs/urinalysis obtained and reviewed demonstrate no acute or emergent abnormalities.  CT scan of abdomen and pelvis demonstrates diffuse gastric wall thickening which could be seen with gastritis,  malignancy, or incomplete distention.  Patient without additional episodes of nausea or vomiting while in the emergency department.At time of discharge disposition patient is afebrile, nontoxic appearing, vital signs stable and able to maintain O2 sats of 93% on room air. Patient will be discharged home with symptomatic care and outpatient follow up.  [JG]      ED Course User  Index  [JG] Elvin Kent, PA                                           MDM    Final diagnoses:   Nausea and vomiting, unspecified vomiting type   Medication side effect, initial encounter   Gastroenteritis       ED Disposition  ED Disposition     ED Disposition   Discharge    Condition   Stable    Comment   --             Marian Perea MD  [address] [address]  [phone]    Call   As needed    [facility] Emergency Department  [address] [address] [phone]  Go to   If symptoms worsen         Medication List      New Prescriptions    ondansetron ODT 4 MG disintegrating tablet  Commonly known as: ZOFRAN-ODT  Place 1 tablet on the tongue Every 8 (Eight) Hours As Needed for Nausea or Vomiting for up to 3 days.           Where to Get Your Medications      These medications were sent to [place] [address] [address] - [phone]  - [phone] [place] [address] [facility] [place] [address]    Phone: [phone]   · ondansetron ODT 4 MG disintegrating tablet

## 2023-01-01 NOTE — DISCHARGE INSTRUCTIONS
Symptomatic care is recommended with increased fluid intake and rest. Advance diet as tolerated. Begin with clear liquids and advance to bland foods such as bread, rice, applesauce and toast. Take all medications as prescribed and instructed. Follow up with Primary care as directed or return to Emergency Department with worsening of symptoms.

## 2023-01-03 ENCOUNTER — TELEPHONE (OUTPATIENT)
Dept: FAMILY MEDICINE CLINIC | Facility: CLINIC | Age: 83
End: 2023-01-03

## 2023-01-03 NOTE — TELEPHONE ENCOUNTER
Caller: BEV WEST    Relationship: Emergency Contact    Best call back number: [phone]    What is the medical concern/diagnosis: A TRACE OF BACTERIA WAS FOUND IN HER KIDNEYS. PATIENT SAYS AFTER TAKING THE MEDICATION SHE GOT REALLY SICK, URINATED ALL OVER AND COULDN'T GET OUT OF BED. PATIENT WENT TO [facility] ON 12/31/22 FOR DEHYDRATION AND WAS GIVEN FLUIDS. PATIENT HAS BEEN IN THE BED EVER SINCE 12/29/22.    PATIENT HAS NOT EATING AS WELL.     What specialty or service is being requested: SHORT TERM HOME HEALTH     Any additional details: PATIENT WOULD NEED SOMEONE TO COME TO HER APARTMENT. PATIENT NEEDS ASSISTANCE WITH WALKING, AND TO BE PRESENT WHILE SHE TAKES A SHOWER.

## 2023-01-09 NOTE — ED PROVIDER NOTES
" EMERGENCY DEPARTMENT ENCOUNTER    Pt Name: Cheryl Tomas  MRN: [id number]  Pt :   1940  Room Number:    Date of encounter:  2022  PCP: Marian Perea MD  ED Provider: VITA Zuniga    Historian:Patient    HPI:  Chief Complaint: Nausea and Vomiting    Context: Cheryl Tomas is a 82 y.o. female who presents to the ED c/o nausea and vomiting.  Patient reports that she recently was started on Bactrim by Dr. Navarrete for a urinary tract infection.  She reports that she took 2 doses and believes that this may have made her sick as she has had symptoms of nausea, vomiting and unsettled stomach.  She reports that her symptoms almost feel \"flulike\".  She shares that her nausea and vomiting symptoms have diminished and that now she is only having dry heaves.  She has not taken anything for her symptoms.  She reports no additional complaints on exam.  HPI     REVIEW OF SYSTEMS  A chief complaint appropriate review of systems was completed and is negative except as noted in the HPI.     PAST MEDICAL HISTORY  Past Medical History:   Diagnosis Date   • Diabetes mellitus (HCC)    • Hyperlipidemia    • Hypertension    • Osteoporosis        PAST SURGICAL HISTORY  Past Surgical History:   Procedure Laterality Date   • APPENDECTOMY     • BREAST BIOPSY     • FIBULA FRACTURE SURGERY     • HYSTERECTOMY     • KIDNEY STONE SURGERY         FAMILY HISTORY  Family History   Problem Relation Age of Onset   • Heart disease Mother    • Hypertension Mother    • Breast cancer Mother         70's   • Heart disease Father    • Stroke Father    • Breast cancer Other         great aunt       SOCIAL HISTORY  Social History     Socioeconomic History   • Marital status:    Tobacco Use   • Smoking status: Never   • Smokeless tobacco: Never   Vaping Use   • Vaping Use: Never used   Substance and Sexual Activity   • Alcohol use: No   • Drug use: No   • Sexual activity: Never       ALLERGIES  Cephalexin    PHYSICAL " EXAM  Physical Exam  GENERAL:   Appears in no acute distress.  HENT: Nares patent.  EYES: No scleral icterus.  CV: Regular rhythm, regular rate.  RESPIRATORY: Normal effort.  No audible wheezes, rales or rhonchi.  ABDOMEN: Soft, nontender  MUSCULOSKELETAL: No deformities.   NEURO: Alert, moves all extremities, follows commands.  SKIN: Warm, dry, no rash visualized.    I have reviewed the triage vital signs and nursing notes.    Physical Exam     LAB RESULTS  Results for orders placed or performed during the hospital encounter of 12/31/22   COVID-19 and FLU A/B PCR - Swab, Nasopharynx    Specimen: Nasopharynx; Swab   Result Value Ref Range    COVID19 Not Detected Not Detected - Ref. Range    Influenza A PCR Not Detected Not Detected    Influenza B PCR Not Detected Not Detected   Comprehensive Metabolic Panel    Specimen: Blood   Result Value Ref Range    Glucose 155 (H) 65 - 99 mg/dL    BUN 27 (H) 8 - 23 mg/dL    Creatinine 1.37 (H) 0.57 - 1.00 mg/dL    Sodium 139 136 - 145 mmol/L    Potassium 3.6 3.5 - 5.2 mmol/L    Chloride 99 98 - 107 mmol/L    CO2 25.0 22.0 - 29.0 mmol/L    Calcium 9.2 8.6 - 10.5 mg/dL    Total Protein 7.1 6.0 - 8.5 g/dL    Albumin 3.9 3.5 - 5.2 g/dL    ALT (SGPT) 49 (H) 1 - 33 U/L    AST (SGOT) 47 (H) 1 - 32 U/L    Alkaline Phosphatase 79 39 - 117 U/L    Total Bilirubin 0.5 0.0 - 1.2 mg/dL    Globulin 3.2 gm/dL    A/G Ratio 1.2 g/dL    BUN/Creatinine Ratio 19.7 7.0 - 25.0    Anion Gap 15.0 5.0 - 15.0 mmol/L    eGFR 39.1 (L) >60.0 mL/min/1.73   Lipase    Specimen: Blood   Result Value Ref Range    Lipase 27 13 - 60 U/L   Urinalysis With Microscopic If Indicated (No Culture) - Urine, Clean Catch    Specimen: Urine, Clean Catch   Result Value Ref Range    Color, UA Dark Yellow (A) Yellow, Straw    Appearance, UA Turbid (A) Clear    pH, UA <=5.0 5.0 - 8.0    Specific Gravity, UA >=1.030 1.001 - 1.030    Glucose, UA Negative Negative    Ketones, UA 15 mg/dL (1+) (A) Negative    Bilirubin, UA Negative  Negative    Blood, UA Negative Negative    Protein,  mg/dL (2+) (A) Negative    Leuk Esterase, UA Negative Negative    Nitrite, UA Negative Negative    Urobilinogen, UA 1.0 E.U./dL 0.2 - 1.0 E.U./dL   Lactic Acid, Plasma    Specimen: Blood   Result Value Ref Range    Lactate 1.9 0.5 - 2.0 mmol/L   CBC Auto Differential    Specimen: Blood   Result Value Ref Range    WBC 6.40 3.40 - 10.80 10*3/mm3    RBC 4.68 3.77 - 5.28 10*6/mm3    Hemoglobin 14.0 12.0 - 15.9 g/dL    Hematocrit 41.8 34.0 - 46.6 %    MCV 89.3 79.0 - 97.0 fL    MCH 29.9 26.6 - 33.0 pg    MCHC 33.5 31.5 - 35.7 g/dL    RDW 13.3 12.3 - 15.4 %    RDW-SD 43.4 37.0 - 54.0 fl    MPV 10.1 6.0 - 12.0 fL    Platelets 159 140 - 450 10*3/mm3    Neutrophil % 92.6 (H) 42.7 - 76.0 %    Lymphocyte % 5.0 (L) 19.6 - 45.3 %    Monocyte % 1.3 (L) 5.0 - 12.0 %    Eosinophil % 0.0 (L) 0.3 - 6.2 %    Basophil % 0.6 0.0 - 1.5 %    Immature Grans % 0.5 0.0 - 0.5 %    Neutrophils, Absolute 5.93 1.70 - 7.00 10*3/mm3    Lymphocytes, Absolute 0.32 (L) 0.70 - 3.10 10*3/mm3    Monocytes, Absolute 0.08 (L) 0.10 - 0.90 10*3/mm3    Eosinophils, Absolute 0.00 0.00 - 0.40 10*3/mm3    Basophils, Absolute 0.04 0.00 - 0.20 10*3/mm3    Immature Grans, Absolute 0.03 0.00 - 0.05 10*3/mm3    nRBC 0.0 0.0 - 0.2 /100 WBC   Scan Slide    Specimen: Blood   Result Value Ref Range    RBC Morphology Normal Normal    WBC Morphology Normal Normal    Platelet Morphology Normal Normal   Urinalysis, Microscopic Only - Urine, Clean Catch    Specimen: Urine, Clean Catch   Result Value Ref Range    RBC, UA 0-2 None Seen, 0-2 /HPF    WBC, UA 3-5 (A) None Seen, 0-2 /HPF    Bacteria, UA 2+ (A) None Seen, Trace /HPF    Squamous Epithelial Cells, UA 3-6 (A) None Seen, 0-2 /HPF    Hyaline Casts, UA 0-6 0 - 6 /LPF    Granular Casts, UA 3-6 None Seen /LPF    Methodology Manual Light Microscopy    Green Top (Gel)   Result Value Ref Range    Extra Tube Hold for add-ons.    Lavender Top   Result Value Ref Range     Extra Tube hold for add-on    Gold Top - SST   Result Value Ref Range    Extra Tube Hold for add-ons.    Gray Top   Result Value Ref Range    Extra Tube Hold for add-ons.    Light Blue Top   Result Value Ref Range    Extra Tube Hold for add-ons.        If labs were ordered, I independently reviewed the results and considered them in treating the patient.    RADIOLOGY  CT Abdomen Pelvis Without Contrast   Final Result   1.  Diffuse gastric wall thickening which could be seen with gastritis,   malignancy, or incomplete distention. Consider follow-up endoscopy for   further assessment.   2.  Small pericardial effusion. Calcific atherosclerosis.   3.  3 cm left adnexal cyst. Pelvic ultrasound follow-up is recommended.   4.  5 mm right lower lobe nodule. If patient is considered high risk for   pulmonary malignancy, 12 month follow-up would be recommended.               This report was finalized on 12/31/2022 9:55 PM by Lucian Calixto MD.            [x] Radiologist's Report Reviewed:  I ordered and independently reviewed the above noted radiographic studies.  See radiologist's dictation for official interpretation.      PROCEDURES    Procedures    No orders to display       MEDICATIONS GIVEN IN ER    Medications   ondansetron (ZOFRAN) injection 4 mg (4 mg Intravenous Given 12/31/22 2236)   sodium chloride 0.9 % bolus 1,000 mL (0 mL Intravenous Stopped 12/31/22 2326)       MEDICAL DECISION MAKING, PROGRESS, and CONSULTS   Medical Decision Making  Gastroenteritis: complicated acute illness or injury  History of diabetes mellitus: chronic illness or injury  History of hyperlipidemia: chronic illness or injury  History of hypertension: chronic illness or injury  Medication side effect, initial encounter: complicated acute illness or injury  Nausea and vomiting, unspecified vomiting type: complicated acute illness or injury  Amount and/or Complexity of Data Reviewed  Labs: ordered.  Radiology: ordered.      Risk  Prescription  drug management.          All labs have been independently reviewed by me.  All radiology studies have been reviewed by me and the radiologist dictating the report.  All EKG's have been independently viewed by me.    [] Discussed with radiology regarding test interpretation:    Discussion below represents my analysis of pertinent findings related to patient's condition, differential diagnosis, treatment plan and final disposition.    Differential diagnosis:  The differential diagnosis associated with the patient's presentation includes: Gastroenteritis, gastritis, medication side effect, migraine, appendicitis, bowel obstruction, gastroparesis, eating disorder, irritable bowel syndrome, UTI, peptic ulcer disease, psychogenic vomiting, anxiety, depression, pancreatitis, cholecystitis, DKA, food poisoning, diverticulitis, cyclic vomiting syndrome and others.    Additional sources  Discussed/ obtained information from independent historians:   [] Spouse  [] Parent  [x] Family member  [] Friend  [] EMS   [] Other:  External (non-ED) record review:   [] Inpatient record:   [] Office record:   [x] Outpatient record:   [] Prior Outpatient labs:   [] Prior Outpatient radiology:   [] Primary Care record:   [] Outside ED record:   [] Other:   Patient's care impacted by:   [x] Diabetes  [x] Hypertension  [x] Hyperlipidemia  [] Coronary Artery Disease   [] COPD   [] Cancer   [] Tobacco Abuse   [] Substance Abuse    [] Other:   Care significantly affected by Social Determinants of Health (housing and economic circumstances, unemployment)    [] Yes     [x] No   If yes, Patient's care significantly limited by  Social Determinants of Health including:   [] Inadequate housing   [] Low income   [] Alcoholism and drug addiction in family   [] Problems related to primary support group   [] Unemployment   [] Problems related to employment   [] Other Social Determinants of Health:     Shared decision making:  I had a discussion with the  patient/family regarding diagnosis, diagnostic results, treatment plan, and medications.  The patient/family indicated understanding of these instructions.  I spent adequate time at the bedside preceding discharge necessary to personally discuss the aftercare instructions, giving patient education, providing explanations of the results of our evaluations/findings, and my decision making to assure that the patient/family understand the plan of care.  Time was allotted to answer questions at that time and throughout the ED course.  Emphasis was placed on timely follow-up after discharge.  I also discussed the potential for the development of an acute emergent condition requiring further evaluation, admission, or even surgical intervention. I discussed that we found nothing during the visit today indicating the need for further workup, admission, or the presence of an unstable medical condition.  I encouraged the patient to return to the emergency department immediately for ANY concerns, worsening, new complaints, or if symptoms persist and unable to seek follow-up in a timely fashion.  The patient/family expressed understanding and agreement with this plan.  The patient will follow-up with primary care for reevaluation.     Orders placed during this visit:  Orders Placed This Encounter   Procedures   • COVID PRE-OP / PRE-PROCEDURE SCREENING ORDER (NO ISOLATION) - Swab, Nasopharynx   • COVID-19 and FLU A/B PCR - Swab, Nasopharynx   • CT Abdomen Pelvis Without Contrast   • [place] Draw   • Comprehensive Metabolic Panel   • Lipase   • Urinalysis With Microscopic If Indicated (No Culture) - Urine, Clean Catch   • Lactic Acid, Plasma   • CBC Auto Differential   • Scan Slide   • Urinalysis, Microscopic Only - Urine, Clean Catch   • Undress & Gown   • CBC & Differential   • Green Top (Gel)   • Lavender Top   • Gold Top - SST   • Gray Top   • Light Blue Top       I considered prescription management  with:   [] Pain  medication  [] Antiviral  [] Antibiotic   [] Other:    ED Course:     ED Course as of 01/09/23 1235   Sat Dec 31, 2022   2921 In summary this is an 82-year-old female who presents to the emergency department with complaints of nausea and vomiting, symptoms subsiding and now with symptoms of dry heaves.  Feeling better on presentation to the emergency department.  Responded well to IV fluids and antiemetics while in the ED. Labs/urinalysis obtained and reviewed demonstrate no acute or emergent abnormalities.  CT scan of abdomen and pelvis demonstrates diffuse gastric wall thickening which could be seen with gastritis,  malignancy, or incomplete distention.  Patient without additional episodes of nausea or vomiting while in the emergency department.At time of discharge disposition patient is afebrile, nontoxic appearing, vital signs stable and able to maintain O2 sats of 93% on room air. Patient will be discharged home with symptomatic care and outpatient follow up.  [JG]      ED Course User Index  [JG] Elvin Kent PA            DIAGNOSIS  Final diagnoses:   Nausea and vomiting, unspecified vomiting type   Medication side effect, initial encounter   Gastroenteritis   History of diabetes mellitus   History of hypertension   History of hyperlipidemia       DISPOSITION    ED Disposition     ED Disposition   Discharge    Condition   Stable    Comment   --             Please note that portions of this document were completed with voice recognition software.      Elvin Kent PA  01/09/23 2805

## 2023-01-18 ENCOUNTER — OFFICE VISIT (OUTPATIENT)
Dept: FAMILY MEDICINE CLINIC | Facility: CLINIC | Age: 83
End: 2023-01-18
Payer: MEDICARE

## 2023-01-18 VITALS
BODY MASS INDEX: 27.14 KG/M2 | HEART RATE: 69 BPM | DIASTOLIC BLOOD PRESSURE: 62 MMHG | TEMPERATURE: 98.6 F | SYSTOLIC BLOOD PRESSURE: 136 MMHG | HEIGHT: 63 IN | WEIGHT: 153.2 LBS | OXYGEN SATURATION: 97 %

## 2023-01-18 DIAGNOSIS — N30.00 ACUTE CYSTITIS WITHOUT HEMATURIA: ICD-10-CM

## 2023-01-18 DIAGNOSIS — R30.0 BURNING WITH URINATION: Primary | ICD-10-CM

## 2023-01-18 DIAGNOSIS — M54.6 ACUTE MIDLINE THORACIC BACK PAIN: ICD-10-CM

## 2023-01-18 LAB
BILIRUB BLD-MCNC: ABNORMAL MG/DL
CLARITY, POC: ABNORMAL
COLOR UR: YELLOW
EXPIRATION DATE: ABNORMAL
GLUCOSE UR STRIP-MCNC: NEGATIVE MG/DL
KETONES UR QL: NEGATIVE
LEUKOCYTE EST, POC: ABNORMAL
Lab: ABNORMAL
NITRITE UR-MCNC: NEGATIVE MG/ML
PH UR: 6 [PH] (ref 5–8)
PROT UR STRIP-MCNC: NEGATIVE MG/DL
RBC # UR STRIP: NEGATIVE /UL
SP GR UR: 1.02 (ref 1–1.03)
UROBILINOGEN UR QL: ABNORMAL

## 2023-01-18 PROCEDURE — 99213 OFFICE O/P EST LOW 20 MIN: CPT | Performed by: PHYSICIAN ASSISTANT

## 2023-01-18 PROCEDURE — 81003 URINALYSIS AUTO W/O SCOPE: CPT | Performed by: PHYSICIAN ASSISTANT

## 2023-01-18 RX ORDER — NITROFURANTOIN 25; 75 MG/1; MG/1
100 CAPSULE ORAL 2 TIMES DAILY
Qty: 14 CAPSULE | Refills: 0 | Status: SHIPPED | OUTPATIENT
Start: 2023-01-18 | End: 2023-01-25

## 2023-01-18 NOTE — PROGRESS NOTES
Follow Up Office Visit      Date: 2023   Patient Name: Cheryl Tomas  : 1940   MRN: [id number]     Chief Complaint:    Chief Complaint   Patient presents with   • Fatigue     Pt was seen in clinic and had a kidney infection. Was prescribed bactrim.  Has not improved very much. Lack of appetite. Has pain in the middle of her back.        History of Present Illness: Cheryl Tomas is a 82 y.o. female who is here today to follow up with UTI symptoms.  States she has urinary frequency, dysuria.  She was seen in urgent care clinic and was given some antibiotics but did not completely take this away.  She has pain in the middle of her back but this is unrelated to the urinary tract infection she has this quite frequently.  No injury to the back.      Subjective      Review of systems:  Review of Systems   Constitutional: Negative for fever.   HENT: Negative for trouble swallowing.    Respiratory: Negative for shortness of breath.    Cardiovascular: Negative for chest pain and leg swelling.   Gastrointestinal: Negative for abdominal pain.   Genitourinary: Positive for dysuria and frequency.   Musculoskeletal: Positive for back pain.        I have reviewed and the following portions of the patient's history were updated as appropriate: past family history, past medical history, past social history, past surgical history and problem list.    Medications:     Current Outpatient Medications:   •  allopurinol (ZYLOPRIM) 300 MG tablet, TAKE 1 TABLET EVERY DAY, Disp: 90 tablet, Rfl: 3  •  aspirin 81 MG EC tablet, Take 1 tablet by mouth Daily., Disp: 90 tablet, Rfl: 3  •  carvedilol (COREG) 12.5 MG tablet, Take 1 tablet by mouth 2 (Two) Times a Day for 360 days., Disp: 180 tablet, Rfl: 3  •  cholecalciferol (VITAMIN D3) 1000 units tablet, Take 1,000 Units by mouth Daily., Disp: , Rfl:   •  Coenzyme Q10 10 MG capsule, Take 10 mg by mouth Daily., Disp: , Rfl:   •  dilTIAZem CD (CARDIZEM CD) 240 MG 24 hr  "capsule, TAKE 1 CAPSULE EVERY DAY, Disp: 90 capsule, Rfl: 3  •  hydrALAZINE (APRESOLINE) 25 MG tablet, Take 1 tablet by mouth 3 (Three) Times a Day., Disp: 270 tablet, Rfl: 3  •  irbesartan (AVAPRO) 300 MG tablet, TAKE 1 TABLET BY MOUTH EVERY NIGHT., Disp: 90 tablet, Rfl: 3  •  pravastatin (PRAVACHOL) 40 MG tablet, TAKE 1 TABLET EVERY NIGHT, Disp: 90 tablet, Rfl: 0  •  triamterene-hydrochlorothiazide (MAXZIDE-25) 37.5-25 MG per tablet, TAKE 1 TABLET EVERY DAY, Disp: 90 tablet, Rfl: 3  •  acetaminophen (TYLENOL) 650 MG 8 hr tablet, Take 1,300 mg by mouth Every 8 (Eight) Hours As Needed for Mild Pain ., Disp: , Rfl:   •  metFORMIN (GLUCOPHAGE) 500 MG tablet, TAKE 1 TABLET BY MOUTH TWICE DAILY WITH MEALS, Disp: 60 tablet, Rfl: 0  •  QUEtiapine (SEROquel) 25 MG tablet, TAKE 1 TABLET BY MOUTH EVERY NIGHT., Disp: 90 tablet, Rfl: 3    Allergies:   Allergies   Allergen Reactions   • Cephalexin Itching and Rash       Objective     Vital Signs:   Vitals:    01/18/23 1508   BP: 136/62   Pulse: 69   Temp: 98.6 °F (37 °C)   TempSrc: Infrared   SpO2: 97%   Weight: 69.5 kg (153 lb 3.2 oz)   Height: 160 cm (62.99\")   PainSc: 0-No pain     Body mass index is 27.15 kg/m².   BMI is >= 25 and <30. (Overweight) The following options were offered after discussion;: weight loss educational material (shared in after visit summary)      Physical Exam:   Physical Exam  Vitals and nursing note reviewed.   Constitutional:       Appearance: Normal appearance.   Cardiovascular:      Rate and Rhythm: Normal rate and regular rhythm.   Pulmonary:      Effort: Pulmonary effort is normal.      Breath sounds: Normal breath sounds.   Abdominal:      Tenderness: There is no right CVA tenderness or left CVA tenderness.   Musculoskeletal:      Cervical back: Neck supple.      Thoracic back: No spasms or tenderness.   Neurological:      Mental Status: She is alert.          Assessment / Plan      Assessment/Plan:   Diagnoses and all orders for this " visit:    1. Burning with urination (Primary)  -     POC Urinalysis Dipstick, Automated  -     Urine Culture - Urine, Urine, Clean Catch; Future  -     Urine Culture - Urine, Urine, Clean Catch    2. Acute cystitis without hematuria  -     nitrofurantoin, macrocrystal-monohydrate, (Macrobid) 100 MG capsule; Take 1 capsule by mouth 2 (Two) Times a Day for 7 days.  Dispense: 14 capsule; Refill: 0  -     Urine Culture - Urine, Urine, Clean Catch; Future  -     Urine Culture - Urine, Urine, Clean Catch; Future  -     Urine Culture - Urine, Urine, Clean Catch    3. Acute midline thoracic back pain    If back pain changes from her usual she will let me know.  Follow Up:   No follow-ups on file.    Afua Jung PA-C   [facility] [place]

## 2023-01-19 PROCEDURE — 87086 URINE CULTURE/COLONY COUNT: CPT | Performed by: PHYSICIAN ASSISTANT

## 2023-01-20 LAB — BACTERIA SPEC AEROBE CULT: NO GROWTH

## 2023-01-24 DIAGNOSIS — E11.9 TYPE 2 DIABETES MELLITUS WITHOUT COMPLICATION, WITHOUT LONG-TERM CURRENT USE OF INSULIN: ICD-10-CM

## 2023-03-25 DIAGNOSIS — E11.9 TYPE 2 DIABETES MELLITUS WITHOUT COMPLICATION, WITHOUT LONG-TERM CURRENT USE OF INSULIN: ICD-10-CM

## 2023-03-29 ENCOUNTER — OFFICE VISIT (OUTPATIENT)
Dept: CARDIOLOGY | Facility: CLINIC | Age: 83
End: 2023-03-29
Payer: MEDICARE

## 2023-03-29 VITALS
BODY MASS INDEX: 26.4 KG/M2 | WEIGHT: 149 LBS | OXYGEN SATURATION: 97 % | HEART RATE: 65 BPM | HEIGHT: 63 IN | DIASTOLIC BLOOD PRESSURE: 62 MMHG | SYSTOLIC BLOOD PRESSURE: 136 MMHG

## 2023-03-29 DIAGNOSIS — I35.1 NONRHEUMATIC AORTIC VALVE INSUFFICIENCY: Primary | ICD-10-CM

## 2023-03-29 NOTE — PROGRESS NOTES
Established Patient Office Visit    Patient Name: Cheryl Tomas  : 1940   MRN: [id number]   Care Team: Patient Care Team:  Marian Perea MD as PCP - General (Family Medicine)  Sharon Salas PA as Physician Assistant (Cardiology)  Bradley Blandon MD as Consulting Physician (Cardiology)    Chief Complaint   Patient presents with   • HF stage 1   • Hypertension   • Hyperlipidemia   • CKD 4   • PSVT   • RBBB     Patient ID: Cheryl Tomas is a 82 y.o.  white female from [place], [place], retired .    1. Chronic Hypertension - probable essential  a. Remote stress test apparently acceptable-data deficit  b. Echocardiogram 2019: EF 60%, mild concentric LVH, moderate aortic insufficiency.  LV end-diastolic dimension 4.3 cm.  c. Lexiscan myocardial perfusion imaging ordered but declined 2021  d. Echocardiogram 10/11/2021: LVEF 61-65%, LV diastolic function consistent with grade 2 with high LAP pseudonormalization, LA mildly increased, moderate AR, RVSP 35-45 mmHg, small less than 1 cm pericardial effusion adjacent to the RV and LV  e. Residual class I symptoms 2022  2. PVST  a. Abnormal holter monitor study with frequent PSVT consistent with nonsustained atrial tachycardia with maximum heart rate 173 bpm, 2021    b. Infrequent palpitations 2022  3. Nonrheumatic aortic insufficiency  4. Mixed hyperlipidemia; on statin therapy  5. Type 2 diabetes mellitus; hemoglobin A1c 5.8% 2022  6. Dizziness  7. Stage III chronic kidney disease  8. History of PIH and preeclampsia  9. Surgical history:  a. Appendectomy  b. Lithotripsy  c. Hysterectomy    HPI: Cheryl Tomas is a 82 y.o. female with a history of moderate aortic regurgitation, grade II diastolic dysfunction, hypertension. who presents today for routine follow-up.  Since her last visit she did have an ER trip in late December due to side effects of Bactrim that she had been on for  UTI.  Since that time she reports feeling well.  She has no recurrent chest pain no palpitations.  She will no dyspnea on exertion at times however not to a degree that she feels as unexpected or unwarranted.  She changed over to twice daily carvedilol with no issues and appreciates the cost savings.    Subjective   Review of Systems   Constitutional: Negative for activity change and fatigue.   Respiratory: Negative for chest tightness and shortness of breath.    Cardiovascular: Positive for leg swelling. Negative for chest pain and palpitations.   Neurological: Negative for dizziness and syncope.       Social History     Tobacco Use   Smoking Status Never   • Passive exposure: Never   Smokeless Tobacco Never        Allergies   Allergen Reactions   • Cephalexin Itching and Rash       Current Outpatient Medications:   •  acetaminophen (TYLENOL) 650 MG 8 hr tablet, Take 2 tablets by mouth Every 8 (Eight) Hours As Needed for Mild Pain., Disp: , Rfl:   •  allopurinol (ZYLOPRIM) 300 MG tablet, TAKE 1 TABLET EVERY DAY, Disp: 90 tablet, Rfl: 3  •  aspirin 81 MG EC tablet, Take 1 tablet by mouth Daily., Disp: 90 tablet, Rfl: 3  •  carvedilol (COREG) 12.5 MG tablet, Take 1 tablet by mouth 2 (Two) Times a Day for 360 days., Disp: 180 tablet, Rfl: 3  •  cholecalciferol (VITAMIN D3) 1000 units tablet, Take 1 tablet by mouth Daily., Disp: , Rfl:   •  Coenzyme Q10 10 MG capsule, Take 10 mg by mouth Daily., Disp: , Rfl:   •  dilTIAZem CD (CARDIZEM CD) 240 MG 24 hr capsule, TAKE 1 CAPSULE EVERY DAY, Disp: 90 capsule, Rfl: 3  •  hydrALAZINE (APRESOLINE) 25 MG tablet, Take 1 tablet by mouth 3 (Three) Times a Day., Disp: 270 tablet, Rfl: 3  •  irbesartan (AVAPRO) 300 MG tablet, TAKE 1 TABLET BY MOUTH EVERY NIGHT., Disp: 90 tablet, Rfl: 3  •  metFORMIN (GLUCOPHAGE) 500 MG tablet, TAKE 1 TABLET BY MOUTH TWICE DAILY WITH MEALS, Disp: 60 tablet, Rfl: 0  •  pravastatin (PRAVACHOL) 40 MG tablet, TAKE 1 TABLET EVERY NIGHT, Disp: 90 tablet,  "Rfl: 0  •  QUEtiapine (SEROquel) 25 MG tablet, TAKE 1 TABLET BY MOUTH EVERY NIGHT., Disp: 90 tablet, Rfl: 3  •  triamterene-hydrochlorothiazide (MAXZIDE-25) 37.5-25 MG per tablet, TAKE 1 TABLET EVERY DAY, Disp: 90 tablet, Rfl: 3    Objective     Vitals:    03/29/23 1330 03/29/23 1403   BP: 142/58 136/62   BP Location: Right arm    Patient Position: Sitting    Pulse: 65    SpO2: 97%    Weight: 67.6 kg (149 lb)    Height: 160 cm (62.99\")      Body mass index is 26.4 kg/m².    Gen: well developed, older WF sitting on exam table, comfortable appearing  HEENT: MMM, sclera anicteric, conjunctiva normal, no carotid bruits  CV: regular rate, regular rhythm, II/VI diastolic murmur at RUSB, normal radial and DP pulses  Pulm: RA, normal work of breathing, no wheezes, rales, rhonchi  Abd: soft, non-tender, non-distended, +bowel sounds  Ext: normal bulk for age, normal tone, trace dependent edema  Neuro: alert, oriented, face symmetrical, moving all extremities well  Psych: normal mood, appropriate affect    RESULTS:     ECG 12 Lead    Date/Time: 3/29/2023 2:08 PM  Performed by: Jani Epperson MD  Authorized by: Jani Epperson MD   Comparison: compared with previous ECG from 10/19/2020  Similar to previous ECG  Rhythm: sinus rhythm  Rate: normal  BPM: 60  Conduction: right bundle branch block, left anterior fascicular block and bifascicular block  Conduction comments: CT 210ms (stable from 206ms)  QRS axis: left    Clinical impression: abnormal EKG          10/11/21 - Transthoracic Echo Complete  · Left ventricular ejection fraction appears to be 61 - 65%. Left ventricular systolic function is normal.  · Left ventricular diastolic function is consistent with (grade II w/high LAP) pseudonormalization.  · Left atrial volume is mildly increased.  · Moderate aortic valve regurgitation is present.  · Estimated right ventricular systolic pressure from tricuspid regurgitation is mildly elevated (35-45 mmHg).  · Mild pulmonary " hypertension is present.  · There is a small (<1cm) pericardial effusion adjacent to the right ventricle and left ventricle.    5/5/22 - Renal duplex US  Impression:  No sonographic evidence of clinically significant stenosis of the right  or the left renal artery on this exam.      There is suspicion of mild left hydronephrosis. This can be further  evaluated with a dedicated renal ultrasound.    4/27/22 - 24 blood pressure monitor  • Overall acceptable 24 ambulatory blood pressure monitor with somewhat elevated nocturnal blood pressure readings; defer additional treatment at this time and follow-up in office as scheduled.    Labs:  Lab Results   Component Value Date    WBC 6.40 12/31/2022    HGB 14.0 12/31/2022    HCT 41.8 12/31/2022    MCV 89.3 12/31/2022     12/31/2022     Lab Results   Component Value Date    GLUCOSE 155 (H) 12/31/2022    BUN 27 (H) 12/31/2022    CREATININE 1.37 (H) 12/31/2022    EGFRIFNONA 43 (L) 11/15/2021    EGFRIFAFRI 52 (L) 11/15/2021    BCR 19.7 12/31/2022    K 3.6 12/31/2022    CO2 25.0 12/31/2022    CALCIUM 9.2 12/31/2022    PROTENTOTREF 6.7 11/30/2022    ALBUMIN 3.9 12/31/2022    LABIL2 2.2 11/30/2022    AST 47 (H) 12/31/2022    ALT 49 (H) 12/31/2022     Lab Results   Component Value Date    HGBA1C 5.7 11/30/2022     Lab Results   Component Value Date    CHLPL 168 11/30/2022    TRIG 136 11/30/2022    HDL 45 11/30/2022    LDL 99 11/30/2022     Most recent PCP note, imaging tests, and labs reviewed.    Assessment & Plan       ICD-10-CM ICD-9-CM   1. Nonrheumatic aortic valve insufficiency  I35.1 424.1      Primary hypertension   - acceptable control on current regimen   - Renal US negative for ZOLTAN    Moderate AR   - no new symptoms, last echo Oct 2021   - We will repeat same day as follow-up visit in 6-months    HLD   - continue pravastatin, acceptable control based on lipid panel from November 2022     Palpitations / PSVT / AT  Bifascicular block -stable from previous, NE  minimally prolonged but stable over the last 2 and half years   - well controlled on carvedilol and diltiazem    Return in about 6 months (around 9/29/2023) for with echocardiogram.    DUARTE Epperson MD, MS  03/29/23    [facility]  [address] [place] [id number] [phone]

## 2023-04-17 ENCOUNTER — OFFICE VISIT (OUTPATIENT)
Dept: FAMILY MEDICINE CLINIC | Facility: CLINIC | Age: 83
End: 2023-04-17
Payer: MEDICARE

## 2023-04-17 ENCOUNTER — LAB (OUTPATIENT)
Dept: LAB | Facility: HOSPITAL | Age: 83
End: 2023-04-17
Payer: MEDICARE

## 2023-04-17 VITALS
DIASTOLIC BLOOD PRESSURE: 66 MMHG | WEIGHT: 149 LBS | HEIGHT: 63 IN | BODY MASS INDEX: 26.4 KG/M2 | TEMPERATURE: 97.7 F | HEART RATE: 63 BPM | OXYGEN SATURATION: 95 % | SYSTOLIC BLOOD PRESSURE: 125 MMHG

## 2023-04-17 DIAGNOSIS — N39.0 URINARY TRACT INFECTION WITHOUT HEMATURIA, SITE UNSPECIFIED: Primary | ICD-10-CM

## 2023-04-17 DIAGNOSIS — R30.0 DYSURIA: ICD-10-CM

## 2023-04-17 LAB
BILIRUB BLD-MCNC: NEGATIVE MG/DL
CLARITY, POC: CLEAR
COLOR UR: YELLOW
EXPIRATION DATE: ABNORMAL
GLUCOSE UR STRIP-MCNC: NEGATIVE MG/DL
KETONES UR QL: NEGATIVE
LEUKOCYTE EST, POC: ABNORMAL
Lab: ABNORMAL
NITRITE UR-MCNC: NEGATIVE MG/ML
PH UR: 6 [PH] (ref 5–8)
PROT UR STRIP-MCNC: NEGATIVE MG/DL
RBC # UR STRIP: NEGATIVE /UL
SP GR UR: 1.02 (ref 1–1.03)
UROBILINOGEN UR QL: NORMAL

## 2023-04-17 PROCEDURE — 1160F RVW MEDS BY RX/DR IN RCRD: CPT | Performed by: NURSE PRACTITIONER

## 2023-04-17 PROCEDURE — 3074F SYST BP LT 130 MM HG: CPT | Performed by: NURSE PRACTITIONER

## 2023-04-17 PROCEDURE — 3078F DIAST BP <80 MM HG: CPT | Performed by: NURSE PRACTITIONER

## 2023-04-17 PROCEDURE — 99213 OFFICE O/P EST LOW 20 MIN: CPT | Performed by: NURSE PRACTITIONER

## 2023-04-17 PROCEDURE — 87086 URINE CULTURE/COLONY COUNT: CPT | Performed by: NURSE PRACTITIONER

## 2023-04-17 PROCEDURE — 81003 URINALYSIS AUTO W/O SCOPE: CPT | Performed by: NURSE PRACTITIONER

## 2023-04-17 PROCEDURE — 1159F MED LIST DOCD IN RCRD: CPT | Performed by: NURSE PRACTITIONER

## 2023-04-17 RX ORDER — DOXYCYCLINE 100 MG/1
100 CAPSULE ORAL 2 TIMES DAILY
Qty: 14 CAPSULE | Refills: 0 | Status: SHIPPED | OUTPATIENT
Start: 2023-04-17 | End: 2023-04-24

## 2023-04-18 LAB — BACTERIA SPEC AEROBE CULT: NO GROWTH

## 2023-04-19 NOTE — PROGRESS NOTES
Follow Up Office Note     Patient Name: Cheryl Tomas  : 1940   MRN: [id number]     Chief Complaint:    Chief Complaint   Patient presents with   • Urinary Tract Infection     Per patient low back pain, dysuria, pressure in lower abdomen, urgency and frequency since Saturday.        History of Present Illness: Cheryl Tomas is a 83 y.o. female who presents today with c/o dysuria x 2 days.  She denies fever, chills, nausea/vomiting or hematuria. Patient has had recurrent UTI's over the past year.      Subjective      I have reviewed and the following portions of the patient's history were updated as appropriate: past family history, past medical history, past social history, past surgical history and problem list.    Review of Systems:   Review of Systems   Constitutional: Negative.    Respiratory: Negative.    Cardiovascular: Negative.    Genitourinary: Positive for dysuria, frequency, pelvic pain and urgency. Negative for hematuria.   Musculoskeletal: Positive for back pain.        Past Medical History:   Past Medical History:   Diagnosis Date   • Diabetes mellitus    • Hyperlipidemia    • Hypertension    • Osteoporosis          Medications:     Current Outpatient Medications:   •  acetaminophen (TYLENOL) 650 MG 8 hr tablet, Take 2 tablets by mouth Every 8 (Eight) Hours As Needed for Mild Pain., Disp: , Rfl:   •  allopurinol (ZYLOPRIM) 300 MG tablet, TAKE 1 TABLET EVERY DAY, Disp: 90 tablet, Rfl: 3  •  aspirin 81 MG EC tablet, Take 1 tablet by mouth Daily., Disp: 90 tablet, Rfl: 3  •  carvedilol (COREG) 12.5 MG tablet, Take 1 tablet by mouth 2 (Two) Times a Day for 360 days., Disp: 180 tablet, Rfl: 3  •  cholecalciferol (VITAMIN D3) 1000 units tablet, Take 1 tablet by mouth Daily., Disp: , Rfl:   •  Coenzyme Q10 10 MG capsule, Take 10 mg by mouth Daily., Disp: , Rfl:   •  dilTIAZem CD (CARDIZEM CD) 240 MG 24 hr capsule, TAKE 1 CAPSULE EVERY DAY, Disp: 90 capsule, Rfl: 3  •  hydrALAZINE  "(APRESOLINE) 25 MG tablet, Take 1 tablet by mouth 3 (Three) Times a Day., Disp: 270 tablet, Rfl: 3  •  irbesartan (AVAPRO) 300 MG tablet, TAKE 1 TABLET BY MOUTH EVERY NIGHT., Disp: 90 tablet, Rfl: 3  •  metFORMIN (GLUCOPHAGE) 500 MG tablet, TAKE 1 TABLET BY MOUTH TWICE DAILY WITH MEALS, Disp: 60 tablet, Rfl: 0  •  pravastatin (PRAVACHOL) 40 MG tablet, TAKE 1 TABLET EVERY NIGHT, Disp: 90 tablet, Rfl: 0  •  QUEtiapine (SEROquel) 25 MG tablet, TAKE 1 TABLET BY MOUTH EVERY NIGHT., Disp: 90 tablet, Rfl: 3  •  triamterene-hydrochlorothiazide (MAXZIDE-25) 37.5-25 MG per tablet, TAKE 1 TABLET EVERY DAY, Disp: 90 tablet, Rfl: 3  •  doxycycline (MONODOX) 100 MG capsule, Take 1 capsule by mouth 2 (Two) Times a Day for 7 days., Disp: 14 capsule, Rfl: 0    Allergies:   Allergies   Allergen Reactions   • Cephalexin Itching and Rash   • Bactrim [Sulfamethoxazole-Trimethoprim] GI Intolerance         Objective     Physical Exam:  Vital Signs:   Vitals:    04/17/23 1059   BP: 125/66   Pulse: 63   Temp: 97.7 °F (36.5 °C)   TempSrc: Infrared   SpO2: 95%   Weight: 67.6 kg (149 lb)   Height: 160 cm (62.99\")   PainSc: 0-No pain     Body mass index is 26.4 kg/m².     Physical Exam  Vitals and nursing note reviewed.   Constitutional:       General: She is not in acute distress.     Appearance: Normal appearance. She is well-developed. She is not ill-appearing, toxic-appearing or diaphoretic.   HENT:      Head: Normocephalic and atraumatic.   Cardiovascular:      Rate and Rhythm: Normal rate and regular rhythm.      Heart sounds: Normal heart sounds.   Pulmonary:      Effort: Pulmonary effort is normal. No respiratory distress.      Breath sounds: Normal breath sounds. No stridor. No wheezing.   Abdominal:      General: There is no distension.      Palpations: Abdomen is soft.      Tenderness: There is no abdominal tenderness. There is no right CVA tenderness, left CVA tenderness, guarding or rebound.   Skin:     General: Skin is warm and " dry.   Neurological:      General: No focal deficit present.      Mental Status: She is alert and oriented to person, place, and time.   Psychiatric:         Mood and Affect: Mood normal.         Behavior: Behavior normal. Behavior is cooperative.         Thought Content: Thought content normal.         Judgment: Judgment normal.         Assessment / Plan      Assessment/Plan:   Diagnoses and all orders for this visit:    1. Urinary tract infection without hematuria, site unspecified (Primary)  -     Urine Culture - Urine, Urine, Clean Catch  -     doxycycline (MONODOX) 100 MG capsule; Take 1 capsule by mouth 2 (Two) Times a Day for 7 days.  Dispense: 14 capsule; Refill: 0    2. Dysuria  -     POC Urinalysis Dipstick, Automated       Brief Urine Lab Results  (Last result in the past 365 days)      Color   Clarity   Blood   Leuk Est   Nitrite   Protein   CREAT   Urine HCG        04/17/23 1112 Yellow   Clear   Negative   Moderate (2+)   Negative   Negative               Recommend referral to urology should symptoms fail to improve.    Follow Up:   PRN and at next scheduled appointment(s) with PCP.    Discussed the nature of the medical condition(s) risks, complications, implications, management, safe and proper use of medications. Encouraged medication compliance, and keeping scheduled follow up appointments with me and any other providers.      RTC if symptoms fail to improve, to ER if symptoms worsen.        *Dictated Utilizing Dragon Dictation   Please note that portions of this note were completed with a voice recognition program.   Part of this note may be an electronic transcription/translation of spoken language to printed text using the Dragon Dictation System. Spelling and/or grammatical errors may exist despite efforts at proofreading.        JESSICA Abbasi  [facility]

## 2023-04-22 ENCOUNTER — TELEPHONE (OUTPATIENT)
Dept: FAMILY MEDICINE CLINIC | Facility: CLINIC | Age: 83
End: 2023-04-22
Payer: MEDICARE

## 2023-04-22 NOTE — TELEPHONE ENCOUNTER
I spoke with the patient and she feels very weak. And is concerned that the abx that she is on for her urinary symptoms is making her BP low. I spoke with Dr. Marquis who states to tell the pt to stop her abx since she had no growth on her culture and to get at least 2L of water in today. I advised the patient of what Dr. Marquis stated and she voiced understanding. I advised her is her BP stayed low or any other symptoms appeared to present to the ED.

## 2023-04-24 ENCOUNTER — OFFICE VISIT (OUTPATIENT)
Dept: FAMILY MEDICINE CLINIC | Facility: CLINIC | Age: 83
End: 2023-04-24
Payer: MEDICARE

## 2023-04-24 VITALS
BODY MASS INDEX: 26.4 KG/M2 | SYSTOLIC BLOOD PRESSURE: 116 MMHG | OXYGEN SATURATION: 96 % | DIASTOLIC BLOOD PRESSURE: 76 MMHG | HEART RATE: 132 BPM | WEIGHT: 149 LBS | TEMPERATURE: 97.3 F

## 2023-04-24 DIAGNOSIS — I13.0 HYPERTENSIVE HEART AND CHRONIC KIDNEY DISEASE WITH HEART FAILURE AND STAGE 1 THROUGH STAGE 4 CHRONIC KIDNEY DISEASE, OR UNSPECIFIED CHRONIC KIDNEY DISEASE: Chronic | ICD-10-CM

## 2023-04-24 DIAGNOSIS — E78.00 PURE HYPERCHOLESTEROLEMIA: ICD-10-CM

## 2023-04-24 DIAGNOSIS — I47.1 SUPRAVENTRICULAR TACHYCARDIA: ICD-10-CM

## 2023-04-24 DIAGNOSIS — E11.9 TYPE 2 DIABETES MELLITUS WITHOUT COMPLICATION, WITHOUT LONG-TERM CURRENT USE OF INSULIN: Chronic | ICD-10-CM

## 2023-04-24 DIAGNOSIS — R53.83 OTHER FATIGUE: ICD-10-CM

## 2023-04-24 DIAGNOSIS — I10 BENIGN ESSENTIAL HYPERTENSION: Primary | Chronic | ICD-10-CM

## 2023-04-24 RX ORDER — PRAVASTATIN SODIUM 40 MG
TABLET ORAL
Qty: 90 TABLET | Refills: 0 | Status: SHIPPED | OUTPATIENT
Start: 2023-04-24

## 2023-04-24 NOTE — PROGRESS NOTES
Subjective   Cheryl Tomas is a 83 y.o. female.     History of Present Illness symptoms started Thursday with low blood pressure.  She has not taken her hydralazine or carvedilol today because the blood pressure has been low.    Her last cardiology note from Dr. Ciro Ann on 3/29/2023 was reviewed.    She reports that for the last 1 week she has just felt horrible.  She has felt like she does not have energy to move.  She reports she has not been able to do much.  She does not specifically endorse that she has any runny nose any fever no chills no chest pain no shortness of breath she just feels weakness and she is having a hard time doing her activities of daily living due to weakness.  When she came into the office she was tachycardic she said she had not been taking her hydralazine or her carvedilol because her blood pressure has been running low at home.  Other than that she does not have any specific complaints.  She does not have any urinary complaints.  Her glucose levels have been stable.    The following portions of the patient's history were reviewed and updated as appropriate: allergies, current medications, past family history, past medical history, past social history, past surgical history and problem list.    Review of Systems   Constitutional: Positive for fatigue.   HENT: Negative.    Eyes: Negative.    Respiratory: Negative.    Cardiovascular: Negative.    Gastrointestinal: Negative.    Musculoskeletal: Negative.    Skin: Negative.    Neurological: Negative.        Objective     Vitals:    04/24/23 1147   BP: 116/76   Pulse: (!) 132   Temp: 97.3 °F (36.3 °C)   TempSrc: Infrared   SpO2: 96%   Weight: 67.6 kg (149 lb)       Physical Exam  Vitals and nursing note reviewed.   Constitutional:       Appearance: She is well-developed.   HENT:      Head: Normocephalic and atraumatic.   Eyes:      General:         Right eye: No discharge.         Left eye: No discharge.      Pupils: Pupils are equal,  round, and reactive to light.   Cardiovascular:      Rate and Rhythm: Normal rate and regular rhythm.      Heart sounds: Normal heart sounds.   Pulmonary:      Effort: Pulmonary effort is normal.      Breath sounds: Normal breath sounds.   Abdominal:      General: Bowel sounds are normal.      Palpations: Abdomen is soft. There is no mass.      Tenderness: There is no abdominal tenderness.   Musculoskeletal:         General: Normal range of motion.      Right shoulder: No swelling.      Cervical back: Normal range of motion and neck supple.   Skin:     General: Skin is warm and dry.      Nails: There is no clubbing.   Neurological:      Mental Status: She is alert and oriented to person, place, and time.      Deep Tendon Reflexes: Reflexes are normal and symmetric.   Psychiatric:         Behavior: Behavior normal.         Thought Content: Thought content normal.         Judgment: Judgment normal.         ECG 12 Lead    Date/Time: 4/24/2023 3:24 PM  Performed by: Marian Perea MD  Authorized by: Marian Perea MD   Comparison: compared with previous ECG from 3/29/2023  Comparison to previous ECG: She has tachycardia  Rhythm: sinus tachycardia  BPM: 131  Conduction: non-specific intraventricular conduction delay  ST Segments: ST segments normal  T Waves: T waves normal    Clinical impression: abnormal EKG  Comments: There are changes in her QRS complexes in the 2-3 and aVF leads compared to previous EKG.  She is tachycardic.  It looks like there is ectopic atrial tachycardia.  She has P waves.            Assessment & Plan     Problem List Items Addressed This Visit        Cardiac and Vasculature    Hypertensive heart and chronic kidney disease with heart failure and stage 1 through stage 4 chronic kidney disease, or unspecified chronic kidney disease (CMS/HCC)  (Chronic)    Benign essential hypertension - Primary (Chronic)    Supraventricular tachycardia       Endocrine and Metabolic    Diabetes mellitus (HCC)  (Chronic)       Symptoms and Signs    Fatigue     EMS was called they came and evaluated the patient when they checked her blood pressure was 142/94.  Her glucose was 131.  Her oxygen was 95.  At that time she agreed that she had been feeling bad enough that she wanted to go to the ER.  When I asked her about going to the ER she reports that she did not have a ride or way and she reports that when she got home and got her cat taking care of that she would call EMS from home.  I told her that we can go ahead and call EMS from the office to get her evaluated for her overall generalized malaise and fatigue and feeling bad symptoms.  After EMS got her into the truck she backed out and came back into the lobby and waiting for her friend to come and get her.

## 2023-05-03 ENCOUNTER — LAB (OUTPATIENT)
Dept: LAB | Facility: HOSPITAL | Age: 83
End: 2023-05-03
Payer: MEDICARE

## 2023-05-03 ENCOUNTER — OFFICE VISIT (OUTPATIENT)
Dept: FAMILY MEDICINE CLINIC | Facility: CLINIC | Age: 83
End: 2023-05-03
Payer: MEDICARE

## 2023-05-03 VITALS
BODY MASS INDEX: 26.58 KG/M2 | OXYGEN SATURATION: 96 % | HEART RATE: 56 BPM | TEMPERATURE: 98.4 F | WEIGHT: 150 LBS | DIASTOLIC BLOOD PRESSURE: 58 MMHG | SYSTOLIC BLOOD PRESSURE: 126 MMHG | RESPIRATION RATE: 18 BRPM

## 2023-05-03 DIAGNOSIS — I13.0 HYPERTENSIVE HEART AND CHRONIC KIDNEY DISEASE WITH HEART FAILURE AND STAGE 1 THROUGH STAGE 4 CHRONIC KIDNEY DISEASE, OR UNSPECIFIED CHRONIC KIDNEY DISEASE: Chronic | ICD-10-CM

## 2023-05-03 DIAGNOSIS — R60.0 LEG EDEMA: Primary | ICD-10-CM

## 2023-05-03 DIAGNOSIS — I10 BENIGN ESSENTIAL HYPERTENSION: Chronic | ICD-10-CM

## 2023-05-03 DIAGNOSIS — R53.83 OTHER FATIGUE: ICD-10-CM

## 2023-05-03 RX ORDER — FUROSEMIDE 20 MG/1
20 TABLET ORAL DAILY
Qty: 10 TABLET | Refills: 0 | Status: SHIPPED | OUTPATIENT
Start: 2023-05-03

## 2023-05-03 NOTE — PROGRESS NOTES
Subjective   Cheryl Tomas is a 83 y.o. female.     History of Present Illness bilateral foot swelling for a week.  No chest pain.  Seen in office last week because she was feeling bad and was ems called but she did not go to ed.  She sees dr Dean cardiology.  Last visit was 1 month ago.  Scheduled for echo in November.    She reports she does get winded if she tries to exert herself and occasionally feels short of breath if she lays flat.  The symptoms have worsened in the last 1 week she is not currently endorsing any chest pain  The following portions of the patient's history were reviewed and updated as appropriate: allergies, current medications, past family history, past medical history, past social history, past surgical history and problem list.    Review of Systems   Constitutional: Positive for fatigue.   HENT: Negative.    Eyes: Negative.    Respiratory: Positive for shortness of breath. Negative for cough and chest tightness.    Cardiovascular: Positive for leg swelling. Negative for chest pain and palpitations.   Gastrointestinal: Negative.    Endocrine: Negative.    Genitourinary: Negative.    Musculoskeletal: Negative.    Skin: Negative.    Allergic/Immunologic: Negative.    Neurological: Negative.    Hematological: Negative.    Psychiatric/Behavioral: Negative.    All other systems reviewed and are negative.      Objective     Vitals:    05/03/23 1130   BP: 126/58   Pulse: 56   Resp: 18   Temp: 98.4 °F (36.9 °C)   SpO2: 96%   Weight: 68 kg (150 lb)       Physical Exam  Vitals and nursing note reviewed.   Constitutional:       Appearance: She is well-developed.   HENT:      Head: Normocephalic and atraumatic.   Eyes:      General:         Right eye: No discharge.         Left eye: No discharge.      Pupils: Pupils are equal, round, and reactive to light.   Cardiovascular:      Rate and Rhythm: Normal rate and regular rhythm.      Heart sounds: Normal heart sounds.   Pulmonary:      Effort:  Pulmonary effort is normal.      Breath sounds: Normal breath sounds.   Abdominal:      General: Bowel sounds are normal.      Palpations: Abdomen is soft. There is no mass.      Tenderness: There is no abdominal tenderness.   Musculoskeletal:         General: Swelling present. Normal range of motion.      Right shoulder: No swelling.      Cervical back: Normal range of motion and neck supple.      Comments: She has 3+ edema bilateral feet and ankles   Skin:     General: Skin is warm and dry.      Nails: There is no clubbing.   Neurological:      Mental Status: She is alert and oriented to person, place, and time.      Deep Tendon Reflexes: Reflexes are normal and symmetric.   Psychiatric:         Behavior: Behavior normal.         Thought Content: Thought content normal.         Judgment: Judgment normal.         Assessment & Plan     Problem List Items Addressed This Visit        Cardiac and Vasculature    Hypertensive heart and chronic kidney disease with heart failure and stage 1 through stage 4 chronic kidney disease, or unspecified chronic kidney disease (CMS/HCC)  (Chronic)    Relevant Medications    furosemide (Lasix) 20 MG tablet    Other Relevant Orders    XR Chest PA & Lateral    CBC & Differential    Basic Metabolic Panel    proBNP    Benign essential hypertension (Chronic)    Relevant Medications    furosemide (Lasix) 20 MG tablet       Symptoms and Signs    Fatigue    Leg edema - Primary    Relevant Medications    furosemide (Lasix) 20 MG tablet    Other Relevant Orders    Adult Transthoracic Echo Complete W/ Cont if Necessary Per Protocol     Fu 2 weeks.   Again I recommended if her symptoms worsen or if she has any chest pain she should go to the ER.  I have asked her to contact her cardiologist.  We will check some labs today and see what her BNP is I scheduled her for a stat echo    Aurelia check kidney function we will try to diurese her a little bit I have asked her to elevate her legs

## 2023-05-04 LAB
BASOPHILS # BLD AUTO: 0.05 10*3/MM3 (ref 0–0.2)
BASOPHILS NFR BLD AUTO: 0.6 % (ref 0–1.5)
BUN SERPL-MCNC: 21 MG/DL (ref 8–23)
BUN/CREAT SERPL: 17.8 (ref 7–25)
CALCIUM SERPL-MCNC: 10.3 MG/DL (ref 8.6–10.5)
CHLORIDE SERPL-SCNC: 107 MMOL/L (ref 98–107)
CO2 SERPL-SCNC: 23 MMOL/L (ref 22–29)
CREAT SERPL-MCNC: 1.18 MG/DL (ref 0.57–1)
EGFRCR SERPLBLD CKD-EPI 2021: 46.5 ML/MIN/1.73
EOSINOPHIL # BLD AUTO: 0.19 10*3/MM3 (ref 0–0.4)
EOSINOPHIL NFR BLD AUTO: 2.2 % (ref 0.3–6.2)
ERYTHROCYTE [DISTWIDTH] IN BLOOD BY AUTOMATED COUNT: 13.6 % (ref 12.3–15.4)
GLUCOSE SERPL-MCNC: 112 MG/DL (ref 65–99)
HCT VFR BLD AUTO: 38.7 % (ref 34–46.6)
HGB BLD-MCNC: 12.7 G/DL (ref 12–15.9)
IMM GRANULOCYTES # BLD AUTO: 0.02 10*3/MM3 (ref 0–0.05)
IMM GRANULOCYTES NFR BLD AUTO: 0.2 % (ref 0–0.5)
LYMPHOCYTES # BLD AUTO: 1.69 10*3/MM3 (ref 0.7–3.1)
LYMPHOCYTES NFR BLD AUTO: 19.5 % (ref 19.6–45.3)
MCH RBC QN AUTO: 28.8 PG (ref 26.6–33)
MCHC RBC AUTO-ENTMCNC: 32.8 G/DL (ref 31.5–35.7)
MCV RBC AUTO: 87.8 FL (ref 79–97)
MONOCYTES # BLD AUTO: 0.54 10*3/MM3 (ref 0.1–0.9)
MONOCYTES NFR BLD AUTO: 6.2 % (ref 5–12)
NEUTROPHILS # BLD AUTO: 6.17 10*3/MM3 (ref 1.7–7)
NEUTROPHILS NFR BLD AUTO: 71.3 % (ref 42.7–76)
NRBC BLD AUTO-RTO: 0 /100 WBC (ref 0–0.2)
NT-PROBNP SERPL-MCNC: 538 PG/ML (ref 0–738)
PLATELET # BLD AUTO: 177 10*3/MM3 (ref 140–450)
POTASSIUM SERPL-SCNC: 4.1 MMOL/L (ref 3.5–5.2)
RBC # BLD AUTO: 4.41 10*6/MM3 (ref 3.77–5.28)
SODIUM SERPL-SCNC: 145 MMOL/L (ref 136–145)
WBC # BLD AUTO: 8.66 10*3/MM3 (ref 3.4–10.8)

## 2023-05-08 ENCOUNTER — TELEPHONE (OUTPATIENT)
Dept: FAMILY MEDICINE CLINIC | Facility: CLINIC | Age: 83
End: 2023-05-08
Payer: MEDICARE

## 2023-05-08 NOTE — TELEPHONE ENCOUNTER
----- Message from Marian Perea MD sent at 5/8/2023 10:37 AM EDT -----  Notify the patient that her chest x-ray shows some chronic stable findings without signs of heart failure the heart continues to be enlarged.  Lets just make sure that her symptoms are improving.

## 2023-05-09 ENCOUNTER — TELEPHONE (OUTPATIENT)
Dept: FAMILY MEDICINE CLINIC | Facility: CLINIC | Age: 83
End: 2023-05-09
Payer: MEDICARE

## 2023-05-09 NOTE — TELEPHONE ENCOUNTER
Provider: DR. THURSTON     Caller: VALENTINO HIDALGO     Phone Number: [phone]    Reason for Call: PATIENT CALLED BACK AND WAS READ THE MESSAGE AND VERBALIZED UNDERSTANDING

## 2023-05-09 NOTE — TELEPHONE ENCOUNTER
----- Message from Marian Perea MD sent at 5/9/2023 11:35 AM EDT -----  The labs that we did in the office show improvement in your kidney function your electrolytes look normal your heart enzymes does not look like you are in volume overload.  All your other labs look good

## 2023-05-22 RX ORDER — HYDRALAZINE HYDROCHLORIDE 25 MG/1
TABLET, FILM COATED ORAL
Qty: 270 TABLET | Refills: 3 | Status: SHIPPED | OUTPATIENT
Start: 2023-05-22

## 2023-06-01 ENCOUNTER — HOSPITAL ENCOUNTER (OUTPATIENT)
Dept: CARDIOLOGY | Facility: HOSPITAL | Age: 83
Discharge: HOME | End: 2023-06-01
Admitting: FAMILY MEDICINE

## 2023-06-01 VITALS — WEIGHT: 149.91 LBS | BODY MASS INDEX: 26.56 KG/M2 | HEIGHT: 63 IN

## 2023-06-01 DIAGNOSIS — R60.0 LEG EDEMA: ICD-10-CM

## 2023-06-01 PROCEDURE — 93306 TTE W/DOPPLER COMPLETE: CPT

## 2023-06-01 PROCEDURE — 93306 TTE W/DOPPLER COMPLETE: CPT | Performed by: INTERNAL MEDICINE

## 2023-06-02 LAB
ASCENDING AORTA: 3.4 CM
AV VENA CONTRACTA: 1.2 CM
BH CV ECHO MEAS - AI P1/2T: 452.5 MSEC
BH CV ECHO MEAS - AO ROOT DIAM: 3.1 CM
BH CV ECHO MEAS - EDV(CUBED): 122.8 ML
BH CV ECHO MEAS - EDV(MOD-SP2): 51 ML
BH CV ECHO MEAS - EDV(MOD-SP4): 58 ML
BH CV ECHO MEAS - EF(MOD-BP): 61 %
BH CV ECHO MEAS - EF(MOD-SP2): 52.9 %
BH CV ECHO MEAS - EF(MOD-SP4): 67.2 %
BH CV ECHO MEAS - ESV(CUBED): 28.9 ML
BH CV ECHO MEAS - ESV(MOD-SP2): 24 ML
BH CV ECHO MEAS - ESV(MOD-SP4): 19 ML
BH CV ECHO MEAS - FS: 38.2 %
BH CV ECHO MEAS - IVS/LVPW: 1 CM
BH CV ECHO MEAS - IVSD: 1.49 CM
BH CV ECHO MEAS - LA DIMENSION: 4.4 CM
BH CV ECHO MEAS - LAT PEAK E' VEL: 6.4 CM/SEC
BH CV ECHO MEAS - LV MASS(C)D: 316.5 GRAMS
BH CV ECHO MEAS - LV MAX PG: 8.8 MMHG
BH CV ECHO MEAS - LV MEAN PG: 5 MMHG
BH CV ECHO MEAS - LV V1 MAX: 148 CM/SEC
BH CV ECHO MEAS - LV V1 VTI: 38 CM
BH CV ECHO MEAS - LVIDD: 5 CM
BH CV ECHO MEAS - LVIDS: 3.1 CM
BH CV ECHO MEAS - LVOT AREA: 2.8 CM2
BH CV ECHO MEAS - LVOT DIAM: 1.9 CM
BH CV ECHO MEAS - LVPWD: 1.49 CM
BH CV ECHO MEAS - MED PEAK E' VEL: 5 CM/SEC
BH CV ECHO MEAS - MV A MAX VEL: 60.7 CM/SEC
BH CV ECHO MEAS - MV DEC SLOPE: 468 CM/SEC2
BH CV ECHO MEAS - MV DEC TIME: 0.22 MSEC
BH CV ECHO MEAS - MV E MAX VEL: 84.4 CM/SEC
BH CV ECHO MEAS - MV E/A: 1.39
BH CV ECHO MEAS - MV P1/2T: 68.8 MSEC
BH CV ECHO MEAS - MVA(P1/2T): 3.2 CM2
BH CV ECHO MEAS - PA ACC SLOPE: 640 CM/SEC2
BH CV ECHO MEAS - PA ACC TIME: 0.13 SEC
BH CV ECHO MEAS - PA PR(ACCEL): 21.9 MMHG
BH CV ECHO MEAS - PI END-D VEL: 93.7 CM/SEC
BH CV ECHO MEAS - SV(LVOT): 107.7 ML
BH CV ECHO MEAS - SV(MOD-SP2): 27 ML
BH CV ECHO MEAS - SV(MOD-SP4): 39 ML
BH CV ECHO MEAS - TAPSE (>1.6): 1.9 CM
BH CV ECHO MEAS - TR MAX PG: 32.9 MMHG
BH CV ECHO MEAS - TR MAX VEL: 287 CM/SEC
BH CV ECHO MEASUREMENTS AVERAGE E/E' RATIO: 14.81
BH CV VAS BP RIGHT ARM: NORMAL MMHG
BH CV XLRA - RV BASE: 2.8 CM
BH CV XLRA - RV LENGTH: 5.7 CM
BH CV XLRA - RV MID: 2.4 CM
BH CV XLRA - TDI S': 11.6 CM/SEC
IVRT: 87 MSEC
LEFT ATRIUM VOLUME INDEX: 47.3 ML/M2
MAXIMAL PREDICTED HEART RATE: 139 BPM
STRESS TARGET HR: 118 BPM

## 2023-07-18 ENCOUNTER — OFFICE VISIT (OUTPATIENT)
Dept: FAMILY MEDICINE CLINIC | Facility: CLINIC | Age: 83
End: 2023-07-18
Payer: MEDICARE

## 2023-07-18 VITALS
BODY MASS INDEX: 26.75 KG/M2 | TEMPERATURE: 98.7 F | WEIGHT: 151 LBS | DIASTOLIC BLOOD PRESSURE: 76 MMHG | SYSTOLIC BLOOD PRESSURE: 130 MMHG | HEART RATE: 65 BPM | RESPIRATION RATE: 16 BRPM | HEIGHT: 63 IN

## 2023-07-18 DIAGNOSIS — R10.9 FLANK PAIN: Primary | ICD-10-CM

## 2023-07-18 LAB
BILIRUB BLD-MCNC: NEGATIVE MG/DL
CLARITY, POC: CLEAR
COLOR UR: YELLOW
EXPIRATION DATE: NORMAL
GLUCOSE UR STRIP-MCNC: NEGATIVE MG/DL
KETONES UR QL: NEGATIVE
LEUKOCYTE EST, POC: NEGATIVE
Lab: NORMAL
NITRITE UR-MCNC: NEGATIVE MG/ML
PH UR: 6 [PH] (ref 5–8)
PROT UR STRIP-MCNC: NEGATIVE MG/DL
RBC # UR STRIP: NEGATIVE /UL
SP GR UR: 1.02 (ref 1–1.03)
UROBILINOGEN UR QL: NORMAL

## 2023-07-18 PROCEDURE — 3078F DIAST BP <80 MM HG: CPT

## 2023-07-18 PROCEDURE — 81003 URINALYSIS AUTO W/O SCOPE: CPT

## 2023-07-18 PROCEDURE — 99213 OFFICE O/P EST LOW 20 MIN: CPT

## 2023-07-18 PROCEDURE — 3075F SYST BP GE 130 - 139MM HG: CPT

## 2023-07-18 RX ORDER — DOXYCYCLINE 100 MG/1
100 TABLET ORAL 2 TIMES DAILY
Qty: 20 TABLET | Refills: 0 | Status: SHIPPED | OUTPATIENT
Start: 2023-07-18 | End: 2023-07-28

## 2023-07-24 ENCOUNTER — TELEPHONE (OUTPATIENT)
Dept: FAMILY MEDICINE CLINIC | Facility: CLINIC | Age: 83
End: 2023-07-24

## 2023-07-24 ENCOUNTER — OFFICE VISIT (OUTPATIENT)
Dept: FAMILY MEDICINE CLINIC | Facility: CLINIC | Age: 83
End: 2023-07-24
Payer: MEDICARE

## 2023-07-24 VITALS
BODY MASS INDEX: 26.51 KG/M2 | WEIGHT: 149.6 LBS | HEART RATE: 55 BPM | SYSTOLIC BLOOD PRESSURE: 128 MMHG | DIASTOLIC BLOOD PRESSURE: 60 MMHG | OXYGEN SATURATION: 98 % | TEMPERATURE: 97.6 F | HEIGHT: 63 IN

## 2023-07-24 DIAGNOSIS — Z87.442 FLANK PAIN WITH HISTORY OF UROLITHIASIS: ICD-10-CM

## 2023-07-24 DIAGNOSIS — R10.9 FLANK PAIN WITH HISTORY OF UROLITHIASIS: ICD-10-CM

## 2023-07-24 DIAGNOSIS — R30.0 BURNING WITH URINATION: Primary | ICD-10-CM

## 2023-07-24 LAB
BILIRUB BLD-MCNC: ABNORMAL MG/DL
CLARITY, POC: CLEAR
COLOR UR: YELLOW
EXPIRATION DATE: ABNORMAL
GLUCOSE UR STRIP-MCNC: NEGATIVE MG/DL
KETONES UR QL: ABNORMAL
LEUKOCYTE EST, POC: NEGATIVE
Lab: ABNORMAL
NITRITE UR-MCNC: NEGATIVE MG/ML
PH UR: 6 [PH] (ref 5–8)
PROT UR STRIP-MCNC: ABNORMAL MG/DL
RBC # UR STRIP: NEGATIVE /UL
SP GR UR: 1.03 (ref 1–1.03)
UROBILINOGEN UR QL: NORMAL

## 2023-07-24 PROCEDURE — 3078F DIAST BP <80 MM HG: CPT | Performed by: FAMILY MEDICINE

## 2023-07-24 PROCEDURE — 99214 OFFICE O/P EST MOD 30 MIN: CPT | Performed by: FAMILY MEDICINE

## 2023-07-24 PROCEDURE — 3074F SYST BP LT 130 MM HG: CPT | Performed by: FAMILY MEDICINE

## 2023-07-24 PROCEDURE — 81003 URINALYSIS AUTO W/O SCOPE: CPT | Performed by: FAMILY MEDICINE

## 2023-07-24 NOTE — PROGRESS NOTES
"Subjective   Cheryl Tomas is a 83 y.o. female.     Flank Pain  This is a recurrent problem. The current episode started in the past 7 days. The problem occurs constantly. The problem has been gradually improving since onset. The pain is present in the thoracic spine. The quality of the pain is described as aching. The pain is at a severity of 3/10. The pain is mild. Associated symptoms include dysuria. She has tried ice and heat for the symptoms. The treatment provided no relief.  she reports that she has had some urinary discomfort off and on.  She is currently on antibiotics and she thinks that the symptoms are a little bit better today but yesterday she was having some right flank pain no nausea or vomiting.  She is tolerating the antibiotic okay no fever or chills.  No nausea or vomiting.  She reports that today the pain is better but it is similar pain to when she had a kidney stone previously.  In the past she had to have surgery to have the stone removed.    The following portions of the patient's history were reviewed and updated as appropriate: allergies, current medications, past family history, past medical history, past social history, past surgical history, and problem list.    Review of Systems   Constitutional: Negative.    HENT: Negative.     Eyes: Negative.    Respiratory: Negative.     Cardiovascular: Negative.    Gastrointestinal: Negative.    Endocrine: Negative.    Genitourinary:  Positive for dysuria and flank pain.   Skin: Negative.    Allergic/Immunologic: Negative.    Neurological: Negative.    Hematological: Negative.    Psychiatric/Behavioral: Negative.     All other systems reviewed and are negative.    Objective     Vitals:    07/24/23 1320   BP: 128/60   Pulse: 55   Temp: 97.6 °F (36.4 °C)   SpO2: 98%   Weight: 67.9 kg (149 lb 9.6 oz)   Height: 160 cm (62.99\")       Physical Exam  Vitals and nursing note reviewed.   Constitutional:       Appearance: She is well-developed.   HENT:    "   Head: Normocephalic and atraumatic.   Eyes:      General:         Right eye: No discharge.         Left eye: No discharge.      Pupils: Pupils are equal, round, and reactive to light.   Cardiovascular:      Rate and Rhythm: Normal rate and regular rhythm.      Heart sounds: Normal heart sounds.   Pulmonary:      Effort: Pulmonary effort is normal.      Breath sounds: Normal breath sounds.   Abdominal:      General: Bowel sounds are normal.      Palpations: Abdomen is soft. There is no mass.      Tenderness: There is no abdominal tenderness.   Musculoskeletal:         General: Normal range of motion.      Right shoulder: No swelling.      Cervical back: Normal range of motion and neck supple.   Skin:     General: Skin is warm and dry.      Nails: There is no clubbing.   Neurological:      Mental Status: She is alert and oriented to person, place, and time.      Deep Tendon Reflexes: Reflexes are normal and symmetric.   Psychiatric:         Behavior: Behavior normal.         Thought Content: Thought content normal.         Judgment: Judgment normal.       Assessment & Plan     Problem List Items Addressed This Visit    None  Visit Diagnoses       Burning with urination    -  Primary    Relevant Orders    Urine Culture - , Urine, Clean Catch    POC Urinalysis Dipstick, Automated (Completed)    Flank pain with history of urolithiasis        Relevant Orders    CT Abdomen Pelvis Stone Protocol          If symptoms worsen or she develops nausea or vomiting or fever chills or night sweats she should go to the ER.  Increase fluids rest could consider given her shot of Toradol if she has kidney stone the pain seems better today

## 2023-07-24 NOTE — TELEPHONE ENCOUNTER
Caller: Cheryl Tomas    Relationship: Self    Best call back number: [phone]    What is the best time to reach you: ANYTIME     Who are you requesting to speak with (clinical staff, provider,  specific staff member): DOCTOR OR NURSE         What was the call regarding: THE PATIENT STATES THAT SHE WAS SEEN BY CHELSEY ROSEN AND TREATED FOR A UTI BUT HER SYMPTOMS ARE NOT GETTING BETTER SHE HAS A LOT OF PAIN IN HER RIGHT KIDNEY AREA PLEASE CALL THE PATIENT TO LET HER KNOW WHAT TO DO

## 2023-07-24 NOTE — TELEPHONE ENCOUNTER
I called the pt and got her scheduled with Dr. Perea today. She was agreeable to the date and time.

## 2023-07-25 ENCOUNTER — TELEPHONE (OUTPATIENT)
Dept: FAMILY MEDICINE CLINIC | Facility: CLINIC | Age: 83
End: 2023-07-25
Payer: MEDICARE

## 2023-07-25 NOTE — TELEPHONE ENCOUNTER
"Provider: IVAN THURSTON MD    Caller: VALENTINO HIDALGO    Relationship to Patient: SELF    Phone Number: [phone]     Reason for Call: -PATIENT RETURNING CALL TO THE OFFICE. HUB HAS RELAYED,\"HUB TO READ\" MESSAGE TO PATIENT. PATIENT VERBALIZED UNDERSTANDING .      "

## 2023-07-25 NOTE — TELEPHONE ENCOUNTER
Hub to read I did call patient I have scheduled her an appt for ct at [address] the [facility] in [place] for Friday at 345 pm

## 2023-07-28 ENCOUNTER — HOSPITAL ENCOUNTER (OUTPATIENT)
Dept: CT IMAGING | Facility: HOSPITAL | Age: 83
Discharge: HOME | End: 2023-07-28
Admitting: FAMILY MEDICINE
Payer: MEDICARE

## 2023-07-28 DIAGNOSIS — Z87.442 FLANK PAIN WITH HISTORY OF UROLITHIASIS: ICD-10-CM

## 2023-07-28 DIAGNOSIS — R10.9 FLANK PAIN WITH HISTORY OF UROLITHIASIS: ICD-10-CM

## 2023-07-28 PROCEDURE — 74176 CT ABD & PELVIS W/O CONTRAST: CPT

## 2023-09-01 DIAGNOSIS — I10 ESSENTIAL HYPERTENSION: ICD-10-CM

## 2023-09-01 RX ORDER — CARVEDILOL 12.5 MG/1
12.5 TABLET ORAL 2 TIMES DAILY
Qty: 180 TABLET | Refills: 0 | Status: SHIPPED | OUTPATIENT
Start: 2023-09-01 | End: 2024-08-26

## 2023-09-01 NOTE — TELEPHONE ENCOUNTER
Caller: Thom Cheryl BETH    Relationship: Self    Best call back number: [phone]    Requested Prescriptions:   Requested Prescriptions     Pending Prescriptions Disp Refills    carvedilol (COREG) 12.5 MG tablet 180 tablet 3     Sig: Take 1 tablet by mouth 2 (Two) Times a Day for 360 days.        Pharmacy where request should be sent: [facility] - [facility] [address] - [phone]  - [phone]      Last office visit with prescribing clinician: 3/29/2023   Last telemedicine visit with prescribing clinician: Visit date not found   Next office visit with prescribing clinician: 11/29/2023     Additional details provided by patient: HAS PLENTY REMAINING, BUT [facility] KEEPS CALLING ABOUT THE RX EXPIRING     Does the patient have less than a 3 day supply:  [] Yes  [x] No    Would you like a call back once the refill request has been completed: [x] Yes [] No    If the office needs to give you a call back, can they leave a voicemail: [x] Yes [] No    Lucio Bentley Rep   09/01/23 12:58 EDT

## 2023-09-05 ENCOUNTER — TELEPHONE (OUTPATIENT)
Dept: FAMILY MEDICINE CLINIC | Facility: CLINIC | Age: 83
End: 2023-09-05

## 2023-09-05 NOTE — TELEPHONE ENCOUNTER
Caller: Cheryl Tomas    Relationship: Self    Best call back number: [phone]     What is the best time to reach you: ANY TIME    Who are you requesting to speak with (clinical staff, provider,  specific staff member): DR THURSTON    Do you know the name of the person who called: CHERYL    What was the call regarding: PATIENT IS REQUESTING A CALL BACK WITH DR THURSTON' RECOMMENDATIONS FOR ANOTHER PRIMARY CARE DOCTOR AND CARDIOLOGIST WHO IS IN HER NETWORK WITH [facility] [employer].     PATIENT STATED THAT SHE SPOKE WITH [facility] BUT IS CONCERNED ABOUT WHAT TO DO IF THE NEGOTIATIONS WITH [facility] AND [facility] FALL THROUGH.    PLEASE ADVISE

## 2023-09-25 DIAGNOSIS — I10 ESSENTIAL HYPERTENSION: ICD-10-CM

## 2023-09-25 DIAGNOSIS — E79.0 ELEVATED URIC ACID IN BLOOD: ICD-10-CM

## 2023-09-25 DIAGNOSIS — E78.00 PURE HYPERCHOLESTEROLEMIA: ICD-10-CM

## 2023-09-25 RX ORDER — DILTIAZEM HYDROCHLORIDE 240 MG/1
CAPSULE, COATED, EXTENDED RELEASE ORAL
Qty: 90 CAPSULE | Refills: 3 | Status: SHIPPED | OUTPATIENT
Start: 2023-09-25

## 2023-09-25 RX ORDER — TRIAMTERENE AND HYDROCHLOROTHIAZIDE 37.5; 25 MG/1; MG/1
TABLET ORAL
Qty: 90 TABLET | Refills: 3 | Status: SHIPPED | OUTPATIENT
Start: 2023-09-25

## 2023-09-25 RX ORDER — PRAVASTATIN SODIUM 40 MG
TABLET ORAL
Qty: 90 TABLET | Refills: 0 | Status: SHIPPED | OUTPATIENT
Start: 2023-09-25

## 2023-09-25 RX ORDER — ALLOPURINOL 300 MG/1
TABLET ORAL
Qty: 90 TABLET | Refills: 3 | Status: SHIPPED | OUTPATIENT
Start: 2023-09-25

## 2023-09-25 RX ORDER — IRBESARTAN 300 MG/1
TABLET ORAL
Qty: 90 TABLET | Refills: 3 | Status: SHIPPED | OUTPATIENT
Start: 2023-09-25

## 2023-10-19 ENCOUNTER — HOSPITAL ENCOUNTER (EMERGENCY)
Facility: HOSPITAL | Age: 83
Discharge: HOME | End: 2023-10-20
Attending: EMERGENCY MEDICINE
Payer: MEDICARE

## 2023-10-19 DIAGNOSIS — Z86.39 HISTORY OF HYPERLIPIDEMIA: ICD-10-CM

## 2023-10-19 DIAGNOSIS — Z87.2 HISTORY OF ACTINIC KERATOSES: ICD-10-CM

## 2023-10-19 DIAGNOSIS — H92.01 POSTERIOR AURICULAR PAIN OF RIGHT EAR: Primary | ICD-10-CM

## 2023-10-19 DIAGNOSIS — Z86.39 HISTORY OF DIABETES MELLITUS: ICD-10-CM

## 2023-10-19 DIAGNOSIS — N18.9 CHRONIC RENAL IMPAIRMENT, UNSPECIFIED CKD STAGE: ICD-10-CM

## 2023-10-19 DIAGNOSIS — I10 ELEVATED BLOOD PRESSURE READING WITH DIAGNOSIS OF HYPERTENSION: ICD-10-CM

## 2023-10-19 PROCEDURE — 93005 ELECTROCARDIOGRAM TRACING: CPT

## 2023-10-19 PROCEDURE — 99284 EMERGENCY DEPT VISIT MOD MDM: CPT

## 2023-10-19 PROCEDURE — 93005 ELECTROCARDIOGRAM TRACING: CPT | Performed by: EMERGENCY MEDICINE

## 2023-10-20 ENCOUNTER — APPOINTMENT (OUTPATIENT)
Dept: CT IMAGING | Facility: HOSPITAL | Age: 83
End: 2023-10-20
Payer: MEDICARE

## 2023-10-20 VITALS
HEART RATE: 60 BPM | SYSTOLIC BLOOD PRESSURE: 176 MMHG | BODY MASS INDEX: 24.27 KG/M2 | DIASTOLIC BLOOD PRESSURE: 76 MMHG | HEIGHT: 63 IN | TEMPERATURE: 99 F | OXYGEN SATURATION: 94 % | WEIGHT: 137 LBS | RESPIRATION RATE: 20 BRPM

## 2023-10-20 LAB
ALBUMIN SERPL-MCNC: 4.3 G/DL (ref 3.5–5.2)
ALBUMIN/GLOB SERPL: 1.7 G/DL
ALP SERPL-CCNC: 76 U/L (ref 39–117)
ALT SERPL W P-5'-P-CCNC: 10 U/L (ref 1–33)
ANION GAP SERPL CALCULATED.3IONS-SCNC: 11 MMOL/L (ref 5–15)
AST SERPL-CCNC: 14 U/L (ref 1–32)
BASOPHILS # BLD AUTO: 0.04 10*3/MM3 (ref 0–0.2)
BASOPHILS NFR BLD AUTO: 0.6 % (ref 0–1.5)
BILIRUB SERPL-MCNC: 0.3 MG/DL (ref 0–1.2)
BUN SERPL-MCNC: 32 MG/DL (ref 8–23)
BUN/CREAT SERPL: 26.2 (ref 7–25)
CALCIUM SPEC-SCNC: 9.9 MG/DL (ref 8.6–10.5)
CHLORIDE SERPL-SCNC: 107 MMOL/L (ref 98–107)
CO2 SERPL-SCNC: 25 MMOL/L (ref 22–29)
CREAT BLDA-MCNC: 1.3 MG/DL
CREAT BLDA-MCNC: 1.3 MG/DL (ref 0.6–1.3)
CREAT SERPL-MCNC: 1.22 MG/DL (ref 0.57–1)
CRP SERPL-MCNC: 1.17 MG/DL (ref 0–0.5)
D-LACTATE SERPL-SCNC: 0.7 MMOL/L (ref 0.5–2)
DEPRECATED RDW RBC AUTO: 43.1 FL (ref 37–54)
EGFRCR SERPLBLD CKD-EPI 2021: 44.7 ML/MIN/1.73
EOSINOPHIL # BLD AUTO: 0.11 10*3/MM3 (ref 0–0.4)
EOSINOPHIL NFR BLD AUTO: 1.6 % (ref 0.3–6.2)
ERYTHROCYTE [DISTWIDTH] IN BLOOD BY AUTOMATED COUNT: 13.2 % (ref 12.3–15.4)
ERYTHROCYTE [SEDIMENTATION RATE] IN BLOOD: 40 MM/HR (ref 0–30)
GLOBULIN UR ELPH-MCNC: 2.6 GM/DL
GLUCOSE SERPL-MCNC: 114 MG/DL (ref 65–99)
HCT VFR BLD AUTO: 38.4 % (ref 34–46.6)
HGB BLD-MCNC: 12.5 G/DL (ref 12–15.9)
IMM GRANULOCYTES # BLD AUTO: 0.02 10*3/MM3 (ref 0–0.05)
IMM GRANULOCYTES NFR BLD AUTO: 0.3 % (ref 0–0.5)
LYMPHOCYTES # BLD AUTO: 1.74 10*3/MM3 (ref 0.7–3.1)
LYMPHOCYTES NFR BLD AUTO: 25.4 % (ref 19.6–45.3)
MCH RBC QN AUTO: 29.5 PG (ref 26.6–33)
MCHC RBC AUTO-ENTMCNC: 32.6 G/DL (ref 31.5–35.7)
MCV RBC AUTO: 90.6 FL (ref 79–97)
MONOCYTES # BLD AUTO: 0.46 10*3/MM3 (ref 0.1–0.9)
MONOCYTES NFR BLD AUTO: 6.7 % (ref 5–12)
NEUTROPHILS NFR BLD AUTO: 4.49 10*3/MM3 (ref 1.7–7)
NEUTROPHILS NFR BLD AUTO: 65.4 % (ref 42.7–76)
NRBC BLD AUTO-RTO: 0 /100 WBC (ref 0–0.2)
PLATELET # BLD AUTO: 228 10*3/MM3 (ref 140–450)
PMV BLD AUTO: 10.1 FL (ref 6–12)
POTASSIUM SERPL-SCNC: 3.7 MMOL/L (ref 3.5–5.2)
PROCALCITONIN SERPL-MCNC: 0.06 NG/ML (ref 0–0.25)
PROT SERPL-MCNC: 6.9 G/DL (ref 6–8.5)
QT INTERVAL: 436 MS
QTC INTERVAL: 467 MS
RBC # BLD AUTO: 4.24 10*6/MM3 (ref 3.77–5.28)
SODIUM SERPL-SCNC: 143 MMOL/L (ref 136–145)
WBC NRBC COR # BLD: 6.86 10*3/MM3 (ref 3.4–10.8)

## 2023-10-20 PROCEDURE — 25010000002 MORPHINE PER 10 MG: Performed by: EMERGENCY MEDICINE

## 2023-10-20 PROCEDURE — 84145 PROCALCITONIN (PCT): CPT | Performed by: EMERGENCY MEDICINE

## 2023-10-20 PROCEDURE — 85652 RBC SED RATE AUTOMATED: CPT | Performed by: EMERGENCY MEDICINE

## 2023-10-20 PROCEDURE — 86140 C-REACTIVE PROTEIN: CPT | Performed by: EMERGENCY MEDICINE

## 2023-10-20 PROCEDURE — 85025 COMPLETE CBC W/AUTO DIFF WBC: CPT | Performed by: EMERGENCY MEDICINE

## 2023-10-20 PROCEDURE — 25010000002 ONDANSETRON PER 1 MG: Performed by: EMERGENCY MEDICINE

## 2023-10-20 PROCEDURE — 96375 TX/PRO/DX INJ NEW DRUG ADDON: CPT

## 2023-10-20 PROCEDURE — 83605 ASSAY OF LACTIC ACID: CPT | Performed by: EMERGENCY MEDICINE

## 2023-10-20 PROCEDURE — 82565 ASSAY OF CREATININE: CPT

## 2023-10-20 PROCEDURE — 70486 CT MAXILLOFACIAL W/O DYE: CPT

## 2023-10-20 PROCEDURE — 96374 THER/PROPH/DIAG INJ IV PUSH: CPT

## 2023-10-20 PROCEDURE — 80053 COMPREHEN METABOLIC PANEL: CPT | Performed by: EMERGENCY MEDICINE

## 2023-10-20 PROCEDURE — 70450 CT HEAD/BRAIN W/O DYE: CPT

## 2023-10-20 RX ORDER — MORPHINE SULFATE 2 MG/ML
2 INJECTION, SOLUTION INTRAMUSCULAR; INTRAVENOUS ONCE
Status: COMPLETED | OUTPATIENT
Start: 2023-10-20 | End: 2023-10-20

## 2023-10-20 RX ORDER — CLONIDINE HYDROCHLORIDE 0.1 MG/1
0.1 TABLET ORAL ONCE
Status: DISCONTINUED | OUTPATIENT
Start: 2023-10-20 | End: 2023-10-20

## 2023-10-20 RX ORDER — ONDANSETRON 2 MG/ML
4 INJECTION INTRAMUSCULAR; INTRAVENOUS ONCE
Status: COMPLETED | OUTPATIENT
Start: 2023-10-20 | End: 2023-10-20

## 2023-10-20 RX ADMIN — MORPHINE SULFATE 2 MG: 2 INJECTION, SOLUTION INTRAMUSCULAR; INTRAVENOUS at 00:50

## 2023-10-20 RX ADMIN — ONDANSETRON 4 MG: 2 INJECTION INTRAMUSCULAR; INTRAVENOUS at 00:53

## 2023-10-20 NOTE — ED PROVIDER NOTES
Subjective   History of Present Illness  This is a 83-year-old female that presents the ER with pain behind the right ear x5 days.  Patient denies any swelling or skin changes.  She does have chronic actinic keratoses with scaled lesions, but there is no swelling, redness, or warmth.  She denies any tenderness to palpation to the postauricular region.  She denies any drainage from the right ear.  She denies any fever or chills.  She denies any recent URI symptoms including rhinorrhea, nasal congestion, or cough.  She denies dizziness, visual changes, or headache.  Patient has been trying cool compresses/ice to this area to see if it would help.  She was concerned and wanted to make sure she was not having any type of stroke.  She denies any speech changes or difficulty thought forming.  She denies any unilateral extremity weakness or numbness/tingling.  She denies any chest pain or shortness of breath.  Blood pressure has been elevated intermittently over the last week, which patient believes is related to pain.  Past medical history is significant for diabetes mellitus, hyperlipidemia, bilateral renal cysts, hypertension, osteoporosis, diverticulosis, and history of pulmonary nodule.    History provided by:  Patient  Earache  Location:  Right  Behind ear:  No abnormality  Quality:  Sharp  Onset quality:  Gradual  Duration:  5 days  Timing:  Constant  Chronicity:  New  Context comment:  Pt reports 5 day h/o sharp pain to post-auricular region. No fever/chils.  No swelling, redness to skin or abscess.  No URI sxs or RN, congestion, or cough.  No HA.  Relieved by:  Nothing  Worsened by:  Nothing  Ineffective treatments:  Ice  Associated symptoms: no abdominal pain, no congestion, no cough, no diarrhea, no ear discharge, no fever, no headaches, no hearing loss, no neck pain, no rash, no rhinorrhea, no sore throat, no tinnitus and no vomiting    Risk factors: no chronic ear infection and no prior ear surgery         Review of Systems   Constitutional: Negative.  Negative for activity change, appetite change, chills, diaphoresis, fatigue and fever.   HENT:  Positive for ear pain (Sharp and intermittent pain to right post-auricular region.). Negative for congestion, dental problem, ear discharge, facial swelling, hearing loss, postnasal drip, rhinorrhea, sinus pressure, sinus pain, sneezing, sore throat, tinnitus and trouble swallowing.         No dental pain.  Pain noted to right postauricular region.  No skin changes or swelling.   Eyes: Negative.  Negative for visual disturbance.   Respiratory: Negative.  Negative for cough.    Cardiovascular: Negative.  Negative for chest pain, palpitations and leg swelling.   Gastrointestinal: Negative.  Negative for abdominal pain, diarrhea and vomiting.   Genitourinary: Negative.  Negative for dysuria, flank pain, frequency and urgency.   Musculoskeletal: Negative.  Negative for back pain and neck pain.   Skin: Negative.  Negative for color change, rash and wound.   Neurological: Negative.  Negative for dizziness, syncope, facial asymmetry, speech difficulty, light-headedness and headaches.   All other systems reviewed and are negative.      Past Medical History:   Diagnosis Date    Bilateral renal cysts     noted CT abdomen 12/31/22    Diabetes mellitus     Diverticulosis     Hyperlipidemia     Hypertension     Osteoporosis     Ovarian cyst     noted on CT abdomen 12/31/22 (left ovary)    Pulmonary nodule less than 6 mm determined by computed tomography of lung     Noted on CT Abdomen from ER visit 12/31/22 (right lung)       Allergies   Allergen Reactions    Cephalexin Itching and Rash    Bactrim [Sulfamethoxazole-Trimethoprim] GI Intolerance       Past Surgical History:   Procedure Laterality Date    APPENDECTOMY      BREAST BIOPSY      CATARACT EXTRACTION, BILATERAL      02/23 and 03/23    FIBULA FRACTURE SURGERY      HYSTERECTOMY      KIDNEY STONE SURGERY         Family History    Problem Relation Age of Onset    Heart disease Mother     Hypertension Mother     Breast cancer Mother         70's    Heart disease Father     Stroke Father     Breast cancer Other         great aunt       Social History     Socioeconomic History    Marital status:    Tobacco Use    Smoking status: Never     Passive exposure: Never    Smokeless tobacco: Never   Vaping Use    Vaping Use: Never used   Substance and Sexual Activity    Alcohol use: No    Drug use: No    Sexual activity: Never           Objective   Physical Exam  Vitals and nursing note reviewed.   Constitutional:       General: She is not in acute distress.     Appearance: Normal appearance. She is not ill-appearing, toxic-appearing or diaphoretic.      Comments: Pleasant elderly female.  No acute sign of pain or distress.   HENT:      Head: Normocephalic and atraumatic. No laceration.        Right Ear: Tympanic membrane normal. No drainage, swelling or tenderness. Tympanic membrane is not perforated, erythematous, retracted or bulging.      Left Ear: Tympanic membrane normal. No drainage, swelling or tenderness. Tympanic membrane is not perforated, erythematous, retracted or bulging.      Ears:      Comments: No tenderness to the right tragus or right mastoid.  See skin exam for details.  External ear canals do not have any swelling or drainage or erythema.  Bilateral TMs are clear.     Nose: Nose normal. No congestion or rhinorrhea.      Right Sinus: No maxillary sinus tenderness or frontal sinus tenderness.      Left Sinus: No maxillary sinus tenderness or frontal sinus tenderness.      Mouth/Throat:      Mouth: Mucous membranes are moist.      Dentition: Normal dentition. No dental tenderness or dental caries.      Pharynx: Oropharynx is clear. No pharyngeal swelling, oropharyngeal exudate or posterior oropharyngeal erythema.      Comments: No dental tenderness or significant caries.  Oral mucous membranes are moist.  Posterior pharynx is  not erythematous.  Eyes:      Extraocular Movements: Extraocular movements intact.      Conjunctiva/sclera: Conjunctivae normal.      Pupils: Pupils are equal, round, and reactive to light.   Neck:      Comments: No cervical lymphadenopathy  Cardiovascular:      Rate and Rhythm: Normal rate and regular rhythm.      Pulses: Normal pulses.      Heart sounds: Normal heart sounds.   Pulmonary:      Effort: Pulmonary effort is normal.      Breath sounds: Normal breath sounds.   Abdominal:      General: Bowel sounds are normal.      Palpations: Abdomen is soft.   Musculoskeletal:         General: Normal range of motion.      Cervical back: Normal range of motion and neck supple.   Lymphadenopathy:      Head:      Right side of head: No preauricular or posterior auricular adenopathy.      Cervical: No cervical adenopathy.      Comments: No right pre or postauricular lymphadenopathy   Skin:     General: Skin is warm and dry.      Findings: No bruising or erythema.      Comments: Patient has no confluent erythema, warmth, or swelling to the right postauricular region.  Patient has diffuse scaled lesions to the skin including trunk and extremities consistent with actinic keratoses.  There is no evidence of rash concerning for herpes zoster to the right postauricular region.   Neurological:      General: No focal deficit present.      Mental Status: She is alert and oriented to person, place, and time.      Cranial Nerves: Cranial nerves 2-12 are intact.      Sensory: Sensation is intact.      Motor: Motor function is intact.      Coordination: Coordination is intact.      Comments: Neuro intact and nonfocal   Psychiatric:         Mood and Affect: Mood and affect normal.         Speech: Speech normal.         Behavior: Behavior normal. Behavior is cooperative.         Thought Content: Thought content normal.         Cognition and Memory: Cognition normal.         Judgment: Judgment normal.      Comments: Pleasant and  talkative.  Normal mood and affect.         Procedures           ED Course  ED Course as of 10/20/23 0310   Fri Oct 20, 2023   0215 CBC revealed normal white blood cell count and normal differential.  Chemistries reveal BUN and creatinine 32 and 1.22.  Baseline creatinine according to epic is around 1.2 to 1.4.  Lactic acid is normal and procalcitonin was within normal limits.  Sed rate is 40 and CRP is mildly elevated at 1.17.  CT of the brain and facial bones without contrast reveals no acute intracranial process and no evidence of fracture or dislocation of the facial bones.  Mastoid air cells and paranasal sinuses appear clear.  Discussed the case in detail with Dr. Merritt, ER attending physician.  Patient did not have any swelling or skin changes to the right postauricular region.  This area was nontender to palpation.  Pain comes and goes and is sharp in nature.  She does not have any lymphadenopathy around this region, as well.  She is afebrile.  Blood pressure was initially elevated at 192/89.  We gave a dose of IV morphine and Zofran and blood pressure stabilized to 175/74.  Recommend for stable recheck with PCP or return to the ER if worsening symptoms.  Patient also advised to keep a close eye on the skin to ensure that she is not developing a rash which would be concerning for herpes zoster.  Patient verbalized understanding. [FC]   0306 Initial EKG was reviewed with Dr. Merritt.  There seem to be increased artifact so we repeated EKG and it showed normal sinus rhythm.  There was no evidence of acute ST-T wave changes consistent with ischemia.  Patient ready for discharge to home. [FC]      ED Course User Index  [FC] Hailey Barros PA-C           Recent Results (from the past 24 hour(s))   ECG 12 Lead Other; HTN    Collection Time: 10/19/23 10:12 PM   Result Value Ref Range    QT Interval 436 ms    QTC Interval 467 ms   Comprehensive Metabolic Panel    Collection Time: 10/20/23 12:44 AM     Specimen: Blood   Result Value Ref Range    Glucose 114 (H) 65 - 99 mg/dL    BUN 32 (H) 8 - 23 mg/dL    Creatinine 1.22 (H) 0.57 - 1.00 mg/dL    Sodium 143 136 - 145 mmol/L    Potassium 3.7 3.5 - 5.2 mmol/L    Chloride 107 98 - 107 mmol/L    CO2 25.0 22.0 - 29.0 mmol/L    Calcium 9.9 8.6 - 10.5 mg/dL    Total Protein 6.9 6.0 - 8.5 g/dL    Albumin 4.3 3.5 - 5.2 g/dL    ALT (SGPT) 10 1 - 33 U/L    AST (SGOT) 14 1 - 32 U/L    Alkaline Phosphatase 76 39 - 117 U/L    Total Bilirubin 0.3 0.0 - 1.2 mg/dL    Globulin 2.6 gm/dL    A/G Ratio 1.7 g/dL    BUN/Creatinine Ratio 26.2 (H) 7.0 - 25.0    Anion Gap 11.0 5.0 - 15.0 mmol/L    eGFR 44.7 (L) >60.0 mL/min/1.73   Procalcitonin    Collection Time: 10/20/23 12:44 AM    Specimen: Blood   Result Value Ref Range    Procalcitonin 0.06 0.00 - 0.25 ng/mL   Lactic Acid, Plasma    Collection Time: 10/20/23 12:44 AM    Specimen: Blood   Result Value Ref Range    Lactate 0.7 0.5 - 2.0 mmol/L   C-reactive Protein    Collection Time: 10/20/23 12:44 AM    Specimen: Blood   Result Value Ref Range    C-Reactive Protein 1.17 (H) 0.00 - 0.50 mg/dL   Sedimentation Rate    Collection Time: 10/20/23 12:44 AM    Specimen: Blood   Result Value Ref Range    Sed Rate 40 (H) 0 - 30 mm/hr   CBC Auto Differential    Collection Time: 10/20/23 12:44 AM    Specimen: Blood   Result Value Ref Range    WBC 6.86 3.40 - 10.80 10*3/mm3    RBC 4.24 3.77 - 5.28 10*6/mm3    Hemoglobin 12.5 12.0 - 15.9 g/dL    Hematocrit 38.4 34.0 - 46.6 %    MCV 90.6 79.0 - 97.0 fL    MCH 29.5 26.6 - 33.0 pg    MCHC 32.6 31.5 - 35.7 g/dL    RDW 13.2 12.3 - 15.4 %    RDW-SD 43.1 37.0 - 54.0 fl    MPV 10.1 6.0 - 12.0 fL    Platelets 228 140 - 450 10*3/mm3    Neutrophil % 65.4 42.7 - 76.0 %    Lymphocyte % 25.4 19.6 - 45.3 %    Monocyte % 6.7 5.0 - 12.0 %    Eosinophil % 1.6 0.3 - 6.2 %    Basophil % 0.6 0.0 - 1.5 %    Immature Grans % 0.3 0.0 - 0.5 %    Neutrophils, Absolute 4.49 1.70 - 7.00 10*3/mm3    Lymphocytes, Absolute 1.74  0.70 - 3.10 10*3/mm3    Monocytes, Absolute 0.46 0.10 - 0.90 10*3/mm3    Eosinophils, Absolute 0.11 0.00 - 0.40 10*3/mm3    Basophils, Absolute 0.04 0.00 - 0.20 10*3/mm3    Immature Grans, Absolute 0.02 0.00 - 0.05 10*3/mm3    nRBC 0.0 0.0 - 0.2 /100 WBC   POC Creatinine    Collection Time: 10/20/23 12:59 AM    Specimen: Blood   Result Value Ref Range    Creatinine 1.30 0.60 - 1.30 mg/dL   POC Creatinine    Collection Time: 10/20/23  1:01 AM    Specimen: Blood   Result Value Ref Range    Creatinine 1.30 mg/dL     Note: In addition to lab results from this visit, the labs listed above may include labs taken at another facility or during a different encounter within the last 24 hours. Please correlate lab times with ED admission and discharge times for further clarification of the services performed during this visit.    CT Head Without Contrast   Final Result   Impression:   1.No acute intracranial process.   2.No evidence of fracture or dislocation of the facial bones. Mastoid air cells and paranasal sinuses appear clear.            Electronically Signed: Za Guzmán MD     10/20/2023 1:24 AM EDT     Workstation ID: [id number]      CT Facial Bones Without Contrast   Final Result   Impression:   1.No acute intracranial process.   2.No evidence of fracture or dislocation of the facial bones. Mastoid air cells and paranasal sinuses appear clear.            Electronically Signed: Za Guzmán MD     10/20/2023 1:24 AM EDT     Workstation ID: [id number]        Vitals:    10/20/23 0102 10/20/23 0130 10/20/23 0202 10/20/23 0300   BP: (!) 182/72 (!) 186/82 175/74 (!) 186/79   Pulse: 66 69 59 64   Resp:       Temp:       TempSrc:       SpO2: 91% 93% 92% 92%   Weight:       Height:         Medications   morphine injection 2 mg (2 mg Intravenous Given 10/20/23 0050)   ondansetron (ZOFRAN) injection 4 mg (4 mg Intravenous Given 10/20/23 0053)     ECG/EMG Results (last 24 hours)       Procedure Component Value Units Date/Time     ECG 12 Lead Other; HTN [[id number]] Collected: 10/19/23 2212     Updated: 10/19/23 2212     QT Interval 436 ms      QTC Interval 467 ms     Narrative:      Test Reason : HTN  Blood Pressure :   */*   mmHG  Vent. Rate :  69 BPM     Atrial Rate : 258 BPM     P-R Int :   * ms          QRS Dur : 146 ms      QT Int : 436 ms       P-R-T Axes : 137 -53   4 degrees     QTc Int : 467 ms    Atrial flutter  Right bundle branch block  Left anterior fascicular block  ** Bifascicular block **  Septal infarct , age undetermined  Abnormal ECG  When compared with ECG of 20-AUG-2012 00:53,  Atrial flutter has replaced Sinus rhythm  (RBBB and left anterior fascicular block) is now present    Referred By: EDMD           Confirmed By:           ECG 12 Lead Other; HTN   Final Result   Test Reason : HTN   Blood Pressure :   */*   mmHG   Vent. Rate :  69 BPM     Atrial Rate : 258 BPM      P-R Int :   * ms          QRS Dur : 146 ms       QT Int : 436 ms       P-R-T Axes : 137 -53   4 degrees      QTc Int : 467 ms      artifact with normal rhythm   Right bundle branch block   Left anterior fascicular block   ** Bifascicular block **   Septal infarct , age undetermined   Abnormal ECG   When compared with ECG of 20-AUG-2012 00:53,   (RBBB and left anterior fascicular block) is now present   Confirmed by Ori Merritt ([id number]) on 10/20/2023 2:27:41 AM      Referred By: LINDA           Confirmed By: Ori Merritt      ECG 12 Lead Other; Concern that initial EKG had artifact.  Please repeat.    (Results Pending)                                         Medical Decision Making  Problems Addressed:  Chronic renal impairment, unspecified CKD stage: complicated acute illness or injury  Elevated blood pressure reading with diagnosis of hypertension: complicated acute illness or injury  History of actinic keratoses: complicated acute illness or injury  History of diabetes mellitus: complicated acute illness or injury  History of hyperlipidemia:  complicated acute illness or injury  Posterior auricular pain of right ear: complicated acute illness or injury    Amount and/or Complexity of Data Reviewed  Labs: ordered.  Radiology: ordered.  ECG/medicine tests: ordered.    Risk  Prescription drug management.        Final diagnoses:   Posterior auricular pain of right ear   Elevated blood pressure reading with diagnosis of hypertension   Chronic renal impairment, unspecified CKD stage   History of diabetes mellitus   History of hyperlipidemia   History of actinic keratoses       ED Disposition  ED Disposition       ED Disposition   Discharge    Condition   Stable    Comment   --               [facility] EMERGENCY DEPARTMENT  [address] [address] [phone]    If symptoms worsen    Routine PCP    Call today  Call today for close recheck         Medication List        Stop      furosemide 20 MG tablet  Commonly known as: Lasix                 Hailey Barros PA-C  10/20/23 0309       Hailey Barros PA-C  10/20/23 0310

## 2023-10-20 NOTE — DISCHARGE INSTRUCTIONS
ER evaluation revealed normal CBC and chemistries.  Sed rate and CRP were mildly elevated but all other inflammatory markers were normal.  CT of the brain and facial bones without contrast revealed no evidence of stroke or other acute process in the brain and there was no evidence of mastoiditis.  There were no skin changes other than chronic actinic keratoses.  Recommend close PCP follow-up for recheck or return to the ER for worsening symptoms.  Tylenol/ibuprofen every 4-6 hours as needed for pain.  Continue with all other current medical management.

## 2023-10-30 DIAGNOSIS — E11.9 TYPE 2 DIABETES MELLITUS WITHOUT COMPLICATION, WITHOUT LONG-TERM CURRENT USE OF INSULIN: ICD-10-CM

## 2023-11-20 DIAGNOSIS — G47.00 INSOMNIA, UNSPECIFIED TYPE: ICD-10-CM

## 2023-11-20 DIAGNOSIS — I10 ESSENTIAL HYPERTENSION: ICD-10-CM

## 2023-11-20 RX ORDER — CARVEDILOL 12.5 MG/1
12.5 TABLET ORAL 2 TIMES DAILY
Qty: 180 TABLET | Refills: 1 | Status: SHIPPED | OUTPATIENT
Start: 2023-11-20

## 2023-11-20 RX ORDER — QUETIAPINE FUMARATE 25 MG/1
25 TABLET, FILM COATED ORAL NIGHTLY
Qty: 90 TABLET | Refills: 10 | Status: SHIPPED | OUTPATIENT
Start: 2023-11-20

## 2023-11-20 NOTE — TELEPHONE ENCOUNTER
Rx Refill Note  Requested Prescriptions     Pending Prescriptions Disp Refills    QUEtiapine (SEROquel) 25 MG tablet [Pharmacy Med Name: QUETIAPINE FUMARATE 25 MG Tablet] 90 tablet 10     Sig: TAKE 1 TABLET EVERY NIGHT      Last office visit with prescribing clinician: 7/24/2023   Last telemedicine visit with prescribing clinician: Visit date not found   Next office visit with prescribing clinician: 1/2/2024                         Would you like a call back once the refill request has been completed: [] Yes [] No    If the office needs to give you a call back, can they leave a voicemail: [] Yes [] No    Coco Moralez MA  11/20/23, 10:57 EST

## 2024-01-26 ENCOUNTER — HOSPITAL ENCOUNTER (INPATIENT)
Facility: HOSPITAL | Age: 84
LOS: 5 days | Discharge: HOME | End: 2024-02-01
Attending: EMERGENCY MEDICINE | Admitting: INTERNAL MEDICINE
Payer: MEDICARE

## 2024-01-26 ENCOUNTER — APPOINTMENT (OUTPATIENT)
Dept: GENERAL RADIOLOGY | Facility: HOSPITAL | Age: 84
End: 2024-01-26
Payer: MEDICARE

## 2024-01-26 DIAGNOSIS — I48.91 ATRIAL FIBRILLATION, UNSPECIFIED TYPE: Primary | ICD-10-CM

## 2024-01-26 DIAGNOSIS — R09.02 HYPOXIA: ICD-10-CM

## 2024-01-26 DIAGNOSIS — I50.9 ACUTE ON CHRONIC CONGESTIVE HEART FAILURE, UNSPECIFIED HEART FAILURE TYPE: ICD-10-CM

## 2024-01-26 PROBLEM — R53.1 WEAKNESS: Status: ACTIVE | Noted: 2017-12-29

## 2024-01-26 PROBLEM — I50.33 ACUTE ON CHRONIC HEART FAILURE WITH PRESERVED EJECTION FRACTION (HFPEF): Status: ACTIVE | Noted: 2024-01-26

## 2024-01-26 LAB
ALBUMIN SERPL-MCNC: 4.4 G/DL (ref 3.5–5.2)
ALBUMIN/GLOB SERPL: 1.9 G/DL
ALP SERPL-CCNC: 110 U/L (ref 39–117)
ALT SERPL W P-5'-P-CCNC: 60 U/L (ref 1–33)
ANION GAP SERPL CALCULATED.3IONS-SCNC: 12 MMOL/L (ref 5–15)
AST SERPL-CCNC: 31 U/L (ref 1–32)
B PARAPERT DNA SPEC QL NAA+PROBE: NOT DETECTED
B PERT DNA SPEC QL NAA+PROBE: NOT DETECTED
BASOPHILS # BLD AUTO: 0.05 10*3/MM3 (ref 0–0.2)
BASOPHILS NFR BLD AUTO: 0.6 % (ref 0–1.5)
BILIRUB SERPL-MCNC: 0.6 MG/DL (ref 0–1.2)
BUN SERPL-MCNC: 17 MG/DL (ref 8–23)
BUN/CREAT SERPL: 14.7 (ref 7–25)
C PNEUM DNA NPH QL NAA+NON-PROBE: NOT DETECTED
CALCIUM SPEC-SCNC: 10 MG/DL (ref 8.6–10.5)
CHLORIDE SERPL-SCNC: 105 MMOL/L (ref 98–107)
CO2 SERPL-SCNC: 27 MMOL/L (ref 22–29)
CREAT SERPL-MCNC: 1.16 MG/DL (ref 0.57–1)
D-LACTATE SERPL-SCNC: 1.1 MMOL/L (ref 0.5–2)
DEPRECATED RDW RBC AUTO: 47.7 FL (ref 37–54)
EGFRCR SERPLBLD CKD-EPI 2021: 47.5 ML/MIN/1.73
EOSINOPHIL # BLD AUTO: 0.14 10*3/MM3 (ref 0–0.4)
EOSINOPHIL NFR BLD AUTO: 1.6 % (ref 0.3–6.2)
ERYTHROCYTE [DISTWIDTH] IN BLOOD BY AUTOMATED COUNT: 14.5 % (ref 12.3–15.4)
FLUAV SUBTYP SPEC NAA+PROBE: NOT DETECTED
FLUBV RNA ISLT QL NAA+PROBE: NOT DETECTED
GEN 5 2HR TROPONIN T REFLEX: 16 NG/L
GLOBULIN UR ELPH-MCNC: 2.3 GM/DL
GLUCOSE BLDC GLUCOMTR-MCNC: 120 MG/DL (ref 70–130)
GLUCOSE SERPL-MCNC: 144 MG/DL (ref 65–99)
HADV DNA SPEC NAA+PROBE: NOT DETECTED
HCOV 229E RNA SPEC QL NAA+PROBE: NOT DETECTED
HCOV HKU1 RNA SPEC QL NAA+PROBE: NOT DETECTED
HCOV NL63 RNA SPEC QL NAA+PROBE: NOT DETECTED
HCOV OC43 RNA SPEC QL NAA+PROBE: NOT DETECTED
HCT VFR BLD AUTO: 39.9 % (ref 34–46.6)
HGB BLD-MCNC: 13 G/DL (ref 12–15.9)
HMPV RNA NPH QL NAA+NON-PROBE: NOT DETECTED
HOLD SPECIMEN: NORMAL
HPIV1 RNA ISLT QL NAA+PROBE: NOT DETECTED
HPIV2 RNA SPEC QL NAA+PROBE: NOT DETECTED
HPIV3 RNA NPH QL NAA+PROBE: NOT DETECTED
HPIV4 P GENE NPH QL NAA+PROBE: NOT DETECTED
IMM GRANULOCYTES # BLD AUTO: 0.03 10*3/MM3 (ref 0–0.05)
IMM GRANULOCYTES NFR BLD AUTO: 0.3 % (ref 0–0.5)
LIPASE SERPL-CCNC: 23 U/L (ref 13–60)
LYMPHOCYTES # BLD AUTO: 1.06 10*3/MM3 (ref 0.7–3.1)
LYMPHOCYTES NFR BLD AUTO: 11.8 % (ref 19.6–45.3)
M PNEUMO IGG SER IA-ACNC: NOT DETECTED
MAGNESIUM SERPL-MCNC: 1.8 MG/DL (ref 1.6–2.4)
MCH RBC QN AUTO: 30.1 PG (ref 26.6–33)
MCHC RBC AUTO-ENTMCNC: 32.6 G/DL (ref 31.5–35.7)
MCV RBC AUTO: 92.4 FL (ref 79–97)
MONOCYTES # BLD AUTO: 0.56 10*3/MM3 (ref 0.1–0.9)
MONOCYTES NFR BLD AUTO: 6.2 % (ref 5–12)
NEUTROPHILS NFR BLD AUTO: 7.16 10*3/MM3 (ref 1.7–7)
NEUTROPHILS NFR BLD AUTO: 79.5 % (ref 42.7–76)
NRBC BLD AUTO-RTO: 0 /100 WBC (ref 0–0.2)
NT-PROBNP SERPL-MCNC: 3266 PG/ML (ref 0–1800)
PLATELET # BLD AUTO: 220 10*3/MM3 (ref 140–450)
PMV BLD AUTO: 10.6 FL (ref 6–12)
POTASSIUM SERPL-SCNC: 3.6 MMOL/L (ref 3.5–5.2)
PROT SERPL-MCNC: 6.7 G/DL (ref 6–8.5)
RBC # BLD AUTO: 4.32 10*6/MM3 (ref 3.77–5.28)
RHINOVIRUS RNA SPEC NAA+PROBE: NOT DETECTED
RSV RNA NPH QL NAA+NON-PROBE: NOT DETECTED
SARS-COV-2 RNA NPH QL NAA+NON-PROBE: NOT DETECTED
SODIUM SERPL-SCNC: 144 MMOL/L (ref 136–145)
TROPONIN T DELTA: -3 NG/L
TROPONIN T SERPL HS-MCNC: 17 NG/L
TROPONIN T SERPL HS-MCNC: 18 NG/L
TROPONIN T SERPL HS-MCNC: 19 NG/L
TROPONIN T SERPL HS-MCNC: 20 NG/L
TSH SERPL DL<=0.05 MIU/L-ACNC: 2.94 UIU/ML (ref 0.27–4.2)
WBC NRBC COR # BLD AUTO: 9 10*3/MM3 (ref 3.4–10.8)
WHOLE BLOOD HOLD COAG: NORMAL
WHOLE BLOOD HOLD SPECIMEN: NORMAL

## 2024-01-26 PROCEDURE — 93010 ELECTROCARDIOGRAM REPORT: CPT | Performed by: INTERNAL MEDICINE

## 2024-01-26 PROCEDURE — 83880 ASSAY OF NATRIURETIC PEPTIDE: CPT | Performed by: EMERGENCY MEDICINE

## 2024-01-26 PROCEDURE — 82948 REAGENT STRIP/BLOOD GLUCOSE: CPT

## 2024-01-26 PROCEDURE — 99214 OFFICE O/P EST MOD 30 MIN: CPT | Performed by: INTERNAL MEDICINE

## 2024-01-26 PROCEDURE — 36415 COLL VENOUS BLD VENIPUNCTURE: CPT

## 2024-01-26 PROCEDURE — 85025 COMPLETE CBC W/AUTO DIFF WBC: CPT | Performed by: EMERGENCY MEDICINE

## 2024-01-26 PROCEDURE — 84484 ASSAY OF TROPONIN QUANT: CPT | Performed by: EMERGENCY MEDICINE

## 2024-01-26 PROCEDURE — 83690 ASSAY OF LIPASE: CPT | Performed by: EMERGENCY MEDICINE

## 2024-01-26 PROCEDURE — 93005 ELECTROCARDIOGRAM TRACING: CPT | Performed by: NURSE PRACTITIONER

## 2024-01-26 PROCEDURE — 99285 EMERGENCY DEPT VISIT HI MDM: CPT

## 2024-01-26 PROCEDURE — 83605 ASSAY OF LACTIC ACID: CPT | Performed by: EMERGENCY MEDICINE

## 2024-01-26 PROCEDURE — 80053 COMPREHEN METABOLIC PANEL: CPT | Performed by: EMERGENCY MEDICINE

## 2024-01-26 PROCEDURE — G0378 HOSPITAL OBSERVATION PER HR: HCPCS

## 2024-01-26 PROCEDURE — 83735 ASSAY OF MAGNESIUM: CPT | Performed by: EMERGENCY MEDICINE

## 2024-01-26 PROCEDURE — 99222 1ST HOSP IP/OBS MODERATE 55: CPT | Performed by: FAMILY MEDICINE

## 2024-01-26 PROCEDURE — 93005 ELECTROCARDIOGRAM TRACING: CPT | Performed by: EMERGENCY MEDICINE

## 2024-01-26 PROCEDURE — 84484 ASSAY OF TROPONIN QUANT: CPT | Performed by: FAMILY MEDICINE

## 2024-01-26 PROCEDURE — 25010000002 FUROSEMIDE PER 20 MG: Performed by: EMERGENCY MEDICINE

## 2024-01-26 PROCEDURE — 71045 X-RAY EXAM CHEST 1 VIEW: CPT

## 2024-01-26 PROCEDURE — 84443 ASSAY THYROID STIM HORMONE: CPT | Performed by: EMERGENCY MEDICINE

## 2024-01-26 PROCEDURE — 0202U NFCT DS 22 TRGT SARS-COV-2: CPT | Performed by: EMERGENCY MEDICINE

## 2024-01-26 PROCEDURE — 84484 ASSAY OF TROPONIN QUANT: CPT | Performed by: NURSE PRACTITIONER

## 2024-01-26 RX ORDER — DILTIAZEM HCL/D5W 125 MG/125
5-15 PLASTIC BAG, INJECTION (ML) INTRAVENOUS
Status: DISCONTINUED | OUTPATIENT
Start: 2024-01-26 | End: 2024-01-26

## 2024-01-26 RX ORDER — ALLOPURINOL 300 MG/1
300 TABLET ORAL DAILY
Status: DISCONTINUED | OUTPATIENT
Start: 2024-01-27 | End: 2024-02-01 | Stop reason: HOSPADM

## 2024-01-26 RX ORDER — FUROSEMIDE 10 MG/ML
40 INJECTION INTRAMUSCULAR; INTRAVENOUS DAILY
Status: DISCONTINUED | OUTPATIENT
Start: 2024-01-27 | End: 2024-01-31

## 2024-01-26 RX ORDER — SODIUM CHLORIDE 9 MG/ML
40 INJECTION, SOLUTION INTRAVENOUS AS NEEDED
Status: DISCONTINUED | OUTPATIENT
Start: 2024-01-26 | End: 2024-02-01 | Stop reason: HOSPADM

## 2024-01-26 RX ORDER — SODIUM CHLORIDE 0.9 % (FLUSH) 0.9 %
10 SYRINGE (ML) INJECTION EVERY 12 HOURS SCHEDULED
Status: DISCONTINUED | OUTPATIENT
Start: 2024-01-26 | End: 2024-02-01 | Stop reason: HOSPADM

## 2024-01-26 RX ORDER — CARVEDILOL 12.5 MG/1
12.5 TABLET ORAL 2 TIMES DAILY WITH MEALS
Status: DISCONTINUED | OUTPATIENT
Start: 2024-01-26 | End: 2024-01-26 | Stop reason: SDUPTHER

## 2024-01-26 RX ORDER — HYDRALAZINE HYDROCHLORIDE 25 MG/1
25 TABLET, FILM COATED ORAL 3 TIMES DAILY
Status: DISCONTINUED | OUTPATIENT
Start: 2024-01-26 | End: 2024-01-29

## 2024-01-26 RX ORDER — PRAVASTATIN SODIUM 40 MG
40 TABLET ORAL NIGHTLY
Status: DISCONTINUED | OUTPATIENT
Start: 2024-01-26 | End: 2024-02-01 | Stop reason: HOSPADM

## 2024-01-26 RX ORDER — ONDANSETRON 2 MG/ML
4 INJECTION INTRAMUSCULAR; INTRAVENOUS EVERY 6 HOURS PRN
Status: DISCONTINUED | OUTPATIENT
Start: 2024-01-26 | End: 2024-02-01 | Stop reason: HOSPADM

## 2024-01-26 RX ORDER — ASPIRIN 81 MG/1
81 TABLET ORAL DAILY
Status: DISCONTINUED | OUTPATIENT
Start: 2024-01-27 | End: 2024-02-01 | Stop reason: HOSPADM

## 2024-01-26 RX ORDER — POLYETHYLENE GLYCOL 3350 17 G/17G
17 POWDER, FOR SOLUTION ORAL DAILY PRN
Status: DISCONTINUED | OUTPATIENT
Start: 2024-01-26 | End: 2024-02-01 | Stop reason: HOSPADM

## 2024-01-26 RX ORDER — BISACODYL 10 MG
10 SUPPOSITORY, RECTAL RECTAL DAILY PRN
Status: DISCONTINUED | OUTPATIENT
Start: 2024-01-26 | End: 2024-02-01 | Stop reason: HOSPADM

## 2024-01-26 RX ORDER — ACETAMINOPHEN 325 MG/1
650 TABLET ORAL EVERY 4 HOURS PRN
Status: DISCONTINUED | OUTPATIENT
Start: 2024-01-26 | End: 2024-02-01 | Stop reason: HOSPADM

## 2024-01-26 RX ORDER — CARVEDILOL 12.5 MG/1
12.5 TABLET ORAL 2 TIMES DAILY
Status: DISCONTINUED | OUTPATIENT
Start: 2024-01-26 | End: 2024-01-28

## 2024-01-26 RX ORDER — AMOXICILLIN 250 MG
2 CAPSULE ORAL 2 TIMES DAILY
Status: DISCONTINUED | OUTPATIENT
Start: 2024-01-26 | End: 2024-02-01 | Stop reason: HOSPADM

## 2024-01-26 RX ORDER — SODIUM CHLORIDE 0.9 % (FLUSH) 0.9 %
10 SYRINGE (ML) INJECTION AS NEEDED
Status: DISCONTINUED | OUTPATIENT
Start: 2024-01-26 | End: 2024-02-01 | Stop reason: HOSPADM

## 2024-01-26 RX ORDER — POTASSIUM CHLORIDE 20 MEQ/1
40 TABLET, EXTENDED RELEASE ORAL EVERY 4 HOURS
Qty: 4 TABLET | Refills: 0 | Status: COMPLETED | OUTPATIENT
Start: 2024-01-26 | End: 2024-01-26

## 2024-01-26 RX ORDER — DILTIAZEM HYDROCHLORIDE 5 MG/ML
10 INJECTION INTRAVENOUS ONCE
Status: DISCONTINUED | OUTPATIENT
Start: 2024-01-26 | End: 2024-01-26

## 2024-01-26 RX ORDER — LOSARTAN POTASSIUM 25 MG/1
25 TABLET ORAL
Status: DISCONTINUED | OUTPATIENT
Start: 2024-01-26 | End: 2024-02-01 | Stop reason: HOSPADM

## 2024-01-26 RX ORDER — FUROSEMIDE 10 MG/ML
80 INJECTION INTRAMUSCULAR; INTRAVENOUS ONCE
Status: COMPLETED | OUTPATIENT
Start: 2024-01-26 | End: 2024-01-26

## 2024-01-26 RX ORDER — NITROGLYCERIN 0.4 MG/1
0.4 TABLET SUBLINGUAL
Status: DISCONTINUED | OUTPATIENT
Start: 2024-01-26 | End: 2024-02-01 | Stop reason: HOSPADM

## 2024-01-26 RX ORDER — BISACODYL 5 MG/1
5 TABLET, DELAYED RELEASE ORAL DAILY PRN
Status: DISCONTINUED | OUTPATIENT
Start: 2024-01-26 | End: 2024-02-01 | Stop reason: HOSPADM

## 2024-01-26 RX ORDER — QUETIAPINE FUMARATE 25 MG/1
25 TABLET, FILM COATED ORAL NIGHTLY
Status: DISCONTINUED | OUTPATIENT
Start: 2024-01-26 | End: 2024-02-01 | Stop reason: HOSPADM

## 2024-01-26 RX ORDER — DILTIAZEM HCL/D5W 125 MG/125
5-15 PLASTIC BAG, INJECTION (ML) INTRAVENOUS CONTINUOUS
Status: DISCONTINUED | OUTPATIENT
Start: 2024-01-26 | End: 2024-01-30

## 2024-01-26 RX ADMIN — Medication 15 MG/HR: at 21:28

## 2024-01-26 RX ADMIN — Medication 7.5 MG/HR: at 17:14

## 2024-01-26 RX ADMIN — FUROSEMIDE 80 MG: 10 INJECTION, SOLUTION INTRAMUSCULAR; INTRAVENOUS at 11:29

## 2024-01-26 RX ADMIN — PRAVASTATIN SODIUM 40 MG: 40 TABLET ORAL at 20:11

## 2024-01-26 RX ADMIN — POTASSIUM CHLORIDE 40 MEQ: 1500 TABLET, EXTENDED RELEASE ORAL at 18:09

## 2024-01-26 RX ADMIN — QUETIAPINE FUMARATE 25 MG: 25 TABLET ORAL at 20:11

## 2024-01-26 RX ADMIN — Medication 10 ML: at 20:12

## 2024-01-26 RX ADMIN — LOSARTAN POTASSIUM 25 MG: 25 TABLET, FILM COATED ORAL at 18:18

## 2024-01-26 RX ADMIN — APIXABAN 2.5 MG: 2.5 TABLET, FILM COATED ORAL at 20:11

## 2024-01-26 RX ADMIN — Medication 10 MG/HR: at 17:37

## 2024-01-26 RX ADMIN — NITROGLYCERIN 0.4 MG: 0.4 TABLET SUBLINGUAL at 16:41

## 2024-01-26 RX ADMIN — CARVEDILOL 12.5 MG: 12.5 TABLET, FILM COATED ORAL at 20:11

## 2024-01-26 RX ADMIN — POTASSIUM CHLORIDE 40 MEQ: 1500 TABLET, EXTENDED RELEASE ORAL at 20:11

## 2024-01-26 RX ADMIN — Medication 12.5 MG/HR: at 20:13

## 2024-01-26 RX ADMIN — Medication 5 MG/HR: at 16:58

## 2024-01-26 RX ADMIN — HYDRALAZINE HYDROCHLORIDE 25 MG: 25 TABLET ORAL at 18:10

## 2024-01-26 RX ADMIN — NITROGLYCERIN 0.4 MG: 0.4 TABLET SUBLINGUAL at 16:46

## 2024-01-26 NOTE — H&P
[facility] Medicine Services  HISTORY AND PHYSICAL    Patient Name: Cheryl Tomas  : 1940  MRN: [id number]  Primary Care Physician: Provider, No Known  Date of admission: 2024      Subjective   Subjective     Chief Complaint:  Soa and weakness    HPI:  Cheryl Tomas is a 83 y.o. female who presented to the ED with several days of weakness and shortness of breath.  Reportedly she had a viral upper respiratory infection just after [place] and then saw a new cardiologist who started her on Eliquis for A-fib.  Since that time she has felt short of breath and weak.  Today she describes some mild chest pain that is continuous.  She denies palpitations.  She was noted to have hypoxia in the ED and placed on 2 L nasal cannula.  She also given IV diuretic.  She was then incontinent and wet the bed.  At baseline she lives alone and is independent.  She has no home oxygen use.    Discussed CODE STATUS with patient and her daughters at bedside and she states she is a DNR.  Patient's daughters are in agreement that has been her wishes.      Personal History     Past Medical History:   Diagnosis Date    Bilateral renal cysts     noted CT abdomen 22    Diabetes mellitus     Diverticulosis     Hyperlipidemia     Hypertension     Medicare annual wellness visit, subsequent 11/15/2021    Osteoporosis     Ovarian cyst     noted on CT abdomen 22 (left ovary)    Pulmonary nodule less than 6 mm determined by computed tomography of lung     Noted on CT Abdomen from ER visit 22 (right lung)           Past Surgical History:   Procedure Laterality Date    APPENDECTOMY      BREAST BIOPSY      CATARACT EXTRACTION, BILATERAL       and     FIBULA FRACTURE SURGERY      HYSTERECTOMY      KIDNEY STONE SURGERY         Family History: family history includes Breast cancer in her mother and another family member; Heart disease in her father and mother; Hypertension in her  mother; Stroke in her father.     Social History:  reports that she has never smoked. She has never been exposed to tobacco smoke. She has never used smokeless tobacco. She reports that she does not drink alcohol and does not use drugs.  Social History     Social History Narrative    Not on file       Medications:  Available home medication information reviewed.  Coenzyme Q10, QUEtiapine, acetaminophen, allopurinol, apixaban, aspirin, carvedilol, cholecalciferol, dilTIAZem CD, hydrALAZINE, irbesartan, metFORMIN, pravastatin, and triamterene-hydrochlorothiazide    Allergies   Allergen Reactions    Cephalexin Itching and Rash    Bactrim [Sulfamethoxazole-Trimethoprim] GI Intolerance       Objective   Objective     Vital Signs:   Temp:  [98.7 °F (37.1 °C)] 98.7 °F (37.1 °C)  Heart Rate:  [] 112  Resp:  [18] 18  BP: (167-179)/() 170/105       Physical Exam   Constitutional: No acute distress, awake, alert, nontoxic, elderly body habitus  Eyes: Pupils equal, sclerae anicteric, no conjunctival injection  HENT: NCAT, mucous membranes moist  Neck: Supple, no thyromegaly, no lymphadenopathy  Respiratory: Faint crackles with expiration bilaterally, good effort, nonlabored respirations   Cardiovascular: Irregular  Gastrointestinal: Positive bowel sounds, soft, nontender, nondistended  Musculoskeletal: Based bilateral lower extremity edema with minor sock indentions, normal muscle tone for age  Psychiatric: Appropriate affect, good insight and judgement, cooperative  Neurologic: Oriented x 3, generally weak, Cranial Nerves grossly intact, speech clear and fluent  Skin: No rashes, no jaundice, no petechiae, no mottling      Result Review:  I have personally reviewed the results from the time of this admission to 1/26/2024 14:49 EST and agree with these findings:  [x]  Laboratory list / accordion  []  Microbiology  [x]  Radiology  []  EKG/Telemetry   []  Cardiology/Vascular   []  Pathology  []  Old records  []   Other:  Most notable findings include: White count 9, hemoglobin 13, platelets 220, sodium 144, potassium 3.6, creatinine 1.16, BNP 3266, troponin 18; respiratory panel negative; chest x-ray with cardiomegaly and mild pulmonary edema with trace effusions; last echo in June 23 EF 61%      LAB RESULTS:      Lab 01/26/24  1034   WBC 9.00   HEMOGLOBIN 13.0   HEMATOCRIT 39.9   PLATELETS 220   NEUTROS ABS 7.16*   IMMATURE GRANS (ABS) 0.03   LYMPHS ABS 1.06   MONOS ABS 0.56   EOS ABS 0.14   MCV 92.4   LACTATE 1.1         Lab 01/26/24  1034   SODIUM 144   POTASSIUM 3.6   CHLORIDE 105   CO2 27.0   ANION GAP 12.0   BUN 17   CREATININE 1.16*   EGFR 47.5*   GLUCOSE 144*   CALCIUM 10.0   MAGNESIUM 1.8   TSH 2.940         Lab 01/26/24  1034   TOTAL PROTEIN 6.7   ALBUMIN 4.4   GLOBULIN 2.3   ALT (SGPT) 60*   AST (SGOT) 31   BILIRUBIN 0.6   ALK PHOS 110   LIPASE 23         Lab 01/26/24  1418 01/26/24  1034   PROBNP  --  3,266.0*   HSTROP T 17* 18*                 UA          4/17/2023    11:12 7/18/2023    13:45 7/24/2023    13:34   Urinalysis   Ketones, UA Negative  Negative  50 mg/dL    Leukocytes, UA Moderate (2+)  Negative  Negative        Microbiology Results (last 10 days)       Procedure Component Value - Date/Time    COVID PRE-OP / PRE-PROCEDURE SCREENING ORDER (NO ISOLATION) - Swab, Nasopharynx [[id number]]  (Normal) Collected: 01/26/24 1050    Lab Status: Final result Specimen: Swab from Nasopharynx Updated: 01/26/24 1203    Narrative:      The following orders were created for panel order COVID PRE-OP / PRE-PROCEDURE SCREENING ORDER (NO ISOLATION) - Swab, Nasopharynx.  Procedure                               Abnormality         Status                     ---------                               -----------         ------                     Respiratory Panel PCR w/...[[id number]]  Normal              Final result                 Please view results for these tests on the individual orders.    Respiratory Panel PCR  w/COVID-19(SARS-CoV-2) ROSALIE/BENNY/[place]/PAD/COR/REYNA In-House, NP Swab in UTM/VTM, 2 HR TAT - Swab, Nasopharynx [[id number]]  (Normal) Collected: 01/26/24 1050    Lab Status: Final result Specimen: Swab from Nasopharynx Updated: 01/26/24 1203     ADENOVIRUS, PCR Not Detected     Coronavirus 229E Not Detected     Coronavirus HKU1 Not Detected     Coronavirus NL63 Not Detected     Coronavirus OC43 Not Detected     COVID19 Not Detected     Human Metapneumovirus Not Detected     Human Rhinovirus/Enterovirus Not Detected     Influenza A PCR Not Detected     Influenza B PCR Not Detected     Parainfluenza Virus 1 Not Detected     Parainfluenza Virus 2 Not Detected     Parainfluenza Virus 3 Not Detected     Parainfluenza Virus 4 Not Detected     RSV, PCR Not Detected     Bordetella pertussis pcr Not Detected     Bordetella parapertussis PCR Not Detected     Chlamydophila pneumoniae PCR Not Detected     Mycoplasma pneumo by PCR Not Detected    Narrative:      In the setting of a positive respiratory panel with a viral infection PLUS a negative procalcitonin without other underlying concern for bacterial infection, consider observing off antibiotics or discontinuation of antibiotics and continue supportive care. If the respiratory panel is positive for atypical bacterial infection (Bordetella pertussis, Chlamydophila pneumoniae, or Mycoplasma pneumoniae), consider antibiotic de-escalation to target atypical bacterial infection.            XR Chest 1 View    Result Date: 1/26/2024  XR CHEST 1 VW Date of Exam: 1/26/2024 10:37 AM EST Indication: Dysrhythmia triage protocol Comparison: Chest radiograph 5/3/2023. Findings: Enlarged cardiac silhouette, unchanged. Mild diffuse interstitial thickening. Trace bilateral pleural effusions. Bibasilar atelectasis without focal consolidation otherwise. No pneumothorax. Osseous structures are unchanged.     Impression: Impression: Cardiomegaly with mild pulmonary edema and trace bilateral pleural  effusions. Bibasilar atelectasis without focal consolidation otherwise. Electronically Signed: Pedro Arevalo MD  1/26/2024 10:50 AM EST  Workstation ID: [id number]     Results for orders placed during the hospital encounter of 06/01/23    Adult Transthoracic Echo Complete W/ Cont if Necessary Per Protocol    Interpretation Summary    Left ventricular systolic function is normal. Calculated left ventricular EF = 61% Normal left ventricular cavity size noted. Left ventricular wall thickness is consistent with moderate concentric hypertrophy. All left ventricular wall segments contract normally. Left ventricular diastolic function is consistent with (grade II w/high LAP) pseudonormalization.    Normal right ventricular cavity size, wall thickness, systolic function and septal motion noted.    Left atrial volume is moderately increased.    Moderate aortic valve regurgitation is present. No aortic valve stenosis is present.    Mitral annular calcification is present. Mild mitral valve regurgitation is present. No significant mitral valve stenosis is present.    Mild tricuspid valve regurgitation is present.    There is a small (<1cm) circumferential pericardial effusion. There is no evidence of cardiac tamponade.    There is a small (<1cm) circumferential pericardial effusion. The effusion is fibrinous. There is no evidence of cardiac tamponade.      Assessment & Plan   Assessment & Plan       Atrial fibrillation with RVR    Hypertension    Diabetes mellitus    Hyperlipidemia    Right bundle branch block with left anterior fascicular block    Nonrheumatic aortic valve insufficiency    Weakness    Acute on chronic heart failure with preserved ejection fraction (HFpEF)    Hypoxia        Patient is an 83-year-old with past medical history of hypertension, A-fib, diabetes who is admitted with weakness, shortness of breath and hypoxia secondary to acute CHF exacerbation and A-fib with RVR.    Hypoxia  Acute exacerbation of  congestive heart failure with preserved EF  Elevated troponin  A-fib with RVR  -Cardiology consulted  -Diuresis initiated in the ED with Lasix 80 mg IV, further diuresis per cardiology  -Continue diltiazem drip per cardiology  -Continue O2 to maintain sats greater than 90, currently on 2 L nasal cannula  -Continue Coreg  -Continue Eliquis    History of diabetes mellitus  -Last hemoglobin A1c was 5.7 in November 2022  -Diet controlled    Hypertension  Hyperlipidemia    Generalized weakness  -Will need PT and OT consults        DVT prophylaxis:  Mechanical DVT prophylaxis orders are signed and held. Medical DVT prophylaxis orders are present.          CODE STATUS:    Code Status and Medical Interventions:   Ordered at: 01/26/24 1446     Medical Intervention Limits:    NO intubation (DNI)     Level Of Support Discussed With:    Patient     Code Status (Patient has no pulse and is not breathing):    No CPR (Do Not Attempt to Resuscitate)     Medical Interventions (Patient has pulse or is breathing):    Limited Support       Expected Discharge   Expected Discharge Date: 1/29/2024; Expected Discharge Time:      Celina Chaudhry MD  01/26/24

## 2024-01-26 NOTE — ED NOTES
Cheryl Tomas    Nursing Report ED to Floor:  Mental status: GCS 15  Ambulatory status: x1 assist  Oxygen Therapy:  2LNC  Cardiac Rhythm: afib  Admitted from: home  Safety Concerns:  none  Social Issues: none  ED Room #:  32    ED Nurse Phone Extension - [phone] or may call [phone].      HPI:   Chief Complaint   Patient presents with    Irregular Heart Beat       Past Medical History:  Past Medical History:   Diagnosis Date    Bilateral renal cysts     noted CT abdomen 12/31/22    Diabetes mellitus     Diverticulosis     Hyperlipidemia     Hypertension     Medicare annual wellness visit, subsequent 11/15/2021    Osteoporosis     Ovarian cyst     noted on CT abdomen 12/31/22 (left ovary)    Pulmonary nodule less than 6 mm determined by computed tomography of lung     Noted on CT Abdomen from ER visit 12/31/22 (right lung)        Past Surgical History:  Past Surgical History:   Procedure Laterality Date    APPENDECTOMY      BREAST BIOPSY      CATARACT EXTRACTION, BILATERAL      02/23 and 03/23    FIBULA FRACTURE SURGERY      HYSTERECTOMY      KIDNEY STONE SURGERY          Admitting Doctor:   Celina Chaudhry MD    Consulting Provider(s):  Consults       Date and Time Order Name Status Description    1/26/2024 12:36 PM Cardiology (on-call MD unless specified) Completed              Admitting Diagnosis:   The primary encounter diagnosis was Atrial fibrillation, unspecified type. Diagnoses of Acute on chronic congestive heart failure, unspecified heart failure type and Hypoxia were also pertinent to this visit.    Most Recent Vitals:   Vitals:    01/26/24 1128 01/26/24 1418 01/26/24 1428 01/26/24 1511   BP: 167/94 (!) 176/145 (!) 170/105    Pulse: 97 (!) 130 112 (!) 131   Resp:       Temp:       TempSrc:       SpO2: 93% 90% 93% 93%   Weight:       Height:           Active LDAs/IV Access:   Lines, Drains & Airways       Active LDAs       Name Placement date Placement time Site Days    Peripheral IV 01/26/24 1027  Anterior;Left Hand 01/26/24  1027  Hand  less than 1    Peripheral IV 01/26/24 1033 Right Antecubital 01/26/24  1033  Antecubital  less than 1                    Labs (abnormal labs have a star):   Labs Reviewed   COMPREHENSIVE METABOLIC PANEL - Abnormal; Notable for the following components:       Result Value    Glucose 144 (*)     Creatinine 1.16 (*)     ALT (SGPT) 60 (*)     eGFR 47.5 (*)     All other components within normal limits    Narrative:     GFR Normal >60  Chronic Kidney Disease <60  Kidney Failure <15    The GFR formula is only valid for adults with stable renal function between ages 18 and 70.   SINGLE HSTROPONIN T - Abnormal; Notable for the following components:    HS Troponin T 18 (*)     All other components within normal limits    Narrative:     High Sensitive Troponin T Reference Range:  <14.0 ng/L- Negative Female for AMI  <22.0 ng/L- Negative Male for AMI  >=14 - Abnormal Female indicating possible myocardial injury.  >=22 - Abnormal Male indicating possible myocardial injury.   Clinicians would have to utilize clinical acumen, EKG, Troponin, and serial changes to determine if it is an Acute Myocardial Infarction or myocardial injury due to an underlying chronic condition.        BNP (IN-HOUSE) - Abnormal; Notable for the following components:    proBNP 3,266.0 (*)     All other components within normal limits    Narrative:     This assay is used as an aid in the diagnosis of individuals suspected of having heart failure. It can be used as an aid in the diagnosis of acute decompensated heart failure (ADHF) in patients presenting with signs and symptoms of ADHF to the emergency department (ED). In addition, NT-proBNP of <300 pg/mL indicates ADHF is not likely.    Age Range Result Interpretation  NT-proBNP Concentration (pg/mL:      <50             Positive            >450                   Gray                 300-450                    Negative             <300    50-75           Positive             >900                  Gray                300-900                  Negative            <300      >75             Positive            >1800                  Gray                300-1800                  Negative            <300   CBC WITH AUTO DIFFERENTIAL - Abnormal; Notable for the following components:    Neutrophil % 79.5 (*)     Lymphocyte % 11.8 (*)     Neutrophils, Absolute 7.16 (*)     All other components within normal limits   TROPONIN - Abnormal; Notable for the following components:    HS Troponin T 17 (*)     All other components within normal limits    Narrative:     High Sensitive Troponin T Reference Range:  <14.0 ng/L- Negative Female for AMI  <22.0 ng/L- Negative Male for AMI  >=14 - Abnormal Female indicating possible myocardial injury.  >=22 - Abnormal Male indicating possible myocardial injury.   Clinicians would have to utilize clinical acumen, EKG, Troponin, and serial changes to determine if it is an Acute Myocardial Infarction or myocardial injury due to an underlying chronic condition.        RESPIRATORY PANEL PCR W/ COVID-19 (SARS-COV-2), NP SWAB IN UTM/VTP, 2 HR TAT - Normal    Narrative:     In the setting of a positive respiratory panel with a viral infection PLUS a negative procalcitonin without other underlying concern for bacterial infection, consider observing off antibiotics or discontinuation of antibiotics and continue supportive care. If the respiratory panel is positive for atypical bacterial infection (Bordetella pertussis, Chlamydophila pneumoniae, or Mycoplasma pneumoniae), consider antibiotic de-escalation to target atypical bacterial infection.   MAGNESIUM - Normal   TSH - Normal   LIPASE - Normal   LACTIC ACID, PLASMA - Normal   COVID PRE-OP / PRE-PROCEDURE SCREENING ORDER (NO ISOLATION)    Narrative:     The following orders were created for panel order COVID PRE-OP / PRE-PROCEDURE SCREENING ORDER (NO ISOLATION) - Swab, Nasopharynx.  Procedure                                Abnormality         Status                     ---------                               -----------         ------                     Respiratory Panel PCR w/...[[id number]]  Normal              Final result                 Please view results for these tests on the individual orders.   RAINBOW DRAW    Narrative:     The following orders were created for panel order [place] Draw.  Procedure                               Abnormality         Status                     ---------                               -----------         ------                     Green Top (Gel)[[id number]]                                  Final result               Lavender Top[[id number]]                                     Final result               Gold Top - SST[[id number]]                                   Final result               Ernandez Top[[id number]]                                         Final result               Light Blue Top[[id number]]                                   Final result                 Please view results for these tests on the individual orders.   URINALYSIS W/ CULTURE IF INDICATED   HIGH SENSITIVITIY TROPONIN T 2HR   POCT OCCULT BLOOD STOOL   CBC AND DIFFERENTIAL    Narrative:     The following orders were created for panel order CBC & Differential.  Procedure                               Abnormality         Status                     ---------                               -----------         ------                     CBC Auto Differential[[id number]]        Abnormal            Final result                 Please view results for these tests on the individual orders.   GREEN TOP   LAVENDER TOP   GOLD TOP - SST   GRAY TOP   LIGHT BLUE TOP       Meds Given in ED:   Medications   sodium chloride 0.9 % flush 10 mL (has no administration in time range)   sodium chloride 0.9 % flush 10 mL (has no administration in time range)   dilTIAZem (CARDIZEM) injection 10 mg (10 mg Intravenous Not Given 1/26/24 2934)    carvedilol (COREG) tablet 12.5 mg (has no administration in time range)   furosemide (LASIX) injection 40 mg (has no administration in time range)   furosemide (LASIX) injection 80 mg (80 mg Intravenous Given 1/26/24 1120)

## 2024-01-26 NOTE — CONSULTS
[facility]  Initial Consult Note      Patient Identification:  Cheryl Tomas        83 y.o.        female  1940  [id number]       Date of Consultation:  01/26/24    Reason for Consultation:  evalaution     Primary cardiologist: Elizabeth Graves at [facility]. Former patient of mine but had to change health systems due to her insurance  Referring physician: Sanjana Worthington MD    History of Present Illness:     Cheryl Tomas is a 83 y.o. with a history of HTN, HLD, DM, HFpEF, moderate AR, and recent diagnosis of AF who presented to the emergency department today with worsening dyspnea and weakness.  She reports being diagnosed with atrial fibrillation about 2 weeks ago and had been started on Eliquis.  She brings her medication list which includes carvedilol which she had been on previously.  Instructions handwritten are to take 25 mg twice daily however she reports only taking 12.5.  She has had some leg swelling associated with her dyspnea and fatigue.  In the emergency department she was found to be in A-fib with RVR with hypoxemia.  She was given IV diuretics.    I had previously seen Ms. Tomas in the office however due to with the insurance disputes she has since established with [facility] and has been following with Dr. Rob Mack.    Past History:  Past Medical History:   Diagnosis Date    Bilateral renal cysts     noted CT abdomen 12/31/22    Diabetes mellitus     Diverticulosis     Hyperlipidemia     Hypertension     Medicare annual wellness visit, subsequent 11/15/2021    Osteoporosis     Ovarian cyst     noted on CT abdomen 12/31/22 (left ovary)    Pulmonary nodule less than 6 mm determined by computed tomography of lung     Noted on CT Abdomen from ER visit 12/31/22 (right lung)     Past Surgical History:   Procedure Laterality Date    APPENDECTOMY      BREAST BIOPSY      CATARACT EXTRACTION, BILATERAL      02/23 and 03/23     FIBULA FRACTURE SURGERY      HYSTERECTOMY      KIDNEY STONE SURGERY       Allergies   Allergen Reactions    Cephalexin Itching and Rash    Bactrim [Sulfamethoxazole-Trimethoprim] GI Intolerance     Social History     Socioeconomic History    Marital status:    Tobacco Use    Smoking status: Never     Passive exposure: Never    Smokeless tobacco: Never   Vaping Use    Vaping Use: Never used   Substance and Sexual Activity    Alcohol use: No    Drug use: No    Sexual activity: Never     Family History   Problem Relation Age of Onset    Heart disease Mother     Hypertension Mother     Breast cancer Mother         70's    Heart disease Father     Stroke Father     Breast cancer Other         great aunt     Medications:  (Not in a hospital admission)    Current medications:  carvedilol, 12.5 mg, Oral, BID With Meals  dilTIAZem, 10 mg, Intravenous, Once      Current IV drips:  dilTIAZem, 5-15 mg/hr        Review of Systems   Constitutional: Positive for malaise/fatigue.   Cardiovascular:  Positive for dyspnea on exertion and leg swelling. Negative for chest pain and palpitations.   Respiratory:  Positive for shortness of breath.        Physical exam:  Temp:  [98.7 °F (37.1 °C)] 98.7 °F (37.1 °C)  Heart Rate:  [87-97] 97  Resp:  [18] 18  BP: (167-179)/(94-97) 167/94  Body mass index is 25.23 kg/m².    Gen: well developed, lying in bed, comfortable appearing  HEENT: MMM, sclerae anicteric, conjunctivae normal  CV: tachycardia, irregularly irregular rhythm, no murmurs or rubs, normal S1, S2. 2+ radial and DP pulses  Pulm: RA, normal work of breathing, no wheezes, rales, rhonchi  Abd: soft, nontender, nondistended  Ext: normal bulk for age, normal tone, no dependent edema  Neuro: alert, oriented, face symmetrical, moving all extremities well  Psych: normal mood, appropriate affect    Cardiac Testing:    ECG  (personally reviewed) afib, HR 96bpm, RBBB, LAFB    ECHO:   Results for orders placed during the hospital  encounter of 06/01/23    Adult Transthoracic Echo Complete W/ Cont if Necessary Per Protocol    Interpretation Summary    Left ventricular systolic function is normal. Calculated left ventricular EF = 61% Normal left ventricular cavity size noted. Left ventricular wall thickness is consistent with moderate concentric hypertrophy. All left ventricular wall segments contract normally. Left ventricular diastolic function is consistent with (grade II w/high LAP) pseudonormalization.    Normal right ventricular cavity size, wall thickness, systolic function and septal motion noted.    Left atrial volume is moderately increased.    Moderate aortic valve regurgitation is present. No aortic valve stenosis is present.    Mitral annular calcification is present. Mild mitral valve regurgitation is present. No significant mitral valve stenosis is present.    Mild tricuspid valve regurgitation is present.    There is a small (<1cm) circumferential pericardial effusion. There is no evidence of cardiac tamponade.    There is a small (<1cm) circumferential pericardial effusion. The effusion is fibrinous. There is no evidence of cardiac tamponade.    Lab Review:    Lab Results   Component Value Date    WBC 9.00 01/26/2024    HGB 13.0 01/26/2024    HCT 39.9 01/26/2024    MCV 92.4 01/26/2024     01/26/2024     Lab Results   Component Value Date    GLUCOSE 144 (H) 01/26/2024    BUN 17 01/26/2024    CREATININE 1.16 (H) 01/26/2024    EGFRIFNONA 43 (L) 11/15/2021    EGFRIFAFRI 52 (L) 11/15/2021    BCR 14.7 01/26/2024    K 3.6 01/26/2024    CO2 27.0 01/26/2024    CALCIUM 10.0 01/26/2024    PROTENTOTREF 6.7 11/30/2022    ALBUMIN 4.4 01/26/2024    LABIL2 2.2 11/30/2022    AST 31 01/26/2024    ALT 60 (H) 01/26/2024     Lab Results   Component Value Date    TROPONINT 18 (H) 01/26/2024     Lab Results   Component Value Date    PROBNP 3,266.0 (H) 01/26/2024     Lab Results   Component Value Date    HGBA1C 5.7 11/30/2022      Lab Results    Component Value Date    CHLPL 168 11/30/2022    TRIG 136 11/30/2022    HDL 45 11/30/2022    LDL 99 11/30/2022     Imaging:  All pertinent imaging studies were personally reviewed.    Assessment & Plan    Atrial fibrillation with RVR    Hypertension    Right bundle branch block with left anterior fascicular block    Nonrheumatic aortic valve insufficiency    Acute on chronic heart failure with preserved ejection fraction (HFpEF)    Persistent AF   - recent diagnosis ~2-3 weeks ago   - unable to perfom ECV today without REGINE due to duration of AC treatment   - will continue rate control, resume home carvedilol 12.5mg BID   - review of pt's medication list shows that she had supposed to increase to 25mg BID however she was still only taking 1 of the 12.5mg tablets. If rates are inadequately controlled overnight would increase carvedilol if BP allows   - continue diuretics, possible transition to PO tomorrow depending on respiratory status    Acute on chronic HFpEF  Moderate AR    - reportedly has had an echocardiogram through [facility] not seen in careeverywhere since her last TTE in June. Bedside POCUS showed an EF which appears similar to previous, will defer repeat at this time    Thank you for allowing us to share in the care of Cheryl Tomas    Discussed with patient's nurse, Dr. Worthington.      DUARTE Epperson MD  01/26/24   13:43 EST

## 2024-01-26 NOTE — ED PROVIDER NOTES
Subjective   History of Present Illness  83-year-old female who presents for evaluation of shortness of breath.  The patient reports that she has been feeling fatigued for a couple months.  She reports that 2 and half weeks ago on a routine outpatient clinic evaluation she was identified to be in A-fib.  She has been initiated on Eliquis and has been taking it since that given time.  She denies a history of underlying lung disease or oxygen dependence.  This morning she began to feel short of breath upon awakening.  [place] saturations were observed to 85% on room air upon initial presentation.  With the application of 2 L of nasal cannula oxygen oxygen saturation improved to greater than 91%.  She exhibits normal mentation.  She denies chest pain.  She denies any sick contacts.  No upper respiratory congestion, sore throat, rhinorrhea.  No urinary symptoms include no dysuria, frequency, urgency.  She denies any diarrhea or blood in the stool.  Heart rate is in the  range.  She is unsure what her baseline heart rate is prior to being in A-fib.  She does appear to be in A-fib at this time.  No other acute complaints.      Review of Systems   Constitutional:  Negative for chills, fatigue and fever.   HENT:  Negative for congestion, ear pain, postnasal drip, sinus pressure and sore throat.    Eyes:  Negative for pain, redness and visual disturbance.   Respiratory:  Positive for shortness of breath. Negative for cough and chest tightness.    Cardiovascular:  Positive for palpitations and leg swelling. Negative for chest pain.   Gastrointestinal:  Negative for abdominal pain, anal bleeding, blood in stool, diarrhea, nausea and vomiting.   Endocrine: Negative for polydipsia and polyuria.   Genitourinary:  Negative for difficulty urinating, dysuria, frequency and urgency.   Musculoskeletal:  Negative for arthralgias, back pain and neck pain.   Skin:  Negative for pallor and rash.   Allergic/Immunologic: Negative for  environmental allergies and immunocompromised state.   Neurological:  Negative for dizziness, weakness and headaches.   Hematological:  Negative for adenopathy.   Psychiatric/Behavioral:  Negative for confusion, self-injury and suicidal ideas. The patient is not nervous/anxious.    All other systems reviewed and are negative.      Past Medical History:   Diagnosis Date    Bilateral renal cysts     noted CT abdomen 12/31/22    Diabetes mellitus     Diverticulosis     Hyperlipidemia     Hypertension     Medicare annual wellness visit, subsequent 11/15/2021    Osteoporosis     Ovarian cyst     noted on CT abdomen 12/31/22 (left ovary)    Pulmonary nodule less than 6 mm determined by computed tomography of lung     Noted on CT Abdomen from ER visit 12/31/22 (right lung)       Allergies   Allergen Reactions    Cephalexin Itching and Rash    Bactrim [Sulfamethoxazole-Trimethoprim] GI Intolerance       Past Surgical History:   Procedure Laterality Date    APPENDECTOMY      BREAST BIOPSY      CATARACT EXTRACTION, BILATERAL      02/23 and 03/23    FIBULA FRACTURE SURGERY      HYSTERECTOMY      KIDNEY STONE SURGERY         Family History   Problem Relation Age of Onset    Heart disease Mother     Hypertension Mother     Breast cancer Mother         70's    Heart disease Father     Stroke Father     Breast cancer Other         great aunt       Social History     Socioeconomic History    Marital status:    Tobacco Use    Smoking status: Never     Passive exposure: Never    Smokeless tobacco: Never   Vaping Use    Vaping Use: Never used   Substance and Sexual Activity    Alcohol use: No    Drug use: No    Sexual activity: Never           Objective   Physical Exam  Vitals and nursing note reviewed.   Constitutional:       General: She is not in acute distress.     Appearance: Normal appearance. She is well-developed. She is not toxic-appearing or diaphoretic.   HENT:      Head: Normocephalic and atraumatic.      Right  Ear: External ear normal.      Left Ear: External ear normal.      Nose: Nose normal.   Eyes:      General: Lids are normal.      Pupils: Pupils are equal, round, and reactive to light.   Neck:      Trachea: No tracheal deviation.   Cardiovascular:      Rate and Rhythm: Normal rate. Rhythm irregularly irregular.      Pulses: No decreased pulses.      Heart sounds: Normal heart sounds. No murmur heard.     No friction rub. No gallop.   Pulmonary:      Effort: Pulmonary effort is normal. No respiratory distress.      Breath sounds: Examination of the right-lower field reveals rales. Examination of the left-lower field reveals rales. Rales present. No decreased breath sounds, wheezing or rhonchi.   Abdominal:      General: Bowel sounds are normal.      Palpations: Abdomen is soft.      Tenderness: There is no abdominal tenderness. There is no guarding or rebound.   Musculoskeletal:         General: No deformity. Normal range of motion.      Cervical back: Normal range of motion and neck supple.      Right lower le+ Edema present.      Left lower le+ Edema present.   Lymphadenopathy:      Cervical: No cervical adenopathy.   Skin:     General: Skin is warm and dry.      Findings: No rash.   Neurological:      Mental Status: She is alert and oriented to person, place, and time.      Cranial Nerves: No cranial nerve deficit.      Sensory: No sensory deficit.   Psychiatric:         Speech: Speech normal.         Behavior: Behavior normal.         Thought Content: Thought content normal.         Judgment: Judgment normal.         Procedures           ED Course            [id number] Score: 6                                  Medical Decision Making  Differential diagnosis includes dysrhythmia, CHF exacerbation, pneumonia, viral illness, other unspecified etiology.    White blood cell count is normal, normal H&H, nonconcerning kidney function, normal electrolytes, urine does not show infection, viral respiratory panel  is negative.    Troponin is barely elevated but remained stable on recheck.    EKG initially showed A-fib but with a controlled rate.  Later in the ER course however she was identified to go into an A-fib with a rapid ventricular rate.    She did have an elevated BNP consistent with CHF exacerbation does report increasing lower extremity edema and associated hypoxia.  Oxygen was applied to correct hypoxia.    Chest x-ray shows cardiomegaly mild pulmonary edema and trace bilateral pleural effusions consistent with suspected CHF exacerbation.    I have initiated IV diuresis.    I discussed the patient with the hospitalist, who will consult on the patient to determine status of admission.    Problems Addressed:  Acute on chronic congestive heart failure, unspecified heart failure type: complicated acute illness or injury with systemic symptoms that poses a threat to life or bodily functions  Atrial fibrillation, unspecified type: complicated acute illness or injury with systemic symptoms that poses a threat to life or bodily functions  Hypoxia: complicated acute illness or injury with systemic symptoms that poses a threat to life or bodily functions    Amount and/or Complexity of Data Reviewed  Independent Historian: friend     Details: Family provides additional history  External Data Reviewed: labs, radiology, ECG and notes.  Labs: ordered. Decision-making details documented in ED Course.  Radiology: ordered and independent interpretation performed. Decision-making details documented in ED Course.  ECG/medicine tests: ordered and independent interpretation performed. Decision-making details documented in ED Course.     Details: EKG independently interpreted by myself shows A-fib with controlled rate with right bundle branch block and left anterior fascicular block, no acute ischemic changes.    Repeat EKG showed atrial fibrillation with rapid ventricular rate.  No acute ischemic changes.    Risk  Prescription drug  management.  Decision regarding hospitalization.        Final diagnoses:   Atrial fibrillation, unspecified type   Acute on chronic congestive heart failure, unspecified heart failure type   Hypoxia       ED Disposition  ED Disposition       ED Disposition   Decision to Admit    Condition   --    Comment   Level of Care: Telemetry [5]   Diagnosis: Atrial fibrillation with RVR [858075]   Admitting Physician: GARETT GARAY [[id number]]                 No follow-up provider specified.       Medication List      No changes were made to your prescriptions during this visit.            Sanjana Worthington MD  01/28/24 0652

## 2024-01-26 NOTE — Clinical Note
Level of Care: Telemetry [5]   Diagnosis: Atrial fibrillation with RVR [[id number]]   Admitting Physician: GARETT GARAY [[id number]]

## 2024-01-27 PROBLEM — I48.91 A-FIB: Status: ACTIVE | Noted: 2024-01-27

## 2024-01-27 LAB
ANION GAP SERPL CALCULATED.3IONS-SCNC: 14 MMOL/L (ref 5–15)
BILIRUB UR QL STRIP: NEGATIVE
BUN SERPL-MCNC: 18 MG/DL (ref 8–23)
BUN/CREAT SERPL: 16.4 (ref 7–25)
CALCIUM SPEC-SCNC: 9.4 MG/DL (ref 8.6–10.5)
CHLORIDE SERPL-SCNC: 102 MMOL/L (ref 98–107)
CLARITY UR: CLEAR
CO2 SERPL-SCNC: 27 MMOL/L (ref 22–29)
COLOR UR: YELLOW
CREAT SERPL-MCNC: 1.1 MG/DL (ref 0.57–1)
DEPRECATED RDW RBC AUTO: 45.4 FL (ref 37–54)
EGFRCR SERPLBLD CKD-EPI 2021: 50.6 ML/MIN/1.73
ERYTHROCYTE [DISTWIDTH] IN BLOOD BY AUTOMATED COUNT: 13.7 % (ref 12.3–15.4)
GLUCOSE SERPL-MCNC: 113 MG/DL (ref 65–99)
GLUCOSE UR STRIP-MCNC: NEGATIVE MG/DL
HCT VFR BLD AUTO: 37.8 % (ref 34–46.6)
HGB BLD-MCNC: 12.2 G/DL (ref 12–15.9)
HGB UR QL STRIP.AUTO: NEGATIVE
KETONES UR QL STRIP: ABNORMAL
LEUKOCYTE ESTERASE UR QL STRIP.AUTO: NEGATIVE
MCH RBC QN AUTO: 29.2 PG (ref 26.6–33)
MCHC RBC AUTO-ENTMCNC: 32.3 G/DL (ref 31.5–35.7)
MCV RBC AUTO: 90.4 FL (ref 79–97)
NITRITE UR QL STRIP: NEGATIVE
PH UR STRIP.AUTO: 5.5 [PH] (ref 5–8)
PLATELET # BLD AUTO: 268 10*3/MM3 (ref 140–450)
PMV BLD AUTO: 10.6 FL (ref 6–12)
POTASSIUM SERPL-SCNC: 3.9 MMOL/L (ref 3.5–5.2)
PROT UR QL STRIP: NEGATIVE
QT INTERVAL: 362 MS
QTC INTERVAL: 496 MS
RBC # BLD AUTO: 4.18 10*6/MM3 (ref 3.77–5.28)
SODIUM SERPL-SCNC: 143 MMOL/L (ref 136–145)
SP GR UR STRIP: 1.01 (ref 1–1.03)
UROBILINOGEN UR QL STRIP: ABNORMAL
WBC NRBC COR # BLD AUTO: 8.69 10*3/MM3 (ref 3.4–10.8)

## 2024-01-27 PROCEDURE — 99232 SBSQ HOSP IP/OBS MODERATE 35: CPT | Performed by: INTERNAL MEDICINE

## 2024-01-27 PROCEDURE — 25010000002 FUROSEMIDE PER 20 MG: Performed by: INTERNAL MEDICINE

## 2024-01-27 PROCEDURE — 80048 BASIC METABOLIC PNL TOTAL CA: CPT | Performed by: FAMILY MEDICINE

## 2024-01-27 PROCEDURE — 81003 URINALYSIS AUTO W/O SCOPE: CPT | Performed by: EMERGENCY MEDICINE

## 2024-01-27 PROCEDURE — 85027 COMPLETE CBC AUTOMATED: CPT | Performed by: FAMILY MEDICINE

## 2024-01-27 RX ORDER — METOPROLOL TARTRATE 1 MG/ML
5 INJECTION, SOLUTION INTRAVENOUS EVERY 6 HOURS
Status: DISCONTINUED | OUTPATIENT
Start: 2024-01-27 | End: 2024-01-28 | Stop reason: SDUPTHER

## 2024-01-27 RX ADMIN — QUETIAPINE FUMARATE 25 MG: 25 TABLET ORAL at 20:07

## 2024-01-27 RX ADMIN — CARVEDILOL 12.5 MG: 12.5 TABLET, FILM COATED ORAL at 08:36

## 2024-01-27 RX ADMIN — CARVEDILOL 12.5 MG: 12.5 TABLET, FILM COATED ORAL at 20:07

## 2024-01-27 RX ADMIN — ASPIRIN 81 MG: 81 TABLET, COATED ORAL at 08:34

## 2024-01-27 RX ADMIN — Medication 10 ML: at 20:08

## 2024-01-27 RX ADMIN — LOSARTAN POTASSIUM 25 MG: 25 TABLET, FILM COATED ORAL at 13:44

## 2024-01-27 RX ADMIN — APIXABAN 2.5 MG: 2.5 TABLET, FILM COATED ORAL at 20:07

## 2024-01-27 RX ADMIN — PRAVASTATIN SODIUM 40 MG: 40 TABLET ORAL at 20:07

## 2024-01-27 RX ADMIN — ALLOPURINOL 300 MG: 300 TABLET ORAL at 08:34

## 2024-01-27 RX ADMIN — SENNOSIDES AND DOCUSATE SODIUM 2 TABLET: 8.6; 5 TABLET ORAL at 08:34

## 2024-01-27 RX ADMIN — FUROSEMIDE 40 MG: 10 INJECTION, SOLUTION INTRAMUSCULAR; INTRAVENOUS at 13:45

## 2024-01-27 RX ADMIN — HYDRALAZINE HYDROCHLORIDE 25 MG: 25 TABLET ORAL at 16:25

## 2024-01-27 RX ADMIN — Medication 10 ML: at 08:35

## 2024-01-27 RX ADMIN — HYDRALAZINE HYDROCHLORIDE 25 MG: 25 TABLET ORAL at 20:07

## 2024-01-27 RX ADMIN — Medication 15 MG/HR: at 01:29

## 2024-01-27 RX ADMIN — METOPROLOL TARTRATE 5 MG: 5 INJECTION INTRAVENOUS at 17:28

## 2024-01-27 RX ADMIN — METOPROLOL TARTRATE 5 MG: 5 INJECTION INTRAVENOUS at 11:40

## 2024-01-27 RX ADMIN — APIXABAN 2.5 MG: 2.5 TABLET, FILM COATED ORAL at 08:34

## 2024-01-27 NOTE — PROGRESS NOTES
[facility] Medicine Services  PROGRESS NOTE    Patient Name: Cheryl Tomas  : 1940  MRN: [id number]    Date of Admission: 2024  Primary Care Physician: Provider, No Known    Subjective   Subjective     CC:  F/u A.fib, CHF    HPI:  Patient sitting up in bedside chair, remains on O2 (at baseline no O2), feels okay otherwise, remains on diltiazem and HR remain elevated.      Objective   Objective     Vital Signs:   Temp:  [98.3 °F (36.8 °C)-98.7 °F (37.1 °C)] 98.6 °F (37 °C)  Heart Rate:  [] 79  Resp:  [16-18] 16  BP: ()/() 107/81  Flow (L/min):  [2] 2     Physical Exam:  Constitutional: No acute distress, awake, alert  HENT: NCAT, mucous membranes moist  Respiratory: crackles over lung bases R>L, respiratory effort normal on 2L  Cardiovascular: tachycardic, irregular, no murmurs, rubs, or gallops  Gastrointestinal: soft, nontender, nondistended  Musculoskeletal: No bilateral ankle edema  Psychiatric: Appropriate affect, cooperative  Neurologic: Oriented x 3, speech clear, no focal deficits  Skin: No rashes      Results Reviewed:  LAB RESULTS:      Lab 24  0207 24  1034   WBC 8.69 9.00   HEMOGLOBIN 12.2 13.0   HEMATOCRIT 37.8 39.9   PLATELETS 268 220   NEUTROS ABS  --  7.16*   IMMATURE GRANS (ABS)  --  0.03   LYMPHS ABS  --  1.06   MONOS ABS  --  0.56   EOS ABS  --  0.14   MCV 90.4 92.4   LACTATE  --  1.1         Lab 24  0207 24  1034   SODIUM 143 144   POTASSIUM 3.9 3.6   CHLORIDE 102 105   CO2 27.0 27.0   ANION GAP 14.0 12.0   BUN 18 17   CREATININE 1.10* 1.16*   EGFR 50.6* 47.5*   GLUCOSE 113* 144*   CALCIUM 9.4 10.0   MAGNESIUM  --  1.8   TSH  --  2.940         Lab 24  1034   TOTAL PROTEIN 6.7   ALBUMIN 4.4   GLOBULIN 2.3   ALT (SGPT) 60*   AST (SGOT) 31   BILIRUBIN 0.6   ALK PHOS 110   LIPASE 23         Lab 24  2041 24  1902 24  1645 24  1418 24  1034   PROBNP  --   --   --   --  3,266.0*    HSTROP T 16* 19* 20* 17* 18*                 Brief Urine Lab Results  (Last result in the past 365 days)        Color   Clarity   Blood   Leuk Est   Nitrite   Protein   CREAT   Urine HCG        01/27/24 0808 Yellow   Clear   Negative   Negative   Negative   Negative                   Microbiology Results Abnormal       Procedure Component Value - Date/Time    COVID PRE-OP / PRE-PROCEDURE SCREENING ORDER (NO ISOLATION) - Swab, Nasopharynx [[id number]]  (Normal) Collected: 01/26/24 1050    Lab Status: Final result Specimen: Swab from Nasopharynx Updated: 01/26/24 1203    Narrative:      The following orders were created for panel order COVID PRE-OP / PRE-PROCEDURE SCREENING ORDER (NO ISOLATION) - Swab, Nasopharynx.  Procedure                               Abnormality         Status                     ---------                               -----------         ------                     Respiratory Panel PCR w/...[[id number]]  Normal              Final result                 Please view results for these tests on the individual orders.    Respiratory Panel PCR w/COVID-19(SARS-CoV-2) [facility]ALIE/[facility]EN[place]/[facility] In-House, NP Swab in UTM/VTM, 2 HR TAT - Swab, Nasopharynx [[id number]]  (Normal) Collected: 01/26/24 1050    Lab Status: Final result Specimen: Swab from Nasopharynx Updated: 01/26/24 1203     ADENOVIRUS, PCR Not Detected     Coronavirus 229E Not Detected     Coronavirus HKU1 Not Detected     Coronavirus NL63 Not Detected     Coronavirus OC43 Not Detected     COVID19 Not Detected     Human Metapneumovirus Not Detected     Human Rhinovirus/Enterovirus Not Detected     Influenza A PCR Not Detected     Influenza B PCR Not Detected     Parainfluenza Virus 1 Not Detected     Parainfluenza Virus 2 Not Detected     Parainfluenza Virus 3 Not Detected     Parainfluenza Virus 4 Not Detected     RSV, PCR Not Detected     Bordetella pertussis pcr Not Detected     Bordetella parapertussis PCR Not Detected     Chlamydophila  pneumoniae PCR Not Detected     Mycoplasma pneumo by PCR Not Detected    Narrative:      In the setting of a positive respiratory panel with a viral infection PLUS a negative procalcitonin without other underlying concern for bacterial infection, consider observing off antibiotics or discontinuation of antibiotics and continue supportive care. If the respiratory panel is positive for atypical bacterial infection (Bordetella pertussis, Chlamydophila pneumoniae, or Mycoplasma pneumoniae), consider antibiotic de-escalation to target atypical bacterial infection.            XR Chest 1 View    Result Date: 1/26/2024  XR CHEST 1 VW Date of Exam: 1/26/2024 10:37 AM EST Indication: Dysrhythmia triage protocol Comparison: Chest radiograph 5/3/2023. Findings: Enlarged cardiac silhouette, unchanged. Mild diffuse interstitial thickening. Trace bilateral pleural effusions. Bibasilar atelectasis without focal consolidation otherwise. No pneumothorax. Osseous structures are unchanged.     Impression: Impression: Cardiomegaly with mild pulmonary edema and trace bilateral pleural effusions. Bibasilar atelectasis without focal consolidation otherwise. Electronically Signed: Pedro Arevalo MD  1/26/2024 10:50 AM EST  Workstation ID: [id number]     Results for orders placed during the hospital encounter of 06/01/23    Adult Transthoracic Echo Complete W/ Cont if Necessary Per Protocol    Interpretation Summary    Left ventricular systolic function is normal. Calculated left ventricular EF = 61% Normal left ventricular cavity size noted. Left ventricular wall thickness is consistent with moderate concentric hypertrophy. All left ventricular wall segments contract normally. Left ventricular diastolic function is consistent with (grade II w/high LAP) pseudonormalization.    Normal right ventricular cavity size, wall thickness, systolic function and septal motion noted.    Left atrial volume is moderately increased.    Moderate aortic valve  regurgitation is present. No aortic valve stenosis is present.    Mitral annular calcification is present. Mild mitral valve regurgitation is present. No significant mitral valve stenosis is present.    Mild tricuspid valve regurgitation is present.    There is a small (<1cm) circumferential pericardial effusion. There is no evidence of cardiac tamponade.    There is a small (<1cm) circumferential pericardial effusion. The effusion is fibrinous. There is no evidence of cardiac tamponade.      Current medications:  Scheduled Meds:allopurinol, 300 mg, Oral, Daily  apixaban, 2.5 mg, Oral, Q12H  aspirin, 81 mg, Oral, Daily  carvedilol, 12.5 mg, Oral, BID  furosemide, 40 mg, Intravenous, Daily  hydrALAZINE, 25 mg, Oral, TID  losartan, 25 mg, Oral, Q24H  pharmacy consult - MTM, , Does not apply, Daily  pravastatin, 40 mg, Oral, Nightly  QUEtiapine, 25 mg, Oral, Nightly  senna-docusate sodium, 2 tablet, Oral, BID  sodium chloride, 10 mL, Intravenous, Q12H      Continuous Infusions:dilTIAZem, 5-15 mg/hr, Last Rate: Stopped (01/27/24 0837)      PRN Meds:.  acetaminophen    senna-docusate sodium **AND** polyethylene glycol **AND** bisacodyl **AND** bisacodyl    Calcium Replacement - Follow Nurse / BPA Driven Protocol    Magnesium Standard Dose Replacement - Follow Nurse / BPA Driven Protocol    nitroglycerin    ondansetron    Phosphorus Replacement - Follow Nurse / BPA Driven Protocol    Potassium Replacement - Follow Nurse / BPA Driven Protocol    sodium chloride    Insert Peripheral IV **AND** sodium chloride    sodium chloride    sodium chloride    Assessment & Plan   Assessment & Plan     Active Hospital Problems    Diagnosis  POA    **Atrial fibrillation with RVR [I48.91]  Yes    Acute on chronic heart failure with preserved ejection fraction (HFpEF) [I50.33]  Unknown    Hypoxia [R09.02]  Yes    Nonrheumatic aortic valve insufficiency [I35.1]  Yes    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        Brief Hospital Course to date:  Cheryl Tomas is a 83 y.o. female with past medical history of hypertension, A-fib, diabetes who is admitted with weakness, shortness of breath and hypoxia secondary to acute CHF exacerbation and A-fib with RVR.     Acute HFpEF w/Hypoxia  - still requiring O2 this morning, continue IV diuresis as tolerated  - monitor I&Os, wean O2 as tolerated  - cardiology following  - continue coreg    A-fib with RVR  -Cardiology following, remains on diltiazem this AM  --unable to do ECV as has not been on AC long enough  --continue Eliquis, Coreg     History of diabetes mellitus  -Last hemoglobin A1c was 5.7 in November 2022  -Diet controlled     Hypertension  Hyperlipidemia   - continue home regimen    Generalized weakness  -Will need PT and OT consults    Expected Discharge Location and Transportation: Home  Expected Discharge   Expected Discharge Date: 1/29/2024; Expected Discharge Time:      DVT prophylaxis:  Medical and mechanical DVT prophylaxis orders are present.         AM-PAC 6 Clicks Score (PT): 24 (01/26/24 2186)    CODE STATUS:   Code Status and Medical Interventions:   Ordered at: 01/26/24 1446     Medical Intervention Limits:    NO intubation (DNI)     Level Of Support Discussed With:    Patient     Code Status (Patient has no pulse and is not breathing):    No CPR (Do Not Attempt to Resuscitate)     Medical Interventions (Patient has pulse or is breathing):    Limited Support       Mirta Chong,   01/27/24

## 2024-01-27 NOTE — PROGRESS NOTES
"[facility] Cardiology  Hospital Progress Note     LOS: 0 days   Patient Care Team:  Provider, No Known as PCP - Sharon Geronimo PA as Physician Assistant (Cardiology)  Bradley Blandon MD as Consulting Physician (Cardiology)  PCP:  Provider, No Known    Chief Complaint:  fu afib    SUBJECTIVE: Didn't rest well last pm  Dyspnea improved      OBJECTIVE:    Vital Sign Min/Max for last 24 hours  Temp  Min: 98.3 °F (36.8 °C)  Max: 98.7 °F (37.1 °C)   BP  Min: 97/60  Max: 180/112   Pulse  Min: 77  Max: 160   Resp  Min: 16  Max: 18   SpO2  Min: 89 %  Max: 96 %   No data recorded   No data recorded     Flowsheet Rows      Flowsheet Row First Filed Value   Admission Height 162.6 cm (64\") Documented at 01/26/2024 1030   Admission Weight 66.7 kg (147 lb) Documented at 01/26/2024 1030            Telemetry:   Af 110    Intake/Output Summary (Last 24 hours) at 1/27/2024 1105  Last data filed at 1/27/2024 0809  Gross per 24 hour   Intake 150 ml   Output 1350 ml   Net -1200 ml     Intake & Output (last 3 days)         01/24 0701  01/25 0700 01/25 0701  01/26 0700 01/26 0701  01/27 0700 01/27 0701  01/28 0700    P.O.    150    Total Intake(mL/kg)    150 (2.2)    Urine (mL/kg/hr)   700 650 (2.4)    Total Output   700 650    Net   -700 -500                     Physical Exam:  Physical Exam   Alert appropriate  2L nc  Irreg tachy s1s2  Chest diminished bases  Abd soft =bs  Ext no ed    LABS/DIAGNOSTIC DATA:  Results from last 7 days   Lab Units 01/27/24  0207 01/26/24  1034   WBC 10*3/mm3 8.69 9.00   HEMOGLOBIN g/dL 12.2 13.0   HEMATOCRIT % 37.8 39.9   PLATELETS 10*3/mm3 268 220     Lab Results   Lab Value Date/Time    TROPONINT 16 (H) 01/26/2024 2041    TROPONINT 19 (H) 01/26/2024 1902    TROPONINT 20 (H) 01/26/2024 1645    TROPONINT 17 (H) 01/26/2024 1418    TROPONINT 18 (H) 01/26/2024 1034         Results from last 7 days   Lab Units 01/27/24  0207 01/26/24  1034   SODIUM mmol/L 143 144   POTASSIUM " mmol/L 3.9 3.6   CHLORIDE mmol/L 102 105   CO2 mmol/L 27.0 27.0   BUN mg/dL 18 17   CREATININE mg/dL 1.10* 1.16*   CALCIUM mg/dL 9.4 10.0   BILIRUBIN mg/dL  --  0.6   ALK PHOS U/L  --  110   ALT (SGPT) U/L  --  60*   AST (SGOT) U/L  --  31   GLUCOSE mg/dL 113* 144*             Results from last 7 days   Lab Units 01/26/24  1034   TSH uIU/mL 2.940           Medication Review:   allopurinol, 300 mg, Oral, Daily  apixaban, 2.5 mg, Oral, Q12H  aspirin, 81 mg, Oral, Daily  carvedilol, 12.5 mg, Oral, BID  furosemide, 40 mg, Intravenous, Daily  hydrALAZINE, 25 mg, Oral, TID  losartan, 25 mg, Oral, Q24H  pharmacy consult - [place], , Does not apply, Daily  pravastatin, 40 mg, Oral, Nightly  QUEtiapine, 25 mg, Oral, Nightly  senna-docusate sodium, 2 tablet, Oral, BID  sodium chloride, 10 mL, Intravenous, Q12H       dilTIAZem, 5-15 mg/hr, Last Rate: Stopped (01/27/24 0837)           Atrial fibrillation with RVR    Hypertension    Diabetes mellitus    Hyperlipidemia    Right bundle branch block with left anterior fascicular block    Nonrheumatic aortic valve insufficiency    Weakness    Acute on chronic heart failure with preserved ejection fraction (HFpEF)    Hypoxia      ASSESSMENT/PLAN:  AF/RVR advance av soham agent as tolerant  Discussed rate vs rhythm control strategies w pt and family  Pt to consider over weekend    If chooses ECV will  need REGINE/ECV Monday.  If chooses rate control , follow overnight potentially home tomorrow          Steve Toscano MD   01/27/24  11:05 EST

## 2024-01-28 LAB
ANION GAP SERPL CALCULATED.3IONS-SCNC: 14 MMOL/L (ref 5–15)
BUN SERPL-MCNC: 25 MG/DL (ref 8–23)
BUN/CREAT SERPL: 20.3 (ref 7–25)
CALCIUM SPEC-SCNC: 10 MG/DL (ref 8.6–10.5)
CHLORIDE SERPL-SCNC: 100 MMOL/L (ref 98–107)
CO2 SERPL-SCNC: 27 MMOL/L (ref 22–29)
CREAT SERPL-MCNC: 1.23 MG/DL (ref 0.57–1)
EGFRCR SERPLBLD CKD-EPI 2021: 44.2 ML/MIN/1.73
GLUCOSE SERPL-MCNC: 156 MG/DL (ref 65–99)
MAGNESIUM SERPL-MCNC: 1.8 MG/DL (ref 1.6–2.4)
POTASSIUM SERPL-SCNC: 3.6 MMOL/L (ref 3.5–5.2)
POTASSIUM SERPL-SCNC: 4.2 MMOL/L (ref 3.5–5.2)
QT INTERVAL: 366 MS
QTC INTERVAL: 531 MS
SODIUM SERPL-SCNC: 141 MMOL/L (ref 136–145)

## 2024-01-28 PROCEDURE — 25010000002 FUROSEMIDE PER 20 MG: Performed by: INTERNAL MEDICINE

## 2024-01-28 PROCEDURE — 99232 SBSQ HOSP IP/OBS MODERATE 35: CPT | Performed by: INTERNAL MEDICINE

## 2024-01-28 PROCEDURE — 93005 ELECTROCARDIOGRAM TRACING: CPT | Performed by: INTERNAL MEDICINE

## 2024-01-28 PROCEDURE — 83735 ASSAY OF MAGNESIUM: CPT | Performed by: INTERNAL MEDICINE

## 2024-01-28 PROCEDURE — 84132 ASSAY OF SERUM POTASSIUM: CPT | Performed by: INTERNAL MEDICINE

## 2024-01-28 PROCEDURE — 93010 ELECTROCARDIOGRAM REPORT: CPT | Performed by: INTERNAL MEDICINE

## 2024-01-28 PROCEDURE — 80048 BASIC METABOLIC PNL TOTAL CA: CPT | Performed by: INTERNAL MEDICINE

## 2024-01-28 RX ORDER — POTASSIUM CHLORIDE 20 MEQ/1
40 TABLET, EXTENDED RELEASE ORAL EVERY 4 HOURS
Qty: 4 TABLET | Refills: 0 | Status: COMPLETED | OUTPATIENT
Start: 2024-01-28 | End: 2024-01-28

## 2024-01-28 RX ADMIN — DILTIAZEM HYDROCHLORIDE 30 MG: 30 TABLET, FILM COATED ORAL at 12:48

## 2024-01-28 RX ADMIN — POTASSIUM CHLORIDE 40 MEQ: 1500 TABLET, EXTENDED RELEASE ORAL at 15:20

## 2024-01-28 RX ADMIN — DILTIAZEM HYDROCHLORIDE 30 MG: 30 TABLET, FILM COATED ORAL at 23:25

## 2024-01-28 RX ADMIN — APIXABAN 2.5 MG: 2.5 TABLET, FILM COATED ORAL at 08:34

## 2024-01-28 RX ADMIN — Medication 5 MG/HR: at 02:02

## 2024-01-28 RX ADMIN — Medication 10 ML: at 20:42

## 2024-01-28 RX ADMIN — LOSARTAN POTASSIUM 25 MG: 25 TABLET, FILM COATED ORAL at 08:34

## 2024-01-28 RX ADMIN — METOPROLOL TARTRATE 25 MG: 25 TABLET, FILM COATED ORAL at 12:48

## 2024-01-28 RX ADMIN — CARVEDILOL 12.5 MG: 12.5 TABLET, FILM COATED ORAL at 08:34

## 2024-01-28 RX ADMIN — SENNOSIDES AND DOCUSATE SODIUM 2 TABLET: 8.6; 5 TABLET ORAL at 20:41

## 2024-01-28 RX ADMIN — ASPIRIN 81 MG: 81 TABLET, COATED ORAL at 08:34

## 2024-01-28 RX ADMIN — POTASSIUM CHLORIDE 40 MEQ: 1500 TABLET, EXTENDED RELEASE ORAL at 17:48

## 2024-01-28 RX ADMIN — DILTIAZEM HYDROCHLORIDE 30 MG: 30 TABLET, FILM COATED ORAL at 17:47

## 2024-01-28 RX ADMIN — SENNOSIDES AND DOCUSATE SODIUM 2 TABLET: 8.6; 5 TABLET ORAL at 08:35

## 2024-01-28 RX ADMIN — HYDRALAZINE HYDROCHLORIDE 25 MG: 25 TABLET ORAL at 08:34

## 2024-01-28 RX ADMIN — APIXABAN 2.5 MG: 2.5 TABLET, FILM COATED ORAL at 20:41

## 2024-01-28 RX ADMIN — Medication 5 MG/HR: at 06:55

## 2024-01-28 RX ADMIN — PRAVASTATIN SODIUM 40 MG: 40 TABLET ORAL at 20:41

## 2024-01-28 RX ADMIN — QUETIAPINE FUMARATE 25 MG: 25 TABLET ORAL at 20:41

## 2024-01-28 RX ADMIN — METOPROLOL TARTRATE 5 MG: 5 INJECTION INTRAVENOUS at 00:19

## 2024-01-28 RX ADMIN — METOPROLOL TARTRATE 25 MG: 25 TABLET, FILM COATED ORAL at 20:41

## 2024-01-28 RX ADMIN — FUROSEMIDE 40 MG: 10 INJECTION, SOLUTION INTRAMUSCULAR; INTRAVENOUS at 08:35

## 2024-01-28 RX ADMIN — Medication 10 ML: at 08:33

## 2024-01-28 RX ADMIN — ALLOPURINOL 300 MG: 300 TABLET ORAL at 08:34

## 2024-01-28 RX ADMIN — METOPROLOL TARTRATE 5 MG: 5 INJECTION INTRAVENOUS at 05:56

## 2024-01-28 NOTE — PROGRESS NOTES
"[facility] Cardiology  Hospital Progress Note     LOS: 1 day   Patient Care Team:  Provider, No Known as PCP - Sharon Geronimo PA as Physician Assistant (Cardiology)  Bradley Blandon MD as Consulting Physician (Cardiology)  PCP:  Provider, No Known    Chief Complaint:  fu afib    SUBJECTIVE:       OBJECTIVE:    Vital Sign Min/Max for last 24 hours  Temp  Min: 97.7 °F (36.5 °C)  Max: 98.4 °F (36.9 °C)   BP  Min: 95/76  Max: 142/100   Pulse  Min: 83  Max: 147   Resp  Min: 16  Max: 18   SpO2  Min: 91 %  Max: 95 %   No data recorded   No data recorded     Flowsheet Rows      Flowsheet Row First Filed Value   Admission Height 162.6 cm (64\") Documented at 01/26/2024 1030   Admission Weight 66.7 kg (147 lb) Documented at 01/26/2024 1030            Telemetry:   Af 110    Intake/Output Summary (Last 24 hours) at 1/28/2024 0950  Last data filed at 1/28/2024 0300  Gross per 24 hour   Intake 1540 ml   Output 1475 ml   Net 65 ml     Intake & Output (last 3 days)         01/24 0701  01/25 0700 01/25 0701  01/26 0700 01/26 0701  01/27 0700 01/27 0701  01/28 0700    P.O.    150    Total Intake(mL/kg)    150 (2.2)    Urine (mL/kg/hr)   700 650 (2.4)    Total Output   700 650    Net   -700 -500                     Physical Exam:  Physical Exam   Alert appropriate  2L nc  Irreg tachy s1s2  Chest diminished bases  Abd soft =bs  Ext no ed    LABS/DIAGNOSTIC DATA:  Results from last 7 days   Lab Units 01/27/24  0207 01/26/24  1034   WBC 10*3/mm3 8.69 9.00   HEMOGLOBIN g/dL 12.2 13.0   HEMATOCRIT % 37.8 39.9   PLATELETS 10*3/mm3 268 220     Lab Results   Lab Value Date/Time    TROPONINT 16 (H) 01/26/2024 2041    TROPONINT 19 (H) 01/26/2024 1902    TROPONINT 20 (H) 01/26/2024 1645    TROPONINT 17 (H) 01/26/2024 1418    TROPONINT 18 (H) 01/26/2024 1034         Results from last 7 days   Lab Units 01/27/24  0207 01/26/24  1034   SODIUM mmol/L 143 144   POTASSIUM mmol/L 3.9 3.6   CHLORIDE mmol/L 102 105   CO2 " mmol/L 27.0 27.0   BUN mg/dL 18 17   CREATININE mg/dL 1.10* 1.16*   CALCIUM mg/dL 9.4 10.0   BILIRUBIN mg/dL  --  0.6   ALK PHOS U/L  --  110   ALT (SGPT) U/L  --  60*   AST (SGOT) U/L  --  31   GLUCOSE mg/dL 113* 144*             Results from last 7 days   Lab Units 01/26/24  1034   TSH uIU/mL 2.940           Medication Review:   allopurinol, 300 mg, Oral, Daily  apixaban, 2.5 mg, Oral, Q12H  aspirin, 81 mg, Oral, Daily  carvedilol, 12.5 mg, Oral, BID  furosemide, 40 mg, Intravenous, Daily  hydrALAZINE, 25 mg, Oral, TID  losartan, 25 mg, Oral, Q24H  metoprolol tartrate, 5 mg, Intravenous, Q6H  pharmacy consult - MTM, , Does not apply, Daily  pravastatin, 40 mg, Oral, Nightly  QUEtiapine, 25 mg, Oral, Nightly  senna-docusate sodium, 2 tablet, Oral, BID  sodium chloride, 10 mL, Intravenous, Q12H       dilTIAZem, 5-15 mg/hr, Last Rate: 10 mg/hr (01/28/24 0944)           Atrial fibrillation with RVR    Hypertension    Diabetes mellitus    Hyperlipidemia    Right bundle branch block with left anterior fascicular block    Nonrheumatic aortic valve insufficiency    Weakness    Acute on chronic heart failure with preserved ejection fraction (HFpEF)    Hypoxia    A-fib      ASSESSMENT/PLAN:  AF/RVR advance av soham agent as tolerant  Discussed rate vs rhythm control strategies w pt and family  Pt to consider over weekend    Pt wants rate control strategy if possible.  Remains on IV CCB.  Will transition to metop from coreg as bp is soft  Attempt po CCB  If no rate control overnight pt & son agree to REGINE/ecv    NPO      Steve Toscano MD   01/28/24  09:50 EST

## 2024-01-28 NOTE — PROGRESS NOTES
[facility]  PROGRESS NOTE    Patient Name: Cheryl Tomas  : 1940  MRN: [id number]    Date of Admission: 2024  Primary Care Physician: Provider, No Known    Subjective   Subjective     CC:  F/u A.fib, CHF    HPI:  Patient resting in bed. Remains in A.fib RVR, feels tired. Son at bedside.       Objective   Objective     Vital Signs:   Temp:  [97.7 °F (36.5 °C)-98.4 °F (36.9 °C)] 97.8 °F (36.6 °C)  Heart Rate:  [] 127  Resp:  [16-18] 18  BP: ()/() 124/109  Flow (L/min):  [2] 2     Physical Exam:  Constitutional: No acute distress, awake, alert  HENT: NCAT, mucous membranes moist  Respiratory: crackles over lung bases R>L, respiratory effort normal on 2L  Cardiovascular: tachycardic, irregular, no murmurs, rubs, or gallops  Gastrointestinal: soft, nontender, nondistended  Musculoskeletal: No bilateral ankle edema  Psychiatric: Appropriate affect, cooperative  Neurologic: Oriented x 3, speech clear, no focal deficits  Skin: No rashes    Exam is unchanged from previous      Results Reviewed:  LAB RESULTS:      Lab 24  0207 24  1034   WBC 8.69 9.00   HEMOGLOBIN 12.2 13.0   HEMATOCRIT 37.8 39.9   PLATELETS 268 220   NEUTROS ABS  --  7.16*   IMMATURE GRANS (ABS)  --  0.03   LYMPHS ABS  --  1.06   MONOS ABS  --  0.56   EOS ABS  --  0.14   MCV 90.4 92.4   LACTATE  --  1.1         Lab 24  0207 24  1034   SODIUM 143 144   POTASSIUM 3.9 3.6   CHLORIDE 102 105   CO2 27.0 27.0   ANION GAP 14.0 12.0   BUN 18 17   CREATININE 1.10* 1.16*   EGFR 50.6* 47.5*   GLUCOSE 113* 144*   CALCIUM 9.4 10.0   MAGNESIUM  --  1.8   TSH  --  2.940         Lab 24  1034   TOTAL PROTEIN 6.7   ALBUMIN 4.4   GLOBULIN 2.3   ALT (SGPT) 60*   AST (SGOT) 31   BILIRUBIN 0.6   ALK PHOS 110   LIPASE 23         Lab 24  2041 24  1902 24  1645 24  1418 24  1034   PROBNP  --   --   --   --  3,266.0*   HSTROP T 16* 19* 20* 17* 18*                  Brief Urine Lab Results  (Last result in the past 365 days)        Color   Clarity   Blood   Leuk Est   Nitrite   Protein   CREAT   Urine HCG        01/27/24 0808 Yellow   Clear   Negative   Negative   Negative   Negative                   Microbiology Results Abnormal       Procedure Component Value - Date/Time    COVID PRE-OP / PRE-PROCEDURE SCREENING ORDER (NO ISOLATION) - Swab, Nasopharynx [[id number]]  (Normal) Collected: 01/26/24 1050    Lab Status: Final result Specimen: Swab from Nasopharynx Updated: 01/26/24 1203    Narrative:      The following orders were created for panel order COVID PRE-OP / PRE-PROCEDURE SCREENING ORDER (NO ISOLATION) - Swab, Nasopharynx.  Procedure                               Abnormality         Status                     ---------                               -----------         ------                     Respiratory Panel PCR w/...[[id number]]  Normal              Final result                 Please view results for these tests on the individual orders.    Respiratory Panel PCR w/COVID-19(SARS-CoV-2) [facility] In-House, NP Swab in UTM/VTM, 2 HR TAT - Swab, Nasopharynx [[id number]]  (Normal) Collected: 01/26/24 1050    Lab Status: Final result Specimen: Swab from Nasopharynx Updated: 01/26/24 1203     ADENOVIRUS, PCR Not Detected     Coronavirus 229E Not Detected     Coronavirus HKU1 Not Detected     Coronavirus NL63 Not Detected     Coronavirus OC43 Not Detected     COVID19 Not Detected     Human Metapneumovirus Not Detected     Human Rhinovirus/Enterovirus Not Detected     Influenza A PCR Not Detected     Influenza B PCR Not Detected     Parainfluenza Virus 1 Not Detected     Parainfluenza Virus 2 Not Detected     Parainfluenza Virus 3 Not Detected     Parainfluenza Virus 4 Not Detected     RSV, PCR Not Detected     Bordetella pertussis pcr Not Detected     Bordetella parapertussis PCR Not Detected     Chlamydophila pneumoniae PCR Not Detected      Mycoplasma pneumo by PCR Not Detected    Narrative:      In the setting of a positive respiratory panel with a viral infection PLUS a negative procalcitonin without other underlying concern for bacterial infection, consider observing off antibiotics or discontinuation of antibiotics and continue supportive care. If the respiratory panel is positive for atypical bacterial infection (Bordetella pertussis, Chlamydophila pneumoniae, or Mycoplasma pneumoniae), consider antibiotic de-escalation to target atypical bacterial infection.            XR Chest 1 View    Result Date: 1/26/2024  XR CHEST 1 VW Date of Exam: 1/26/2024 10:37 AM EST Indication: Dysrhythmia triage protocol Comparison: Chest radiograph 5/3/2023. Findings: Enlarged cardiac silhouette, unchanged. Mild diffuse interstitial thickening. Trace bilateral pleural effusions. Bibasilar atelectasis without focal consolidation otherwise. No pneumothorax. Osseous structures are unchanged.     Impression: Impression: Cardiomegaly with mild pulmonary edema and trace bilateral pleural effusions. Bibasilar atelectasis without focal consolidation otherwise. Electronically Signed: Pedro Arevalo MD  1/26/2024 10:50 AM EST  Workstation ID: [id number]     Results for orders placed during the hospital encounter of 06/01/23    Adult Transthoracic Echo Complete W/ Cont if Necessary Per Protocol    Interpretation Summary    Left ventricular systolic function is normal. Calculated left ventricular EF = 61% Normal left ventricular cavity size noted. Left ventricular wall thickness is consistent with moderate concentric hypertrophy. All left ventricular wall segments contract normally. Left ventricular diastolic function is consistent with (grade II w/high LAP) pseudonormalization.    Normal right ventricular cavity size, wall thickness, systolic function and septal motion noted.    Left atrial volume is moderately increased.    Moderate aortic valve regurgitation is present. No  aortic valve stenosis is present.    Mitral annular calcification is present. Mild mitral valve regurgitation is present. No significant mitral valve stenosis is present.    Mild tricuspid valve regurgitation is present.    There is a small (<1cm) circumferential pericardial effusion. There is no evidence of cardiac tamponade.    There is a small (<1cm) circumferential pericardial effusion. The effusion is fibrinous. There is no evidence of cardiac tamponade.      Current medications:  Scheduled Meds:allopurinol, 300 mg, Oral, Daily  apixaban, 2.5 mg, Oral, Q12H  aspirin, 81 mg, Oral, Daily  carvedilol, 12.5 mg, Oral, BID  furosemide, 40 mg, Intravenous, Daily  hydrALAZINE, 25 mg, Oral, TID  losartan, 25 mg, Oral, Q24H  metoprolol tartrate, 5 mg, Intravenous, Q6H  pharmacy consult - MTM, , Does not apply, Daily  pravastatin, 40 mg, Oral, Nightly  QUEtiapine, 25 mg, Oral, Nightly  senna-docusate sodium, 2 tablet, Oral, BID  sodium chloride, 10 mL, Intravenous, Q12H      Continuous Infusions:dilTIAZem, 5-15 mg/hr, Last Rate: 7.5 mg/hr (01/28/24 0858)      PRN Meds:.  acetaminophen    senna-docusate sodium **AND** polyethylene glycol **AND** bisacodyl **AND** bisacodyl    Calcium Replacement - Follow Nurse / BPA Driven Protocol    Magnesium Standard Dose Replacement - Follow Nurse / BPA Driven Protocol    nitroglycerin    ondansetron    Phosphorus Replacement - Follow Nurse / BPA Driven Protocol    Potassium Replacement - Follow Nurse / BPA Driven Protocol    sodium chloride    [COMPLETED] Insert Peripheral IV **AND** sodium chloride    sodium chloride    sodium chloride    Assessment & Plan   Assessment & Plan     Active Hospital Problems    Diagnosis  POA    **Atrial fibrillation with RVR [I48.91]  Yes    A-fib [I48.91]  Yes    Acute on chronic heart failure with preserved ejection fraction (HFpEF) [I50.33]  Unknown    Hypoxia [R09.02]  Yes    Nonrheumatic aortic valve insufficiency [I35.1]  Yes    Right bundle  branch block with left anterior fascicular block [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        Brief Hospital Course to date:  Cheryl Tomas is a 83 y.o. female with past medical history of hypertension, A-fib, diabetes who is admitted with weakness, shortness of breath and hypoxia secondary to acute CHF exacerbation and A-fib with RVR.     Acute HFpEF w/Hypoxia  - still requiring O2 (baseline RA) continue IV diuresis as tolerated  - monitor I&Os, wean O2 as tolerated  - cardiology following  - continue coreg    A-fib with RVR  -Cardiology following, remains on diltiazem this AM, rates remain uncontrolled  --considering REGINE w/ECV vs. Rhythm control   --continue Eliquis, Coreg     History of diabetes mellitus  -Last hemoglobin A1c was 5.7 in November 2022  -Diet controlled     Hypertension  Hyperlipidemia   - continue home regimen    Generalized weakness  -PT/OT consulted    Expected Discharge Location and Transportation: Home  Expected Discharge   Expected Discharge Date: 1/29/2024; Expected Discharge Time:      DVT prophylaxis:  Medical and mechanical DVT prophylaxis orders are present.         AM-PAC 6 Clicks Score (PT): 24 (01/27/24 2050)    CODE STATUS:   Code Status and Medical Interventions:   Ordered at: 01/26/24 1446     Medical Intervention Limits:    NO intubation (DNI)     Level Of Support Discussed With:    Patient     Code Status (Patient has no pulse and is not breathing):    No CPR (Do Not Attempt to Resuscitate)     Medical Interventions (Patient has pulse or is breathing):    Limited Support       Mirta Chong DO  01/28/24

## 2024-01-28 NOTE — NURSING NOTE
Pt vss, 2L NC. Initiated cardizem gtt at 5 mg per hour. Pt BP dropped systolic below 100 when gtt was at 10 mg per hour. New IV placed, ultrasound guided, through L upper arm. Potassium replaced this shift.

## 2024-01-29 LAB
ANION GAP SERPL CALCULATED.3IONS-SCNC: 10 MMOL/L (ref 5–15)
BASOPHILS # BLD AUTO: 0.06 10*3/MM3 (ref 0–0.2)
BASOPHILS NFR BLD AUTO: 0.7 % (ref 0–1.5)
BUN SERPL-MCNC: 29 MG/DL (ref 8–23)
BUN/CREAT SERPL: 25.4 (ref 7–25)
CALCIUM SPEC-SCNC: 9.5 MG/DL (ref 8.6–10.5)
CHLORIDE SERPL-SCNC: 103 MMOL/L (ref 98–107)
CO2 SERPL-SCNC: 29 MMOL/L (ref 22–29)
CREAT SERPL-MCNC: 1.14 MG/DL (ref 0.57–1)
DEPRECATED RDW RBC AUTO: 45 FL (ref 37–54)
EGFRCR SERPLBLD CKD-EPI 2021: 48.5 ML/MIN/1.73
EOSINOPHIL # BLD AUTO: 0.26 10*3/MM3 (ref 0–0.4)
EOSINOPHIL NFR BLD AUTO: 3 % (ref 0.3–6.2)
ERYTHROCYTE [DISTWIDTH] IN BLOOD BY AUTOMATED COUNT: 13.6 % (ref 12.3–15.4)
GLUCOSE SERPL-MCNC: 115 MG/DL (ref 65–99)
HCT VFR BLD AUTO: 38.3 % (ref 34–46.6)
HGB BLD-MCNC: 12.5 G/DL (ref 12–15.9)
IMM GRANULOCYTES # BLD AUTO: 0.02 10*3/MM3 (ref 0–0.05)
IMM GRANULOCYTES NFR BLD AUTO: 0.2 % (ref 0–0.5)
LYMPHOCYTES # BLD AUTO: 1.89 10*3/MM3 (ref 0.7–3.1)
LYMPHOCYTES NFR BLD AUTO: 22.2 % (ref 19.6–45.3)
MCH RBC QN AUTO: 29.6 PG (ref 26.6–33)
MCHC RBC AUTO-ENTMCNC: 32.6 G/DL (ref 31.5–35.7)
MCV RBC AUTO: 90.5 FL (ref 79–97)
MONOCYTES # BLD AUTO: 0.78 10*3/MM3 (ref 0.1–0.9)
MONOCYTES NFR BLD AUTO: 9.1 % (ref 5–12)
NEUTROPHILS NFR BLD AUTO: 5.52 10*3/MM3 (ref 1.7–7)
NEUTROPHILS NFR BLD AUTO: 64.8 % (ref 42.7–76)
NRBC BLD AUTO-RTO: 0 /100 WBC (ref 0–0.2)
PLATELET # BLD AUTO: 272 10*3/MM3 (ref 140–450)
PMV BLD AUTO: 10.9 FL (ref 6–12)
POTASSIUM SERPL-SCNC: 3.9 MMOL/L (ref 3.5–5.2)
RBC # BLD AUTO: 4.23 10*6/MM3 (ref 3.77–5.28)
SODIUM SERPL-SCNC: 142 MMOL/L (ref 136–145)
WBC NRBC COR # BLD AUTO: 8.53 10*3/MM3 (ref 3.4–10.8)

## 2024-01-29 PROCEDURE — 97162 PT EVAL MOD COMPLEX 30 MIN: CPT

## 2024-01-29 PROCEDURE — 25010000002 FUROSEMIDE PER 20 MG: Performed by: INTERNAL MEDICINE

## 2024-01-29 PROCEDURE — 85025 COMPLETE CBC W/AUTO DIFF WBC: CPT | Performed by: INTERNAL MEDICINE

## 2024-01-29 PROCEDURE — 99232 SBSQ HOSP IP/OBS MODERATE 35: CPT | Performed by: INTERNAL MEDICINE

## 2024-01-29 PROCEDURE — 80048 BASIC METABOLIC PNL TOTAL CA: CPT | Performed by: INTERNAL MEDICINE

## 2024-01-29 PROCEDURE — 97165 OT EVAL LOW COMPLEX 30 MIN: CPT

## 2024-01-29 PROCEDURE — 97535 SELF CARE MNGMENT TRAINING: CPT

## 2024-01-29 RX ORDER — IRBESARTAN 300 MG/1
300 TABLET ORAL NIGHTLY
COMMUNITY
End: 2024-02-01 | Stop reason: HOSPADM

## 2024-01-29 RX ORDER — DILTIAZEM HYDROCHLORIDE 240 MG/1
240 CAPSULE, COATED, EXTENDED RELEASE ORAL DAILY
COMMUNITY
End: 2024-02-01 | Stop reason: HOSPADM

## 2024-01-29 RX ORDER — CARVEDILOL 12.5 MG/1
12.5 TABLET ORAL 2 TIMES DAILY WITH MEALS
COMMUNITY

## 2024-01-29 RX ORDER — TRIAMTERENE AND HYDROCHLOROTHIAZIDE 37.5; 25 MG/1; MG/1
1 TABLET ORAL DAILY
COMMUNITY
End: 2024-02-01 | Stop reason: HOSPADM

## 2024-01-29 RX ORDER — ALLOPURINOL 300 MG/1
300 TABLET ORAL DAILY
COMMUNITY

## 2024-01-29 RX ORDER — PRAVASTATIN SODIUM 40 MG
40 TABLET ORAL NIGHTLY
COMMUNITY

## 2024-01-29 RX ORDER — HYDROCHLOROTHIAZIDE 12.5 MG/1
12.5 TABLET ORAL DAILY
COMMUNITY
End: 2024-02-01 | Stop reason: HOSPADM

## 2024-01-29 RX ORDER — METOPROLOL TARTRATE 50 MG/1
50 TABLET, FILM COATED ORAL EVERY 8 HOURS
Status: DISCONTINUED | OUTPATIENT
Start: 2024-01-29 | End: 2024-02-01 | Stop reason: HOSPADM

## 2024-01-29 RX ORDER — HYDRALAZINE HYDROCHLORIDE 25 MG/1
25 TABLET, FILM COATED ORAL 3 TIMES DAILY
COMMUNITY
End: 2024-02-01 | Stop reason: HOSPADM

## 2024-01-29 RX ORDER — QUETIAPINE FUMARATE 25 MG/1
25 TABLET, FILM COATED ORAL NIGHTLY
COMMUNITY

## 2024-01-29 RX ADMIN — QUETIAPINE FUMARATE 25 MG: 25 TABLET ORAL at 20:53

## 2024-01-29 RX ADMIN — ALLOPURINOL 300 MG: 300 TABLET ORAL at 09:16

## 2024-01-29 RX ADMIN — METOPROLOL TARTRATE 50 MG: 50 TABLET ORAL at 20:53

## 2024-01-29 RX ADMIN — HYDRALAZINE HYDROCHLORIDE 25 MG: 25 TABLET ORAL at 09:16

## 2024-01-29 RX ADMIN — FUROSEMIDE 40 MG: 10 INJECTION, SOLUTION INTRAMUSCULAR; INTRAVENOUS at 09:17

## 2024-01-29 RX ADMIN — DILTIAZEM HYDROCHLORIDE 30 MG: 30 TABLET, FILM COATED ORAL at 23:50

## 2024-01-29 RX ADMIN — Medication 10 ML: at 09:18

## 2024-01-29 RX ADMIN — METOPROLOL TARTRATE 50 MG: 50 TABLET ORAL at 12:20

## 2024-01-29 RX ADMIN — PRAVASTATIN SODIUM 40 MG: 40 TABLET ORAL at 20:53

## 2024-01-29 RX ADMIN — DILTIAZEM HYDROCHLORIDE 30 MG: 30 TABLET, FILM COATED ORAL at 12:21

## 2024-01-29 RX ADMIN — Medication 10 ML: at 20:54

## 2024-01-29 RX ADMIN — ASPIRIN 81 MG: 81 TABLET, COATED ORAL at 09:15

## 2024-01-29 RX ADMIN — DILTIAZEM HYDROCHLORIDE 30 MG: 30 TABLET, FILM COATED ORAL at 06:06

## 2024-01-29 RX ADMIN — APIXABAN 5 MG: 5 TABLET, FILM COATED ORAL at 20:53

## 2024-01-29 RX ADMIN — SENNOSIDES AND DOCUSATE SODIUM 2 TABLET: 8.6; 5 TABLET ORAL at 09:23

## 2024-01-29 RX ADMIN — DILTIAZEM HYDROCHLORIDE 30 MG: 30 TABLET, FILM COATED ORAL at 18:12

## 2024-01-29 RX ADMIN — METOPROLOL TARTRATE 25 MG: 25 TABLET, FILM COATED ORAL at 04:11

## 2024-01-29 RX ADMIN — SENNOSIDES AND DOCUSATE SODIUM 2 TABLET: 8.6; 5 TABLET ORAL at 20:53

## 2024-01-29 RX ADMIN — LOSARTAN POTASSIUM 25 MG: 25 TABLET, FILM COATED ORAL at 09:14

## 2024-01-29 RX ADMIN — APIXABAN 2.5 MG: 2.5 TABLET, FILM COATED ORAL at 09:17

## 2024-01-29 NOTE — CASE MANAGEMENT/SOCIAL WORK
Discharge Planning Assessment  [facility]     Patient Name: Cheryl Tomas  MRN: [id number]  Today's Date: 1/29/2024    Admit Date: 1/26/2024    Plan: discharge plan   Discharge Needs Assessment       Row Name 01/29/24 1504       Living Environment    People in Home other (see comments)    Unique Family Situation Pt resides at [facility]    Primary Care Provided by self    Provides Primary Care For no one, unable/limited ability to care for self    Family Caregiver if Needed child(cayden), adult    Family Caregiver Names Modesto Fuller(son) or Levi Bernstein(daughter)    Quality of Family Relationships involved;supportive;helpful    Able to Return to Prior Arrangements yes    Living Arrangement Comments I met with pt and pt's sonModesto in room with permission regarding discharge plan. Pt resides in [facility]       Transition Planning    Patient/Family Anticipates Transition to home    Patient/Family Anticipated Services at Transition     Transportation Anticipated family or friend will provide       Discharge Needs Assessment    Readmission Within the Last 30 Days no previous admission in last 30 days    Equipment Currently Used at Home other (see comments)  Denies any DME    Concerns to be Addressed discharge planning    Equipment Needed After Discharge other (see comments)  Pt currently on 2 L of O2 and does not uses home O2    Outpatient/Agency/Support Group Needs other (see comments)  Pt resides at [facility])    Discharge Facility/Level of Care Needs [facility] facility    Discharge Coordination/Progress Pt confirms that she has [employer] Medicare Replacement insurance with prescription coverage and uses [facility] on [address] in [place]. Pt reports she is independent with ADLs and uses no DME for mobility.. Pt is here with afib. Plan is home with family to transport. Pt currently on 2 L of O2 and does  not wear home O2 at baseline. If home O2 needed at discharge, pt is agreeable with no preference to a particular DME company. CM will cont to follow                   Discharge Plan       Row Name 01/29/24 8254       Plan    Plan discharge plan    Plan Comments Plan is home with family to transport. Pt currently on 2 L of O2 and does not wear home O2 at baseline. If home O2 needed at discharge, pt is agreeable with no preference to a particular DME company. CM will cont to follow    Final Discharge Disposition Code 01 - home or self-care                  Continued Care and Services - Admitted Since 1/26/2024    Coordination has not been started for this encounter.       Expected Discharge Date and Time       Expected Discharge Date Expected Discharge Time    Jan 31, 2024            Demographic Summary       Row Name 01/29/24 6464       General Information    General Information Comments Pt reports her PCP is Opal Angela       Contact Information    Permission Granted to Share Info With     Contact Information Obtained for     Contact Information Comments Modesto Fuller(son) [phone] or Levi Magallon(daughter) [phone]                   Functional Status    No documentation.                  Psychosocial    No documentation.                  Abuse/Neglect    No documentation.                  Legal    No documentation.                  Substance Abuse    No documentation.                  Patient Forms    No documentation.                     Sheri Linder, RN

## 2024-01-29 NOTE — PAYOR COMM NOTE
"Valentino Tomas (83 y.o. Female)       Date of Birth   1940    Social Security Number       Address   [address] [place] [id number]    Home Phone   [phone]    MRN   [id number]       Caodaism   Advent    Marital Status                               Admission Date   1/26/24    Admission Type   Emergency    Admitting Provider   Mirta Chong DO    Attending Provider   Mirta Chong DO    Department, Room/Bed   [facility] 6A, N607/1       Discharge Date       Discharge Disposition       Discharge Destination                                 Attending Provider: Mirta Chong DO    Allergies: Cephalexin, Bactrim [Sulfamethoxazole-trimethoprim]    Isolation: None   Infection: None   Code Status: No CPR    Ht: 162.6 cm (64\")   Wt: 66.7 kg (147 lb)    Admission Cmt: None   Principal Problem: Atrial fibrillation with RVR [I48.91]                   Active Insurance as of 1/26/2024       Primary Coverage       Payor Plan Insurance Group Employer/Plan Group    HUMANA MEDICARE REPLACEMENT HUMANA MED ADV HMO [id number]       Payor Plan Address Payor Plan Phone Number Payor Plan Fax Number Effective Dates    [address] [address] [phone]  1/1/2023 - None Entered    [facility] [id number]         Subscriber Name Subscriber Birth Date Member ID       VALENTINO TOMAS 1940 [id number]                     Emergency Contacts        (Rel.) Home Phone Work Phone Mobile Phone    Kobe Fuller (Son) -- -- [phone]    BRETT BEV (Daughter) [phone] -- --              [place]: [facility] [id number] Tax ID [id number]  Insurance Information                  HUMANA MEDICARE REPLACEMENT/HUMANA MED ADV HMO Phone: [phone]    Subscriber: Valentino Tomas Subscriber#: [id number]    Group#: [id number] Precert#: --             History & Physical        Celina Chaudhry MD at 01/26/24 [facility]              [facility] Medicine " Services  HISTORY AND PHYSICAL    Patient Name: Cheryl Tomas  : 1940  MRN: [id number]  Primary Care Physician: Provider, No Known  Date of admission: 2024      Subjective  Subjective     Chief Complaint:  Soa and weakness    HPI:  Cheryl Tomas is a 83 y.o. female who presented to the ED with several days of weakness and shortness of breath.  Reportedly she had a viral upper respiratory infection just after [place] and then saw a new cardiologist who started her on Eliquis for A-fib.  Since that time she has felt short of breath and weak.  Today she describes some mild chest pain that is continuous.  She denies palpitations.  She was noted to have hypoxia in the ED and placed on 2 L nasal cannula.  She also given IV diuretic.  She was then incontinent and wet the bed.  At baseline she lives alone and is independent.  She has no home oxygen use.    Discussed CODE STATUS with patient and her daughters at bedside and she states she is a DNR.  Patient's daughters are in agreement that has been her wishes.      Personal History     Past Medical History:   Diagnosis Date    Bilateral renal cysts     noted CT abdomen 22    Diabetes mellitus     Diverticulosis     Hyperlipidemia     Hypertension     Medicare annual wellness visit, subsequent 11/15/2021    Osteoporosis     Ovarian cyst     noted on CT abdomen 22 (left ovary)    Pulmonary nodule less than 6 mm determined by computed tomography of lung     Noted on CT Abdomen from ER visit 22 (right lung)           Past Surgical History:   Procedure Laterality Date    APPENDECTOMY      BREAST BIOPSY      CATARACT EXTRACTION, BILATERAL       and     FIBULA FRACTURE SURGERY      HYSTERECTOMY      KIDNEY STONE SURGERY         Family History: family history includes Breast cancer in her mother and another family member; Heart disease in her father and mother; Hypertension in her mother; Stroke in her father.     Social History:   reports that she has never smoked. She has never been exposed to tobacco smoke. She has never used smokeless tobacco. She reports that she does not drink alcohol and does not use drugs.  Social History     Social History Narrative    Not on file       Medications:  Available home medication information reviewed.  Coenzyme Q10, QUEtiapine, acetaminophen, allopurinol, apixaban, aspirin, carvedilol, cholecalciferol, dilTIAZem CD, hydrALAZINE, irbesartan, metFORMIN, pravastatin, and triamterene-hydrochlorothiazide    Allergies   Allergen Reactions    Cephalexin Itching and Rash    Bactrim [Sulfamethoxazole-Trimethoprim] GI Intolerance       Objective  Objective     Vital Signs:   Temp:  [98.7 °F (37.1 °C)] 98.7 °F (37.1 °C)  Heart Rate:  [] 112  Resp:  [18] 18  BP: (167-179)/() 170/105       Physical Exam   Constitutional: No acute distress, awake, alert, nontoxic, elderly body habitus  Eyes: Pupils equal, sclerae anicteric, no conjunctival injection  HENT: NCAT, mucous membranes moist  Neck: Supple, no thyromegaly, no lymphadenopathy  Respiratory: Faint crackles with expiration bilaterally, good effort, nonlabored respirations   Cardiovascular: Irregular  Gastrointestinal: Positive bowel sounds, soft, nontender, nondistended  Musculoskeletal: Based bilateral lower extremity edema with minor sock indentions, normal muscle tone for age  Psychiatric: Appropriate affect, good insight and judgement, cooperative  Neurologic: Oriented x 3, generally weak, Cranial Nerves grossly intact, speech clear and fluent  Skin: No rashes, no jaundice, no petechiae, no mottling      Result Review:  I have personally reviewed the results from the time of this admission to 1/26/2024 14:49 EST and agree with these findings:  [x]  Laboratory list / accordion  []  Microbiology  [x]  Radiology  []  EKG/Telemetry   []  Cardiology/Vascular   []  Pathology  []  Old records  []  Other:  Most notable findings include: White count 9,  hemoglobin 13, platelets 220, sodium 144, potassium 3.6, creatinine 1.16, BNP 3266, troponin 18; respiratory panel negative; chest x-ray with cardiomegaly and mild pulmonary edema with trace effusions; last echo in June 23 EF 61%      LAB RESULTS:      Lab 01/26/24  1034   WBC 9.00   HEMOGLOBIN 13.0   HEMATOCRIT 39.9   PLATELETS 220   NEUTROS ABS 7.16*   IMMATURE GRANS (ABS) 0.03   LYMPHS ABS 1.06   MONOS ABS 0.56   EOS ABS 0.14   MCV 92.4   LACTATE 1.1         Lab 01/26/24  1034   SODIUM 144   POTASSIUM 3.6   CHLORIDE 105   CO2 27.0   ANION GAP 12.0   BUN 17   CREATININE 1.16*   EGFR 47.5*   GLUCOSE 144*   CALCIUM 10.0   MAGNESIUM 1.8   TSH 2.940         Lab 01/26/24  1034   TOTAL PROTEIN 6.7   ALBUMIN 4.4   GLOBULIN 2.3   ALT (SGPT) 60*   AST (SGOT) 31   BILIRUBIN 0.6   ALK PHOS 110   LIPASE 23         Lab 01/26/24  1418 01/26/24  1034   PROBNP  --  3,266.0*   HSTROP T 17* 18*                 UA          4/17/2023    11:12 7/18/2023    13:45 7/24/2023    13:34   Urinalysis   Ketones, UA Negative  Negative  50 mg/dL    Leukocytes, UA Moderate (2+)  Negative  Negative        Microbiology Results (last 10 days)       Procedure Component Value - Date/Time    COVID PRE-OP / PRE-PROCEDURE SCREENING ORDER (NO ISOLATION) - Swab, Nasopharynx [[id number]]  (Normal) Collected: 01/26/24 1050    Lab Status: Final result Specimen: Swab from Nasopharynx Updated: 01/26/24 1203    Narrative:      The following orders were created for panel order COVID PRE-OP / PRE-PROCEDURE SCREENING ORDER (NO ISOLATION) - Swab, Nasopharynx.  Procedure                               Abnormality         Status                     ---------                               -----------         ------                     Respiratory Panel PCR w/...[[id number]]  Normal              Final result                 Please view results for these tests on the individual orders.    Respiratory Panel PCR w/COVID-19(SARS-CoV-2) ROSALIE/BENNY/[place]/PAD/COR/REYNA In-House, NP  Swab in UTM/VTM, 2 HR TAT - Swab, Nasopharynx [[id number]]  (Normal) Collected: 01/26/24 1050    Lab Status: Final result Specimen: Swab from Nasopharynx Updated: 01/26/24 1203     ADENOVIRUS, PCR Not Detected     Coronavirus 229E Not Detected     Coronavirus HKU1 Not Detected     Coronavirus NL63 Not Detected     Coronavirus OC43 Not Detected     COVID19 Not Detected     Human Metapneumovirus Not Detected     Human Rhinovirus/Enterovirus Not Detected     Influenza A PCR Not Detected     Influenza B PCR Not Detected     Parainfluenza Virus 1 Not Detected     Parainfluenza Virus 2 Not Detected     Parainfluenza Virus 3 Not Detected     Parainfluenza Virus 4 Not Detected     RSV, PCR Not Detected     Bordetella pertussis pcr Not Detected     Bordetella parapertussis PCR Not Detected     Chlamydophila pneumoniae PCR Not Detected     Mycoplasma pneumo by PCR Not Detected    Narrative:      In the setting of a positive respiratory panel with a viral infection PLUS a negative procalcitonin without other underlying concern for bacterial infection, consider observing off antibiotics or discontinuation of antibiotics and continue supportive care. If the respiratory panel is positive for atypical bacterial infection (Bordetella pertussis, Chlamydophila pneumoniae, or Mycoplasma pneumoniae), consider antibiotic de-escalation to target atypical bacterial infection.            XR Chest 1 View    Result Date: 1/26/2024  XR CHEST 1 VW Date of Exam: 1/26/2024 10:37 AM EST Indication: Dysrhythmia triage protocol Comparison: Chest radiograph 5/3/2023. Findings: Enlarged cardiac silhouette, unchanged. Mild diffuse interstitial thickening. Trace bilateral pleural effusions. Bibasilar atelectasis without focal consolidation otherwise. No pneumothorax. Osseous structures are unchanged.     Impression: Impression: Cardiomegaly with mild pulmonary edema and trace bilateral pleural effusions. Bibasilar atelectasis without focal consolidation  otherwise. Electronically Signed: Pedro Arevalo MD  1/26/2024 10:50 AM EST  Workstation ID: [id number]     Results for orders placed during the hospital encounter of 06/01/23    Adult Transthoracic Echo Complete W/ Cont if Necessary Per Protocol    Interpretation Summary    Left ventricular systolic function is normal. Calculated left ventricular EF = 61% Normal left ventricular cavity size noted. Left ventricular wall thickness is consistent with moderate concentric hypertrophy. All left ventricular wall segments contract normally. Left ventricular diastolic function is consistent with (grade II w/high LAP) pseudonormalization.    Normal right ventricular cavity size, wall thickness, systolic function and septal motion noted.    Left atrial volume is moderately increased.    Moderate aortic valve regurgitation is present. No aortic valve stenosis is present.    Mitral annular calcification is present. Mild mitral valve regurgitation is present. No significant mitral valve stenosis is present.    Mild tricuspid valve regurgitation is present.    There is a small (<1cm) circumferential pericardial effusion. There is no evidence of cardiac tamponade.    There is a small (<1cm) circumferential pericardial effusion. The effusion is fibrinous. There is no evidence of cardiac tamponade.      Assessment & Plan  Assessment & Plan       Atrial fibrillation with RVR    Hypertension    Diabetes mellitus    Hyperlipidemia    Right bundle branch block with left anterior fascicular block    Nonrheumatic aortic valve insufficiency    Weakness    Acute on chronic heart failure with preserved ejection fraction (HFpEF)    Hypoxia        Patient is an 83-year-old with past medical history of hypertension, A-fib, diabetes who is admitted with weakness, shortness of breath and hypoxia secondary to acute CHF exacerbation and A-fib with RVR.    Hypoxia  Acute exacerbation of congestive heart failure with preserved EF  Elevated  troponin  A-fib with RVR  -Cardiology consulted  -Diuresis initiated in the ED with Lasix 80 mg IV, further diuresis per cardiology  -Continue diltiazem drip per cardiology  -Continue O2 to maintain sats greater than 90, currently on 2 L nasal cannula  -Continue Coreg  -Continue Eliquis    History of diabetes mellitus  -Last hemoglobin A1c was 5.7 in November 2022  -Diet controlled    Hypertension  Hyperlipidemia    Generalized weakness  -Will need PT and OT consults        DVT prophylaxis:  Mechanical DVT prophylaxis orders are signed and held. Medical DVT prophylaxis orders are present.          CODE STATUS:    Code Status and Medical Interventions:   Ordered at: 01/26/24 1446     Medical Intervention Limits:    NO intubation (DNI)     Level Of Support Discussed With:    Patient     Code Status (Patient has no pulse and is not breathing):    No CPR (Do Not Attempt to Resuscitate)     Medical Interventions (Patient has pulse or is breathing):    Limited Support       Expected Discharge   Expected Discharge Date: 1/29/2024; Expected Discharge Time:      Celina Chaudhry MD  01/26/24      Electronically signed by Celina Chaudhry MD at 01/26/24 1450       Current Facility-Administered Medications   Medication Dose Route Frequency Provider Last Rate Last Admin    acetaminophen (TYLENOL) tablet 650 mg  650 mg Oral Q4H PRN Celina Chaudhry MD        allopurinol (ZYLOPRIM) tablet 300 mg  300 mg Oral Daily Celina Chaudhry MD   300 mg at 01/29/24 0916    apixaban (ELIQUIS) tablet 2.5 mg  2.5 mg Oral Q12H Celina Chaudhry MD   2.5 mg at 01/29/24 0917    aspirin EC tablet 81 mg  81 mg Oral Daily Celina Chaudhry MD   81 mg at 01/29/24 0915    sennosides-docusate (PERICOLACE) 8.6-50 MG per tablet 2 tablet  2 tablet Oral BID Celina Chaudhry MD   2 tablet at 01/29/24 0923    And    polyethylene glycol (MIRALAX) packet 17 g  17 g Oral Daily PRN Celina Chaudhry MD        And    bisacodyl (DULCOLAX) EC tablet 5 mg  5 mg  Oral Daily PRN Celina Chaudhry MD        And    bisacodyl (DULCOLAX) suppository 10 mg  10 mg Rectal Daily PRN Celina Chaudhry MD        Calcium Replacement - Follow Nurse / BPA Driven Protocol   Does not apply PRN Celina Chaudhry MD        dilTIAZem (CARDIZEM) 125 mg in 125 mL D5W infusion  5-15 mg/hr Intravenous Continuous Jani Epperson MD   Stopped at 01/28/24 2328    dilTIAZem (CARDIZEM) tablet 30 mg  30 mg Oral Q6H Steve Toscano MD   30 mg at 01/29/24 0606    furosemide (LASIX) injection 40 mg  40 mg Intravenous Daily Jani Epperson MD   40 mg at 01/29/24 0917    losartan (COZAAR) tablet 25 mg  25 mg Oral Q24H Celina Chaudhry MD   25 mg at 01/29/24 0914    Magnesium Standard Dose Replacement - Follow Nurse / BPA Driven Protocol   Does not apply PRN Celina Chaudhry MD        metoprolol tartrate (LOPRESSOR) tablet 50 mg  50 mg Oral Q8H Jani Epperson MD        nitroglycerin (NITROSTAT) SL tablet 0.4 mg  0.4 mg Sublingual Q5 Min PRN Celina Chaudhry MD   0.4 mg at 01/26/24 1646    ondansetron (ZOFRAN) injection 4 mg  4 mg Intravenous Q6H PRN Celina Chaudhry MD        Pharmacy Consult - MTM   Does not apply Daily Krystal Petty F, [facility]        Phosphorus Replacement - Follow Nurse / BPA Driven Protocol   Does not apply PRN Celina Chaudhry MD        Potassium Replacement - Follow Nurse / BPA Driven Protocol   Does not apply PRN Celina Chaudhry MD        pravastatin (PRAVACHOL) tablet 40 mg  40 mg Oral Nightly Celina Chaudhry MD   40 mg at 01/28/24 2041    QUEtiapine (SEROquel) tablet 25 mg  25 mg Oral Nightly Celina Chaudhry MD   25 mg at 01/28/24 2041    sodium chloride 0.9 % flush 10 mL  10 mL Intravenous PRN Sanjana Worthington MD        sodium chloride 0.9 % flush 10 mL  10 mL Intravenous PRN Sanjana Worthington MD        sodium chloride 0.9 % flush 10 mL  10 mL Intravenous Q12H Celina Chaudhry MD   10 mL at 01/29/24 0918    sodium chloride 0.9 % flush 10 mL  10 mL  Intravenous PRN Celina Chaudhry MD        sodium chloride 0.9 % infusion 40 mL  40 mL Intravenous PRN Celina Chaudhry MD         Lab Results (last 24 hours)       Procedure Component Value Units Date/Time    Basic Metabolic Panel [[id number]]  (Abnormal) Collected: 01/29/24 0434    Specimen: Blood Updated: 01/29/24 0647     Glucose 115 mg/dL      BUN 29 mg/dL      Creatinine 1.14 mg/dL      Sodium 142 mmol/L      Potassium 3.9 mmol/L      Chloride 103 mmol/L      CO2 29.0 mmol/L      Calcium 9.5 mg/dL      BUN/Creatinine Ratio 25.4     Anion Gap 10.0 mmol/L      eGFR 48.5 mL/min/1.73     Narrative:      GFR Normal >60  Chronic Kidney Disease <60  Kidney Failure <15    The GFR formula is only valid for adults with stable renal function between ages 18 and 70.    CBC & Differential [[id number]]  (Normal) Collected: 01/29/24 0434    Specimen: Blood Updated: 01/29/24 0527    Narrative:      The following orders were created for panel order CBC & Differential.  Procedure                               Abnormality         Status                     ---------                               -----------         ------                     CBC Auto Differential[[id number]]        Normal              Final result                 Please view results for these tests on the individual orders.    CBC Auto Differential [[id number]]  (Normal) Collected: 01/29/24 0434    Specimen: Blood Updated: 01/29/24 0527     WBC 8.53 10*3/mm3      RBC 4.23 10*6/mm3      Hemoglobin 12.5 g/dL      Hematocrit 38.3 %      MCV 90.5 fL      MCH 29.6 pg      MCHC 32.6 g/dL      RDW 13.6 %      RDW-SD 45.0 fl      MPV 10.9 fL      Platelets 272 10*3/mm3      Neutrophil % 64.8 %      Lymphocyte % 22.2 %      Monocyte % 9.1 %      Eosinophil % 3.0 %      Basophil % 0.7 %      Immature Grans % 0.2 %      Neutrophils, Absolute 5.52 10*3/mm3      Lymphocytes, Absolute 1.89 10*3/mm3      Monocytes, Absolute 0.78 10*3/mm3      Eosinophils, Absolute 0.26 10*3/mm3       Basophils, Absolute 0.06 10*3/mm3      Immature Grans, Absolute 0.02 10*3/mm3      nRBC 0.0 /100 WBC     Potassium [[id number]]  (Normal) Collected: 01/28/24 2206    Specimen: Blood Updated: 01/28/24 2259     Potassium 4.2 mmol/L     Magnesium [[id number]]  (Normal) Collected: 01/28/24 1133    Specimen: Blood Updated: 01/28/24 1313     Magnesium 1.8 mg/dL     Basic Metabolic Panel [[id number]]  (Abnormal) Collected: 01/28/24 1133    Specimen: Blood Updated: 01/28/24 1212     Glucose 156 mg/dL      BUN 25 mg/dL      Creatinine 1.23 mg/dL      Sodium 141 mmol/L      Potassium 3.6 mmol/L      Comment: Slight hemolysis detected by analyzer. Result may be falsely elevated.        Chloride 100 mmol/L      CO2 27.0 mmol/L      Calcium 10.0 mg/dL      BUN/Creatinine Ratio 20.3     Anion Gap 14.0 mmol/L      eGFR 44.2 mL/min/1.73     Narrative:      GFR Normal >60  Chronic Kidney Disease <60  Kidney Failure <15    The GFR formula is only valid for adults with stable renal function between ages 18 and 70.          Imaging Results (Last 24 Hours)       ** No results found for the last 24 hours. **          Orders (last 24 hrs)        Start     Ordered    01/29/24 1200  metoprolol tartrate (LOPRESSOR) tablet 50 mg  Every 8 Hours         01/29/24 0928    01/29/24 0912  DIET MESSAGE Send tray now  Once        Comments: Send tray now    01/29/24 0911    01/29/24 0902  Diet: Cardiac Diets; Healthy Heart (2-3 Na+); Texture: Regular Texture (IDDSI 7); Fluid Consistency: Thin (IDDSI 0)  Diet Effective Now         01/29/24 0902 01/29/24 0600  Basic Metabolic Panel  Morning Draw         01/28/24 0925    01/29/24 0600  CBC & Differential  Morning Draw         01/28/24 0925 01/29/24 0600  CBC Auto Differential  PROCEDURE ONCE         01/28/24 2202 01/29/24 0001  NPO Diet NPO Type: Strict NPO  Diet Effective Midnight,   Status:  Canceled         01/28/24 0959    01/29/24 0001  NPO Diet NPO Type: Sips with Meds  Diet Effective  Midnight,   Status:  Canceled         01/28/24 1647    01/28/24 2157  Potassium  Timed         01/28/24 1256    01/28/24 1400  potassium chloride (K-DUR,KLOR-CON) CR tablet 40 mEq  Every 4 Hours         01/28/24 1256    01/28/24 1301  Magnesium  Once         01/28/24 1300    01/28/24 1200  metoprolol tartrate (LOPRESSOR) tablet 25 mg  Every 8 Hours,   Status:  Discontinued         01/28/24 0957    01/28/24 1200  dilTIAZem (CARDIZEM) tablet 30 mg  Every 6 Hours Scheduled         01/28/24 0957    01/28/24 1050  Basic Metabolic Panel  Morning Draw         01/27/24 0956    01/28/24 1035  Apply Heat To Affected Area  Once         01/28/24 1035    01/27/24 1200  metoprolol tartrate (LOPRESSOR) injection 5 mg  Every 6 Hours,   Status:  Discontinued         01/27/24 1115    01/27/24 1000  Pharmacy Consult - MT  Daily         01/27/24 0905    01/27/24 0900  aspirin EC tablet 81 mg  Daily         01/26/24 1614    01/27/24 0900  allopurinol (ZYLOPRIM) tablet 300 mg  Daily         01/26/24 1614    01/27/24 0900  furosemide (LASIX) injection 40 mg  Daily         01/26/24 1503    01/26/24 2100  carvedilol (COREG) tablet 12.5 mg  2 Times Daily,   Status:  Discontinued         01/26/24 1614    01/26/24 2100  pravastatin (PRAVACHOL) tablet 40 mg  Nightly         01/26/24 1614    01/26/24 2100  QUEtiapine (SEROquel) tablet 25 mg  Nightly         01/26/24 1614    01/26/24 2100  sodium chloride 0.9 % flush 10 mL  Every 12 Hours Scheduled         01/26/24 1614 01/26/24 2100  sennosides-docusate (PERICOLACE) 8.6-50 MG per tablet 2 tablet  2 Times Daily        See Hyperspace for full Linked Orders Report.    01/26/24 1614 01/26/24 2100  apixaban (ELIQUIS) tablet 2.5 mg  Every 12 Hours Scheduled         01/26/24 1740    01/26/24 2000  Vital Signs  Every 4 Hours       01/26/24 1614 01/26/24 1800  Oral Care  2 Times Daily       01/26/24 1614    01/26/24 1800  Incentive Spirometry  Every 4 Hours While Awake       01/26/24 1614     01/26/24 1730  dilTIAZem (CARDIZEM) 125 mg in 125 mL D5W infusion  Continuous         01/26/24 1639    01/26/24 1700  hydrALAZINE (APRESOLINE) tablet 25 mg  3 Times Daily,   Status:  Discontinued         01/26/24 1614    01/26/24 1700  losartan (COZAAR) tablet 25 mg  Every 24 Hours Scheduled         01/26/24 1614    01/26/24 1630  ondansetron (ZOFRAN) injection 4 mg  Every 6 Hours PRN         01/26/24 1630    01/26/24 1615  Intake & Output  Every Shift       01/26/24 1614    01/26/24 1614  sodium chloride 0.9 % flush 10 mL  As Needed         01/26/24 1614    01/26/24 1614  sodium chloride 0.9 % infusion 40 mL  As Needed         01/26/24 1614    01/26/24 1614  polyethylene glycol (MIRALAX) packet 17 g  Daily PRN        See Hyperspace for full Linked Orders Report.    01/26/24 1614    01/26/24 1614  bisacodyl (DULCOLAX) EC tablet 5 mg  Daily PRN        See Hyperspace for full Linked Orders Report.    01/26/24 1614    01/26/24 1614  bisacodyl (DULCOLAX) suppository 10 mg  Daily PRN        See Hyperspace for full Linked Orders Report.    01/26/24 1614    01/26/24 1614  nitroglycerin (NITROSTAT) SL tablet 0.4 mg  Every 5 Minutes PRN         01/26/24 1614    01/26/24 1614  Potassium Replacement - Follow Nurse / BPA Driven Protocol  As Needed         01/26/24 1614    01/26/24 1614  Magnesium Standard Dose Replacement - Follow Nurse / BPA Driven Protocol  As Needed         01/26/24 1614    01/26/24 1614  Phosphorus Replacement - Follow Nurse / BPA Driven Protocol  As Needed         01/26/24 1614    01/26/24 1614  Calcium Replacement - Follow Nurse / BPA Driven Protocol  As Needed         01/26/24 1614    01/26/24 1614  acetaminophen (TYLENOL) tablet 650 mg  Every 4 Hours PRN         01/26/24 1614    01/26/24 1043  sodium chloride 0.9 % flush 10 mL  As Needed        See Hyperspace for full Linked Orders Report.    01/26/24 1044    01/26/24 1028  sodium chloride 0.9 % flush 10 mL  As Needed         01/26/24 1028     Unscheduled  Oxygen Therapy- Nasal Cannula; Titrate 1-6 LPM Per SpO2; 90 - 95%  Continuous PRN       01/26/24 1028    Unscheduled  Oxygen Therapy- Nasal Cannula; Titrate 1-6 LPM Per SpO2; 90 - 95%  Continuous PRN       01/26/24 1614    --  apixaban (ELIQUIS) 5 MG tablet tablet  2 Times Daily         01/26/24 1442                     Physician Progress Notes (last 24 hours)        Jani Epperson MD at 01/29/24 0928          [facility] Cardiology  Inpatient Progress Note      Chief Complaint/Reason for consult:    Chief Complaint   Patient presents with    Irregular Heart Beat       Subjective     Subjective  Feeling better today, afebrile overnight, no chest pain, no dyspnea at rest    Objective   Vital Sign Min/Max for last 24 hours  Temp  Min: 97.6 °F (36.4 °C)  Max: 98.8 °F (37.1 °C)   BP  Min: 85/66  Max: 137/114   Pulse  Min: 67  Max: 146   Resp  Min: 16  Max: 18   SpO2  Min: 88 %  Max: 98 %   Flow (L/min)  Min: 2  Max: 2    No intake or output data in the 24 hours ending 01/29/24 0928        Gen: well developed, lying in bed, comfortable appearing  HEENT: MMM, sclerae anicteric, conjunctivae normal  CV: regular rate, irregularly irregular rhythm, II/VI diastolic murmur at LLSB, normal S1, S2. 2+ radial and DP pulses  Pulm: NC oxygen, normal work of breathing, no wheezes, rales, rhonchi  Abd: soft, nontender, nondistended  Ext: normal bulk for age, normal tone, no dependent edema  Neuro: alert, oriented, face symmetrical, moving all extremities well  Psych: normal mood, appropriate affect    Tele:  atrial fibrillations, rates 60s-140s    Results Review (reviewed the patient's recent labs in the electronic medical record):     6/223 - Transthoracic Echo Complete    Left ventricular systolic function is normal. Calculated left ventricular EF = 61% Normal left ventricular cavity size noted. Left ventricular wall thickness is consistent with moderate concentric hypertrophy. All left ventricular  wall segments contract normally. Left ventricular diastolic function is consistent with (grade II w/high LAP) pseudonormalization.    Normal right ventricular cavity size, wall thickness, systolic function and septal motion noted.    Left atrial volume is moderately increased.    Moderate aortic valve regurgitation is present. No aortic valve stenosis is present.    Mitral annular calcification is present. Mild mitral valve regurgitation is present. No significant mitral valve stenosis is present.    Mild tricuspid valve regurgitation is present.    There is a small (<1cm) circumferential pericardial effusion. The effusion is fibrinous. There is no evidence of cardiac tamponade.       Radiology: No radiology results for the last day    Lab Results   Component Value Date    WBC 8.53 01/29/2024    HGB 12.5 01/29/2024    HCT 38.3 01/29/2024    MCV 90.5 01/29/2024     01/29/2024     Lab Results   Component Value Date    GLUCOSE 115 (H) 01/29/2024    CALCIUM 9.5 01/29/2024     01/29/2024    K 3.9 01/29/2024    CO2 29.0 01/29/2024     01/29/2024    BUN 29 (H) 01/29/2024    CREATININE 1.14 (H) 01/29/2024    EGFRIFAFRI 52 (L) 11/15/2021    EGFRIFNONA 43 (L) 11/15/2021    BCR 25.4 (H) 01/29/2024    ANIONGAP 10.0 01/29/2024     Lab Results   Component Value Date    TROPONINT 16 (H) 01/26/2024    TROPONINT 19 (H) 01/26/2024    TROPONINT 20 (H) 01/26/2024      Lab Results   Component Value Date    PROBNP 3,266.0 (H) 01/26/2024     Lab Results   Component Value Date    HGBA1C 5.7 11/30/2022      Lab Results   Component Value Date    CHLPL 168 11/30/2022    TRIG 136 11/30/2022    HDL 45 11/30/2022    LDL 99 11/30/2022        Assessment     Persistent AF   - recent diagnosis, would perform REGINE prior to ECV given duration of AC, discussed with daughter and son today, would like to hold off today   - increase scheduled metoprolol, continue diltiazem   - AC with apixaban    Acute on chronic HFpEF   - continue daily  "furosemide    Hypertension   - will dc hydralazine while working on medications for rate control     DUARTE Epperson MD  1/29/2024  09:28 EST        Electronically signed by Jani Epperson MD at 01/29/24 0934       Steve Toscano MD at 01/28/24 0950          [facility] Cardiology  Hospital Progress Note     LOS: 1 day   Patient Care Team:  Provider, No Known as PCP - Sharon Geronimo PA as Physician Assistant (Cardiology)  Bradley Blandon MD as Consulting Physician (Cardiology)  PCP:  Provider, No Known    Chief Complaint:  fu afib    SUBJECTIVE:       OBJECTIVE:    Vital Sign Min/Max for last 24 hours  Temp  Min: 97.7 °F (36.5 °C)  Max: 98.4 °F (36.9 °C)   BP  Min: 95/76  Max: 142/100   Pulse  Min: 83  Max: 147   Resp  Min: 16  Max: 18   SpO2  Min: 91 %  Max: 95 %   No data recorded   No data recorded     Flowsheet Rows      Flowsheet Row First Filed Value   Admission Height 162.6 cm (64\") Documented at 01/26/2024 1030   Admission Weight 66.7 kg (147 lb) Documented at 01/26/2024 1030            Telemetry:   Af 110    Intake/Output Summary (Last 24 hours) at 1/28/2024 0950  Last data filed at 1/28/2024 0300  Gross per 24 hour   Intake 1540 ml   Output 1475 ml   Net 65 ml     Intake & Output (last 3 days)         01/24 0701  01/25 0700 01/25 0701  01/26 0700 01/26 0701  01/27 0700 01/27 0701  01/28 0700    P.O.    150    Total Intake(mL/kg)    150 (2.2)    Urine (mL/kg/hr)   700 650 (2.4)    Total Output   700 650    Net   -700 -500                     Physical Exam:  Physical Exam   Alert appropriate  2L nc  Irreg tachy s1s2  Chest diminished bases  Abd soft =bs  Ext no ed    LABS/DIAGNOSTIC DATA:  Results from last 7 days   Lab Units 01/27/24  0207 01/26/24  1034   WBC 10*3/mm3 8.69 9.00   HEMOGLOBIN g/dL 12.2 13.0   HEMATOCRIT % 37.8 39.9   PLATELETS 10*3/mm3 268 220     Lab Results   Lab Value Date/Time    TROPONINT 16 (H) 01/26/2024 2041    TROPONINT 19 (H) 01/26/2024 1902    " TROPONINT 20 (H) 01/26/2024 1645    TROPONINT 17 (H) 01/26/2024 1418    TROPONINT 18 (H) 01/26/2024 1034         Results from last 7 days   Lab Units 01/27/24  0207 01/26/24  1034   SODIUM mmol/L 143 144   POTASSIUM mmol/L 3.9 3.6   CHLORIDE mmol/L 102 105   CO2 mmol/L 27.0 27.0   BUN mg/dL 18 17   CREATININE mg/dL 1.10* 1.16*   CALCIUM mg/dL 9.4 10.0   BILIRUBIN mg/dL  --  0.6   ALK PHOS U/L  --  110   ALT (SGPT) U/L  --  60*   AST (SGOT) U/L  --  31   GLUCOSE mg/dL 113* 144*             Results from last 7 days   Lab Units 01/26/24  1034   TSH uIU/mL 2.940           Medication Review:   allopurinol, 300 mg, Oral, Daily  apixaban, 2.5 mg, Oral, Q12H  aspirin, 81 mg, Oral, Daily  carvedilol, 12.5 mg, Oral, BID  furosemide, 40 mg, Intravenous, Daily  hydrALAZINE, 25 mg, Oral, TID  losartan, 25 mg, Oral, Q24H  metoprolol tartrate, 5 mg, Intravenous, Q6H  pharmacy consult - MTM, , Does not apply, Daily  pravastatin, 40 mg, Oral, Nightly  QUEtiapine, 25 mg, Oral, Nightly  senna-docusate sodium, 2 tablet, Oral, BID  sodium chloride, 10 mL, Intravenous, Q12H       dilTIAZem, 5-15 mg/hr, Last Rate: 10 mg/hr (01/28/24 0944)           Atrial fibrillation with RVR    Hypertension    Diabetes mellitus    Hyperlipidemia    Right bundle branch block with left anterior fascicular block    Nonrheumatic aortic valve insufficiency    Weakness    Acute on chronic heart failure with preserved ejection fraction (HFpEF)    Hypoxia    A-fib      ASSESSMENT/PLAN:  AF/RVR advance av soham agent as tolerant  Discussed rate vs rhythm control strategies w pt and family  Pt to consider over weekend    Pt wants rate control strategy if possible.  Remains on IV CCB.  Will transition to metop from coreg as bp is soft  Attempt po CCB  If no rate control overnight pt & son agree to REGINE/ecv    NPO      Steve Toscano MD   01/28/24  09:50 EST      Electronically signed by Steve Toscano MD at 01/28/24 0936       Consult Notes (last 24 hours)   Notes from 01/28/24 0949 through 01/29/24 0949   No notes of this type exist for this encounter.

## 2024-01-29 NOTE — THERAPY DISCHARGE NOTE
Acute Care - Occupational Therapy Discharge  [facility]    Patient Name: Cheryl Tomas  : 1940    MRN: [id number]                              Today's Date: 2024       Admit Date: 2024    Visit Dx:     ICD-10-CM ICD-9-CM   1. Atrial fibrillation, unspecified type  I48.91 427.31   2. Acute on chronic congestive heart failure, unspecified heart failure type  I50.9 428.0   3. Hypoxia  R09.02 799.02     Patient Active Problem List   Diagnosis    Hypertension    Diabetes mellitus    Hyperlipidemia    Anxiety and depression    Medicare annual wellness visit, initial    Chest wall discomfort    Needs flu shot    Right wrist pain    Elevated uric acid in blood    Breast cancer screening    Colon cancer screening    Actinic keratitis    Acute intractable tension-type headache    Family history of brain aneurysm    Age-related cataract    Ankle edema, bilateral    Hypertensive heart and chronic kidney disease with heart failure and stage 1 through stage 4 chronic kidney disease, or unspecified chronic kidney disease    Orthopnea    Right bundle branch block with left anterior fascicular block    Postmenopausal    PSVT (paroxysmal supraventricular tachycardia)    Nonrheumatic aortic valve insufficiency    Osteoarthrosis multiple sites, not specified as generalized    Actinic keratosis    Body mass index (BMI) of 25.0 to 25.9 in adult    Weakness    High risk medication use    Insomnia    Obesity, unspecified    Other osteoporosis    Sprain and strain of knee and leg    Syncope and collapse    Vitamin D deficiency    Bronchitis    Edema of lower extremity    Other specified circulatory system disorders    Hypertensive disorder    Encounter for long-term (current) use of other medications    Other depressive disorder    Mixed hyperlipidemia    Benign essential hypertension    Supraventricular tachycardia    Varicose veins of left lower extremity with pain    Essential hypertension    Dizziness    Serum  potassium elevated    Medicare annual wellness visit, subsequent    At high risk for falls    Leg edema    Atrial fibrillation with RVR    Acute on chronic heart failure with preserved ejection fraction (HFpEF)    Hypoxia    A-fib     Past Medical History:   Diagnosis Date    Bilateral renal cysts     noted CT abdomen 12/31/22    Diabetes mellitus     Diverticulosis     Hyperlipidemia     Hypertension     Medicare annual wellness visit, subsequent 11/15/2021    Osteoporosis     Ovarian cyst     noted on CT abdomen 12/31/22 (left ovary)    Pulmonary nodule less than 6 mm determined by computed tomography of lung     Noted on CT Abdomen from ER visit 12/31/22 (right lung)     Past Surgical History:   Procedure Laterality Date    APPENDECTOMY      BREAST BIOPSY      CATARACT EXTRACTION, BILATERAL      02/23 and 03/23    FIBULA FRACTURE SURGERY      HYSTERECTOMY      KIDNEY STONE SURGERY        General Information       Row Name 01/29/24 0945          OT Time and Intention    Document Type discharge evaluation/summary  -KF     Mode of Treatment occupational therapy;individual therapy  -KF       Row Name 01/29/24 0945          General Information    Patient Profile Reviewed yes  -KF     Prior Level of Function independent:;all household mobility;community mobility;bed mobility;ADL's;dependent:;driving  -KF     Existing Precautions/Restrictions oxygen therapy device and L/min  -KF     Barriers to Rehab medically complex  -KF       Row Name 01/29/24 0945          Living Environment    People in Home facility resident;other (see comments);alone  [facility]  -KF       Row Name 01/29/24 0945          Home Main Entrance    Number of Stairs, Main Entrance none;other (see comments)  elevator access  -KF       Row Name 01/29/24 0945          Stairs Within Home, Primary    Number of Stairs, Within Home, Primary none  -KF     Stairs Comment, Within Home, Primary Pt states she has a tub shower and a  comfort height commode.  -       Row Name 01/29/24 0945          Cognition    Orientation Status (Cognition) oriented x 4  -       Row Name 01/29/24 0945          Safety Issues, Functional Mobility    Safety Issues Affecting Function (Mobility) safety precaution awareness  -     Impairments Affecting Function (Mobility) endurance/activity tolerance  -               User Key  (r) = Recorded By, (t) = Taken By, (c) = Cosigned By      Initials Name Provider Type    KF Lenore Spears OT Occupational Therapist                   Mobility/ADL's       Row Name 01/29/24 0946          Bed Mobility    Bed Mobility supine-sit  -     Supine-Sit [place] (Bed Mobility) modified independence  -     Comment, (Bed Mobility) Increased effort needed, though no assistance required.  -       Row Name 01/29/24 0946          Transfers    Transfers bed-chair transfer;sit-stand transfer;stand-sit transfer;toilet transfer  -       Row Name 01/29/24 0946          Bed-Chair Transfer    Bed-Chair [place] (Transfers) standby assist  -       Row Name 01/29/24 0946          Sit-Stand Transfer    Sit-Stand [place] (Transfers) supervision  -       Row Name 01/29/24 0946          Stand-Sit Transfer    Stand-Sit [place] (Transfers) supervision  -[facility] Name 01/29/24 0946          Toilet Transfer    Type (Toilet Transfer) sit-stand;stand-sit  -     [place] Level (Toilet Transfer) standby assist  -     Assistive Device (Toilet Transfer) commode;grab bars/safety frame  -       Row Name 01/29/24 0946          Functional Mobility    Functional Mobility- Ind. Level standby assist  -     Functional Mobility- Comment Pt ambulated a short household distance to/from the bathroom with SBA, no AD. Pt with no LOB, no complaints of SOA.  -       Row Name 01/29/24 0946          Activities of Daily Living    BADL Assessment/Intervention upper body dressing;lower body dressing;grooming;toileting  -        Row Name 01/29/24 0946          Upper Body Dressing Assessment/Training    [place] Level (Upper Body Dressing) doff;don;pajama/robe;set up;other (see comments)  hospital gown  -       Row Name 01/29/24 0946          Lower Body Dressing Assessment/Training    [place] Level (Lower Body Dressing) doff;don;socks;independent  -KF     Position (Lower Body Dressing) edge of bed sitting  -KF       Row Name 01/29/24 0946          Grooming Assessment/Training    [place] Level (Grooming) hair care, combing/brushing;wash face, hands;oral care regimen;supervision  -KF     Position (Grooming) sink side;unsupported standing  -KF       Row Name 01/29/24 0946          Toileting Assessment/Training    [place] Level (Toileting) adjust/manage clothing;perform perineal hygiene;supervision  -KF     Assistive Devices (Toileting) commode;grab bar/safety frame  -KF     Position (Toileting) unsupported sitting;unsupported standing  -KF               User Key  (r) = Recorded By, (t) = Taken By, (c) = Cosigned By      Initials Name Provider Type    KF Lenore Spears OT Occupational Therapist                   Obj/Interventions       Row Name 01/29/24 0948          Sensory Assessment (Somatosensory)    Sensory Assessment (Somatosensory) UE sensation intact  -[facility] Name 01/29/24 0948          Vision Assessment/Intervention    Visual Impairment/Limitations WFL  -[facility] Name 01/29/24 0948          Range of Motion Comprehensive    General Range of Motion bilateral upper extremity ROM WFL  -[facility] Name 01/29/24 0948          Strength Comprehensive (MMT)    Comment, General Manual Muscle Testing (MMT) Assessment BUE grossly 4+/5  -KF       Row Name 01/29/24 0948          Balance    Balance Assessment sitting static balance;sitting dynamic balance;sit to stand dynamic balance;standing static balance;standing dynamic balance  -KF     Static Sitting Balance independent  -KF     Dynamic Sitting Balance  independent  -KF     Position, Sitting Balance unsupported;sitting edge of bed;other (see comments)  commode  -KF     Sit to Stand Dynamic Balance supervision  -KF     Static Standing Balance supervision  -KF     Dynamic Standing Balance standby assist  -KF     Position/Device Used, Standing Balance unsupported  -KF     Balance Interventions sitting;standing;sit to stand;supported;static;dynamic;occupation based/functional task  -               User Key  (r) = Recorded By, (t) = Taken By, (c) = Cosigned By      Initials Name Provider Type    KF Lenore Spears OT Occupational Therapist                   Goals/Plan    No documentation.                  Clinical Impression       Row Name 01/29/24 0949          Pain Assessment    Pretreatment Pain Rating 0/10 - no pain  -KF     Posttreatment Pain Rating 0/10 - no pain  -KF     Pain Intervention(s) Repositioned;Ambulation/increased activity  -       Row Name 01/29/24 0949          Plan of Care Review    Plan of Care Reviewed With patient  -     Progress no change  -     Outcome Evaluation OT evaluation completed. The pt presents near her functional baseline of independence during ADLs and functional mobility. Pt performed bed mobility with modified independence and ambulated a short household distance with SBA, no AD. The pt completed UBD, LBD, toileting, and sink side grooming ADLs with supervision. Pt with no current need for IP OT services. OT will sign off. Recommend a d/c home with assist when medically ready.  -       Row Name 01/29/24 0949          Therapy Assessment/Plan (OT)    Criteria for Skilled Therapeutic Interventions Met (OT) no problems identified which require skilled intervention  -     Therapy Frequency (OT) evaluation only  -       Row Name 01/29/24 0949          Therapy Plan Review/Discharge Plan (OT)    Anticipated Discharge Disposition (OT) home with assist  -       Row Name 01/29/24 0949          Vital Signs    Pre Systolic BP  Rehab 120  -KF     Pre Treatment Diastolic BP 87  -KF     Post Systolic BP Rehab 108  -KF     Post Treatment Diastolic BP 80  -KF     Pretreatment Heart Rate (beats/min) 79  -KF     Intratreatment Heart Rate (beats/min) 135  -KF     Posttreatment Heart Rate (beats/min) 118  -KF     Pre SpO2 (%) 94  3L  -KF     O2 Delivery Pre Treatment nasal cannula  -KF     Intra SpO2 (%) 91  -KF     O2 Delivery Intra Treatment room air  -KF     Post SpO2 (%) 96  3L  -KF     O2 Delivery Post Treatment nasal cannula  -KF     Pre Patient Position Supine  -KF     Intra Patient Position Standing  -KF     Post Patient Position Sitting  -KF       Row Name 01/29/24 0949          Positioning and Restraints    Pre-Treatment Position in bed  -KF     Post Treatment Position chair  -KF     In Chair notified nsg;reclined;call light within reach;encouraged to call for assist;exit alarm on;legs elevated;waffle cushion  -KF               User Key  (r) = Recorded By, (t) = Taken By, (c) = Cosigned By      Initials Name Provider Type    KF Lenore Spears, OT Occupational Therapist                   Outcome Measures       Row Name 01/29/24 0953          How much help from another is currently needed...    Putting on and taking off regular lower body clothing? 4  -KF     Bathing (including washing, rinsing, and drying) 3  -KF     Toileting (which includes using toilet bed pan or urinal) 4  -KF     Putting on and taking off regular upper body clothing 4  -KF     Taking care of personal grooming (such as brushing teeth) 4  -KF     Eating meals 4  -KF     AM-PAC 6 Clicks Score (OT) 23  -KF       Row Name 01/29/24 0915          How much help from another person do you currently need...    Turning from your back to your side while in flat bed without using bedrails? 4  -ND     Moving from lying on back to sitting on the side of a flat bed without bedrails? 4  -ND     Moving to and from a bed to a chair (including a wheelchair)? 4  -ND     Standing up from  a chair using your arms (e.g., wheelchair, bedside chair)? 4  -ND     Climbing 3-5 steps with a railing? 3  -ND     To walk in hospital room? 3  -ND     AM-PAC 6 Clicks Score (PT) 22  -ND     Highest Level of Mobility Goal 7 --> Walk 25 feet or more  -ND       Row Name 01/29/24 0953 01/29/24 0915       Functional Assessment    Outcome Measure Options AM-PAC 6 Clicks Daily Activity (OT)  - AM-PAC 6 Clicks Basic Mobility (PT)  -ND              User Key  (r) = Recorded By, (t) = Taken By, (c) = Cosigned By      Initials Name Provider Type    Lenore Mata, OT Occupational Therapist    ND Patricia Greenberg PT Physical Therapist                  Occupational Therapy Education       Title: PT OT SLP Therapies (In Progress)       Topic: Occupational Therapy (In Progress)       Point: ADL training (Done)       Description:   Instruct learner(s) on proper safety adaptation and remediation techniques during self care or transfers.   Instruct in proper use of assistive devices.                  Learning Progress Summary             Patient Acceptance, E,TB, VU,DU by  at 1/29/2024 0807                         Point: Home exercise program (Not Started)       Description:   Instruct learner(s) on appropriate technique for monitoring, assisting and/or progressing therapeutic exercises/activities.                  Learner Progress:  Not documented in this visit.              Point: Precautions (Done)       Description:   Instruct learner(s) on prescribed precautions during self-care and functional transfers.                  Learning Progress Summary             Patient Acceptance, E,TB, VU,DU by KF at 1/29/2024 0807                         Point: Body mechanics (Done)       Description:   Instruct learner(s) on proper positioning and spine alignment during self-care, functional mobility activities and/or exercises.                  Learning Progress Summary             Patient Acceptance, E,TB, VU,DU by  at 1/29/2024  0807                                         User Key       Initials Effective Dates Name Provider Type Discipline     08/09/23 -  Lenore Spears OT Occupational Therapist OT                  OT Recommendation and Plan  Therapy Frequency (OT): evaluation only  Plan of Care Review  Plan of Care Reviewed With: patient  Progress: no change  Outcome Evaluation: OT evaluation completed. The pt presents near her functional baseline of independence during ADLs and functional mobility. Pt performed bed mobility with modified independence and ambulated a short household distance with SBA, no AD. The pt completed UBD, LBD, toileting, and sink side grooming ADLs with supervision. Pt with no current need for IP OT services. OT will sign off. Recommend a d/c home with assist when medically ready.  Plan of Care Reviewed With: patient  Outcome Evaluation: OT evaluation completed. The pt presents near her functional baseline of independence during ADLs and functional mobility. Pt performed bed mobility with modified independence and ambulated a short household distance with SBA, no AD. The pt completed UBD, LBD, toileting, and sink side grooming ADLs with supervision. Pt with no current need for IP OT services. OT will sign off. Recommend a d/c home with assist when medically ready.     Time Calculation:   Evaluation Complexity (OT)  Review Occupational Profile/Medical/Therapy History Complexity: brief/low complexity  Assessment, Occupational Performance/Identification of Deficit Complexity: 1-3 performance deficits  Clinical Decision Making Complexity (OT): problem focused assessment/low complexity  Overall Complexity of Evaluation (OT): low complexity     Time Calculation- OT       Row Name 01/29/24 0953             Time Calculation- OT    OT Start Time 0807  -KF      OT Received On 01/29/24  -KF         Timed Charges    70242 - OT Self Care/Mgmt Minutes 12  -KF         Untimed Charges    OT Eval/Re-eval Minutes 35  -KF          Total Minutes    Timed Charges Total Minutes 12  -KF      Untimed Charges Total Minutes 35  -KF       Total Minutes 47  -KF                User Key  (r) = Recorded By, (t) = Taken By, (c) = Cosigned By      Initials Name Provider Type    Lenore Mata OT Occupational Therapist                  Therapy Charges for Today       Code Description Service Date Service Provider Modifiers Qty    [id number]  OT EVAL LOW COMPLEXITY 3 1/29/2024 Lenore Spears OT GO 1    [id number]  OT SELF CARE/MGMT/TRAIN EA 15 MIN 1/29/2024 Lenore Spears OT GO 1               OT Discharge Summary  Anticipated Discharge Disposition (OT): home with assist    Lenore Spears OT  1/29/2024

## 2024-01-29 NOTE — PROGRESS NOTES
[facility] Medicine Services  PROGRESS NOTE    Patient Name: Cheryl Tomas  : 1940  MRN: [id number]    Date of Admission: 2024  Primary Care Physician: Provider, No Known    Subjective   Subjective     CC:  F/u A.fib, CHF    HPI:  Patient sitting up in bed. Off diltiazem gtt. Son and patient have decided to try medication instead of REGINE/ECV for now      Objective   Objective     Vital Signs:   Temp:  [97.6 °F (36.4 °C)-98.8 °F (37.1 °C)] 98 °F (36.7 °C)  Heart Rate:  [] 105  Resp:  [16-18] 18  BP: ()/() 122/82  Flow (L/min):  [2] 2     Physical Exam:  Constitutional: No acute distress, awake, alert  HENT: NCAT, mucous membranes moist  Respiratory: CTAB in apices, O2 sats stable on 2L, non-labored  Cardiovascular: tachycardic, irregular, no murmurs, rubs, or gallops  Gastrointestinal: soft, nontender, nondistended  Musculoskeletal: No bilateral ankle edema  Psychiatric: Appropriate affect, cooperative  Neurologic: Oriented x 3, speech clear, no focal deficits  Skin: No rashes      Results Reviewed:  LAB RESULTS:      Lab 24  1034   WBC 8.53 8.69 9.00   HEMOGLOBIN 12.5 12.2 13.0   HEMATOCRIT 38.3 37.8 39.9   PLATELETS 272 268 220   NEUTROS ABS 5.52  --  7.16*   IMMATURE GRANS (ABS) 0.02  --  0.03   LYMPHS ABS 1.89  --  1.06   MONOS ABS 0.78  --  0.56   EOS ABS 0.26  --  0.14   MCV 90.5 90.4 92.4   LACTATE  --   --  1.1         Lab 24  0434 24  1133 24  02024  1034   SODIUM 142  --  141 143 144   POTASSIUM 3.9 4.2 3.6 3.9 3.6   CHLORIDE 103  --  100 102 105   CO2 29.0  --  27.0 27.0 27.0   ANION GAP 10.0  --  14.0 14.0 12.0   BUN 29*  --  25* 18 17   CREATININE 1.14*  --  1.23* 1.10* 1.16*   EGFR 48.5*  --  44.2* 50.6* 47.5*   GLUCOSE 115*  --  156* 113* 144*   CALCIUM 9.5  --  10.0 9.4 10.0   MAGNESIUM  --   --  1.8  --  1.8   TSH  --   --   --   --  2.940         [facility] 24  1031    TOTAL PROTEIN 6.7   ALBUMIN 4.4   GLOBULIN 2.3   ALT (SGPT) 60*   AST (SGOT) 31   BILIRUBIN 0.6   ALK PHOS 110   LIPASE 23         Lab 01/26/24  2041 01/26/24  1902 01/26/24  1645 01/26/24  1418 01/26/24  1034   PROBNP  --   --   --   --  3,266.0*   HSTROP T 16* 19* 20* 17* 18*                 Brief Urine Lab Results  (Last result in the past 365 days)        Color   Clarity   Blood   Leuk Est   Nitrite   Protein   CREAT   Urine HCG        01/27/24 0808 Yellow   Clear   Negative   Negative   Negative   Negative                   Microbiology Results Abnormal       Procedure Component Value - Date/Time    COVID PRE-OP / PRE-PROCEDURE SCREENING ORDER (NO ISOLATION) - Swab, Nasopharynx [[id number]]  (Normal) Collected: 01/26/24 1050    Lab Status: Final result Specimen: Swab from Nasopharynx Updated: 01/26/24 1203    Narrative:      The following orders were created for panel order COVID PRE-OP / PRE-PROCEDURE SCREENING ORDER (NO ISOLATION) - Swab, Nasopharynx.  Procedure                               Abnormality         Status                     ---------                               -----------         ------                     Respiratory Panel PCR w/...[[id number]]  Normal              Final result                 Please view results for these tests on the individual orders.    Respiratory Panel PCR w/COVID-19(SARS-CoV-2) ROSALIE/BENNY/JN/PAD/COR/REYNA In-House, NP Swab in UTM/VTM, 2 HR TAT - Swab, Nasopharynx [[id number]]  (Normal) Collected: 01/26/24 1050    Lab Status: Final result Specimen: Swab from Nasopharynx Updated: 01/26/24 1203     ADENOVIRUS, PCR Not Detected     Coronavirus 229E Not Detected     Coronavirus HKU1 Not Detected     Coronavirus NL63 Not Detected     Coronavirus OC43 Not Detected     COVID19 Not Detected     Human Metapneumovirus Not Detected     Human Rhinovirus/Enterovirus Not Detected     Influenza A PCR Not Detected     Influenza B PCR Not Detected     Parainfluenza Virus 1 Not Detected      Parainfluenza Virus 2 Not Detected     Parainfluenza Virus 3 Not Detected     Parainfluenza Virus 4 Not Detected     RSV, PCR Not Detected     Bordetella pertussis pcr Not Detected     Bordetella parapertussis PCR Not Detected     Chlamydophila pneumoniae PCR Not Detected     Mycoplasma pneumo by PCR Not Detected    Narrative:      In the setting of a positive respiratory panel with a viral infection PLUS a negative procalcitonin without other underlying concern for bacterial infection, consider observing off antibiotics or discontinuation of antibiotics and continue supportive care. If the respiratory panel is positive for atypical bacterial infection (Bordetella pertussis, Chlamydophila pneumoniae, or Mycoplasma pneumoniae), consider antibiotic de-escalation to target atypical bacterial infection.            No radiology results from the last 24 hrs    Results for orders placed during the hospital encounter of 06/01/23    Adult Transthoracic Echo Complete W/ Cont if Necessary Per Protocol    Interpretation Summary    Left ventricular systolic function is normal. Calculated left ventricular EF = 61% Normal left ventricular cavity size noted. Left ventricular wall thickness is consistent with moderate concentric hypertrophy. All left ventricular wall segments contract normally. Left ventricular diastolic function is consistent with (grade II w/high LAP) pseudonormalization.    Normal right ventricular cavity size, wall thickness, systolic function and septal motion noted.    Left atrial volume is moderately increased.    Moderate aortic valve regurgitation is present. No aortic valve stenosis is present.    Mitral annular calcification is present. Mild mitral valve regurgitation is present. No significant mitral valve stenosis is present.    Mild tricuspid valve regurgitation is present.    There is a small (<1cm) circumferential pericardial effusion. There is no evidence of cardiac tamponade.    There is a small  (<1cm) circumferential pericardial effusion. The effusion is fibrinous. There is no evidence of cardiac tamponade.      Current medications:  Scheduled Meds:allopurinol, 300 mg, Oral, Daily  apixaban, 5 mg, Oral, Q12H  aspirin, 81 mg, Oral, Daily  dilTIAZem, 30 mg, Oral, Q6H  furosemide, 40 mg, Intravenous, Daily  losartan, 25 mg, Oral, Q24H  metoprolol tartrate, 50 mg, Oral, Q8H  pharmacy consult - MTM, , Does not apply, Daily  pravastatin, 40 mg, Oral, Nightly  QUEtiapine, 25 mg, Oral, Nightly  senna-docusate sodium, 2 tablet, Oral, BID  sodium chloride, 10 mL, Intravenous, Q12H      Continuous Infusions:dilTIAZem, 5-15 mg/hr, Last Rate: Stopped (01/28/24 2328)      PRN Meds:.  acetaminophen    senna-docusate sodium **AND** polyethylene glycol **AND** bisacodyl **AND** bisacodyl    Calcium Replacement - Follow Nurse / BPA Driven Protocol    Magnesium Cardiology Dose Replacement - Follow Nurse / BPA Driven Protocol    nitroglycerin    ondansetron    Phosphorus Replacement - Follow Nurse / BPA Driven Protocol    Potassium Replacement - Follow Nurse / BPA Driven Protocol    sodium chloride    [COMPLETED] Insert Peripheral IV **AND** sodium chloride    sodium chloride    sodium chloride    Assessment & Plan   Assessment & Plan     Active Hospital Problems    Diagnosis  POA    **Atrial fibrillation with RVR [I48.91]  Yes    A-fib [I48.91]  Yes    Acute on chronic heart failure with preserved ejection fraction (HFpEF) [I50.33]  Unknown    Hypoxia [R09.02]  Yes    Nonrheumatic aortic valve insufficiency [I35.1]  Yes    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        Brief Hospital Course to date:  Cheryl Tomas is a 83 y.o. female with past medical history of hypertension, A-fib, diabetes who is admitted  with weakness, shortness of breath and hypoxia secondary to acute CHF exacerbation and A-fib with RVR.     Acute HFpEF w/Hypoxia  - still requiring O2 (baseline RA) continue IV diuresis   - monitor I&Os, wean O2 as tolerated  - cardiology following    A-fib with RVR  -Cardiology following, currently son/patient would like to defer REGINE/ECV and trial medications fist  -- continue metoprolol, PO diltiazem, weaned off dilt gtt  --continue Eliquis for AC     History of diabetes mellitus  -Last hemoglobin A1c was 5.7 in November 2022  -Diet controlled     Hypertension  Hyperlipidemia   - continue home regimen    Generalized weakness  -PT/OT consulted    Expected Discharge Location and Transportation: Home  Expected Discharge   Expected Discharge Date: 1/31/2024; Expected Discharge Time:      DVT prophylaxis:  Medical and mechanical DVT prophylaxis orders are present.         AM-PAC 6 Clicks Score (PT): 22 (01/29/24 0947)    CODE STATUS:   Code Status and Medical Interventions:   Ordered at: 01/26/24 1446     Medical Intervention Limits:    NO intubation (DNI)     Level Of Support Discussed With:    Patient     Code Status (Patient has no pulse and is not breathing):    No CPR (Do Not Attempt to Resuscitate)     Medical Interventions (Patient has pulse or is breathing):    Limited Support       Mirta Chong, DO  01/29/24

## 2024-01-29 NOTE — PLAN OF CARE
Goal Outcome Evaluation:  Plan of Care Reviewed With: patient        Progress: no change  Outcome Evaluation: OT evaluation completed. The pt presents near her functional baseline of independence during ADLs and functional mobility. Pt performed bed mobility with modified independence and ambulated a short household distance with SBA, no AD. The pt completed UBD, LBD, toileting, and sink side grooming ADLs with supervision. Pt with no current need for IP OT services. OT will sign off. Recommend a d/c home with assist when medically ready.      Anticipated Discharge Disposition (OT): home with assist

## 2024-01-29 NOTE — THERAPY EVALUATION
Patient Name: Cheryl Tomas  : 1940    MRN: [id number]                              Today's Date: 2024       Admit Date: 2024    Visit Dx:     ICD-10-CM ICD-9-CM   1. Atrial fibrillation, unspecified type  I48.91 427.31   2. Acute on chronic congestive heart failure, unspecified heart failure type  I50.9 428.0   3. Hypoxia  R09.02 799.02     Patient Active Problem List   Diagnosis    Hypertension    Diabetes mellitus    Hyperlipidemia    Anxiety and depression    Medicare annual wellness visit, initial    Chest wall discomfort    Needs flu shot    Right wrist pain    Elevated uric acid in blood    Breast cancer screening    Colon cancer screening    Actinic keratitis    Acute intractable tension-type headache    Family history of brain aneurysm    Age-related cataract    Ankle edema, bilateral    Hypertensive heart and chronic kidney disease with heart failure and stage 1 through stage 4 chronic kidney disease, or unspecified chronic kidney disease    Orthopnea    Right bundle branch block with left anterior fascicular block    Postmenopausal    PSVT (paroxysmal supraventricular tachycardia)    Nonrheumatic aortic valve insufficiency    Osteoarthrosis multiple sites, not specified as generalized    Actinic keratosis    Body mass index (BMI) of 25.0 to 25.9 in adult    Weakness    High risk medication use    Insomnia    Obesity, unspecified    Other osteoporosis    Sprain and strain of knee and leg    Syncope and collapse    Vitamin D deficiency    Bronchitis    Edema of lower extremity    Other specified circulatory system disorders    Hypertensive disorder    Encounter for long-term (current) use of other medications    Other depressive disorder    Mixed hyperlipidemia    Benign essential hypertension    Supraventricular tachycardia    Varicose veins of left lower extremity with pain    Essential hypertension    Dizziness    Serum potassium elevated    Medicare annual wellness visit, subsequent     At high risk for falls    Leg edema    Atrial fibrillation with RVR    Acute on chronic heart failure with preserved ejection fraction (HFpEF)    Hypoxia    A-fib     Past Medical History:   Diagnosis Date    Bilateral renal cysts     noted CT abdomen 12/31/22    Diabetes mellitus     Diverticulosis     Hyperlipidemia     Hypertension     Medicare annual wellness visit, subsequent 11/15/2021    Osteoporosis     Ovarian cyst     noted on CT abdomen 12/31/22 (left ovary)    Pulmonary nodule less than 6 mm determined by computed tomography of lung     Noted on CT Abdomen from ER visit 12/31/22 (right lung)     Past Surgical History:   Procedure Laterality Date    APPENDECTOMY      BREAST BIOPSY      CATARACT EXTRACTION, BILATERAL      02/23 and 03/23    FIBULA FRACTURE SURGERY      HYSTERECTOMY      KIDNEY STONE SURGERY        General Information       Row Name 01/29/24 0906          Physical Therapy Time and Intention    Document Type evaluation  -ND     Mode of Treatment physical therapy  -ND       Row Name 01/29/24 0906          General Information    Patient Profile Reviewed yes  -ND     Prior Level of Function independent:;all household mobility;community mobility;gait;transfer;bed mobility;ADL's  -ND     Existing Precautions/Restrictions fall;oxygen therapy device and L/min  -ND     Barriers to Rehab medically complex  -ND       Row Name 01/29/24 0906          Living Environment    [facility]  -[facility]       Row Name 01/29/24 0906          Home Main Entrance    Number of Stairs, Main Entrance none  -ND     Stair Railings, Main Entrance none  -ND       Row Name 01/29/24 0906          Stairs Within Home, Primary    Number of Stairs, Within Home, Primary none  -ND       Row Name 01/29/24 0906          Cognition    Orientation Status (Cognition) oriented x 4  -ND       Row Name 01/29/24 0906          Safety Issues, Functional Mobility    Safety Issues Affecting Function  (Mobility) safety precaution awareness;safety precautions follow-through/compliance  -ND     Impairments Affecting Function (Mobility) balance;endurance/activity tolerance;shortness of breath;strength  -ND               User Key  (r) = Recorded By, (t) = Taken By, (c) = Cosigned By      Initials Name Provider Type    Patricia Arreaga PT Physical Therapist                   Mobility       Row Name 01/29/24 0909          Bed Mobility    Comment, (Bed Mobility) Pt found sitting UIC.  -ND       Row Name 01/29/24 0909          Sit-Stand Transfer    Sit-Stand [place] (Transfers) standby assist  -ND       Row Name 01/29/24 0909          Gait/Stairs (Locomotion)    [place] Level (Gait) contact guard;1 person assist;verbal cues  -ND     Distance in Feet (Gait) 100  -ND     Deviations/Abnormal Patterns (Gait) base of support, narrow;gait speed decreased;stride length decreased  -ND     Bilateral Gait Deviations heel strike decreased  -ND     Comment, (Gait/Stairs) Pt reports feeling her gait is at baseline. Ambulation distance limited by fatigue. Pt denies LEIVA with ambulation.  -ND               User Key  (r) = Recorded By, (t) = Taken By, (c) = Cosigned By      Initials Name Provider Type    Patricia Arreaga PT Physical Therapist                   Obj/Interventions       Row Name 01/29/24 0910          Range of Motion Comprehensive    General Range of Motion bilateral lower extremity ROM WFL  -ND       Row Name 01/29/24 0910          Strength Comprehensive (MMT)    General Manual Muscle Testing (MMT) Assessment lower extremity strength deficits identified  -ND     Comment, General Manual Muscle Testing (MMT) Assessment BLE MMT 4+/5.  -ND       Row Name 01/29/24 0910          Balance    Balance Assessment sitting static balance;sitting dynamic balance;sit to stand dynamic balance;standing static balance;standing dynamic balance  -ND     Static Sitting Balance independent  -ND     Dynamic Sitting Balance  independent  -ND     Position, Sitting Balance supported;sitting in chair  -ND     Sit to Stand Dynamic Balance standby assist  -ND     Static Standing Balance standby assist  -ND     Dynamic Standing Balance contact guard  -ND     Position/Device Used, Standing Balance unsupported  -ND     Balance Interventions sitting;standing;sit to stand;supported;static;dynamic  -ND       Row Name 01/29/24 0910          Sensory Assessment (Somatosensory)    Sensory Assessment (Somatosensory) LE sensation intact  -ND               User Key  (r) = Recorded By, (t) = Taken By, (c) = Cosigned By      Initials Name Provider Type    ND Patricia Greenberg, PT Physical Therapist                   Goals/Plan       Row Name 01/29/24 0914          Bed Mobility Goal 1 (PT)    Activity/Assistive Device (Bed Mobility Goal 1, PT) sit to supine/supine to sit  -ND     [place] Level/Cues Needed (Bed Mobility Goal 1, PT) independent  -ND     Time Frame (Bed Mobility Goal 1, PT) long term goal (LTG);10 days  -ND       Row Name 01/29/24 0914          Transfer Goal 1 (PT)    Activity/Assistive Device (Transfer Goal 1, PT) sit-to-stand/stand-to-sit;bed-to-chair/chair-to-bed  -ND     [place] Level/Cues Needed (Transfer Goal 1, PT) independent  -ND     Time Frame (Transfer Goal 1, PT) long term goal (LTG);10 days  -ND       Row Name 01/29/24 0914          Gait Training Goal 1 (PT)    Activity/Assistive Device (Gait Training Goal 1, PT) gait (walking locomotion);increase endurance/gait distance;decrease fall risk  -ND     [place] Level (Gait Training Goal 1, PT) independent  -ND     Distance (Gait Training Goal 1, PT) 250  -ND     Time Frame (Gait Training Goal 1, PT) long term goal (LTG);10 days  -ND       Row Name 01/29/24 0914          Therapy Assessment/Plan (PT)    Planned Therapy Interventions (PT) balance training;bed mobility training;gait training;home exercise program;patient/family education;transfer training;strengthening  -ND                User Key  (r) = Recorded By, (t) = Taken By, (c) = Cosigned By      Initials Name Provider Type    ND Patricia Greenberg, PT Physical Therapist                   Clinical Impression       Row Name 01/29/24 0911          Pain    Pretreatment Pain Rating 2/10  -ND     Posttreatment Pain Rating 2/10  -ND     Pain Location - Side/Orientation Left  -ND     Pain Location - other (see comments)  Ribs  -ND     Pain Intervention(s) Repositioned;Ambulation/increased activity  -ND       Row Name 01/29/24 0911          Plan of Care Review    Plan of Care Reviewed With patient;son  -ND     Outcome Evaluation Based on PT evaluation, pt is below baseline levels of mobility for activity tolerance, strength, and balance warranting IP PT services to address deficits and facilitate return to PLOF. Recommend home with assist as needed following d/c.  -ND       Row Name 01/29/24 0911          Therapy Assessment/Plan (PT)    Patient/Family Therapy Goals Statement (PT) Return to PLOF.  -ND     Rehab Potential (PT) good, to achieve stated therapy goals  -ND     Criteria for Skilled Interventions Met (PT) yes;meets criteria;skilled treatment is necessary  -ND     Therapy Frequency (PT) daily  -ND       Row Name 01/29/24 0911          Vital Signs    Pre Systolic BP Rehab 117  -ND     Pre Treatment Diastolic BP 97  -ND     Post Systolic BP Rehab 121  -ND     Post Treatment Diastolic BP 85  -ND     Pretreatment Heart Rate (beats/min) 87  -ND     Intratreatment Heart Rate (beats/min) 117  -ND     Posttreatment Heart Rate (beats/min) 106  -ND     Pre SpO2 (%) 97  3L.  -ND     O2 Delivery Pre Treatment nasal cannula  -ND     O2 Delivery Intra Treatment nasal cannula  -ND     Post SpO2 (%) 94  -ND     O2 Delivery Post Treatment nasal cannula  -ND     Pre Patient Position Sitting  -ND     Intra Patient Position Standing  -ND     Post Patient Position Sitting  -ND       Row Name 01/29/24 0911          Positioning and Restraints     Pre-Treatment Position sitting in chair/recliner  -ND     Post Treatment Position chair  -ND     In Chair reclined;legs elevated;with other staff;call light within reach;encouraged to call for assist;exit alarm on;with family/caregiver;[place] cushion;notified nsg  -ND               User Key  (r) = Recorded By, (t) = Taken By, (c) = Cosigned By      Initials Name Provider Type    Patricia Arreaga PT Physical Therapist                   Outcome Measures       Row Name 01/29/24 0915          How much help from another person do you currently need...    Turning from your back to your side while in flat bed without using bedrails? 4  -ND     Moving from lying on back to sitting on the side of a flat bed without bedrails? 4  -ND     Moving to and from a bed to a chair (including a wheelchair)? 4  -ND     Standing up from a chair using your arms (e.g., wheelchair, bedside chair)? 4  -ND     Climbing 3-5 steps with a railing? 3  -ND     To walk in hospital room? 3  -ND     AM-PAC 6 Clicks Score (PT) 22  -ND     Highest Level of Mobility Goal 7 --> Walk 25 feet or more  -ND       Row Name 01/29/24 0915          Functional Assessment    Outcome Measure Options AM-PAC 6 Clicks Basic Mobility (PT)  -ND               User Key  (r) = Recorded By, (t) = Taken By, (c) = Cosigned By      Initials Name Provider Type    Patricia Arreaga PT Physical Therapist                                 Physical Therapy Education       Title: PT OT SLP Therapies (Not Started)       Topic: Physical Therapy (Not Started)       Point: Mobility training (Not Started)       Learner Progress:  Not documented in this visit.              Point: Home exercise program (Not Started)       Learner Progress:  Not documented in this visit.              Point: Body mechanics (Not Started)       Learner Progress:  Not documented in this visit.              Point: Precautions (Not Started)       Learner Progress:  Not documented in this visit.                                   PT Recommendation and Plan  Planned Therapy Interventions (PT): balance training, bed mobility training, gait training, home exercise program, patient/family education, transfer training, strengthening  Plan of Care Reviewed With: patient, son  Outcome Evaluation: Based on PT evaluation, pt is below baseline levels of mobility for activity tolerance, strength, and balance warranting IP PT services to address deficits and facilitate return to PLOF. Recommend home with assist as needed following d/c.     Time Calculation:   PT Evaluation Complexity  History, PT Evaluation Complexity: 3 or more personal factors and/or comorbidities  Examination of Body Systems (PT Eval Complexity): total of 4 or more elements  Clinical Presentation (PT Evaluation Complexity): evolving  Clinical Decision Making (PT Evaluation Complexity): moderate complexity  Overall Complexity (PT Evaluation Complexity): moderate complexity     PT Charges       Row Name 01/29/24 0916             Time Calculation    Start Time 0832  -ND      PT Received On 01/29/24  -ND      PT Goal Re-Cert Due Date 02/08/24  -ND         Untimed Charges    PT Eval/Re-eval Minutes 46  -ND         Total Minutes    Untimed Charges Total Minutes 46  -ND       Total Minutes 46  -ND                User Key  (r) = Recorded By, (t) = Taken By, (c) = Cosigned By      Initials Name Provider Type    ND Patricia Greenberg, PT Physical Therapist                  Therapy Charges for Today       Code Description Service Date Service Provider Modifiers Qty    [id number]  PT EVAL MOD COMPLEXITY 4 1/29/2024 Patricia Greenberg, PT GP 1            PT G-Codes  Outcome Measure Options: AM-PAC 6 Clicks Basic Mobility (PT)  AM-PAC 6 Clicks Score (PT): 22  PT Discharge Summary  Anticipated Discharge Disposition (PT): home with assist    Patricia Greenberg PT  1/29/2024

## 2024-01-29 NOTE — PLAN OF CARE
Goal Outcome Evaluation:  Plan of Care Reviewed With: patient, son           Outcome Evaluation: Based on PT evaluation, pt is below baseline levels of mobility for activity tolerance, strength, and balance warranting IP PT services to address deficits and facilitate return to PLOF. Recommend home with assist as needed following d/c.      Anticipated Discharge Disposition (PT): home with assist

## 2024-01-29 NOTE — PROGRESS NOTES
[facility] Cardiology  Inpatient Progress Note      Chief Complaint/Reason for consult:    Chief Complaint   Patient presents with    Irregular Heart Beat            Subjective  Feeling better today, afebrile overnight, no chest pain, no dyspnea at rest       Vital Sign Min/Max for last 24 hours  Temp  Min: 97.6 °F (36.4 °C)  Max: 98.8 °F (37.1 °C)   BP  Min: 85/66  Max: 137/114   Pulse  Min: 67  Max: 146   Resp  Min: 16  Max: 18   SpO2  Min: 88 %  Max: 98 %   Flow (L/min)  Min: 2  Max: 2    No intake or output data in the 24 hours ending 01/29/24 0928        Gen: well developed, lying in bed, comfortable appearing  HEENT: MMM, sclerae anicteric, conjunctivae normal  CV: regular rate, irregularly irregular rhythm, II/VI diastolic murmur at LLSB, normal S1, S2. 2+ radial and DP pulses  Pulm: NC oxygen, normal work of breathing, no wheezes, rales, rhonchi  Abd: soft, nontender, nondistended  Ext: normal bulk for age, normal tone, no dependent edema  Neuro: alert, oriented, face symmetrical, moving all extremities well  Psych: normal mood, appropriate affect    Tele:  atrial fibrillations, rates 60s-140s    Results Review (reviewed the patient's recent labs in the electronic medical record):     6/223 - Transthoracic Echo Complete    Left ventricular systolic function is normal. Calculated left ventricular EF = 61% Normal left ventricular cavity size noted. Left ventricular wall thickness is consistent with moderate concentric hypertrophy. All left ventricular wall segments contract normally. Left ventricular diastolic function is consistent with (grade II w/high LAP) pseudonormalization.    Normal right ventricular cavity size, wall thickness, systolic function and septal motion noted.    Left atrial volume is moderately increased.    Moderate aortic valve regurgitation is present. No aortic valve stenosis is present.    Mitral annular calcification is present. Mild mitral valve regurgitation is  present. No significant mitral valve stenosis is present.    Mild tricuspid valve regurgitation is present.    There is a small (<1cm) circumferential pericardial effusion. The effusion is fibrinous. There is no evidence of cardiac tamponade.       Radiology: No radiology results for the last day    Lab Results   Component Value Date    WBC 8.53 01/29/2024    HGB 12.5 01/29/2024    HCT 38.3 01/29/2024    MCV 90.5 01/29/2024     01/29/2024     Lab Results   Component Value Date    GLUCOSE 115 (H) 01/29/2024    CALCIUM 9.5 01/29/2024     01/29/2024    K 3.9 01/29/2024    CO2 29.0 01/29/2024     01/29/2024    BUN 29 (H) 01/29/2024    CREATININE 1.14 (H) 01/29/2024    EGFRIFAFRI 52 (L) 11/15/2021    EGFRIFNONA 43 (L) 11/15/2021    BCR 25.4 (H) 01/29/2024    ANIONGAP 10.0 01/29/2024     Lab Results   Component Value Date    TROPONINT 16 (H) 01/26/2024    TROPONINT 19 (H) 01/26/2024    TROPONINT 20 (H) 01/26/2024      Lab Results   Component Value Date    PROBNP 3,266.0 (H) 01/26/2024     Lab Results   Component Value Date    HGBA1C 5.7 11/30/2022      Lab Results   Component Value Date    CHLPL 168 11/30/2022    TRIG 136 11/30/2022    HDL 45 11/30/2022    LDL 99 11/30/2022        Assessment     Persistent AF   - recent diagnosis, would perform REGINE prior to ECV given duration of AC, discussed with daughter and son today, would like to hold off today   - increase scheduled metoprolol, continue diltiazem   - AC with apixaban    Acute on chronic HFpEF   - continue daily furosemide    Hypertension   - will dc hydralazine while working on medications for rate control     DUARTE Epperson MD  1/29/2024  09:28 EST

## 2024-01-30 PROCEDURE — 99232 SBSQ HOSP IP/OBS MODERATE 35: CPT | Performed by: INTERNAL MEDICINE

## 2024-01-30 PROCEDURE — 25010000002 FUROSEMIDE PER 20 MG: Performed by: INTERNAL MEDICINE

## 2024-01-30 RX ORDER — FENTANYL CITRATE 50 UG/ML
50-100 INJECTION, SOLUTION INTRAMUSCULAR; INTRAVENOUS ONCE AS NEEDED
Status: DISCONTINUED | OUTPATIENT
Start: 2024-01-30 | End: 2024-02-01

## 2024-01-30 RX ORDER — FLUMAZENIL 0.1 MG/ML
0.5 INJECTION INTRAVENOUS ONCE AS NEEDED
Status: DISCONTINUED | OUTPATIENT
Start: 2024-01-30 | End: 2024-02-01

## 2024-01-30 RX ORDER — NALOXONE HCL 0.4 MG/ML
0.4 VIAL (ML) INJECTION ONCE AS NEEDED
Status: DISCONTINUED | OUTPATIENT
Start: 2024-01-30 | End: 2024-02-01

## 2024-01-30 RX ORDER — MIDAZOLAM HYDROCHLORIDE 1 MG/ML
2-8 INJECTION INTRAMUSCULAR; INTRAVENOUS ONCE AS NEEDED
Status: DISCONTINUED | OUTPATIENT
Start: 2024-01-30 | End: 2024-02-01

## 2024-01-30 RX ADMIN — DILTIAZEM HYDROCHLORIDE 30 MG: 30 TABLET, FILM COATED ORAL at 23:44

## 2024-01-30 RX ADMIN — SENNOSIDES AND DOCUSATE SODIUM 2 TABLET: 8.6; 5 TABLET ORAL at 20:49

## 2024-01-30 RX ADMIN — ALLOPURINOL 300 MG: 300 TABLET ORAL at 10:04

## 2024-01-30 RX ADMIN — METOPROLOL TARTRATE 50 MG: 50 TABLET ORAL at 12:45

## 2024-01-30 RX ADMIN — DILTIAZEM HYDROCHLORIDE 30 MG: 30 TABLET, FILM COATED ORAL at 12:44

## 2024-01-30 RX ADMIN — DILTIAZEM HYDROCHLORIDE 30 MG: 30 TABLET, FILM COATED ORAL at 18:09

## 2024-01-30 RX ADMIN — QUETIAPINE FUMARATE 25 MG: 25 TABLET ORAL at 20:49

## 2024-01-30 RX ADMIN — ASPIRIN 81 MG: 81 TABLET, COATED ORAL at 10:04

## 2024-01-30 RX ADMIN — Medication 10 ML: at 20:48

## 2024-01-30 RX ADMIN — METOPROLOL TARTRATE 50 MG: 50 TABLET ORAL at 04:26

## 2024-01-30 RX ADMIN — Medication 10 ML: at 12:48

## 2024-01-30 RX ADMIN — PRAVASTATIN SODIUM 40 MG: 40 TABLET ORAL at 20:49

## 2024-01-30 RX ADMIN — APIXABAN 5 MG: 5 TABLET, FILM COATED ORAL at 20:49

## 2024-01-30 RX ADMIN — METOPROLOL TARTRATE 50 MG: 50 TABLET ORAL at 20:49

## 2024-01-30 RX ADMIN — DILTIAZEM HYDROCHLORIDE 30 MG: 30 TABLET, FILM COATED ORAL at 06:04

## 2024-01-30 RX ADMIN — APIXABAN 5 MG: 5 TABLET, FILM COATED ORAL at 10:03

## 2024-01-30 NOTE — PLAN OF CARE
Problem: Adult Inpatient Plan of Care  Goal: Absence of Hospital-Acquired Illness or Injury  Intervention: Identify and Manage Fall Risk  Description: Perform standard risk assessment on admission using a validated tool or comprehensive approach appropriate to the patient; reassess fall risk frequently, with change in status or transfer to another level of care.  Communicate fall injury risk to interprofessional healthcare team.  Determine need for increased observation, equipment and environmental modification, such as low bed, signage and supportive, nonskid footwear.  Adjust safety measures to individual developmental age, stage and identified risk factors.  Reinforce the importance of safety and physical activity with patient and family.  Perform regular intentional rounding to assess need for position change, pain assessment and personal needs, including assistance with toileting.  Recent Flowsheet Documentation  Taken 1/30/2024 0604 by Sia Casillas RN  Safety Promotion/Fall Prevention:   activity supervised   assistive device/personal items within reach   clutter free environment maintained   nonskid shoes/slippers when out of bed   safety round/check completed   room organization consistent   toileting scheduled  Taken 1/30/2024 0426 by Sia Casillas RN  Safety Promotion/Fall Prevention:   activity supervised   assistive device/personal items within reach   clutter free environment maintained   nonskid shoes/slippers when out of bed   safety round/check completed   room organization consistent   toileting scheduled  Taken 1/30/2024 0230 by Sia Casillas RN  Safety Promotion/Fall Prevention:   activity supervised   assistive device/personal items within reach   clutter free environment maintained   nonskid shoes/slippers when out of bed   safety round/check completed   room organization consistent   toileting scheduled  Taken 1/30/2024 0002 by Sia Casillas RN  Safety Promotion/Fall Prevention:    activity supervised   assistive device/personal items within reach   clutter free environment maintained   nonskid shoes/slippers when out of bed   safety round/check completed   room organization consistent   toileting scheduled  Taken 1/29/2024 2245 by Sia Casillas RN  Safety Promotion/Fall Prevention:   activity supervised   assistive device/personal items within reach   clutter free environment maintained   nonskid shoes/slippers when out of bed   safety round/check completed   room organization consistent   toileting scheduled  Taken 1/29/2024 2045 by Sia Casillas RN  Safety Promotion/Fall Prevention:   activity supervised   assistive device/personal items within reach   clutter free environment maintained   nonskid shoes/slippers when out of bed   safety round/check completed   room organization consistent   toileting scheduled     Problem: Adult Inpatient Plan of Care  Goal: Absence of Hospital-Acquired Illness or Injury  Intervention: Prevent Skin Injury  Description: Perform a screening for skin injury risk, such as pressure or moisture associated skin damage on admission and at regular intervals throughout hospital stay.  Keep all areas of skin (especially folds) clean and dry.  Maintain adequate skin hydration.  Relieve and redistribute pressure and protect bony prominences; implement measures based on patient-specific risk factors.  Match turning and repositioning schedule to clinical condition.  Encourage weight shift frequently; assist with reposition if unable to complete independently.  Float heels off bed; avoid pressure on the Achilles tendon.  Keep skin free from extended contact with medical devices.  Encourage functional activity and mobility, as early as tolerated.  Use aids (e.g., slide boards, mechanical lift) during transfer.  Recent Flowsheet Documentation  Taken 1/30/2024 0604 by Sia Casillas RN  Body Position: position changed independently  Skin Protection:   adhesive use  limited   incontinence pads utilized   skin sealant/moisture barrier applied   skin-to-device areas padded   skin-to-skin areas padded   transparent dressing maintained   tubing/devices free from skin contact  Taken 1/30/2024 0426 by Sia Casillas RN  Body Position: position changed independently  Skin Protection:   adhesive use limited   incontinence pads utilized   skin sealant/moisture barrier applied   skin-to-device areas padded   skin-to-skin areas padded   transparent dressing maintained   tubing/devices free from skin contact  Taken 1/30/2024 0230 by Sia Casillas RN  Body Position: position changed independently  Skin Protection:   adhesive use limited   incontinence pads utilized   skin sealant/moisture barrier applied   skin-to-device areas padded   skin-to-skin areas padded   transparent dressing maintained   tubing/devices free from skin contact  Taken 1/30/2024 0002 by Sia Casillas RN  Body Position: position changed independently  Skin Protection:   adhesive use limited   incontinence pads utilized   skin sealant/moisture barrier applied   skin-to-device areas padded   skin-to-skin areas padded   transparent dressing maintained   tubing/devices free from skin contact  Taken 1/29/2024 2245 by Sia Casillas RN  Body Position: position changed independently  Skin Protection:   adhesive use limited   incontinence pads utilized   skin sealant/moisture barrier applied   skin-to-device areas padded   skin-to-skin areas padded   transparent dressing maintained   tubing/devices free from skin contact  Taken 1/29/2024 2045 by Sia Casillas RN  Body Position: position changed independently  Skin Protection:   adhesive use limited   incontinence pads utilized   skin sealant/moisture barrier applied   skin-to-device areas padded   skin-to-skin areas padded   transparent dressing maintained   tubing/devices free from skin contact     Problem: Adjustment to Illness (Heart Failure)  Goal: Optimal  Coping  Intervention: Support Psychosocial Response  Description: Acknowledge, normalize and validate the emotional response to current condition and unpredictable nature of disease progression.  Assess and monitor patient and caregiver perspective on quality of life and personal wellbeing; consider palliative care consult to enhance symptom relief and quality of life.  Engage patient in early and ongoing discussion about goals of care. Facilitate shared decision-making regarding goals and advanced care planning.  Assist patient and family with life transitions associated with heart failure (e.g., adherence to medical regimens, impact on family dynamics/roles, end-of-life care); acknowledge caregiver stress.  Recognize current coping strategies and assist in developing new strategies (problem-solving, mind-body techniques).  Assess and monitor for signs and symptoms of anxiety and depression.  Recent Flowsheet Documentation  Taken 1/30/2024 0604 by Sia Casillas RN  Supportive Measures:   active listening utilized   self-care encouraged   self-reflection promoted   self-responsibility promoted   verbalization of feelings encouraged   positive reinforcement provided  Family/Support System Care:   self-care encouraged   support provided  Taken 1/30/2024 0426 by Sia Casillas, RN  Supportive Measures:   active listening utilized   self-care encouraged   self-reflection promoted   self-responsibility promoted   verbalization of feelings encouraged   positive reinforcement provided  Family/Support System Care:   self-care encouraged   support provided  Taken 1/30/2024 0230 by Sia Casillas RN  Supportive Measures:   active listening utilized   self-care encouraged   self-reflection promoted   self-responsibility promoted   verbalization of feelings encouraged   positive reinforcement provided  Family/Support System Care:   self-care encouraged   support provided  Taken 1/30/2024 0002 by Sia Casillas  RN  Supportive Measures:   active listening utilized   self-care encouraged   self-reflection promoted   self-responsibility promoted   verbalization of feelings encouraged   positive reinforcement provided  Family/Support System Care:   self-care encouraged   support provided  Taken 1/29/2024 2245 by Sia Casillas RN  Supportive Measures:   active listening utilized   self-care encouraged   self-reflection promoted   self-responsibility promoted   verbalization of feelings encouraged   positive reinforcement provided  Family/Support System Care:   self-care encouraged   support provided  Taken 1/29/2024 2045 by Sia Casillas RN  Supportive Measures:   active listening utilized   self-care encouraged   self-reflection promoted   self-responsibility promoted   verbalization of feelings encouraged   positive reinforcement provided  Family/Support System Care:   self-care encouraged   support provided     Problem: Cardiac Output Decreased (Heart Failure)  Goal: Optimal Cardiac Output  Outcome: Ongoing, Progressing  Intervention: Optimize Cardiac Output  Description: Closely monitor fluid balance; advocate for adjustment if balance is consistently positive.  Optimize preload, afterload and contractility with pharmacologic therapy (e.g., diuretic, angiotensin-converting enzyme inhibitor, angiotensin receptor blocker, beta-blocker, aldosterone antagonist, phosphodiesterase-5 inhibitor, inodilator, inotrope  Implement pharmacologic and nonpharmacologic interventions for pain, comfort and anxiety to avoid cardiac and respiratory compromise.  Maintain optimal position to promote venous return (e.g., elevate head of bed, avoid dependent extremity position).  Facilitate sleep/rest patterns that support or enhance activity tolerance.  Decrease energy expenditure (e.g., preplan activity, cluster care considering patient preference).  Maintain normothermia to enhance oxygenation and perfusion.  Facilitate use of additional  therapy, such as ultrafiltration, intra-aortic balloon pump, ventricular assist device or extracorporeal membrane oxygenation to improve perfusion and hemodynamic stability.  Anticipate surgical intervention, such as heart surgery or heart transplantation, with refractory symptoms.  Recent Flowsheet Documentation  Taken 1/30/2024 0604 by Sia Casillas RN  Environmental Support:   rest periods encouraged   personal routine supported  Taken 1/30/2024 0426 by Sia Casillas RN  Environmental Support:   rest periods encouraged   personal routine supported  Taken 1/30/2024 0230 by Sia Casillas RN  Environmental Support:   rest periods encouraged   personal routine supported  Taken 1/30/2024 0002 by Sia Casillas RN  Environmental Support:   rest periods encouraged   personal routine supported  Taken 1/29/2024 2245 by Sia Casillas RN  Environmental Support:   rest periods encouraged   personal routine supported  Taken 1/29/2024 2045 by Sia Casillas RN  Environmental Support:   rest periods encouraged   personal routine supported     Problem: Behavioral Health Comorbidity  Goal: Maintenance of Behavioral Health Symptom Control  Outcome: Ongoing, Progressing  Intervention: Maintain Behavioral Health Symptom Control  Description: Confirm mental health diagnosis and current treatment.  Evaluate adherence to previously identified self-management plan.  Advocate continuation of home strategies, including medication.  Evaluate effectiveness of self-management strategies and coping skills.  Communicate with providers to ensure continuity and follow-up at transition.  Recent Flowsheet Documentation  Taken 1/30/2024 0604 by Sia Casillas RN  Medication Review/Management: medications reviewed  Taken 1/30/2024 0426 by Sia Casillas RN  Medication Review/Management: medications reviewed  Taken 1/30/2024 0230 by Sia Casillas RN  Medication Review/Management: medications reviewed  Taken 1/30/2024 0002 by Sonja  IVY Stovall  Medication Review/Management: medications reviewed  Taken 1/29/2024 2245 by Sia Casillas RN  Medication Review/Management: medications reviewed  Taken 1/29/2024 2045 by Sia Casillas RN  Medication Review/Management: medications reviewed     Problem: Hypertension Comorbidity  Goal: Blood Pressure in Desired Range  Outcome: Ongoing, Progressing  Intervention: Maintain Blood Pressure Management  Description: Evaluate adherence to home antihypertensive regimen (e.g., exercise and activity, diet modification, medication).  Provide scheduled antihypertensive medication; consider administration time and effects (e.g., avoid giving diuretic prior to bedtime).  Monitor response to antihypertensive medication therapy (e.g., blood pressure, electrolyte levels, medication effects).  Minimize risk of orthostatic hypotension; encourage caution with position changes, particularly if elderly.  Recent Flowsheet Documentation  Taken 1/30/2024 0604 by Sia Casillas RN  Medication Review/Management: medications reviewed  Taken 1/30/2024 0426 by Sia Casillas RN  Medication Review/Management: medications reviewed  Taken 1/30/2024 0230 by Sia Casillas RN  Medication Review/Management: medications reviewed  Taken 1/30/2024 0002 by Sia Casillas RN  Medication Review/Management: medications reviewed  Taken 1/29/2024 2245 by Sia Casillas RN  Medication Review/Management: medications reviewed  Taken 1/29/2024 2045 by Sia Casillas RN  Medication Review/Management: medications reviewed   Goal Outcome Evaluation:  Plan of Care Reviewed With: patient        Progress: no change  Outcome Evaluation: Pt AO x4/VSS/aFib on monitor/2L NC. Pt ambulates to the bathroom with no difficulty. Pt wanted to be NPO and was after MN, in case of procedure today. Pt denies pain/N/V/SOA. Continue monitoring.

## 2024-01-30 NOTE — PROGRESS NOTES
[facility] Cardiology  Inpatient Progress Note      Chief Complaint/Reason for consult:    Chief Complaint   Patient presents with    Irregular Heart Beat            Subjective  Feeling a little weak today but hasn't been up yet today       Vital Sign Min/Max for last 24 hours  Temp  Min: 98 °F (36.7 °C)  Max: 99.5 °F (37.5 °C)   BP  Min: 97/65  Max: 136/79   Pulse  Min: 76  Max: 140   Resp  Min: 16  Max: 18   SpO2  Min: 94 %  Max: 97 %   Flow (L/min)  Min: 2  Max: 2      Intake/Output Summary (Last 24 hours) at 1/30/2024 1052  Last data filed at 1/29/2024 2045  Gross per 24 hour   Intake 740 ml   Output 1100 ml   Net -360 ml           Gen: well developed, lying in bed, comfortable appearing  HEENT: MMM, sclerae anicteric, conjunctivae normal  CV: regular rate, irregularly irregular rhythm, II/VI diastolic murmur at LLSB, normal S1, S2. 2+ radial and DP pulses  Pulm: NC oxygen, normal work of breathing, no wheezes, rales, rhonchi  Ext: normal bulk for age, normal tone, no dependent edema  Neuro: alert, oriented, face symmetrical, moving all extremities well  Psych: normal mood, appropriate affect    Tele:  atrial fibrillations, rates 60s-140s, overall trend improved overnight    Results Review (reviewed the patient's recent labs in the electronic medical record):     6/223 - Transthoracic Echo Complete    Left ventricular systolic function is normal. Calculated left ventricular EF = 61% Normal left ventricular cavity size noted. Left ventricular wall thickness is consistent with moderate concentric hypertrophy. All left ventricular wall segments contract normally. Left ventricular diastolic function is consistent with (grade II w/high LAP) pseudonormalization.    Normal right ventricular cavity size, wall thickness, systolic function and septal motion noted.    Left atrial volume is moderately increased.    Moderate aortic valve regurgitation is present. No aortic valve stenosis is present.     Mitral annular calcification is present. Mild mitral valve regurgitation is present. No significant mitral valve stenosis is present.    Mild tricuspid valve regurgitation is present.    There is a small (<1cm) circumferential pericardial effusion. The effusion is fibrinous. There is no evidence of cardiac tamponade.       Radiology: No radiology results for the last day    Lab Results   Component Value Date    WBC 8.53 01/29/2024    HGB 12.5 01/29/2024    HCT 38.3 01/29/2024    MCV 90.5 01/29/2024     01/29/2024     Lab Results   Component Value Date    GLUCOSE 115 (H) 01/29/2024    CALCIUM 9.5 01/29/2024     01/29/2024    K 3.9 01/29/2024    CO2 29.0 01/29/2024     01/29/2024    BUN 29 (H) 01/29/2024    CREATININE 1.14 (H) 01/29/2024    EGFRIFAFRI 52 (L) 11/15/2021    EGFRIFNONA 43 (L) 11/15/2021    BCR 25.4 (H) 01/29/2024    ANIONGAP 10.0 01/29/2024     Lab Results   Component Value Date    TROPONINT 16 (H) 01/26/2024    TROPONINT 19 (H) 01/26/2024    TROPONINT 20 (H) 01/26/2024      Lab Results   Component Value Date    PROBNP 3,266.0 (H) 01/26/2024     Lab Results   Component Value Date    HGBA1C 5.7 11/30/2022      Lab Results   Component Value Date    CHLPL 168 11/30/2022    TRIG 136 11/30/2022    HDL 45 11/30/2022    LDL 99 11/30/2022        Assessment     Persistent AF   - recent diagnosis, would perform REGINE prior to ECV given duration of AC, discussed with daughter and son again today, will consider tomorrow vs. Rate control and outpatient follow-up   - scheduled metoprolol, continue diltiazem   - AC with apixaban    Acute on chronic HFpEF   - continue daily furosemide    Hypertension   - stopped hydralazine while working on medications for rate control     DUARTE Epperson MD  1/30/2024  10:52 EST

## 2024-01-30 NOTE — PROGRESS NOTES
[facility] Medicine Services  PROGRESS NOTE    Patient Name: Cheryl Tomas  : 1940  MRN: [id number]    Date of Admission: 2024  Primary Care Physician: Provider, No Known    Subjective   Subjective     CC:  F/u A.fib, CHF    HPI:  Patient states she has decided that she would like to go ahead and have cardioversion performed during this hospital stay.      Objective   Objective     Vital Signs:   Temp:  [98 °F (36.7 °C)-99.5 °F (37.5 °C)] 98.2 °F (36.8 °C)  Heart Rate:  [] 101  Resp:  [16-18] 18  BP: (107-136)/(74-87) 107/74  Flow (L/min):  [2] 2     Physical Exam:  Constitutional: No acute distress, awake, alert, sitting up in chair  HENT: NCAT, mucous membranes moist  Respiratory: Clear to auscultation bilaterally, respiratory effort normal   Cardiovascular: Irregular, mildly tachycardic  Musculoskeletal: No bilateral ankle edema  Psychiatric: Appropriate affect, cooperative  Neurologic: Speech clear and fluent  Skin: No rashes on exposed surfaces      Results Reviewed:  LAB RESULTS:      Lab 24  0434 24  0207 24  1034   WBC 8.53 8.69 9.00   HEMOGLOBIN 12.5 12.2 13.0   HEMATOCRIT 38.3 37.8 39.9   PLATELETS 272 268 220   NEUTROS ABS 5.52  --  7.16*   IMMATURE GRANS (ABS) 0.02  --  0.03   LYMPHS ABS 1.89  --  1.06   MONOS ABS 0.78  --  0.56   EOS ABS 0.26  --  0.14   MCV 90.5 90.4 92.4   LACTATE  --   --  1.1         Lab 24  0434 24  2206 24  1133 24  0207 24  1034   SODIUM 142  --  141 143 144   POTASSIUM 3.9 4.2 3.6 3.9 3.6   CHLORIDE 103  --  100 102 105   CO2 29.0  --  27.0 27.0 27.0   ANION GAP 10.0  --  14.0 14.0 12.0   BUN 29*  --  25* 18 17   CREATININE 1.14*  --  1.23* 1.10* 1.16*   EGFR 48.5*  --  44.2* 50.6* 47.5*   GLUCOSE 115*  --  156* 113* 144*   CALCIUM 9.5  --  10.0 9.4 10.0   MAGNESIUM  --   --  1.8  --  1.8   TSH  --   --   --   --  2.940         Lab 24  1034   TOTAL PROTEIN 6.7   ALBUMIN 4.4    GLOBULIN 2.3   ALT (SGPT) 60*   AST (SGOT) 31   BILIRUBIN 0.6   ALK PHOS 110   LIPASE 23         Lab 01/26/24  2041 01/26/24  1902 01/26/24  1645 01/26/24  1418 01/26/24  1034   PROBNP  --   --   --   --  3,266.0*   HSTROP T 16* 19* 20* 17* 18*                 Brief Urine Lab Results  (Last result in the past 365 days)        Color   Clarity   Blood   Leuk Est   Nitrite   Protein   CREAT   Urine HCG        01/27/24 0808 Yellow   Clear   Negative   Negative   Negative   Negative                   Microbiology Results Abnormal       Procedure Component Value - Date/Time    COVID PRE-OP / PRE-PROCEDURE SCREENING ORDER (NO ISOLATION) - Swab, Nasopharynx [[id number]]  (Normal) Collected: 01/26/24 1050    Lab Status: Final result Specimen: Swab from Nasopharynx Updated: 01/26/24 1203    Narrative:      The following orders were created for panel order COVID PRE-OP / PRE-PROCEDURE SCREENING ORDER (NO ISOLATION) - Swab, Nasopharynx.  Procedure                               Abnormality         Status                     ---------                               -----------         ------                     Respiratory Panel PCR w/...[[id number]]  Normal              Final result                 Please view results for these tests on the individual orders.    Respiratory Panel PCR w/COVID-19(SARS-CoV-2) [facility]/[place]/[facility] In-House, NP Swab in UTM/VTM, 2 HR TAT - Swab, Nasopharynx [[id number]]  (Normal) Collected: 01/26/24 1050    Lab Status: Final result Specimen: Swab from Nasopharynx Updated: 01/26/24 1203     ADENOVIRUS, PCR Not Detected     Coronavirus 229E Not Detected     Coronavirus HKU1 Not Detected     Coronavirus NL63 Not Detected     Coronavirus OC43 Not Detected     COVID19 Not Detected     Human Metapneumovirus Not Detected     Human Rhinovirus/Enterovirus Not Detected     Influenza A PCR Not Detected     Influenza B PCR Not Detected     Parainfluenza Virus 1 Not Detected     Parainfluenza Virus 2 Not Detected      Parainfluenza Virus 3 Not Detected     Parainfluenza Virus 4 Not Detected     RSV, PCR Not Detected     Bordetella pertussis pcr Not Detected     Bordetella parapertussis PCR Not Detected     Chlamydophila pneumoniae PCR Not Detected     Mycoplasma pneumo by PCR Not Detected    Narrative:      In the setting of a positive respiratory panel with a viral infection PLUS a negative procalcitonin without other underlying concern for bacterial infection, consider observing off antibiotics or discontinuation of antibiotics and continue supportive care. If the respiratory panel is positive for atypical bacterial infection (Bordetella pertussis, Chlamydophila pneumoniae, or Mycoplasma pneumoniae), consider antibiotic de-escalation to target atypical bacterial infection.            No radiology results from the last 24 hrs    Results for orders placed during the hospital encounter of 06/01/23    Adult Transthoracic Echo Complete W/ Cont if Necessary Per Protocol    Interpretation Summary    Left ventricular systolic function is normal. Calculated left ventricular EF = 61% Normal left ventricular cavity size noted. Left ventricular wall thickness is consistent with moderate concentric hypertrophy. All left ventricular wall segments contract normally. Left ventricular diastolic function is consistent with (grade II w/high LAP) pseudonormalization.    Normal right ventricular cavity size, wall thickness, systolic function and septal motion noted.    Left atrial volume is moderately increased.    Moderate aortic valve regurgitation is present. No aortic valve stenosis is present.    Mitral annular calcification is present. Mild mitral valve regurgitation is present. No significant mitral valve stenosis is present.    Mild tricuspid valve regurgitation is present.    There is a small (<1cm) circumferential pericardial effusion. There is no evidence of cardiac tamponade.    There is a small (<1cm) circumferential pericardial  effusion. The effusion is fibrinous. There is no evidence of cardiac tamponade.      Current medications:  Scheduled Meds:allopurinol, 300 mg, Oral, Daily  apixaban, 5 mg, Oral, Q12H  aspirin, 81 mg, Oral, Daily  dilTIAZem, 30 mg, Oral, Q6H  furosemide, 40 mg, Intravenous, Daily  losartan, 25 mg, Oral, Q24H  metoprolol tartrate, 50 mg, Oral, Q8H  pharmacy consult - MTM, , Does not apply, Daily  pravastatin, 40 mg, Oral, Nightly  QUEtiapine, 25 mg, Oral, Nightly  senna-docusate sodium, 2 tablet, Oral, BID  sodium chloride, 10 mL, Intravenous, Q12H      Continuous Infusions:dilTIAZem, 5-15 mg/hr, Last Rate: Stopped (01/28/24 2328)      PRN Meds:.  acetaminophen    senna-docusate sodium **AND** polyethylene glycol **AND** bisacodyl **AND** bisacodyl    Calcium Replacement - Follow Nurse / BPA Driven Protocol    Magnesium Cardiology Dose Replacement - Follow Nurse / BPA Driven Protocol    nitroglycerin    ondansetron    Phosphorus Replacement - Follow Nurse / BPA Driven Protocol    Potassium Replacement - Follow Nurse / BPA Driven Protocol    sodium chloride    [COMPLETED] Insert Peripheral IV **AND** sodium chloride    sodium chloride    sodium chloride    Assessment & Plan   Assessment & Plan     Active Hospital Problems    Diagnosis  POA    **Atrial fibrillation with RVR [I48.91]  Yes    A-fib [I48.91]  Yes    Acute on chronic heart failure with preserved ejection fraction (HFpEF) [I50.33]  Unknown    Hypoxia [R09.02]  Yes    Nonrheumatic aortic valve insufficiency [I35.1]  Yes    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        Brief Hospital Course to date:  Cheryl Tomas is a 83 y.o. female with hypertension, A-fib, diabetes who was admitted with weakness, shortness of breath and hypoxia secondary  to acute CHF exacerbation and A-fib with RVR.     This patient's problems and plans were partially entered by my partner and updated as appropriate by me 01/30/24.     Acute HFpEF with Hypoxia  Hypertension  Hyperlipidemia  - Cardiology following  - Continue diuresis with IV lasix  - Continue losartan 25mg daily  - Wean O2 as tolerated     A-fib with RVR  - Cardiology following  - Continue metoprolol tartrate 50mg Q8h, PO diltiazem  - Continue Eliquis  - Patient has opted to pursue REGINE/ECV this admission.  I updated cardiology.  Was not able to reach her son by phone and he wasn't present at the time of my encounter but will try again tomorrow.     History of diabetes mellitus  - Last hemoglobin A1c was 5.7 in November 2022  - Diet controlled    Generalized weakness  - PT/OT    Expected Discharge Location and Transportation: Home  Expected Discharge   Expected Discharge Date: 1/31/2024; Expected Discharge Time:      DVT prophylaxis:  Medical and mechanical DVT prophylaxis orders are present.         AM-PAC 6 Clicks Score (PT): 22 (01/29/24 2045)    CODE STATUS:   Code Status and Medical Interventions:   Ordered at: 01/26/24 1446     Medical Intervention Limits:    NO intubation (DNI)     Level Of Support Discussed With:    Patient     Code Status (Patient has no pulse and is not breathing):    No CPR (Do Not Attempt to Resuscitate)     Medical Interventions (Patient has pulse or is breathing):    Limited Support       Caitlyn Chu MD  01/30/24

## 2024-01-31 ENCOUNTER — APPOINTMENT (OUTPATIENT)
Dept: CARDIOLOGY | Facility: HOSPITAL | Age: 84
End: 2024-01-31
Payer: MEDICARE

## 2024-01-31 LAB
ANION GAP SERPL CALCULATED.3IONS-SCNC: 10 MMOL/L (ref 5–15)
BH CV ECHO MEAS - AI P1/2T: 409.5 MSEC
BH CV ECHO MEAS - AO MAX PG: 7.6 MMHG
BH CV ECHO MEAS - AO MEAN PG: 4 MMHG
BH CV ECHO MEAS - AO V2 MAX: 138 CM/SEC
BH CV ECHO MEAS - AO V2 VTI: 19.2 CM
BH CV ECHO MEAS - LV MAX PG: 3.3 MMHG
BH CV ECHO MEAS - LV MEAN PG: 2 MMHG
BH CV ECHO MEAS - LV V1 MAX: 90.2 CM/SEC
BH CV ECHO MEAS - LV V1 VTI: 9.8 CM
BH CV ECHO MEAS - TR MAX PG: 25 MMHG
BH CV ECHO MEAS - TR MAX VEL: 250 CM/SEC
BH CV VAS BP LEFT ARM: NORMAL MMHG
BUN SERPL-MCNC: 27 MG/DL (ref 8–23)
BUN/CREAT SERPL: 25.7 (ref 7–25)
CALCIUM SPEC-SCNC: 9.6 MG/DL (ref 8.6–10.5)
CHLORIDE SERPL-SCNC: 102 MMOL/L (ref 98–107)
CO2 SERPL-SCNC: 30 MMOL/L (ref 22–29)
CREAT SERPL-MCNC: 1.05 MG/DL (ref 0.57–1)
EGFRCR SERPLBLD CKD-EPI 2021: 53.5 ML/MIN/1.73
GLUCOSE SERPL-MCNC: 113 MG/DL (ref 65–99)
POTASSIUM SERPL-SCNC: 3.5 MMOL/L (ref 3.5–5.2)
POTASSIUM SERPL-SCNC: 4.4 MMOL/L (ref 3.5–5.2)
SODIUM SERPL-SCNC: 142 MMOL/L (ref 136–145)

## 2024-01-31 PROCEDURE — 80048 BASIC METABOLIC PNL TOTAL CA: CPT | Performed by: INTERNAL MEDICINE

## 2024-01-31 PROCEDURE — 93325 DOPPLER ECHO COLOR FLOW MAPG: CPT | Performed by: INTERNAL MEDICINE

## 2024-01-31 PROCEDURE — 25010000002 AMIODARONE IN DEXTROSE 5% 150-4.21 MG/100ML-% SOLUTION: Performed by: INTERNAL MEDICINE

## 2024-01-31 PROCEDURE — 93312 ECHO TRANSESOPHAGEAL: CPT | Performed by: INTERNAL MEDICINE

## 2024-01-31 PROCEDURE — 99232 SBSQ HOSP IP/OBS MODERATE 35: CPT | Performed by: INTERNAL MEDICINE

## 2024-01-31 PROCEDURE — 93312 ECHO TRANSESOPHAGEAL: CPT

## 2024-01-31 PROCEDURE — B246ZZ4 ULTRASONOGRAPHY OF RIGHT AND LEFT HEART, TRANSESOPHAGEAL: ICD-10-PCS | Performed by: INTERNAL MEDICINE

## 2024-01-31 PROCEDURE — 92960 CARDIOVERSION ELECTRIC EXT: CPT | Performed by: INTERNAL MEDICINE

## 2024-01-31 PROCEDURE — 84132 ASSAY OF SERUM POTASSIUM: CPT | Performed by: INTERNAL MEDICINE

## 2024-01-31 PROCEDURE — 93005 ELECTROCARDIOGRAM TRACING: CPT | Performed by: INTERNAL MEDICINE

## 2024-01-31 PROCEDURE — 93321 DOPPLER ECHO F-UP/LMTD STD: CPT

## 2024-01-31 PROCEDURE — 93325 DOPPLER ECHO COLOR FLOW MAPG: CPT

## 2024-01-31 PROCEDURE — 25010000002 FUROSEMIDE PER 20 MG: Performed by: INTERNAL MEDICINE

## 2024-01-31 PROCEDURE — 92960 CARDIOVERSION ELECTRIC EXT: CPT

## 2024-01-31 PROCEDURE — 93321 DOPPLER ECHO F-UP/LMTD STD: CPT | Performed by: INTERNAL MEDICINE

## 2024-01-31 PROCEDURE — 5A2204Z RESTORATION OF CARDIAC RHYTHM, SINGLE: ICD-10-PCS | Performed by: INTERNAL MEDICINE

## 2024-01-31 PROCEDURE — 93010 ELECTROCARDIOGRAM REPORT: CPT | Performed by: INTERNAL MEDICINE

## 2024-01-31 RX ORDER — FUROSEMIDE 40 MG/1
40 TABLET ORAL DAILY
Status: DISCONTINUED | OUTPATIENT
Start: 2024-02-01 | End: 2024-02-01 | Stop reason: HOSPADM

## 2024-01-31 RX ORDER — MIDAZOLAM HYDROCHLORIDE 1 MG/ML
2-8 INJECTION INTRAMUSCULAR; INTRAVENOUS ONCE AS NEEDED
Status: DISCONTINUED | OUTPATIENT
Start: 2024-01-31 | End: 2024-01-31

## 2024-01-31 RX ORDER — AMIODARONE HYDROCHLORIDE 200 MG/1
200 TABLET ORAL DAILY
Status: DISCONTINUED | OUTPATIENT
Start: 2024-02-21 | End: 2024-02-01 | Stop reason: HOSPADM

## 2024-01-31 RX ORDER — FENTANYL CITRATE 50 UG/ML
50-100 INJECTION, SOLUTION INTRAMUSCULAR; INTRAVENOUS ONCE AS NEEDED
Status: DISCONTINUED | OUTPATIENT
Start: 2024-01-31 | End: 2024-01-31

## 2024-01-31 RX ORDER — AMIODARONE HYDROCHLORIDE 200 MG/1
200 TABLET ORAL EVERY 8 HOURS
Qty: 21 TABLET | Refills: 0 | Status: DISCONTINUED | OUTPATIENT
Start: 2024-01-31 | End: 2024-02-01 | Stop reason: HOSPADM

## 2024-01-31 RX ORDER — NALOXONE HCL 0.4 MG/ML
0.4 VIAL (ML) INJECTION ONCE AS NEEDED
Status: DISCONTINUED | OUTPATIENT
Start: 2024-01-31 | End: 2024-02-01 | Stop reason: HOSPADM

## 2024-01-31 RX ORDER — FLUMAZENIL 0.1 MG/ML
0.5 INJECTION INTRAVENOUS ONCE AS NEEDED
Status: DISCONTINUED | OUTPATIENT
Start: 2024-01-31 | End: 2024-01-31

## 2024-01-31 RX ORDER — AMIODARONE HYDROCHLORIDE 200 MG/1
200 TABLET ORAL EVERY 12 HOURS
Qty: 28 TABLET | Refills: 0 | Status: DISCONTINUED | OUTPATIENT
Start: 2024-02-07 | End: 2024-02-01 | Stop reason: HOSPADM

## 2024-01-31 RX ORDER — POTASSIUM CHLORIDE 20 MEQ/1
40 TABLET, EXTENDED RELEASE ORAL EVERY 4 HOURS
Status: COMPLETED | OUTPATIENT
Start: 2024-01-31 | End: 2024-01-31

## 2024-01-31 RX ADMIN — AMIODARONE HYDROCHLORIDE 200 MG: 200 TABLET ORAL at 16:58

## 2024-01-31 RX ADMIN — PRAVASTATIN SODIUM 40 MG: 40 TABLET ORAL at 20:03

## 2024-01-31 RX ADMIN — DILTIAZEM HYDROCHLORIDE 30 MG: 30 TABLET, FILM COATED ORAL at 12:29

## 2024-01-31 RX ADMIN — DILTIAZEM HYDROCHLORIDE 30 MG: 30 TABLET, FILM COATED ORAL at 06:36

## 2024-01-31 RX ADMIN — POTASSIUM CHLORIDE 40 MEQ: 1500 TABLET, EXTENDED RELEASE ORAL at 09:24

## 2024-01-31 RX ADMIN — METHOHEXITAL SODIUM 20 MG: 500 INJECTION, POWDER, LYOPHILIZED, FOR SOLUTION INTRAMUSCULAR; INTRAVENOUS; RECTAL at 15:12

## 2024-01-31 RX ADMIN — METOPROLOL TARTRATE 50 MG: 50 TABLET ORAL at 04:29

## 2024-01-31 RX ADMIN — QUETIAPINE FUMARATE 25 MG: 25 TABLET ORAL at 20:03

## 2024-01-31 RX ADMIN — Medication 10 ML: at 20:03

## 2024-01-31 RX ADMIN — ACETAMINOPHEN 650 MG: 325 TABLET ORAL at 20:08

## 2024-01-31 RX ADMIN — METHOHEXITAL SODIUM 20 MG: 500 INJECTION, POWDER, LYOPHILIZED, FOR SOLUTION INTRAMUSCULAR; INTRAVENOUS; RECTAL at 15:15

## 2024-01-31 RX ADMIN — SENNOSIDES AND DOCUSATE SODIUM 2 TABLET: 8.6; 5 TABLET ORAL at 20:03

## 2024-01-31 RX ADMIN — FUROSEMIDE 40 MG: 10 INJECTION, SOLUTION INTRAMUSCULAR; INTRAVENOUS at 09:24

## 2024-01-31 RX ADMIN — POTASSIUM CHLORIDE 40 MEQ: 1500 TABLET, EXTENDED RELEASE ORAL at 12:29

## 2024-01-31 RX ADMIN — Medication 10 ML: at 09:25

## 2024-01-31 RX ADMIN — LOSARTAN POTASSIUM 25 MG: 25 TABLET, FILM COATED ORAL at 09:24

## 2024-01-31 RX ADMIN — APIXABAN 5 MG: 5 TABLET, FILM COATED ORAL at 20:03

## 2024-01-31 RX ADMIN — AMIODARONE HYDROCHLORIDE 150 MG: 1.5 INJECTION, SOLUTION INTRAVENOUS at 16:58

## 2024-01-31 RX ADMIN — APIXABAN 5 MG: 5 TABLET, FILM COATED ORAL at 09:24

## 2024-01-31 RX ADMIN — METOPROLOL TARTRATE 50 MG: 50 TABLET ORAL at 12:29

## 2024-01-31 RX ADMIN — ASPIRIN 81 MG: 81 TABLET, COATED ORAL at 09:24

## 2024-01-31 RX ADMIN — SENNOSIDES AND DOCUSATE SODIUM 2 TABLET: 8.6; 5 TABLET ORAL at 09:24

## 2024-01-31 RX ADMIN — ALLOPURINOL 300 MG: 300 TABLET ORAL at 09:24

## 2024-01-31 RX ADMIN — METOPROLOL TARTRATE 50 MG: 50 TABLET ORAL at 20:03

## 2024-01-31 RX ADMIN — METHOHEXITAL SODIUM 20 MG: 500 INJECTION, POWDER, LYOPHILIZED, FOR SOLUTION INTRAMUSCULAR; INTRAVENOUS; RECTAL at 15:13

## 2024-01-31 NOTE — PLAN OF CARE
Goal Outcome Evaluation:           Progress: improving  Outcome Evaluation: Pt is A&O with VSS, A-fib with some tachycardia on the monitor, and on 2L NC. Pt hasn't complained of pain or SOB. Has remained NPO since midnight for REGINE/cardioversion today. There are no complaints at this time.

## 2024-01-31 NOTE — PROGRESS NOTES
[facility] Cardiology  Inpatient Progress Note      Chief Complaint/Reason for consult:    Chief Complaint   Patient presents with    Irregular Heart Beat            Subjective  No complaints this am, rates improved this morning       Vital Sign Min/Max for last 24 hours  Temp  Min: 98 °F (36.7 °C)  Max: 99.1 °F (37.3 °C)   BP  Min: 96/70  Max: 136/67   Pulse  Min: 76  Max: 118   Resp  Min: 16  Max: 18   SpO2  Min: 92 %  Max: 97 %   Flow (L/min)  Min: 2  Max: 2      Intake/Output Summary (Last 24 hours) at 1/31/2024 0839  Last data filed at 1/30/2024 1918  Gross per 24 hour   Intake 200 ml   Output 700 ml   Net -500 ml           Gen: well developed, lying in bed, comfortable appearing  HEENT: MMM, sclerae anicteric, conjunctivae normal  CV: regular rate, irregularly irregular rhythm, II/VI diastolic murmur at LLSB, normal S1, S2. 2+ radial and DP pulses  Pulm: NC oxygen, normal work of breathing, no wheezes, rales, rhonchi  Ext: normal bulk for age, normal tone, no dependent edema  Neuro: alert, oriented, face symmetrical, moving all extremities well  Psych: normal mood, appropriate affect    Tele:  atrial fibrillations, rates 10s-110s    Results Review (reviewed the patient's recent labs in the electronic medical record):     6/223 - Transthoracic Echo Complete    Left ventricular systolic function is normal. Calculated left ventricular EF = 61% Normal left ventricular cavity size noted. Left ventricular wall thickness is consistent with moderate concentric hypertrophy. All left ventricular wall segments contract normally. Left ventricular diastolic function is consistent with (grade II w/high LAP) pseudonormalization.    Normal right ventricular cavity size, wall thickness, systolic function and septal motion noted.    Left atrial volume is moderately increased.    Moderate aortic valve regurgitation is present. No aortic valve stenosis is present.    Mitral annular calcification is present. Mild  mitral valve regurgitation is present. No significant mitral valve stenosis is present.    Mild tricuspid valve regurgitation is present.    There is a small (<1cm) circumferential pericardial effusion. The effusion is fibrinous. There is no evidence of cardiac tamponade.       Radiology: No radiology results for the last day    Lab Results   Component Value Date    WBC 8.53 01/29/2024    HGB 12.5 01/29/2024    HCT 38.3 01/29/2024    MCV 90.5 01/29/2024     01/29/2024     Lab Results   Component Value Date    GLUCOSE 113 (H) 01/31/2024    CALCIUM 9.6 01/31/2024     01/31/2024    K 3.5 01/31/2024    CO2 30.0 (H) 01/31/2024     01/31/2024    BUN 27 (H) 01/31/2024    CREATININE 1.05 (H) 01/31/2024    EGFRIFAFRI 52 (L) 11/15/2021    EGFRIFNONA 43 (L) 11/15/2021    BCR 25.7 (H) 01/31/2024    ANIONGAP 10.0 01/31/2024     Lab Results   Component Value Date    TROPONINT 16 (H) 01/26/2024    TROPONINT 19 (H) 01/26/2024    TROPONINT 20 (H) 01/26/2024      Lab Results   Component Value Date    PROBNP 3,266.0 (H) 01/26/2024     Lab Results   Component Value Date    HGBA1C 5.7 11/30/2022      Lab Results   Component Value Date    CHLPL 168 11/30/2022    TRIG 136 11/30/2022    HDL 45 11/30/2022    LDL 99 11/30/2022        Assessment     Persistent AF   - recent diagnosis, pt has elected for REGINE-ECV   - will see HR post ECV, possible oral amio load   - scheduled metoprolol, continue diltiazem   - AC with apixaban    Acute on chronic HFpEF   - continue daily furosemide    Hypertension   - stopped hydralazine while working on medications for rate control     Dispo   - REGINE-ECV at 3pm, if no issues weaning oxygen would likely be able to d/c later in the afternoon    DUARTE Epperson MD  1/31/2024  08:39 EST

## 2024-01-31 NOTE — PROCEDURES
Discussed risk and benefits of procedure.  60mg of brevetol was used for sedation.  Probe placed and removed without issues.  Coughed during procedure and started gagging on and off throughout.  Ended exam early given gagging.  Did further assessment via TTE to evaluate aortic valve further.  Cardioversion X 4 (200 joules biphasic) without success (was in junctional or NSR for about 30 seconds after the first attempt).    Findings  Left atrial appendage: no clot in multiple views and normal velocities  Aortic valve:  At least moderate AI on REGINE.  Finishing with TTE to better assess  Mitral valve: mild MR  Pulmonic valve: mild PI  Rest not assessed secondary to gagging.  Will get TTE to better assess AI

## 2024-01-31 NOTE — CASE MANAGEMENT/SOCIAL WORK
Continued Stay Note  [facility]     Patient Name: Cheryl Tomas  MRN: [id number]  Today's Date: 1/31/2024    Admit Date: 1/26/2024    Plan: discharge plan   Discharge Plan       Row Name 01/31/24 0750       Plan    Plan discharge plan    Plan Comments Per discussion in MDR, pt is having an Hari-ECV today. I met with pt in room regarding discharge plan. [facility] Pt states she is not sure yet if she will want  or outpatient PT at [facility].  CM will cont to follow    Final Discharge Disposition Code 01 - home or self-care                   Discharge Codes    No documentation.                 Expected Discharge Date and Time       Expected Discharge Date Expected Discharge Time    Feb 2, 2024               Sheri Linder RN

## 2024-01-31 NOTE — PROGRESS NOTES
[facility]  PROGRESS NOTE    Patient Name: Cheryl Tomas  : 1940  MRN: [id number]    Date of Admission: 2024  Primary Care Physician: Provider, No Known    Subjective   Subjective     CC:  F/u A.fib, CHF    HPI:  Doing well today.  After cardioversion she is on room air.  We discussed going home late this evening after amiodarone bolus but ultimately decided will observe overnight on telemetry since it took several shocks to convert her and she will be alone at home tonight.  This way we can ensure that she will not miss any medication doses due to pharmacy being closed.       Objective   Objective     Vital Signs:   Temp:  [98 °F (36.7 °C)-99.1 °F (37.3 °C)] 98.8 °F (37.1 °C)  Heart Rate:  [] 98  Resp:  [16-18] 18  BP: ()/(47-94) 130/87  Flow (L/min):  [2] 2     Physical Exam:  Constitutional: No acute distress, awake, alert, sitting up in chair  HENT: NCAT, mucous membranes moist  Respiratory: Clear to auscultation bilaterally, respiratory effort normal on room air  Cardiovascular: RRR  Musculoskeletal: No bilateral ankle edema  Psychiatric: Appropriate affect, cooperative  Neurologic: Speech clear and fluent  Skin: No rashes on exposed surfaces      Results Reviewed:  LAB RESULTS:      Lab 24  0434 24  0207 24  1034   WBC 8.53 8.69 9.00   HEMOGLOBIN 12.5 12.2 13.0   HEMATOCRIT 38.3 37.8 39.9   PLATELETS 272 268 220   NEUTROS ABS 5.52  --  7.16*   IMMATURE GRANS (ABS) 0.02  --  0.03   LYMPHS ABS 1.89  --  1.06   MONOS ABS 0.78  --  0.56   EOS ABS 0.26  --  0.14   MCV 90.5 90.4 92.4   LACTATE  --   --  1.1         Lab 24  0328 24  0434 24  2206 24  1133 24  0207 24  1034   SODIUM 142 142  --  141 143 144   POTASSIUM 3.5 3.9 4.2 3.6 3.9 3.6   CHLORIDE 102 103  --  100 102 105   CO2 30.0* 29.0  --  27.0 27.0 27.0   ANION GAP 10.0 10.0  --  14.0 14.0 12.0   BUN 27* 29*  --  25* 18 17    CREATININE 1.05* 1.14*  --  1.23* 1.10* 1.16*   EGFR 53.5* 48.5*  --  44.2* 50.6* 47.5*   GLUCOSE 113* 115*  --  156* 113* 144*   CALCIUM 9.6 9.5  --  10.0 9.4 10.0   MAGNESIUM  --   --   --  1.8  --  1.8   TSH  --   --   --   --   --  2.940         Lab 01/26/24  1034   TOTAL PROTEIN 6.7   ALBUMIN 4.4   GLOBULIN 2.3   ALT (SGPT) 60*   AST (SGOT) 31   BILIRUBIN 0.6   ALK PHOS 110   LIPASE 23         Lab 01/26/24  2041 01/26/24  1902 01/26/24  1645 01/26/24  1418 01/26/24  1034   PROBNP  --   --   --   --  3,266.0*   HSTROP T 16* 19* 20* 17* 18*                 Brief Urine Lab Results  (Last result in the past 365 days)        Color   Clarity   Blood   Leuk Est   Nitrite   Protein   CREAT   Urine HCG        01/27/24 0808 Yellow   Clear   Negative   Negative   Negative   Negative                   Microbiology Results Abnormal       Procedure Component Value - Date/Time    COVID PRE-OP / PRE-PROCEDURE SCREENING ORDER (NO ISOLATION) - Swab, Nasopharynx [[id number]]  (Normal) Collected: 01/26/24 1050    Lab Status: Final result Specimen: Swab from Nasopharynx Updated: 01/26/24 1203    Narrative:      The following orders were created for panel order COVID PRE-OP / PRE-PROCEDURE SCREENING ORDER (NO ISOLATION) - Swab, Nasopharynx.  Procedure                               Abnormality         Status                     ---------                               -----------         ------                     Respiratory Panel PCR w/...[[id number]]  Normal              Final result                 Please view results for these tests on the individual orders.    Respiratory Panel PCR w/COVID-19(SARS-CoV-2) [place]/[place]/[facility] In-House, NP Swab in [place], 2 HR TAT - Swab, Nasopharynx [[id number]]  (Normal) Collected: 01/26/24 1050    Lab Status: Final result Specimen: Swab from Nasopharynx Updated: 01/26/24 1203     ADENOVIRUS, PCR Not Detected     Coronavirus 229E Not Detected     Coronavirus HKU1 Not Detected     Coronavirus  NL63 Not Detected     Coronavirus OC43 Not Detected     COVID19 Not Detected     Human Metapneumovirus Not Detected     Human Rhinovirus/Enterovirus Not Detected     Influenza A PCR Not Detected     Influenza B PCR Not Detected     Parainfluenza Virus 1 Not Detected     Parainfluenza Virus 2 Not Detected     Parainfluenza Virus 3 Not Detected     Parainfluenza Virus 4 Not Detected     RSV, PCR Not Detected     Bordetella pertussis pcr Not Detected     Bordetella parapertussis PCR Not Detected     Chlamydophila pneumoniae PCR Not Detected     Mycoplasma pneumo by PCR Not Detected    Narrative:      In the setting of a positive respiratory panel with a viral infection PLUS a negative procalcitonin without other underlying concern for bacterial infection, consider observing off antibiotics or discontinuation of antibiotics and continue supportive care. If the respiratory panel is positive for atypical bacterial infection (Bordetella pertussis, Chlamydophila pneumoniae, or Mycoplasma pneumoniae), consider antibiotic de-escalation to target atypical bacterial infection.            No radiology results from the last 24 hrs    Results for orders placed during the hospital encounter of 06/01/23    Adult Transthoracic Echo Complete W/ Cont if Necessary Per Protocol    Interpretation Summary    Left ventricular systolic function is normal. Calculated left ventricular EF = 61% Normal left ventricular cavity size noted. Left ventricular wall thickness is consistent with moderate concentric hypertrophy. All left ventricular wall segments contract normally. Left ventricular diastolic function is consistent with (grade II w/high LAP) pseudonormalization.    Normal right ventricular cavity size, wall thickness, systolic function and septal motion noted.    Left atrial volume is moderately increased.    Moderate aortic valve regurgitation is present. No aortic valve stenosis is present.    Mitral annular calcification is present.  Mild mitral valve regurgitation is present. No significant mitral valve stenosis is present.    Mild tricuspid valve regurgitation is present.    There is a small (<1cm) circumferential pericardial effusion. There is no evidence of cardiac tamponade.    There is a small (<1cm) circumferential pericardial effusion. The effusion is fibrinous. There is no evidence of cardiac tamponade.      Current medications:  Scheduled Meds:allopurinol, 300 mg, Oral, Daily  apixaban, 5 mg, Oral, Q12H  aspirin, 81 mg, Oral, Daily  dilTIAZem, 30 mg, Oral, Q6H  furosemide, 40 mg, Intravenous, Daily  losartan, 25 mg, Oral, Q24H  metoprolol tartrate, 50 mg, Oral, Q8H  pharmacy consult - MTM, , Does not apply, Daily  potassium chloride ER, 40 mEq, Oral, Q4H  pravastatin, 40 mg, Oral, Nightly  QUEtiapine, 25 mg, Oral, Nightly  senna-docusate sodium, 2 tablet, Oral, BID  sodium chloride, 10 mL, Intravenous, Q12H      Continuous Infusions:     PRN Meds:.  acetaminophen    senna-docusate sodium **AND** polyethylene glycol **AND** bisacodyl **AND** bisacodyl    Calcium Replacement - Follow Nurse / BPA Driven Protocol    fentaNYL citrate (PF)    flumazenil    Magnesium Cardiology Dose Replacement - Follow Nurse / BPA Driven Protocol    methohexital    midazolam    naloxone    nitroglycerin    ondansetron    Phosphorus Replacement - Follow Nurse / BPA Driven Protocol    Potassium Replacement - Follow Nurse / BPA Driven Protocol    sodium chloride    [COMPLETED] Insert Peripheral IV **AND** sodium chloride    sodium chloride    sodium chloride    Assessment & Plan   Assessment & Plan     Active Hospital Problems    Diagnosis  POA    **Atrial fibrillation with RVR [I48.91]  Yes    A-fib [I48.91]  Yes    Acute on chronic heart failure with preserved ejection fraction (HFpEF) [I50.33]  Unknown    Hypoxia [R09.02]  Yes    Nonrheumatic aortic valve insufficiency [I35.1]  Yes    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        Brief Hospital Course to date:  Cheryl Tomas is a 83 y.o. female with hypertension, A-fib, diabetes who was admitted with weakness, shortness of breath and hypoxia secondary to acute CHF exacerbation and A-fib with RVR.     This patient's problems and plans were partially entered by my partner and updated as appropriate by me 01/31/24.     Acute HFpEF with Hypoxia  Hypertension  Hyperlipidemia  - Cardiology following  - S/P IV lasix, transition to PO lasix 40mg daily tomorrow  - Continue losartan 25mg daily  - On room air    A-fib with RVR  - Cardiology following  - Continue metoprolol tartrate 50mg Q8h, PO diltiazem  - Continue Eliquis  - S/P REGINE cardioversion this afternoon.  Planning amiodarone bolus then PO load.     History of diabetes mellitus  - Last hemoglobin A1c was 5.7 in November 2022  - Diet controlled    Generalized weakness  - PT/OT    Expected Discharge Location and Transportation: Home  Expected Discharge   Expected Discharge Date: 2/1/2024; Expected Discharge Time:      DVT prophylaxis:  Medical and mechanical DVT prophylaxis orders are present.         AM-PAC 6 Clicks Score (PT): 22 (01/30/24 2049)    CODE STATUS:   Code Status and Medical Interventions:   Ordered at: 01/26/24 1446     Medical Intervention Limits:    NO intubation (DNI)     Level Of Support Discussed With:    Patient     Code Status (Patient has no pulse and is not breathing):    No CPR (Do Not Attempt to Resuscitate)     Medical Interventions (Patient has pulse or is breathing):    Limited Support       Caitlyn Chu MD  01/31/24

## 2024-02-01 ENCOUNTER — READMISSION MANAGEMENT (OUTPATIENT)
Dept: CALL CENTER | Facility: HOSPITAL | Age: 84
End: 2024-02-01
Payer: MEDICARE

## 2024-02-01 VITALS
OXYGEN SATURATION: 99 % | RESPIRATION RATE: 18 BRPM | HEIGHT: 64 IN | WEIGHT: 146 LBS | SYSTOLIC BLOOD PRESSURE: 132 MMHG | HEART RATE: 61 BPM | TEMPERATURE: 97.6 F | BODY MASS INDEX: 24.92 KG/M2 | DIASTOLIC BLOOD PRESSURE: 52 MMHG

## 2024-02-01 PROCEDURE — 99239 HOSP IP/OBS DSCHRG MGMT >30: CPT | Performed by: INTERNAL MEDICINE

## 2024-02-01 PROCEDURE — 99232 SBSQ HOSP IP/OBS MODERATE 35: CPT | Performed by: INTERNAL MEDICINE

## 2024-02-01 PROCEDURE — 93010 ELECTROCARDIOGRAM REPORT: CPT | Performed by: INTERNAL MEDICINE

## 2024-02-01 PROCEDURE — 93005 ELECTROCARDIOGRAM TRACING: CPT | Performed by: INTERNAL MEDICINE

## 2024-02-01 RX ORDER — FUROSEMIDE 40 MG/1
40 TABLET ORAL DAILY
Qty: 30 TABLET | Refills: 2 | Status: SHIPPED | OUTPATIENT
Start: 2024-02-01

## 2024-02-01 RX ORDER — AMIODARONE HYDROCHLORIDE 200 MG/1
TABLET ORAL
Qty: 74 TABLET | Refills: 0 | Status: SHIPPED | OUTPATIENT
Start: 2024-02-01 | End: 2024-04-01

## 2024-02-01 RX ADMIN — FUROSEMIDE 40 MG: 40 TABLET ORAL at 09:03

## 2024-02-01 RX ADMIN — AMIODARONE HYDROCHLORIDE 200 MG: 200 TABLET ORAL at 09:04

## 2024-02-01 RX ADMIN — LOSARTAN POTASSIUM 25 MG: 25 TABLET, FILM COATED ORAL at 09:04

## 2024-02-01 RX ADMIN — ASPIRIN 81 MG: 81 TABLET, COATED ORAL at 09:03

## 2024-02-01 RX ADMIN — APIXABAN 5 MG: 5 TABLET, FILM COATED ORAL at 09:03

## 2024-02-01 RX ADMIN — AMIODARONE HYDROCHLORIDE 200 MG: 200 TABLET ORAL at 00:55

## 2024-02-01 RX ADMIN — Medication 10 ML: at 09:05

## 2024-02-01 RX ADMIN — ALLOPURINOL 300 MG: 300 TABLET ORAL at 09:03

## 2024-02-01 RX ADMIN — SENNOSIDES AND DOCUSATE SODIUM 2 TABLET: 8.6; 5 TABLET ORAL at 09:03

## 2024-02-01 NOTE — PLAN OF CARE
Goal Outcome Evaluation:      D/C teaching completed. IV removed. No concerns at this time

## 2024-02-01 NOTE — CASE MANAGEMENT/SOCIAL WORK
Case Management Discharge Note      Final Note: [place] [facility] [place] Pt denies discharge needs. Family will transport. Per discussion in MDR, pt is on room air.        Selected Continued Care - Discharged on 2/1/2024 Admission date: 1/26/2024 - Discharge disposition: Home or Self Care      Destination    No services have been selected for the patient.                Durable Medical Equipment    No services have been selected for the patient.                Dialysis/Infusion    No services have been selected for the patient.                Home Medical Care    No services have been selected for the patient.                Therapy    No services have been selected for the patient.                Community Resources    No services have been selected for the patient.                Community & DME    No services have been selected for the patient.                         Final Discharge Disposition Code: 01 - home or self-care

## 2024-02-01 NOTE — DISCHARGE SUMMARY
[facility] Medicine Services  DISCHARGE SUMMARY    Patient Name: Cheryl Tomas  : 1940  MRN: [id number]    Date of Admission: 2024 10:25 AM  Date of Discharge:  2024  Primary Care Physician: Provider, No Known    Consults       Date and Time Order Name Status Description    2024  4:14 PM Inpatient Cardiology Consult Completed     2024 12:36 PM Cardiology (on-call MD unless specified) Completed             Hospital Course     Presenting Problem: Dyspnea    Active Hospital Problems    Diagnosis  POA    **Atrial fibrillation with RVR [I48.91]  Yes    A-fib [I48.91]  Yes    Acute on chronic heart failure with preserved ejection fraction (HFpEF) [I50.33]  Yes    Hypoxia [R09.02]  Yes    Nonrheumatic aortic valve insufficiency [I35.1]  Yes    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          Hospital Course:  Cheryl Tomas is a 83 y.o. female with hypertension, A-fib, diabetes who was admitted with weakness, shortness of breath and hypoxia secondary to acute CHF exacerbation and A-fib with RVR.      This patient's problems and plans were partially entered by my partner and updated as appropriate by me 24.      Acute HFpEF with Hypoxia  Hypertension  Hyperlipidemia  - S/P IV lasix, transitioned to PO lasix 40mg daily  - Added jardiance, continue carvedilol, no ACEi/ARB due to low blood pressure but may be able to add back at outpatient follow up     A-fib with RVR  - Continue Eliquis  - S/P REGINE cardioversion with restoration of NSR.  Amiodarone started per cardiology.  Continue carvedilol.      Discharge Follow Up Recommendations for outpatient labs/diagnostics:   F/U with PCP within 1 week  F/U with Dr. Mack, cardiologist at [facility] within 1 week    Day of Discharge     HPI:   Feels well today.  Had some mild bradycardia overnight while sleeping.  Ready to go home.      Vital Signs:   Temp:  [97.5 °F (36.4 °C)-98.8 °F (37.1 °C)] 97.6 °F (36.4 °C)  Heart Rate:  [] 61  Resp:  [16-18] 18  BP: (101-149)/() 132/52  Flow (L/min):  [2] 2      Physical Exam:  Constitutional: No acute distress, awake, alert, sitting up in chair  HENT: NCAT, mucous membranes moist  Respiratory: Clear to auscultation bilaterally, respiratory effort normal   Cardiovascular: RRR, no murmurs, rubs, or gallops, NSR on telemetry  Gastrointestinal: Soft, nontender, nondistended  Musculoskeletal: No bilateral ankle edema  Psychiatric: Appropriate affect, cooperative  Neurologic: Speech clear      Pertinent  and/or Most Recent Results     LAB RESULTS:      Lab 01/29/24  0434 01/27/24  0207 01/26/24  1034   WBC 8.53 8.69 9.00   HEMOGLOBIN 12.5 12.2 13.0   HEMATOCRIT 38.3 37.8 39.9   PLATELETS 272 268 220   NEUTROS ABS 5.52  --  7.16*   IMMATURE GRANS (ABS) 0.02  --  0.03   LYMPHS ABS 1.89  --  1.06   MONOS ABS 0.78  --  0.56   EOS ABS 0.26  --  0.14   MCV 90.5 90.4 92.4   LACTATE  --   --  1.1         Lab 01/31/24  1818 01/31/24  0328 01/29/24  0434 01/28/24  2206 01/28/24  1133 01/27/24  0207 01/26/24  1034   SODIUM  --  142 142  --  141 143 144   POTASSIUM 4.4 3.5 3.9 4.2 3.6 3.9 3.6   CHLORIDE  --  102 103  --  100 102 105   CO2  --  30.0* 29.0  --  27.0 27.0 27.0   ANION GAP  --  10.0 10.0  --  14.0 14.0 12.0   BUN  --  27* 29*  --  25* 18 17   CREATININE  --  1.05* 1.14*  --  1.23* 1.10* 1.16*   EGFR  --  53.5* 48.5*  --  44.2* 50.6* 47.5*   GLUCOSE  --  113* 115*  --  156* 113* 144*   CALCIUM  --  9.6 9.5  --  10.0 9.4 10.0   MAGNESIUM  --   --   --   --  1.8  --  1.8   TSH  --   --   --   --   --   --  2.940         Lab 01/26/24  1034   TOTAL PROTEIN 6.7   ALBUMIN 4.4   GLOBULIN 2.3   ALT (SGPT) 60*   AST (SGOT) 31   BILIRUBIN 0.6   ALK PHOS 110   LIPASE  23         Lab 01/26/24  2041 01/26/24  1902 01/26/24  1645 01/26/24  1418 01/26/24  1034   PROBNP  --   --   --   --  3,266.0*   HSTROP T 16* 19* 20* 17* 18*                 Brief Urine Lab Results  (Last result in the past 365 days)        Color   Clarity   Blood   Leuk Est   Nitrite   Protein   CREAT   Urine HCG        01/27/24 0808 Yellow   Clear   Negative   Negative   Negative   Negative                 Microbiology Results (last 10 days)       Procedure Component Value - Date/Time    COVID PRE-OP / PRE-PROCEDURE SCREENING ORDER (NO ISOLATION) - Swab, Nasopharynx [[id number]]  (Normal) Collected: 01/26/24 1050    Lab Status: Final result Specimen: Swab from Nasopharynx Updated: 01/26/24 1203    Narrative:      The following orders were created for panel order COVID PRE-OP / PRE-PROCEDURE SCREENING ORDER (NO ISOLATION) - Swab, Nasopharynx.  Procedure                               Abnormality         Status                     ---------                               -----------         ------                     Respiratory Panel PCR w/...[[id number]]  Normal              Final result                 Please view results for these tests on the individual orders.    Respiratory Panel PCR w/COVID-19(SARS-CoV-2) [facility]/[place]/[facility] In-House, NP Swab in UTM/VTM, 2 HR TAT - Swab, Nasopharynx [[id number]]  (Normal) Collected: 01/26/24 1050    Lab Status: Final result Specimen: Swab from Nasopharynx Updated: 01/26/24 1203     ADENOVIRUS, PCR Not Detected     Coronavirus 229E Not Detected     Coronavirus HKU1 Not Detected     Coronavirus NL63 Not Detected     Coronavirus OC43 Not Detected     COVID19 Not Detected     Human Metapneumovirus Not Detected     Human Rhinovirus/Enterovirus Not Detected     Influenza A PCR Not Detected     Influenza B PCR Not Detected     Parainfluenza Virus 1 Not Detected     Parainfluenza Virus 2 Not Detected     Parainfluenza Virus 3 Not Detected     Parainfluenza Virus 4 Not Detected      RSV, PCR Not Detected     Bordetella pertussis pcr Not Detected     Bordetella parapertussis PCR Not Detected     Chlamydophila pneumoniae PCR Not Detected     Mycoplasma pneumo by PCR Not Detected    Narrative:      In the setting of a positive respiratory panel with a viral infection PLUS a negative procalcitonin without other underlying concern for bacterial infection, consider observing off antibiotics or discontinuation of antibiotics and continue supportive care. If the respiratory panel is positive for atypical bacterial infection (Bordetella pertussis, Chlamydophila pneumoniae, or Mycoplasma pneumoniae), consider antibiotic de-escalation to target atypical bacterial infection.            Adult Transesophageal Echo (REGINE) W/ Cont if Necessary Per Protocol (Cardiology Department)    Result Date: 1/31/2024    LVEF 55%   RV normal in size and function   Aortic valve thickened and calcified.  Vena contracta .32 cm and  consistent with moderate AI   Mitral valve has mild MAC and mild MR   Pulmonic valve and tricuspid valve not well assessed.   No left atrial appendage thrombus appreciated on multiple views with normal velocities   Moderate pericardial effusion.  no RV diastolic collapse.   IVC dilated but collapses well.  RA pressure estimated 8 mmHg     XR Chest 1 View    Result Date: 1/26/2024  XR CHEST 1 VW Date of Exam: 1/26/2024 10:37 AM EST Indication: Dysrhythmia triage protocol Comparison: Chest radiograph 5/3/2023. Findings: Enlarged cardiac silhouette, unchanged. Mild diffuse interstitial thickening. Trace bilateral pleural effusions. Bibasilar atelectasis without focal consolidation otherwise. No pneumothorax. Osseous structures are unchanged.     Impression: Cardiomegaly with mild pulmonary edema and trace bilateral pleural effusions. Bibasilar atelectasis without focal consolidation otherwise. Electronically Signed: Pedro Arevalo MD  1/26/2024 10:50 AM EST  Workstation ID: [id number]     Results  for orders placed during the hospital encounter of 05/05/22    Duplex Renal Artery - Bilateral Complete CAR    Interpretation Summary  US RENAL ARTERIES. DUPLEX RENAL ARTERY BILATERAL COMPLETE CAR-    History: HTN.    Comparison: None.    Technique: Two-dimensional grayscale, color flow, pulse wave spectral  Doppler examination of the right and left renal circulation. The study  is not optimized for the examination of the right and left renal  parenchyma.    Limitations: None.    Findings:    Abdominal aorta:  AP diameters: Proximal: 1.75 cm. This is normal.  Peak systolic velocity in the suprarenal aorta is 84 cm/s. The spectral  Doppler waveform shows mostly high resistance waveform. There is  spectral broadening.    Renal artery ratios:  Right: 1.38.  Left: 0.89.    Right kidney: Limited examination  Size: 10.8 cm.  Echogenicity: within normal limits to the extent seen.  Other findings: Not applicable.    Left kidney: Limited examination  Size: 11.5 cm.  Echogenicity: within normal limits to the extent seen.  Other findings: Mild hydronephrosis.    Right renal circulation:  Main right renal artery:  Origin: Peak systolic velocity is 112 cm/s. Spectral Doppler waveform:  As expected.  Proximal: Peak systolic velocity is 92 cm/s. Spectral Doppler waveform:  As expected.  Mid: Peak systolic velocity is 60 cm/s. Spectral Doppler waveform: As  expected.  Distal: Peak systolic velocity is 41 cm/s. Spectral Doppler waveform: As  expected.  Hilum: Peak systolic velocity is 40 cm/s.  Intrarenal circulation: Peak systolic velocity is 20 cm/s. The resistive  index is 0.75. This is not elevated.  Acceleration time at the hilum is 0.34 ms. This is normal.    Right renal vein is patent.    Left renal circulation:  Origin: Peak systolic velocity is 75 cm/s. Spectral Doppler waveform: As  expected.  Proximal: Peak systolic velocity is 69 cm/s. Spectral Doppler waveform:  As expected.  Mid: Peak systolic velocity is 65 cm/s.  Spectral Doppler waveform: As  expected.  Distal: Peak systolic velocity is 48 cm/s. Spectral Doppler waveform: As  expected.  Hilum: Peak systolic velocity is 49 cm/s.  Intrarenal circulation: Peak systolic velocity is 25 cm/s. The resistive  index is 0.72-0.8. This is elevated.  Acceleration time at the hilum is 0.43 ms. This is normal.    Left renal vein is patent.    Other findings:  None.    Impression  Impression:  No sonographic evidence of clinically significant stenosis of the right  or the left renal artery on this exam.    There is suspicion of mild left hydronephrosis. This can be further  evaluated with a dedicated renal ultrasound.    Please note that the study is not optimized for evaluation of the right  and left renal parenchyma. A separate dedicated ultrasound examination  should be performed for that purpose.    This report was finalized on 5/5/2022 3:33 PM by Isaac Skinner MD.      Results for orders placed during the hospital encounter of 05/05/22    Duplex Renal Artery - Bilateral Complete CAR    Interpretation Summary  US RENAL ARTERIES. DUPLEX RENAL ARTERY BILATERAL COMPLETE CAR-    History: HTN.    Comparison: None.    Technique: Two-dimensional grayscale, color flow, pulse wave spectral  Doppler examination of the right and left renal circulation. The study  is not optimized for the examination of the right and left renal  parenchyma.    Limitations: None.    Findings:    Abdominal aorta:  AP diameters: Proximal: 1.75 cm. This is normal.  Peak systolic velocity in the suprarenal aorta is 84 cm/s. The spectral  Doppler waveform shows mostly high resistance waveform. There is  spectral broadening.    Renal artery ratios:  Right: 1.38.  Left: 0.89.    Right kidney: Limited examination  Size: 10.8 cm.  Echogenicity: within normal limits to the extent seen.  Other findings: Not applicable.    Left kidney: Limited examination  Size: 11.5 cm.  Echogenicity: within normal limits to the extent  seen.  Other findings: Mild hydronephrosis.    Right renal circulation:  Main right renal artery:  Origin: Peak systolic velocity is 112 cm/s. Spectral Doppler waveform:  As expected.  Proximal: Peak systolic velocity is 92 cm/s. Spectral Doppler waveform:  As expected.  Mid: Peak systolic velocity is 60 cm/s. Spectral Doppler waveform: As  expected.  Distal: Peak systolic velocity is 41 cm/s. Spectral Doppler waveform: As  expected.  Hilum: Peak systolic velocity is 40 cm/s.  Intrarenal circulation: Peak systolic velocity is 20 cm/s. The resistive  index is 0.75. This is not elevated.  Acceleration time at the hilum is 0.34 ms. This is normal.    Right renal vein is patent.    Left renal circulation:  Origin: Peak systolic velocity is 75 cm/s. Spectral Doppler waveform: As  expected.  Proximal: Peak systolic velocity is 69 cm/s. Spectral Doppler waveform:  As expected.  Mid: Peak systolic velocity is 65 cm/s. Spectral Doppler waveform: As  expected.  Distal: Peak systolic velocity is 48 cm/s. Spectral Doppler waveform: As  expected.  Hilum: Peak systolic velocity is 49 cm/s.  Intrarenal circulation: Peak systolic velocity is 25 cm/s. The resistive  index is 0.72-0.8. This is elevated.  Acceleration time at the hilum is 0.43 ms. This is normal.    Left renal vein is patent.    Other findings:  None.    Impression  Impression:  No sonographic evidence of clinically significant stenosis of the right  or the left renal artery on this exam.    There is suspicion of mild left hydronephrosis. This can be further  evaluated with a dedicated renal ultrasound.    Please note that the study is not optimized for evaluation of the right  and left renal parenchyma. A separate dedicated ultrasound examination  should be performed for that purpose.    This report was finalized on 5/5/2022 3:33 PM by Isaac Skinner MD.      Results for orders placed during the hospital encounter of 01/26/24    Adult Transesophageal Echo (REGINE) W/  Cont if Necessary Per Protocol (Cardiology Department)    Interpretation Summary    LVEF 55%    RV normal in size and function    Aortic valve thickened and calcified.  Vena contracta .32 cm and  consistent with moderate AI    Mitral valve has mild MAC and mild MR    Pulmonic valve and tricuspid valve not well assessed.    No left atrial appendage thrombus appreciated on multiple views with normal velocities    Moderate pericardial effusion.  no RV diastolic collapse.    IVC dilated but collapses well.  RA pressure estimated 8 mmHg      Plan for Follow-up of Pending Labs/Results: None pending    Discharge Details        Discharge Medications        New Medications        Instructions Start Date   amiodarone 200 MG tablet  Commonly known as: PACERONE   Take 1 tablet by mouth Every 12 (Twelve) Hours for 14 days, THEN 1 tablet Daily for 46 days.   Start Date: February 1, 2024     empagliflozin 10 MG tablet tablet  Commonly known as: Jardiance   10 mg, Oral, Daily      furosemide 40 MG tablet  Commonly known as: LASIX   40 mg, Oral, Daily             Continue These Medications        Instructions Start Date   acetaminophen 650 MG 8 hr tablet  Commonly known as: TYLENOL   1,300 mg, Oral, Every 8 Hours PRN      allopurinol 300 MG tablet  Commonly known as: ZYLOPRIM   300 mg, Oral, Daily      apixaban 5 MG tablet tablet  Commonly known as: ELIQUIS   5 mg, Oral, 2 Times Daily      aspirin 81 MG EC tablet   81 mg, Oral, Daily      carvedilol 12.5 MG tablet  Commonly known as: COREG   12.5 mg, Oral, 2 Times Daily With Meals      cholecalciferol 25 MCG (1000 UT) tablet  Commonly known as: VITAMIN D3   1,000 Units, Oral, Daily      Coenzyme Q10 10 MG capsule   10 mg, Oral, Daily      metFORMIN 500 MG tablet  Commonly known as: GLUCOPHAGE   500 mg, Oral, 2 Times Daily With Meals      pravastatin 40 MG tablet  Commonly known as: PRAVACHOL   40 mg, Oral, Nightly      QUEtiapine 25 MG tablet  Commonly known as: SEROquel   25  mg, Oral, Nightly             Stop These Medications      dilTIAZem  MG 24 hr capsule  Commonly known as: CARDIZEM CD     hydrALAZINE 25 MG tablet  Commonly known as: APRESOLINE     hydroCHLOROthiazide 12.5 MG tablet  Commonly known as: HYDRODIURIL     irbesartan 300 MG tablet  Commonly known as: AVAPRO     triamterene-hydrochlorothiazide 37.5-25 MG per tablet  Commonly known as: MAXZIDE-25              Allergies   Allergen Reactions    Cephalexin Itching and Rash    Bactrim [Sulfamethoxazole-Trimethoprim] GI Intolerance         Discharge Disposition:  Home or Self Care    Diet:  Hospital:  Diet Order   Procedures    Diet: Cardiac Diets; Healthy Heart (2-3 Na+); Texture: Regular Texture (IDDSI 7); Fluid Consistency: Thin (IDDSI 0)          CODE STATUS:    Code Status and Medical Interventions:   Ordered at: 01/26/24 1446     Medical Intervention Limits:    NO intubation (DNI)     Level Of Support Discussed With:    Patient     Code Status (Patient has no pulse and is not breathing):    No CPR (Do Not Attempt to Resuscitate)     Medical Interventions (Patient has pulse or is breathing):    Limited Support       No future appointments.    Additional Instructions for the Follow-ups that You Need to Schedule       Discharge Follow-up with PCP   As directed       Currently Documented PCP:    Provider, No Known    PCP Phone Number:    None     Follow Up Details: 1 week        [facility]; [facility]   As directed      [facility]   Follow Up: 1 Week                      Caitlyn Chu MD  02/01/24      Time Spent on Discharge:  I spent  32  minutes on this discharge activity which included: face-to-face encounter with the patient, reviewing the data in the system, coordination of the care with the nursing staff as well as consultants, documentation, and entering orders.

## 2024-02-01 NOTE — PROGRESS NOTES
[facility] Cardiology  Inpatient Progress Note      Chief Complaint/Reason for consult:    Chief Complaint   Patient presents with    Irregular Heart Beat            Subjective  Initially failed ECV yesterday but converted to SR while in recovery, doing well this morning on RA       Vital Sign Min/Max for last 24 hours  Temp  Min: 97.5 °F (36.4 °C)  Max: 98.8 °F (37.1 °C)   BP  Min: 101/49  Max: 149/107   Pulse  Min: 49  Max: 144   Resp  Min: 16  Max: 18   SpO2  Min: 91 %  Max: 100 %   Flow (L/min)  Min: 2  Max: 2      Intake/Output Summary (Last 24 hours) at 2/1/2024 1100  Last data filed at 2/1/2024 0800  Gross per 24 hour   Intake 480 ml   Output --   Net 480 ml           Gen: well developed, lying in bed, comfortable appearing  HEENT: MMM, sclerae anicteric, conjunctivae normal  CV: bradycardia, regular rhythm, II/VI diastolic murmur at LLSB, normal S1, S2. 2+ radial and DP pulses  Pulm: RA, normal work of breathing, no wheezes, rales, rhonchi  Ext: normal bulk for age, normal tone, no dependent edema  Neuro: alert, oriented, face symmetrical, moving all extremities well  Psych: normal mood, appropriate affect    Tele:  atrial fibrillations, rates 10s-110s    Results Review (reviewed the patient's recent labs in the electronic medical record):     6/223 - Transthoracic Echo Complete    Left ventricular systolic function is normal. Calculated left ventricular EF = 61% Normal left ventricular cavity size noted. Left ventricular wall thickness is consistent with moderate concentric hypertrophy. All left ventricular wall segments contract normally. Left ventricular diastolic function is consistent with (grade II w/high LAP) pseudonormalization.    Normal right ventricular cavity size, wall thickness, systolic function and septal motion noted.    Left atrial volume is moderately increased.    Moderate aortic valve regurgitation is present. No aortic valve stenosis is present.    Mitral annular  calcification is present. Mild mitral valve regurgitation is present. No significant mitral valve stenosis is present.    Mild tricuspid valve regurgitation is present.    There is a small (<1cm) circumferential pericardial effusion. The effusion is fibrinous. There is no evidence of cardiac tamponade.     Radiology: No radiology results for the last day    Lab Results   Component Value Date    WBC 8.53 01/29/2024    HGB 12.5 01/29/2024    HCT 38.3 01/29/2024    MCV 90.5 01/29/2024     01/29/2024     Lab Results   Component Value Date    GLUCOSE 113 (H) 01/31/2024    CALCIUM 9.6 01/31/2024     01/31/2024    K 4.4 01/31/2024    CO2 30.0 (H) 01/31/2024     01/31/2024    BUN 27 (H) 01/31/2024    CREATININE 1.05 (H) 01/31/2024    EGFRIFAFRI 52 (L) 11/15/2021    EGFRIFNONA 43 (L) 11/15/2021    BCR 25.7 (H) 01/31/2024    ANIONGAP 10.0 01/31/2024     Lab Results   Component Value Date    TROPONINT 16 (H) 01/26/2024    TROPONINT 19 (H) 01/26/2024    TROPONINT 20 (H) 01/26/2024      Lab Results   Component Value Date    PROBNP 3,266.0 (H) 01/26/2024     Lab Results   Component Value Date    HGBA1C 5.7 11/30/2022      Lab Results   Component Value Date    CHLPL 168 11/30/2022    TRIG 136 11/30/2022    HDL 45 11/30/2022    LDL 99 11/30/2022        Assessment     Persistent AF   - ECV 1/31 initially without conversion but converted to SR in recovery   - continue oral amiodarone   - stop diltiazem   - will d/c metoprolol and resume carvedilol on d/c with improved BP   - AC with apixaban    Acute on chronic HFpEF   - continue daily furosemide   - will hold ARB for now, monitor BP at home and plan to resume as Bps normalize    Hypertension   - stopped hydralazine while working on medications for rate control     Dispo   - Okay to d/c home today   - Plan for follow-up with Dr. Rob Mack at [facility]     DUARTE Epperson MD  2/1/2024  11:00 EST

## 2024-02-01 NOTE — PLAN OF CARE
Goal Outcome Evaluation:           Progress: improving  Outcome Evaluation: Pt is A&O with VSS, NSR to sinus elva when asleep on the monitor, to RA to 2L NC when asleep. Pt hasn't complained of pain or SOB. There are no complaints at this time.

## 2024-02-02 LAB
QT INTERVAL: 416 MS
QTC INTERVAL: 449 MS

## 2024-02-02 NOTE — OUTREACH NOTE
Prep Survey      Flowsheet Row Responses   [facility] patient discharged from? [place]   Is LACE score < 7 ? No   Eligibility Readm Mgmt   Discharge diagnosis Atrial fibrillation with RVR   Does the patient have one of the following disease processes/diagnoses(primary or secondary)? Other   Does the patient have Home health ordered? No   Is there a DME ordered? No   Prep survey completed? Yes            JILLIAN CAMPOS - Registered Nurse

## 2024-02-04 LAB
QT INTERVAL: 368 MS
QT INTERVAL: 412 MS
QTC INTERVAL: 520 MS
QTC INTERVAL: 539 MS

## 2024-02-05 ENCOUNTER — READMISSION MANAGEMENT (OUTPATIENT)
Dept: CALL CENTER | Facility: HOSPITAL | Age: 84
End: 2024-02-05
Payer: MEDICARE

## 2024-02-05 LAB
QT INTERVAL: 478 MS
QTC INTERVAL: 465 MS

## 2024-02-05 NOTE — OUTREACH NOTE
Medical Week 1 Survey      Flowsheet Row Responses   [facility] patient discharged from? [place]   Does the patient have one of the following disease processes/diagnoses(primary or secondary)? Other   Week 1 attempt successful? No   Unsuccessful attempts Attempt 1  [UTR all 3 contact numbers]            Elvira THOMAS - Registered Nurse

## 2024-02-08 ENCOUNTER — READMISSION MANAGEMENT (OUTPATIENT)
Dept: CALL CENTER | Facility: HOSPITAL | Age: 84
End: 2024-02-08
Payer: MEDICARE

## 2024-02-08 NOTE — OUTREACH NOTE
Medical Week 1 Survey      Flowsheet Row Responses   [facility] patient discharged from? [place]   Does the patient have one of the following disease processes/diagnoses(primary or secondary)? Other   Week 1 attempt successful? No   Unsuccessful attempts Attempt 2            Lindsey RODRIGUEZ - Registered Nurse

## 2024-02-13 ENCOUNTER — READMISSION MANAGEMENT (OUTPATIENT)
Dept: CALL CENTER | Facility: HOSPITAL | Age: 84
End: 2024-02-13
Payer: MEDICARE

## 2024-03-20 ENCOUNTER — TELEPHONE (OUTPATIENT)
Dept: CARDIOLOGY | Facility: CLINIC | Age: 84
End: 2024-03-20

## 2024-03-20 NOTE — TELEPHONE ENCOUNTER
Caller: Cheryl Tomas    Relationship to patient: Self    Best call back number: [phone]    Chief complaint: PT APPOINTMENT WAS CANCELLED DUE TO INSURANCE. SHE HAS SWITCHED OVER TO A PPO PLAN AND WOULD LIKE TO RESCHEDULE. PLEASE REACH OUT TO THE PT TO GET HER A NEW APPOINTMENT    Type of visit: FOLLOW UP    Requested date: ASAP    If rescheduling, when is the original appointment: 03.20.24    Additional notes: PT HAS NO NEW OR WORSENING MEDICAL CONCERNS.

## 2024-03-26 NOTE — TELEPHONE ENCOUNTER
Caller: Cheryl Tomas    Relationship: Self    Best call back number: [phone]    What is the best time to reach you: ANYTIME     Who are you requesting to speak with (clinical staff, provider,  specific staff member): ANYONE     What was the call regarding: PATIENT WOULD LIKE AN UPDATE ON APPT.

## 2024-04-02 ENCOUNTER — OFFICE VISIT (OUTPATIENT)
Dept: FAMILY MEDICINE CLINIC | Facility: CLINIC | Age: 84
End: 2024-04-02
Payer: MEDICARE

## 2024-04-02 VITALS
SYSTOLIC BLOOD PRESSURE: 144 MMHG | HEIGHT: 64 IN | TEMPERATURE: 97.7 F | HEART RATE: 70 BPM | WEIGHT: 131 LBS | DIASTOLIC BLOOD PRESSURE: 78 MMHG | BODY MASS INDEX: 22.36 KG/M2 | OXYGEN SATURATION: 98 %

## 2024-04-02 DIAGNOSIS — E78.2 MIXED HYPERLIPIDEMIA: Chronic | ICD-10-CM

## 2024-04-02 DIAGNOSIS — R30.0 DYSURIA: ICD-10-CM

## 2024-04-02 DIAGNOSIS — R35.0 FREQUENCY OF URINATION: ICD-10-CM

## 2024-04-02 DIAGNOSIS — N39.0 URINARY TRACT INFECTION WITHOUT HEMATURIA, SITE UNSPECIFIED: ICD-10-CM

## 2024-04-02 DIAGNOSIS — E11.9 TYPE 2 DIABETES MELLITUS WITHOUT COMPLICATION, WITHOUT LONG-TERM CURRENT USE OF INSULIN: Primary | Chronic | ICD-10-CM

## 2024-04-02 LAB
ALBUMIN/CREATININE RATIO, URINE: NORMAL
BILIRUB BLD-MCNC: ABNORMAL MG/DL
CLARITY, POC: ABNORMAL
COLOR UR: ABNORMAL
EXPIRATION DATE: ABNORMAL
EXPIRATION DATE: NORMAL
EXPIRATION DATE: NORMAL
GLUCOSE UR STRIP-MCNC: ABNORMAL MG/DL
HBA1C MFR BLD: 5.3 % (ref 4.5–5.7)
KETONES UR QL: ABNORMAL
LEUKOCYTE EST, POC: ABNORMAL
Lab: ABNORMAL
Lab: NORMAL
Lab: NORMAL
NITRITE UR-MCNC: NEGATIVE MG/ML
PH UR: 5.5 [PH] (ref 5–8)
POC CREATININE URINE: 200
POC MICROALBUMIN URINE: 150
PROT UR STRIP-MCNC: ABNORMAL MG/DL
RBC # UR STRIP: NEGATIVE /UL
SP GR UR: 1.03 (ref 1–1.03)
UROBILINOGEN UR QL: NORMAL

## 2024-04-02 PROCEDURE — 87086 URINE CULTURE/COLONY COUNT: CPT | Performed by: FAMILY MEDICINE

## 2024-04-02 RX ORDER — NITROFURANTOIN 25; 75 MG/1; MG/1
100 CAPSULE ORAL EVERY 12 HOURS SCHEDULED
Qty: 14 CAPSULE | Refills: 0 | Status: SHIPPED | OUTPATIENT
Start: 2024-04-02 | End: 2024-04-09

## 2024-04-02 NOTE — PATIENT INSTRUCTIONS
Antibiotic as ordered.  Finish course.  Fasting soon.  Take medications as ordered.  Low fat, low salt diet.  Exercise as tolerated.  Portion control and make good food choices.  Follow-up with pcp in 2 weeks.

## 2024-04-02 NOTE — PROGRESS NOTES
Follow Up Office Visit      Date: 2024   Patient Name: Cheryl Tomas  : 1940   MRN: [id number]     Chief Complaint:    Chief Complaint   Patient presents with    Diabetes     Pt unsure if she is taking several medications    Urinary Tract Infection       History of Present Illness: Cheryl Tomas is a 83 y.o. female who presents today    Patient sees Dr. Perea for PCP  Schedule states patient was not notified of Dr. Navarrete not being at the office today.      Takes Jardiance, metformin for diabetes.  Reports not checking glucose regularly.    Takes Coreg, for hypertension    Takes Eliquis due to Afib.    Reports started having urinary frequency and burning since  being out of the hospital.    About 2 months ago.    Cataract surgery 2 and 3 .  Dr. Donato in [place].      Subjective      Review of Systems:   Review of Systems   Constitutional:  Negative for chills and fever.   Gastrointestinal:  Negative for nausea and vomiting.   Neurological:  Negative for seizures and syncope.   Psychiatric/Behavioral:  Negative for suicidal ideas.        I have reviewed the patients family history, social history, past medical history, past surgical history and have updated it as appropriate.     Medications:     Current Outpatient Medications:     acetaminophen (TYLENOL) 650 MG 8 hr tablet, Take 2 tablets by mouth Every 8 (Eight) Hours As Needed for Mild Pain., Disp: , Rfl:     allopurinol (ZYLOPRIM) 300 MG tablet, Take 1 tablet by mouth Daily., Disp: , Rfl:     apixaban (ELIQUIS) 5 MG tablet tablet, Take 1 tablet by mouth 2 (Two) Times a Day., Disp: , Rfl:     aspirin 81 MG EC tablet, Take 1 tablet by mouth Daily., Disp: 90 tablet, Rfl: 3    carvedilol (COREG) 12.5 MG tablet, Take 1 tablet by mouth 2 (Two) Times a Day With Meals., Disp: , Rfl:     cholecalciferol (VITAMIN D3) 1000 units tablet, Take 1 tablet by mouth Daily., Disp: , Rfl:     Coenzyme Q10 10 MG capsule, Take 10 mg by mouth Daily.,  "Disp: , Rfl:     empagliflozin (Jardiance) 10 MG tablet tablet, Take 1 tablet by mouth Daily., Disp: 30 tablet, Rfl: 3    furosemide (LASIX) 40 MG tablet, Take 1 tablet by mouth Daily., Disp: 30 tablet, Rfl: 2    metFORMIN (GLUCOPHAGE) 500 MG tablet, Take 1 tablet by mouth 2 (Two) Times a Day With Meals., Disp: , Rfl:     pravastatin (PRAVACHOL) 40 MG tablet, Take 1 tablet by mouth Every Night., Disp: , Rfl:     QUEtiapine (SEROquel) 25 MG tablet, Take 1 tablet by mouth Every Night., Disp: , Rfl:     nitrofurantoin, macrocrystal-monohydrate, (MACROBID) 100 MG capsule, Take 1 capsule by mouth Every 12 (Twelve) Hours for 7 days., Disp: 14 capsule, Rfl: 0    Allergies:   Allergies   Allergen Reactions    Cephalexin Itching and Rash    Bactrim [Sulfamethoxazole-Trimethoprim] GI Intolerance       Objective     Physical Exam: Please see above  Vital Signs:   Vitals:    04/02/24 1455 04/02/24 1612   BP: 160/70 144/78   BP Location:  Right arm   Patient Position:  Sitting   Cuff Size:  Adult   Pulse: 70    Temp: 97.7 °F (36.5 °C)    TempSrc: Infrared    SpO2: 98%    Weight: 59.4 kg (131 lb)    Height: 162.6 cm (64.02\")      Body mass index is 22.47 kg/m².      BMI is within normal parameters. No other follow-up for BMI required.        Physical Exam  Vitals and nursing note reviewed.   Constitutional:       Appearance: Normal appearance.   HENT:      Head: Normocephalic and atraumatic.   Neck:      Vascular: No carotid bruit.   Cardiovascular:      Rate and Rhythm: Normal rate and regular rhythm.      Heart sounds: Normal heart sounds. No murmur heard.  Pulmonary:      Effort: Pulmonary effort is normal.      Breath sounds: Normal breath sounds.   Abdominal:      General: Bowel sounds are normal.      Palpations: Abdomen is soft. There is no mass.      Tenderness: There is no abdominal tenderness.   Musculoskeletal:      Right lower leg: No edema.      Left lower leg: No edema.   Skin:     Coloration: Skin is not jaundiced " or pale.      Findings: No erythema.   Neurological:      Mental Status: She is alert. Mental status is at baseline.   Psychiatric:         Mood and Affect: Mood normal.         Behavior: Behavior normal.         Procedures    Results:   Labs:   Hemoglobin A1C   Date Value Ref Range Status   04/02/2024 5.3 4.5 - 5.7 % Final     TSH   Date Value Ref Range Status   01/26/2024 2.940 0.270 - 4.200 uIU/mL Final        POCT Results (if applicable):   Results for orders placed or performed in visit on 04/02/24   Urine Culture - Urine, Urine, Clean Catch    Specimen: Urine, Clean Catch   Result Value Ref Range    Urine Culture >100,000 CFU/mL Mixed Gram Positive Uyen (A)    POC Glycosylated Hemoglobin (Hb A1C)    Specimen: Blood   Result Value Ref Range    Hemoglobin A1C 5.3 4.5 - 5.7 %    Lot Number [id number]     Expiration Date 7,302,025    POC Urinalysis Dipstick, Automated    Specimen: Urine   Result Value Ref Range    Color Dark Yellow Yellow, Straw, Dark Yellow, Angélica    Clarity, UA Cloudy (A) Clear    Specific Gravity  1.030 1.005 - 1.030    pH, Urine 5.5 5.0 - 8.0    Leukocytes Moderate (2+) (A) Negative    Nitrite, UA Negative Negative    Protein, POC 1+ (A) Negative mg/dL    Glucose, UA 3+ (A) Negative mg/dL    Ketones, UA Trace (A) Negative    Urobilinogen, UA Normal Normal, 0.2 E.U./dL    Bilirubin Large (3+) (A) Negative    Blood, UA Negative Negative    Lot Number [id number]     Expiration Date 1/14/25    POC Microalbumin    Specimen: Urine   Result Value Ref Range    Microalbumin, Urine 150     Creatinine, Urine 200     Lot Number [id number]     Expiration Date 5/9/25     Albumin/Creatinine Ratio, Urine         Imaging:   No valid procedures specified.     Measures:   Advanced Care Planning:   Patient has an advance directive in EMR which is still valid.     Smoking Cessation:   Does not smoke    Assessment / Plan      Assessment/Plan:     1. Type 2 diabetes mellitus without complication,  without long-term current use of insulin  Discussed importance of portion control and making good food choices for diabetes control.  Check A1C and Microalbumin.  A1C was 5.3 on 4/2/24.  Check Microalbumin ratio today.  Patient to continue current regimen for Diabetes.      - POC Glycosylated Hemoglobin (Hb A1C)  - POC Microalbumin    2. Urinary tract infection without hematuria, site unspecified  Macrobid course.  Encouraged patient to increase water intake.  Will send urine for culture.    - nitrofurantoin, macrocrystal-monohydrate, (MACROBID) 100 MG capsule; Take 1 capsule by mouth Every 12 (Twelve) Hours for 7 days.  Dispense: 14 capsule; Refill: 0  - Urine Culture - Urine, Urine, Clean Catch; Future  - Urine Culture - Urine, Urine, Clean Catch; Future  - Urine Culture - Urine, Urine, Clean Catch; Future  - Urine Culture - Urine, Urine, Clean Catch    3. Mixed hyperlipidemia  Check Lipid panel and CMP.    - Lipid Panel; Future  - Comprehensive Metabolic Panel; Future    4. Dysuria  UA indicated uti.    - POC Urinalysis Dipstick, Automated    5. Frequency of urination  As above.    - POC Urinalysis Dipstick, Automated        Part of this note may be an electronic transcription/translation of spoken language to printed text using the Dragon Dictation System.      Vaccine Counseling:      Follow Up:   Return in about 2 weeks (around 4/16/2024), or With pcp..      DO BONNY Pickett

## 2024-04-03 LAB — BACTERIA SPEC AEROBE CULT: ABNORMAL

## 2024-04-09 ENCOUNTER — TELEPHONE (OUTPATIENT)
Dept: FAMILY MEDICINE CLINIC | Facility: CLINIC | Age: 84
End: 2024-04-09

## 2024-04-10 ENCOUNTER — OFFICE VISIT (OUTPATIENT)
Dept: FAMILY MEDICINE CLINIC | Facility: CLINIC | Age: 84
End: 2024-04-10
Payer: MEDICARE

## 2024-04-10 VITALS
BODY MASS INDEX: 22.4 KG/M2 | TEMPERATURE: 98.4 F | OXYGEN SATURATION: 97 % | DIASTOLIC BLOOD PRESSURE: 74 MMHG | HEIGHT: 64 IN | WEIGHT: 131.2 LBS | HEART RATE: 58 BPM | SYSTOLIC BLOOD PRESSURE: 162 MMHG

## 2024-04-10 DIAGNOSIS — N39.0 URINARY TRACT INFECTION WITHOUT HEMATURIA, SITE UNSPECIFIED: ICD-10-CM

## 2024-04-10 DIAGNOSIS — R30.0 DYSURIA: Primary | ICD-10-CM

## 2024-04-10 LAB
BILIRUB BLD-MCNC: ABNORMAL MG/DL
CLARITY, POC: CLEAR
COLOR UR: ABNORMAL
EXPIRATION DATE: ABNORMAL
GLUCOSE UR STRIP-MCNC: ABNORMAL MG/DL
KETONES UR QL: NEGATIVE
LEUKOCYTE EST, POC: ABNORMAL
Lab: ABNORMAL
NITRITE UR-MCNC: NEGATIVE MG/ML
PH UR: 6 [PH] (ref 5–8)
PROT UR STRIP-MCNC: ABNORMAL MG/DL
RBC # UR STRIP: NEGATIVE /UL
SP GR UR: 1.02 (ref 1–1.03)
UROBILINOGEN UR QL: ABNORMAL

## 2024-04-10 PROCEDURE — 81003 URINALYSIS AUTO W/O SCOPE: CPT | Performed by: FAMILY MEDICINE

## 2024-04-10 PROCEDURE — 3078F DIAST BP <80 MM HG: CPT | Performed by: FAMILY MEDICINE

## 2024-04-10 PROCEDURE — 87086 URINE CULTURE/COLONY COUNT: CPT | Performed by: FAMILY MEDICINE

## 2024-04-10 PROCEDURE — 99213 OFFICE O/P EST LOW 20 MIN: CPT | Performed by: FAMILY MEDICINE

## 2024-04-10 PROCEDURE — 1160F RVW MEDS BY RX/DR IN RCRD: CPT | Performed by: FAMILY MEDICINE

## 2024-04-10 PROCEDURE — 3077F SYST BP >= 140 MM HG: CPT | Performed by: FAMILY MEDICINE

## 2024-04-10 PROCEDURE — 1159F MED LIST DOCD IN RCRD: CPT | Performed by: FAMILY MEDICINE

## 2024-04-10 RX ORDER — AMIODARONE HYDROCHLORIDE 200 MG/1
1 TABLET ORAL DAILY
COMMUNITY
Start: 2024-03-29

## 2024-04-10 RX ORDER — AMOXICILLIN 500 MG/1
500 CAPSULE ORAL 2 TIMES DAILY
Qty: 14 CAPSULE | Refills: 0 | Status: SHIPPED | OUTPATIENT
Start: 2024-04-10 | End: 2024-04-17

## 2024-04-10 NOTE — PROGRESS NOTES
Follow Up Office Visit      Date: 04/10/2024   Patient Name: Cheryl Tomas  : 1940   MRN: [id number]     Chief Complaint:    Chief Complaint   Patient presents with    Urinary Frequency       History of Present Illness: Cheryl Tomas is a 84 y.o. female who presents today for eval of burning with urination.  She might have a UTI.    Symptoms started several days to over a week ago.    Reports frequency with not much output.    Sees Dr. Perea for pcp.          Subjective      Review of Systems:   Review of Systems   Constitutional:  Positive for fatigue. Negative for chills and fever.   Genitourinary:  Positive for frequency.       I have reviewed the patients family history, social history, past medical history, past surgical history and have updated it as appropriate.     Medications:     Current Outpatient Medications:     acetaminophen (TYLENOL) 650 MG 8 hr tablet, Take 2 tablets by mouth Every 8 (Eight) Hours As Needed for Mild Pain., Disp: , Rfl:     allopurinol (ZYLOPRIM) 300 MG tablet, Take 1 tablet by mouth Daily., Disp: , Rfl:     amiodarone (PACERONE) 200 MG tablet, Take 1 tablet by mouth Daily., Disp: , Rfl:     apixaban (ELIQUIS) 5 MG tablet tablet, Take 1 tablet by mouth 2 (Two) Times a Day., Disp: , Rfl:     aspirin 81 MG EC tablet, Take 1 tablet by mouth Daily., Disp: 90 tablet, Rfl: 3    carvedilol (COREG) 12.5 MG tablet, Take 1 tablet by mouth 2 (Two) Times a Day With Meals., Disp: , Rfl:     cholecalciferol (VITAMIN D3) 1000 units tablet, Take 1 tablet by mouth Daily., Disp: , Rfl:     Coenzyme Q10 10 MG capsule, Take 10 mg by mouth Daily., Disp: , Rfl:     empagliflozin (Jardiance) 10 MG tablet tablet, Take 1 tablet by mouth Daily., Disp: 30 tablet, Rfl: 3    furosemide (LASIX) 40 MG tablet, Take 1 tablet by mouth Daily., Disp: 30 tablet, Rfl: 2    metFORMIN (GLUCOPHAGE) 500 MG tablet, Take 1 tablet by mouth 2 (Two) Times a Day With Meals., Disp: , Rfl:     pravastatin  "(PRAVACHOL) 40 MG tablet, Take 1 tablet by mouth Every Night., Disp: , Rfl:     QUEtiapine (SEROquel) 25 MG tablet, Take 1 tablet by mouth Every Night., Disp: , Rfl:     amoxicillin (AMOXIL) 500 MG capsule, Take 1 capsule by mouth 2 (Two) Times a Day for 7 days., Disp: 14 capsule, Rfl: 0    Allergies:   Allergies   Allergen Reactions    Cephalexin Itching and Rash    Bactrim [Sulfamethoxazole-Trimethoprim] GI Intolerance       Objective     Physical Exam: Please see above  Vital Signs:   Vitals:    04/10/24 1313 04/10/24 1335   BP: 160/78 162/74   BP Location:  Right arm   Patient Position:  Sitting   Cuff Size:  Adult   Pulse: 58    Temp: 98.4 °F (36.9 °C)    TempSrc: Temporal    SpO2: 97%    Weight: 59.5 kg (131 lb 3.2 oz)    Height: 162.4 cm (63.94\")    PainSc: 0-No pain      Body mass index is 22.56 kg/m².  BMI is within normal parameters. No other follow-up for BMI required.       Physical Exam  Vitals and nursing note reviewed.   Constitutional:       Appearance: Normal appearance.   HENT:      Head: Normocephalic and atraumatic.   Neck:      Vascular: No carotid bruit.   Cardiovascular:      Rate and Rhythm: Normal rate and regular rhythm.      Heart sounds: Normal heart sounds. No murmur heard.  Pulmonary:      Effort: Pulmonary effort is normal.      Breath sounds: Normal breath sounds.   Abdominal:      General: Bowel sounds are normal.      Palpations: Abdomen is soft. There is no mass.      Tenderness: There is no abdominal tenderness. There is no right CVA tenderness or left CVA tenderness.   Musculoskeletal:      Right lower leg: No edema.      Left lower leg: No edema.   Skin:     Coloration: Skin is not jaundiced or pale.      Findings: No erythema.   Neurological:      Mental Status: She is alert. Mental status is at baseline.   Psychiatric:         Mood and Affect: Mood normal.         Behavior: Behavior normal.         Procedures    Results:   Labs:   Hemoglobin A1C   Date Value Ref Range Status "   04/02/2024 5.3 4.5 - 5.7 % Final     TSH   Date Value Ref Range Status   01/26/2024 2.940 0.270 - 4.200 uIU/mL Final        POCT Results (if applicable):   Results for orders placed or performed in visit on 04/10/24   Urine Culture - Urine, Urine, Clean Catch    Specimen: Urine, Clean Catch   Result Value Ref Range    Urine Culture >100,000 CFU/mL Normal Urogenital Joey    POC Urinalysis Dipstick, Automated    Specimen: Urine   Result Value Ref Range    Color Straw Yellow, Straw, Dark Yellow, Angélica    Clarity, UA Clear Clear    Specific Gravity  1.025 1.005 - 1.030    pH, Urine 6.0 5.0 - 8.0    Leukocytes Large (3+) (A) Negative    Nitrite, UA Negative Negative    Protein, POC 2+ (A) Negative mg/dL    Glucose, UA Trace (A) Negative mg/dL    Ketones, UA Negative Negative    Urobilinogen, UA 0.2 E.U./dL Normal, 0.2 E.U./dL    Bilirubin Moderate (2+) (A) Negative    Blood, UA Negative (A) Negative    Lot Number [id number]     Expiration Date 01/14/2025        Imaging:   No valid procedures specified.         Assessment / Plan      Assessment/Plan:     1. Dysuria  UA result indicates urinary tract infection.  Will treat as below.    - POC Urinalysis Dipstick, Automated    2. Urinary tract infection without hematuria, site unspecified  Patient with recent Macrobid course.  Has allergy to Bactrim.  Also allergy to cephalexin.  Will treat with amoxicillin course.  Will alter antibiotic if needed based on urine culture result.  Urine culture resulted as greater than 100,000 CFU's/ml of normal urogenital joey.    - Urine Culture - Urine, Urine, Clean Catch; Future  - amoxicillin (AMOXIL) 500 MG capsule; Take 1 capsule by mouth 2 (Two) Times a Day for 7 days.  Dispense: 14 capsule; Refill: 0  - Urine Culture - Urine, Urine, Clean Catch; Future  - Urine Culture - Urine, Urine, Clean Catch      Part of this note may be an electronic transcription/translation of spoken language to printed text using the Dragon Dictation  System.      Vaccine Counseling:      Follow Up:   Return if symptoms worsen or fail to improve, with pcp.      DO BONNY Pickett

## 2024-04-12 LAB — BACTERIA SPEC AEROBE CULT: NORMAL

## 2024-04-17 RX ORDER — EMPAGLIFLOZIN 10 MG/1
10 TABLET, FILM COATED ORAL DAILY
Qty: 90 TABLET | Refills: 0 | Status: SHIPPED | OUTPATIENT
Start: 2024-04-17

## 2024-04-18 ENCOUNTER — OFFICE VISIT (OUTPATIENT)
Dept: FAMILY MEDICINE CLINIC | Facility: CLINIC | Age: 84
End: 2024-04-18
Payer: MEDICARE

## 2024-04-18 VITALS
RESPIRATION RATE: 16 BRPM | SYSTOLIC BLOOD PRESSURE: 132 MMHG | TEMPERATURE: 97.3 F | BODY MASS INDEX: 22.67 KG/M2 | WEIGHT: 132.8 LBS | DIASTOLIC BLOOD PRESSURE: 66 MMHG | HEART RATE: 55 BPM | OXYGEN SATURATION: 95 % | HEIGHT: 64 IN

## 2024-04-18 DIAGNOSIS — R30.0 DYSURIA: Primary | ICD-10-CM

## 2024-04-18 LAB
BILIRUB BLD-MCNC: ABNORMAL MG/DL
CLARITY, POC: ABNORMAL
COLOR UR: YELLOW
EXPIRATION DATE: ABNORMAL
GLUCOSE UR STRIP-MCNC: ABNORMAL MG/DL
KETONES UR QL: NEGATIVE
LEUKOCYTE EST, POC: ABNORMAL
Lab: ABNORMAL
NITRITE UR-MCNC: NEGATIVE MG/ML
PH UR: 6 [PH] (ref 5–8)
PROT UR STRIP-MCNC: ABNORMAL MG/DL
RBC # UR STRIP: NEGATIVE /UL
SP GR UR: 1.02 (ref 1–1.03)
UROBILINOGEN UR QL: NORMAL

## 2024-04-18 PROCEDURE — 99213 OFFICE O/P EST LOW 20 MIN: CPT | Performed by: STUDENT IN AN ORGANIZED HEALTH CARE EDUCATION/TRAINING PROGRAM

## 2024-04-18 PROCEDURE — 3075F SYST BP GE 130 - 139MM HG: CPT | Performed by: STUDENT IN AN ORGANIZED HEALTH CARE EDUCATION/TRAINING PROGRAM

## 2024-04-18 PROCEDURE — 81003 URINALYSIS AUTO W/O SCOPE: CPT | Performed by: STUDENT IN AN ORGANIZED HEALTH CARE EDUCATION/TRAINING PROGRAM

## 2024-04-18 PROCEDURE — 3078F DIAST BP <80 MM HG: CPT | Performed by: STUDENT IN AN ORGANIZED HEALTH CARE EDUCATION/TRAINING PROGRAM

## 2024-04-18 RX ORDER — AMOXICILLIN 500 MG/1
500 CAPSULE ORAL 2 TIMES DAILY
Qty: 6 CAPSULE | Refills: 0 | Status: SHIPPED | OUTPATIENT
Start: 2024-04-18

## 2024-04-18 NOTE — PROGRESS NOTES
"Chief Complaint  Dysuria ( Pt was seen by Dr choi 4/10/24 for dysuria. Pt finished 2 round of antibiotics last night and wanted to recheck her urine. )    Dysuria       She says she finished her antibiotic and she is feeling better.   She says she has no symptoms left at this time.     The following portions of the patient's history were reviewed and updated as appropriate: allergies, current medications, past family history, past medical history, past social history, past surgical history, and problem list.    OBJECTIVE:  /66   Pulse 55   Temp 97.3 °F (36.3 °C) (Temporal)   Resp 16   Ht 162.4 cm (63.94\")   Wt 60.2 kg (132 lb 12.8 oz)   SpO2 95%   BMI 22.84 kg/m²       Physical Exam  Constitutional:       General: She is not in acute distress.     Appearance: Normal appearance.   HENT:      Head: Normocephalic and atraumatic.   Eyes:      Extraocular Movements: Extraocular movements intact.   Cardiovascular:      Rate and Rhythm: Normal rate and regular rhythm.      Heart sounds: No murmur heard.  Pulmonary:      Effort: Pulmonary effort is normal. No respiratory distress.      Breath sounds: Normal breath sounds. No stridor. No wheezing, rhonchi or rales.   Abdominal:      General: Bowel sounds are normal.      Palpations: Abdomen is soft.      Tenderness: There is no abdominal tenderness. There is no right CVA tenderness or left CVA tenderness.   Skin:     Findings: No rash.   Neurological:      General: No focal deficit present.      Mental Status: She is alert.   Psychiatric:         Mood and Affect: Mood normal.                    Assessment and Plan   Diagnoses and all orders for this visit:    1. Dysuria (Primary)  -     POCT urinalysis dipstick, automated      Other orders  -     amoxicillin (AMOXIL) 500 MG capsule; Take 1 capsule by mouth 2 (Two) Times a Day.  Dispense: 6 capsule; Refill: 0      Will give 3 more days given continued leuk positive on ua. Advised her to return for any " recurrent symptoms.     Return if symptoms worsen or fail to improve, for Next scheduled follow up.       Denae Singh D.O.  [facility]

## 2024-04-24 ENCOUNTER — OFFICE VISIT (OUTPATIENT)
Dept: CARDIOLOGY | Facility: CLINIC | Age: 84
End: 2024-04-24
Payer: MEDICARE

## 2024-04-24 VITALS
HEART RATE: 62 BPM | BODY MASS INDEX: 21.51 KG/M2 | OXYGEN SATURATION: 96 % | HEIGHT: 64 IN | DIASTOLIC BLOOD PRESSURE: 78 MMHG | SYSTOLIC BLOOD PRESSURE: 144 MMHG | WEIGHT: 126 LBS

## 2024-04-24 DIAGNOSIS — I35.1 NONRHEUMATIC AORTIC VALVE INSUFFICIENCY: ICD-10-CM

## 2024-04-24 DIAGNOSIS — I10 PRIMARY HYPERTENSION: ICD-10-CM

## 2024-04-24 DIAGNOSIS — I50.32 CHRONIC DIASTOLIC CONGESTIVE HEART FAILURE: ICD-10-CM

## 2024-04-24 DIAGNOSIS — I48.0 PAROXYSMAL ATRIAL FIBRILLATION: Primary | ICD-10-CM

## 2024-04-24 RX ORDER — IRBESARTAN 75 MG/1
75 TABLET ORAL NIGHTLY
Qty: 30 TABLET | Refills: 11 | Status: SHIPPED | OUTPATIENT
Start: 2024-04-24

## 2024-04-24 NOTE — PROGRESS NOTES
Established Patient Office Visit    Patient Name: Cheryl Tomas  : 1940   MRN: [id number]   Care Team: Patient Care Team:  Marian Perea MD as PCP - General (Family Medicine)  Sharon Salas PA as Physician Assistant (Cardiology)  Jani Epperson MD as Cardiologist (Cardiology)    Chief Complaint   Patient presents with    Nonrheumatic aortic valve insufficiency     Patient ID: Cheryl Tomas is a 84 y.o.  white female from [place], [place], retired .    PAF  Dx 2024 through [facility]  REGINE-ECV during admission 2024 [facility], started on amiodarone  PSVT  Abnormal holter monitor study with frequent PSVT consistent with nonsustained atrial tachycardia with maximum heart rate 173 bpm, 2021    Chronic Hypertension - probable essential  Remote stress test apparently acceptable-data deficit  Echocardiogram 2019: EF 60%, mild concentric LVH, moderate aortic insufficiency.  LV end-diastolic dimension 4.3 cm.  Lexiscan myocardial perfusion imaging ordered but declined 2021  Echocardiogram 10/11/2021: LVEF 61-65%, LV diastolic function consistent with grade 2 with high LAP pseudonormalization, LA mildly increased, moderate AR, RVSP 35-45 mmHg, small less than 1 cm pericardial effusion adjacent to the RV and LV  Residual class I symptoms 2022  Non-rheumatic aortic insufficiency  Mixed hyperlipidemia; on statin therapy  Type 2 diabetes mellitus; hemoglobin A1c 5.8% 2022  Dizziness  Stage III chronic kidney disease  History of PIH and preeclampsia  Surgical history:  Appendectomy  Lithotripsy  Hysterectomy    HPI: Cheryl Tomas is a 84 y.o. female with a history of PAF, moderate aortic regurgitation, grade II diastolic dysfunction, hypertension. who presents today for follow-up.  I had seen her during hospitalization in January where she had presented with shortness of breath and hypoxia felt to be secondary to heart failure due to A-fib  with RVR.  I had previously seen her as an outpatient however she was then seen by Dr. Mack at [facility] due to insurance disputes with .  During that hospitalization she underwent REGINE cardioversion and initiation of amiodarone and appears to have been maintaining sinus rhythm since that time.  She was last seen by Dr. Mack in February but with the resolution of the [employer] dispute has come back to [facility] for follow-up.  Overall she reports feeling well with more energy and less dyspnea.  She does have very mild leg swelling however this has been stable.    Subjective   Review of Systems   Constitutional:  Negative for activity change and fatigue.   Respiratory:  Negative for chest tightness and shortness of breath.    Cardiovascular:  Positive for leg swelling. Negative for chest pain and palpitations.   Neurological:  Negative for dizziness and syncope.       Social History     Tobacco Use   Smoking Status Never    Passive exposure: Never   Smokeless Tobacco Never        Allergies   Allergen Reactions    Cephalexin Itching and Rash    Bactrim [Sulfamethoxazole-Trimethoprim] GI Intolerance       Current Outpatient Medications:     acetaminophen (TYLENOL) 650 MG 8 hr tablet, Take 2 tablets by mouth Every 8 (Eight) Hours As Needed for Mild Pain., Disp: , Rfl:     allopurinol (ZYLOPRIM) 300 MG tablet, Take 1 tablet by mouth Daily., Disp: , Rfl:     amiodarone (PACERONE) 200 MG tablet, Take 1 tablet by mouth Daily., Disp: , Rfl:     amoxicillin (AMOXIL) 500 MG capsule, Take 1 capsule by mouth 2 (Two) Times a Day., Disp: 6 capsule, Rfl: 0    apixaban (ELIQUIS) 5 MG tablet tablet, Take 1 tablet by mouth 2 (Two) Times a Day., Disp: , Rfl:     aspirin 81 MG EC tablet, Take 1 tablet by mouth Daily., Disp: 90 tablet, Rfl: 3    carvedilol (COREG) 12.5 MG tablet, Take 1 tablet by mouth 2 (Two) Times a Day With Meals., Disp: , Rfl:     cholecalciferol (VITAMIN D3) 1000 units tablet, Take 1 tablet by mouth Daily.,  "Disp: , Rfl:     Coenzyme Q10 10 MG capsule, Take 10 mg by mouth Daily., Disp: , Rfl:     furosemide (LASIX) 40 MG tablet, Take 1 tablet by mouth Daily., Disp: 30 tablet, Rfl: 2    Jardiance 10 MG tablet tablet, TAKE ONE (1) TABLET BY MOUTH ONCE DAILY, Disp: 90 tablet, Rfl: 0    metFORMIN (GLUCOPHAGE) 500 MG tablet, Take 1 tablet by mouth 2 (Two) Times a Day With Meals., Disp: , Rfl:     pravastatin (PRAVACHOL) 40 MG tablet, Take 1 tablet by mouth Every Night., Disp: , Rfl:     QUEtiapine (SEROquel) 25 MG tablet, Take 1 tablet by mouth Every Night., Disp: , Rfl:     Objective     Vitals:    04/24/24 1442   BP: 156/80   BP Location: Right arm   Patient Position: Sitting   Pulse: 62   SpO2: 96%   Weight: 57.2 kg (126 lb)   Height: 162.6 cm (64\")   Body mass index is 21.63 kg/m².  Gen: well developed, older WF sitting on exam table, comfortable appearing  HEENT: MMM, sclera anicteric, conjunctiva normal, no carotid bruits  CV: regular rate, regular rhythm, II/VI diastolic murmur at RUSB, normal radial and DP pulses  Pulm: RA, normal work of breathing, no wheezes, rales, rhonchi  Ext: normal bulk for age, normal tone, trace dependent edema  Neuro: alert, oriented, face symmetrical, moving all extremities well  Psych: normal mood, appropriate affect    RESULTS:   Procedures    1/31/24 - REGINE    LVEF 55%    RV normal in size and function    Aortic valve thickened and calcified.  Vena contracta .32 cm and  consistent with moderate AI    Mitral valve has mild MAC and mild MR    Pulmonic valve and tricuspid valve not well assessed.    No left atrial appendage thrombus appreciated on multiple views with normal velocities    Moderate pericardial effusion.  no RV diastolic collapse.    IVC dilated but collapses well.  RA pressure estimated 8 mmHg    10/11/21 - Transthoracic Echo Complete  · Left ventricular ejection fraction appears to be 61 - 65%. Left ventricular systolic function is normal.  · Left ventricular " diastolic function is consistent with (grade II w/high LAP) pseudonormalization.  · Left atrial volume is mildly increased.  · Moderate aortic valve regurgitation is present.  · Estimated right ventricular systolic pressure from tricuspid regurgitation is mildly elevated (35-45 mmHg).  · Mild pulmonary hypertension is present.  · There is a small (<1cm) pericardial effusion adjacent to the right ventricle and left ventricle.    5/5/22 - Renal duplex US  Impression:  No sonographic evidence of clinically significant stenosis of the right or the left renal artery on this exam.      There is suspicion of mild left hydronephrosis. This can be further evaluated with a dedicated renal ultrasound.    4/27/22 - 24 blood pressure monitor  Overall acceptable 24 ambulatory blood pressure monitor with somewhat elevated nocturnal blood pressure readings; defer additional treatment at this time and follow-up in office as scheduled.    Labs:  Lab Results   Component Value Date    WBC 8.53 01/29/2024    HGB 12.5 01/29/2024    HCT 38.3 01/29/2024    MCV 90.5 01/29/2024     01/29/2024     Lab Results   Component Value Date    GLUCOSE 113 (H) 01/31/2024    BUN 27 (H) 01/31/2024    CREATININE 1.05 (H) 01/31/2024    EGFRIFNONA 43 (L) 11/15/2021    EGFRIFAFRI 52 (L) 11/15/2021    BCR 25.7 (H) 01/31/2024    K 4.4 01/31/2024    CO2 30.0 (H) 01/31/2024    CALCIUM 9.6 01/31/2024    PROTENTOTREF 6.7 11/30/2022    ALBUMIN 4.4 01/26/2024    LABIL2 2.2 11/30/2022    AST 31 01/26/2024    ALT 60 (H) 01/26/2024     Lab Results   Component Value Date    HGBA1C 5.3 04/02/2024     Lab Results   Component Value Date    CHLPL 168 11/30/2022    TRIG 136 11/30/2022    HDL 45 11/30/2022    LDL 99 11/30/2022     Most recent PCP note, imaging tests, and labs reviewed.    Assessment & Plan       ICD-10-CM ICD-9-CM   1. Paroxysmal atrial fibrillation  I48.0 427.31   2. Chronic diastolic congestive heart failure  I50.32 428.32     428.0   3. Nonrheumatic  aortic valve insufficiency  I35.1 424.1   4. Primary hypertension  I10 401.9        PAF   - Sinus rhythm on exam today   - Continue anticoagulation with apixaban   - Continue carvedilol and amiodarone   - Okay to stop aspirin    Chronic heart failure preserved ejection fraction   - Resume ARB, continue empagliflozin   - Will monitor for recurrent UTI, if so would have to consider cessation of SGLT2 inhibitor    Primary hypertension   - Above goal today, medications had been held at most recent hospitalization due to hypotension   - Will resume ARB with lower dose of irbesartan    Moderate AR   - no new symptoms    HLD   - continue pravastatin, acceptable control based on lipid panel from November 2022     Palpitations / PSVT / AT  Bifascicular block - HI minimally prolonged but stable previously    - BB, amio as above    Return in about 6 months (around 10/24/2024).    DUARTE Epperson MD, MS  04/24/24    [facility]  [address] [address] [phone]

## 2024-05-13 ENCOUNTER — LAB (OUTPATIENT)
Dept: LAB | Facility: HOSPITAL | Age: 84
End: 2024-05-13
Payer: MEDICARE

## 2024-05-13 ENCOUNTER — OFFICE VISIT (OUTPATIENT)
Dept: FAMILY MEDICINE CLINIC | Facility: CLINIC | Age: 84
End: 2024-05-13
Payer: MEDICARE

## 2024-05-13 VITALS
DIASTOLIC BLOOD PRESSURE: 76 MMHG | TEMPERATURE: 98.4 F | HEART RATE: 59 BPM | RESPIRATION RATE: 21 BRPM | SYSTOLIC BLOOD PRESSURE: 170 MMHG | BODY MASS INDEX: 22.3 KG/M2 | OXYGEN SATURATION: 96 % | HEIGHT: 64 IN | WEIGHT: 130.6 LBS

## 2024-05-13 DIAGNOSIS — R53.83 OTHER FATIGUE: Primary | ICD-10-CM

## 2024-05-13 DIAGNOSIS — R30.9 URINARY PAIN: ICD-10-CM

## 2024-05-13 DIAGNOSIS — I10 BENIGN ESSENTIAL HYPERTENSION: Chronic | ICD-10-CM

## 2024-05-13 DIAGNOSIS — I48.21 PERMANENT ATRIAL FIBRILLATION: ICD-10-CM

## 2024-05-13 DIAGNOSIS — R53.83 OTHER FATIGUE: ICD-10-CM

## 2024-05-13 LAB
BILIRUB BLD-MCNC: ABNORMAL MG/DL
CLARITY, POC: CLEAR
COLOR UR: YELLOW
EXPIRATION DATE: ABNORMAL
GLUCOSE UR STRIP-MCNC: NEGATIVE MG/DL
KETONES UR QL: NEGATIVE
LEUKOCYTE EST, POC: ABNORMAL
Lab: ABNORMAL
NITRITE UR-MCNC: NEGATIVE MG/ML
PH UR: 6 [PH] (ref 5–8)
PROT UR STRIP-MCNC: ABNORMAL MG/DL
RBC # UR STRIP: NEGATIVE /UL
SP GR UR: 1.03 (ref 1–1.03)
UROBILINOGEN UR QL: ABNORMAL

## 2024-05-13 PROCEDURE — 3078F DIAST BP <80 MM HG: CPT | Performed by: FAMILY MEDICINE

## 2024-05-13 PROCEDURE — 84443 ASSAY THYROID STIM HORMONE: CPT

## 2024-05-13 PROCEDURE — 93000 ELECTROCARDIOGRAM COMPLETE: CPT | Performed by: FAMILY MEDICINE

## 2024-05-13 PROCEDURE — 87086 URINE CULTURE/COLONY COUNT: CPT | Performed by: FAMILY MEDICINE

## 2024-05-13 PROCEDURE — 1160F RVW MEDS BY RX/DR IN RCRD: CPT | Performed by: FAMILY MEDICINE

## 2024-05-13 PROCEDURE — 3077F SYST BP >= 140 MM HG: CPT | Performed by: FAMILY MEDICINE

## 2024-05-13 PROCEDURE — 85025 COMPLETE CBC W/AUTO DIFF WBC: CPT

## 2024-05-13 PROCEDURE — 81003 URINALYSIS AUTO W/O SCOPE: CPT | Performed by: FAMILY MEDICINE

## 2024-05-13 PROCEDURE — 99214 OFFICE O/P EST MOD 30 MIN: CPT | Performed by: FAMILY MEDICINE

## 2024-05-13 PROCEDURE — 1126F AMNT PAIN NOTED NONE PRSNT: CPT | Performed by: FAMILY MEDICINE

## 2024-05-13 PROCEDURE — 1159F MED LIST DOCD IN RCRD: CPT | Performed by: FAMILY MEDICINE

## 2024-05-13 NOTE — PROGRESS NOTES
Subjective   Cheryl Tomas is a 84 y.o. female.     Urinary Tract Infection      she wanted urine checked again because she is just so tired all the time.  She is not having any urine symptoms right now.    She last saw card 2-3 weeks ago.  She was feeling tired then when she saw card but maybe not as much.  She reports her blood pressure was elevated at last card appt.  She is having trouble remembering what meds have been changed and she is not sure what she is taking.      She knows she is taking eliquis.    She is taking carvedilol and amiodarone and irbesartan.      She was seen by Rob Mack on February 14, 2024     She does not complain that she is having chest pain right now she just reports that she has some chest pain that radiates to her back after she takes her medication.  She is not having any urinary pain right now her main complaint is she just wanted to get checked out and see why she is feeling so tired.    The following portions of the patient's history were reviewed and updated as appropriate: allergies, current medications, past family history, past medical history, past social history, past surgical history, and problem list.    Review of Systems   Constitutional:  Positive for fatigue.   HENT: Negative.     Eyes: Negative.    Respiratory:  Positive for chest tightness.    Cardiovascular:  Positive for chest pain. Negative for leg swelling.   Gastrointestinal: Negative.    Endocrine: Negative.    Genitourinary: Negative.    Musculoskeletal: Negative.    Skin: Negative.    Allergic/Immunologic: Negative.    Neurological: Negative.    Hematological: Negative.    Psychiatric/Behavioral: Negative.     All other systems reviewed and are negative.      Objective     Vitals:    05/13/24 1319   BP: 170/76   BP Location: Right arm   Patient Position: Sitting   Cuff Size: Adult   Pulse: 59   Resp: 21   Temp: 98.4 °F (36.9 °C)   TempSrc: Temporal   SpO2: 96%   Weight: 59.2 kg (130 lb 9.6 oz)  "  Height: 162.6 cm (64\")       Physical Exam  Vitals and nursing note reviewed.   Constitutional:       Appearance: She is well-developed.   HENT:      Head: Normocephalic and atraumatic.   Eyes:      General:         Right eye: No discharge.         Left eye: No discharge.      Pupils: Pupils are equal, round, and reactive to light.   Cardiovascular:      Rate and Rhythm: Normal rate and regular rhythm.      Heart sounds: Murmur heard.   Pulmonary:      Effort: Pulmonary effort is normal.      Breath sounds: Normal breath sounds.   Abdominal:      General: Bowel sounds are normal.      Palpations: Abdomen is soft. There is no mass.      Tenderness: There is no abdominal tenderness.   Musculoskeletal:         General: Normal range of motion.      Right shoulder: No swelling.      Cervical back: Normal range of motion and neck supple.   Skin:     General: Skin is warm and dry.      Nails: There is no clubbing.   Neurological:      Mental Status: She is alert and oriented to person, place, and time.      Deep Tendon Reflexes: Reflexes are normal and symmetric.   Psychiatric:         Behavior: Behavior normal.         Thought Content: Thought content normal.         Judgment: Judgment normal.           ECG 12 Lead    Date/Time: 5/13/2024 2:04 PM  Performed by: Marian Perea MD    Authorized by: Marian Perea MD  Comparison: compared with previous ECG from 2/1/2024  Similar to previous ECG  Rhythm: sinus bradycardia  BPM: 57  Conduction: left bundle branch block and left anterior fascicular block    Clinical impression: non-specific ECG          Assessment & Plan     Problem List Items Addressed This Visit          Cardiac and Vasculature    A-fib    Relevant Orders    ECG 12 Lead     Other Visit Diagnoses       Other fatigue    -  Primary    Relevant Orders    CBC & Differential    TSH    ECG 12 Lead    Urinary pain        Relevant Orders    POCT urinalysis dipstick, automated (Completed)    Urine Culture - , " Urine, Clean Catch            We will stop jardiance.   Send urine culture  Last A1c 5.3 4/2/24  She is unsure what meds she is taking and reports she has had a headache every day since she was in hospital.    In hospital with A fib with RVR and CHF exacerbation.     At last hospital added jardiance, arb was held but was started back by card 4/24/24.    She has not been feeling well since jan.  Her main complaint is chest pain that she gets after she takes her medication.  That is how she felt before the ablation.  Ablation was January with a REGINE cardioversion amiodarone was started she was continued on carvedilol.    I recommended if she has any more chest pain that she return to the ER.    She does not complain that she is having chest pain right now she just reports that she has some chest pain that radiates to her back after she takes her medication.  She is not having any urinary pain right now her main complaint is she just wanted to get checked out and see why she is feeling so tired.    We will check her labs today I will have her notify her cardiologist if she is having more chest pain.  We will have her stop the Jardiance and see if the urinary symptoms resolve follow-up in 1 week to recheck blood pressure.

## 2024-05-14 LAB
BASOPHILS # BLD AUTO: 0.05 10*3/MM3 (ref 0–0.2)
BASOPHILS NFR BLD AUTO: 0.7 % (ref 0–1.5)
DEPRECATED RDW RBC AUTO: 47.9 FL (ref 37–54)
EOSINOPHIL # BLD AUTO: 0.18 10*3/MM3 (ref 0–0.4)
EOSINOPHIL NFR BLD AUTO: 2.5 % (ref 0.3–6.2)
ERYTHROCYTE [DISTWIDTH] IN BLOOD BY AUTOMATED COUNT: 14.7 % (ref 12.3–15.4)
HCT VFR BLD AUTO: 39 % (ref 34–46.6)
HGB BLD-MCNC: 12.2 G/DL (ref 12–15.9)
IMM GRANULOCYTES # BLD AUTO: 0.01 10*3/MM3 (ref 0–0.05)
IMM GRANULOCYTES NFR BLD AUTO: 0.1 % (ref 0–0.5)
LYMPHOCYTES # BLD AUTO: 1.5 10*3/MM3 (ref 0.7–3.1)
LYMPHOCYTES NFR BLD AUTO: 21.1 % (ref 19.6–45.3)
MCH RBC QN AUTO: 28 PG (ref 26.6–33)
MCHC RBC AUTO-ENTMCNC: 31.3 G/DL (ref 31.5–35.7)
MCV RBC AUTO: 89.7 FL (ref 79–97)
MONOCYTES # BLD AUTO: 0.57 10*3/MM3 (ref 0.1–0.9)
MONOCYTES NFR BLD AUTO: 8 % (ref 5–12)
NEUTROPHILS NFR BLD AUTO: 4.81 10*3/MM3 (ref 1.7–7)
NEUTROPHILS NFR BLD AUTO: 67.6 % (ref 42.7–76)
NRBC BLD AUTO-RTO: 0 /100 WBC (ref 0–0.2)
PLATELET # BLD AUTO: 167 10*3/MM3 (ref 140–450)
PMV BLD AUTO: 10.9 FL (ref 6–12)
RBC # BLD AUTO: 4.35 10*6/MM3 (ref 3.77–5.28)
TSH SERPL DL<=0.05 MIU/L-ACNC: 1.73 UIU/ML (ref 0.27–4.2)
WBC NRBC COR # BLD AUTO: 7.12 10*3/MM3 (ref 3.4–10.8)

## 2024-05-15 ENCOUNTER — REFERRAL TRIAGE (OUTPATIENT)
Dept: CASE MANAGEMENT | Facility: OTHER | Age: 84
End: 2024-05-15
Payer: MEDICARE

## 2024-05-15 LAB — BACTERIA SPEC AEROBE CULT: NORMAL

## 2024-05-17 ENCOUNTER — TELEPHONE (OUTPATIENT)
Dept: CASE MANAGEMENT | Facility: OTHER | Age: 84
End: 2024-05-17
Payer: MEDICARE

## 2024-05-17 NOTE — TELEPHONE ENCOUNTER
RN-ACM outreach call attempt. No answer. Left voicemail. Will attempt outreach again at a later date.

## 2024-05-20 ENCOUNTER — OFFICE VISIT (OUTPATIENT)
Dept: FAMILY MEDICINE CLINIC | Facility: CLINIC | Age: 84
End: 2024-05-20
Payer: MEDICARE

## 2024-05-20 VITALS
OXYGEN SATURATION: 94 % | DIASTOLIC BLOOD PRESSURE: 70 MMHG | BODY MASS INDEX: 21.61 KG/M2 | TEMPERATURE: 98 F | HEART RATE: 63 BPM | RESPIRATION RATE: 18 BRPM | SYSTOLIC BLOOD PRESSURE: 170 MMHG | HEIGHT: 64 IN | WEIGHT: 126.6 LBS

## 2024-05-20 DIAGNOSIS — I13.0 HYPERTENSIVE HEART AND CHRONIC KIDNEY DISEASE WITH HEART FAILURE AND STAGE 1 THROUGH STAGE 4 CHRONIC KIDNEY DISEASE, OR UNSPECIFIED CHRONIC KIDNEY DISEASE: Primary | Chronic | ICD-10-CM

## 2024-05-20 DIAGNOSIS — I45.2 RIGHT BUNDLE BRANCH BLOCK WITH LEFT ANTERIOR FASCICULAR BLOCK: ICD-10-CM

## 2024-05-20 DIAGNOSIS — I50.33 ACUTE ON CHRONIC HEART FAILURE WITH PRESERVED EJECTION FRACTION (HFPEF): ICD-10-CM

## 2024-05-20 DIAGNOSIS — I48.91 ATRIAL FIBRILLATION WITH RVR: ICD-10-CM

## 2024-05-20 DIAGNOSIS — I48.20 CHRONIC ATRIAL FIBRILLATION: ICD-10-CM

## 2024-05-20 PROCEDURE — 3077F SYST BP >= 140 MM HG: CPT | Performed by: FAMILY MEDICINE

## 2024-05-20 PROCEDURE — G2211 COMPLEX E/M VISIT ADD ON: HCPCS | Performed by: FAMILY MEDICINE

## 2024-05-20 PROCEDURE — 3078F DIAST BP <80 MM HG: CPT | Performed by: FAMILY MEDICINE

## 2024-05-20 PROCEDURE — 99214 OFFICE O/P EST MOD 30 MIN: CPT | Performed by: FAMILY MEDICINE

## 2024-05-20 PROCEDURE — 1159F MED LIST DOCD IN RCRD: CPT | Performed by: FAMILY MEDICINE

## 2024-05-20 PROCEDURE — 1160F RVW MEDS BY RX/DR IN RCRD: CPT | Performed by: FAMILY MEDICINE

## 2024-05-20 PROCEDURE — 1126F AMNT PAIN NOTED NONE PRSNT: CPT | Performed by: FAMILY MEDICINE

## 2024-05-20 RX ORDER — CARVEDILOL 25 MG/1
25 TABLET ORAL 2 TIMES DAILY WITH MEALS
Qty: 60 TABLET | Refills: 3 | Status: SHIPPED | OUTPATIENT
Start: 2024-05-20

## 2024-05-20 NOTE — PROGRESS NOTES
"Subjective   Cheryl Tomas is a 84 y.o. female.     Hypertension     she is here for 1 week follow-up from her previous visit where she was in the hospital for A-fib with RVR she reports she is just not felt well since January she reports she has some chest pain after she takes her medications.  She also was having some urinary symptoms when I saw her last week so we had her stop the Jardiance.    The urine culture was negative.  She was seen by cardiology on 4/24/2024 she saw Dr. Kelby Ann.  Her blood pressure in the cardiologist office was 144/78.    She reports since her last appointment she has not had the pain in her heart like she was having she is not having any urinary symptoms like she was having since that appointment we discontinued the Jardiance that she was started on in the hospital.  He reports she is feeling better in the last 3 days.    The following portions of the patient's history were reviewed and updated as appropriate: allergies, current medications, past family history, past medical history, past social history, past surgical history, and problem list.    Review of Systems   Constitutional: Negative.    HENT: Negative.     Eyes: Negative.    Respiratory: Negative.     Cardiovascular: Negative.    Gastrointestinal: Negative.    Endocrine: Negative.    Genitourinary: Negative.    Musculoskeletal: Negative.    Skin: Negative.    Allergic/Immunologic: Negative.    Neurological: Negative.    Hematological: Negative.    Psychiatric/Behavioral: Negative.     All other systems reviewed and are negative.      Objective     Vitals:    05/20/24 1431   BP: 170/70   Pulse: 63   Resp: 18   Temp: 98 °F (36.7 °C)   TempSrc: Temporal   SpO2: 94%   Weight: 57.4 kg (126 lb 9.6 oz)   Height: 162.6 cm (64.02\")       Physical Exam  Vitals and nursing note reviewed.   Constitutional:       Appearance: She is well-developed.   HENT:      Head: Normocephalic and atraumatic.   Eyes:      General:         Right eye: " No discharge.         Left eye: No discharge.      Pupils: Pupils are equal, round, and reactive to light.   Cardiovascular:      Rate and Rhythm: Normal rate and regular rhythm.      Heart sounds: Normal heart sounds.   Pulmonary:      Effort: Pulmonary effort is normal.      Breath sounds: Normal breath sounds.   Abdominal:      General: Bowel sounds are normal.      Palpations: Abdomen is soft. There is no mass.      Tenderness: There is no abdominal tenderness.   Musculoskeletal:         General: Normal range of motion.      Right shoulder: No swelling.      Cervical back: Normal range of motion and neck supple.   Skin:     General: Skin is warm and dry.      Nails: There is no clubbing.   Neurological:      Mental Status: She is alert and oriented to person, place, and time.      Deep Tendon Reflexes: Reflexes are normal and symmetric.   Psychiatric:         Behavior: Behavior normal.         Thought Content: Thought content normal.         Judgment: Judgment normal.         Assessment & Plan     Problem List Items Addressed This Visit          Cardiac and Vasculature    Hypertensive heart and chronic kidney disease with heart failure and stage 1 through stage 4 chronic kidney disease, or unspecified chronic kidney disease - Primary (Chronic)    Relevant Medications    carvedilol (COREG) 25 MG tablet    Right bundle branch block with left anterior fascicular block    Relevant Medications    carvedilol (COREG) 25 MG tablet    Atrial fibrillation with RVR    Relevant Medications    carvedilol (COREG) 25 MG tablet    Acute on chronic heart failure with preserved ejection fraction (HFpEF)    Relevant Medications    carvedilol (COREG) 25 MG tablet    A-fib    Relevant Medications    carvedilol (COREG) 25 MG tablet       Will go ahead and increase her carvedilol from 12.5-25 twice daily I have asked her to notify her cardiologist that we discontinued the Jardiance.  Will follow her up in 3 months and do a Medicare  wellness.  If she has any new issues or concerns do not hesitate to call the office before then monitor blood pressure closely at home.

## 2024-05-21 ENCOUNTER — TELEPHONE (OUTPATIENT)
Dept: CARDIOLOGY | Facility: CLINIC | Age: 84
End: 2024-05-21
Payer: MEDICARE

## 2024-05-21 ENCOUNTER — TELEPHONE (OUTPATIENT)
Dept: CASE MANAGEMENT | Facility: OTHER | Age: 84
End: 2024-05-21
Payer: MEDICARE

## 2024-05-21 ENCOUNTER — TELEPHONE (OUTPATIENT)
Dept: FAMILY MEDICINE CLINIC | Facility: CLINIC | Age: 84
End: 2024-05-21
Payer: MEDICARE

## 2024-05-21 DIAGNOSIS — E11.9 TYPE 2 DIABETES MELLITUS WITHOUT COMPLICATION, WITHOUT LONG-TERM CURRENT USE OF INSULIN: ICD-10-CM

## 2024-05-21 DIAGNOSIS — I13.0 HYPERTENSIVE HEART AND CHRONIC KIDNEY DISEASE WITH HEART FAILURE AND STAGE 1 THROUGH STAGE 4 CHRONIC KIDNEY DISEASE, OR UNSPECIFIED CHRONIC KIDNEY DISEASE: Primary | ICD-10-CM

## 2024-05-21 DIAGNOSIS — I50.33 ACUTE ON CHRONIC HEART FAILURE WITH PRESERVED EJECTION FRACTION (HFPEF): ICD-10-CM

## 2024-05-21 DIAGNOSIS — I48.91 ATRIAL FIBRILLATION WITH RVR: ICD-10-CM

## 2024-05-21 NOTE — TELEPHONE ENCOUNTER
3rd RN-ACM outreach call attempt. No answer, left voicemail with call back number. RN-ACM will close CCM program at this time.

## 2024-05-21 NOTE — TELEPHONE ENCOUNTER
If there is an acute change with how she is walking or if there is concern about falling we could place an order for physical therapy or consider home health evaluation.  If we could have her come back into the office with the patient that would be helpful

## 2024-05-21 NOTE — TELEPHONE ENCOUNTER
"Daughter LVM stating PCP discontinued Jardiance during office visit yesterday. Patient was advised to contact office to let us know. Per office visit with PCP, \"She also was having some urinary symptoms when I saw her last week so we had her stop the Jardiance. She reports since her last appointment she has not had the pain in her heart like she was having she is not having any urinary symptoms like she was having since that appointment we discontinued the Jardiance that she was started on in the hospital. He reports she is feeling better in the last 3 days.\".   "

## 2024-05-21 NOTE — TELEPHONE ENCOUNTER
Caller: [facility]    Relationship: Emergency Contact    Best call back number:       [phone] (Home)     What is the best time to reach you:     ANY TIME - PLEASE LEAVE VOICEMAIL FOR CALLER    Who are you requesting to speak with (clinical staff, provider,  specific staff member):     DR THURSTON OR NURSE    What was the call regarding:    CALLER MADE CONTACT REGARDING MEDICATION NO LONGER TO BE TAKEN BY PATIENT AND MEDICATION THAT WAS TO TAKE ITS PLACE    CALLER STATED PATIENT DID NOT RECALL MEDICATION INFORMATION    CALLER ALSO HAS CONCERNS REGARDING PATIENT'S MOBILITY AND PHYSICAL THERAPY    CALLER STATED SHE HAS CONCERNS REGARDING PATIENT'S OVER ALL WELL BEING FOLLOWING HOSPITAL STAY

## 2024-05-21 NOTE — TELEPHONE ENCOUNTER
Called and spoke with daughter and have confirmed medication changes. Daughter concerned about way patient is walking. She said she leans forward when walking and is afraid she is going to fall. Please advise.

## 2024-05-21 NOTE — TELEPHONE ENCOUNTER
Called and spoke with daughter. She will come to the appointment with the patient, she would like to go ahead with home health referral.

## 2024-05-22 ENCOUNTER — HOME HEALTH ADMISSION (OUTPATIENT)
Dept: HOME HEALTH SERVICES | Facility: HOME HEALTHCARE | Age: 84
End: 2024-05-22
Payer: MEDICARE

## 2024-06-03 ENCOUNTER — TELEPHONE (OUTPATIENT)
Dept: CARDIOLOGY | Facility: CLINIC | Age: 84
End: 2024-06-03
Payer: MEDICARE

## 2024-06-03 NOTE — TELEPHONE ENCOUNTER
"Spoke with patient about symptoms. Patient denies slurred speech, blurry vision, one sided weakness, or facial drooping. Patient states she has been feeling weak. States she \"feels like I am being pulled forward.\". States symptoms started a few weeks ago. States she had been experiencing some CP for the past few weeks as well. Patient saw PCP on 5/20/2024, who increase carvedilol to 25 mg BID due to BP in office 170/70. States CP went away since increasing carvedilol. Patient states she does not monitor BP at home. Patient states she takes Lasix 40 mg PRN. Takes all other medication as prescribed.     Advised patient to keep BP log prior to medication and 2 hours after. Advised patient to contact PCP about weakness. Patient verbalizes understanding. Do you have any other recommendations?  "

## 2024-06-03 NOTE — TELEPHONE ENCOUNTER
Caller: Cheryl Tomas    Relationship to patient: Self    Best call back number: [phone]    Chief complaint: INTERMITTENT STROKE LIKE SYMPTOMS ( AT LEAST THE LAST TWO WEEKS)    Type of visit: F/U APPT    Requested date: NEXT AVAILABLE     Additional notes:PLEASE CONTACT PATIENT WITH A SUITABLE DATE.

## 2024-07-23 ENCOUNTER — TELEPHONE (OUTPATIENT)
Dept: FAMILY MEDICINE CLINIC | Facility: CLINIC | Age: 84
End: 2024-07-23
Payer: MEDICARE

## 2024-07-23 DIAGNOSIS — G47.00 INSOMNIA, UNSPECIFIED TYPE: Primary | ICD-10-CM

## 2024-07-23 NOTE — TELEPHONE ENCOUNTER
Pharmacy Name:      [facility]    Pharmacy representative name:     TINO    Pharmacy representative phone number:     [phone] (Work)     What medication are you calling in regards to:       QUEtiapine (SEROquel) 25 MG tablet     What question does the pharmacy have:     PHARMACY REQUESTED A NEW PRESCRIPTION FOR MEDICATION SINCE PHARMACY IS NEW WITH PATIENT    Who is the provider that prescribed the medication:     DR THURSTON

## 2024-07-23 NOTE — TELEPHONE ENCOUNTER
HUB TO RELAY    LVM. Please ask patient if she has been taking the quetiapine 25mg daily since starting the amiodarone.

## 2024-07-29 DIAGNOSIS — I13.0 HYPERTENSIVE HEART AND CHRONIC KIDNEY DISEASE WITH HEART FAILURE AND STAGE 1 THROUGH STAGE 4 CHRONIC KIDNEY DISEASE, OR UNSPECIFIED CHRONIC KIDNEY DISEASE: Chronic | ICD-10-CM

## 2024-07-30 RX ORDER — CARVEDILOL 25 MG/1
TABLET ORAL
Qty: 60 TABLET | Refills: 3 | Status: SHIPPED | OUTPATIENT
Start: 2024-07-30

## 2024-08-13 ENCOUNTER — OFFICE VISIT (OUTPATIENT)
Dept: FAMILY MEDICINE CLINIC | Facility: CLINIC | Age: 84
End: 2024-08-13
Payer: MEDICARE

## 2024-08-13 VITALS
RESPIRATION RATE: 20 BRPM | HEART RATE: 50 BPM | DIASTOLIC BLOOD PRESSURE: 74 MMHG | BODY MASS INDEX: 21.75 KG/M2 | OXYGEN SATURATION: 90 % | SYSTOLIC BLOOD PRESSURE: 122 MMHG | WEIGHT: 127.4 LBS | HEIGHT: 64 IN | TEMPERATURE: 98.9 F

## 2024-08-13 DIAGNOSIS — R41.3 MEMORY CHANGES: ICD-10-CM

## 2024-08-13 DIAGNOSIS — I10 BENIGN ESSENTIAL HYPERTENSION: Chronic | ICD-10-CM

## 2024-08-13 DIAGNOSIS — E55.9 VITAMIN D DEFICIENCY: ICD-10-CM

## 2024-08-13 DIAGNOSIS — I48.20 CHRONIC ATRIAL FIBRILLATION: ICD-10-CM

## 2024-08-13 DIAGNOSIS — I13.0 HYPERTENSIVE HEART AND CHRONIC KIDNEY DISEASE WITH HEART FAILURE AND STAGE 1 THROUGH STAGE 4 CHRONIC KIDNEY DISEASE, OR UNSPECIFIED CHRONIC KIDNEY DISEASE: Chronic | ICD-10-CM

## 2024-08-13 DIAGNOSIS — M10.9 GOUT, UNSPECIFIED CAUSE, UNSPECIFIED CHRONICITY, UNSPECIFIED SITE: ICD-10-CM

## 2024-08-13 DIAGNOSIS — Z91.81 AT RISK FOR FALLING: ICD-10-CM

## 2024-08-13 DIAGNOSIS — E11.9 TYPE 2 DIABETES MELLITUS WITHOUT COMPLICATION, WITHOUT LONG-TERM CURRENT USE OF INSULIN: Primary | ICD-10-CM

## 2024-08-13 DIAGNOSIS — E78.2 MIXED HYPERLIPIDEMIA: Chronic | ICD-10-CM

## 2024-08-13 LAB
EXPIRATION DATE: NORMAL
HBA1C MFR BLD: 5 % (ref 4.5–5.7)
Lab: NORMAL

## 2024-08-13 NOTE — PROGRESS NOTES
Subjective   The ABCs of the Annual Wellness Visit  Medicare Wellness Visit      Cheryl Tomas is a 84 y.o. patient who presents for a Medicare Wellness Visit.    The following portions of the patient's history were reviewed and   updated as appropriate: allergies, current medications, past family history, past medical history, past social history, past surgical history, and problem list.    She lives at [facility]. She is present with daughter today in office. She was in hospital 2/24 and did home health and therapy.  She is doing better with watching her feet.  3 months of home health.      Compared to one year ago, the patient's physical   health is worse.   Compared to one year ago, the patient's mental   health is worse.    Recent Hospitalizations:  This patient has had a [facility] admission record on file within the last 365 days.  Current Medical Providers:  Patient Care Team:  Marian Perea MD as PCP - General (Family Medicine)  Sharon Salas PA as Physician Assistant (Cardiology)  Jani Epperson MD as Cardiologist (Cardiology)    Outpatient Medications Prior to Visit   Medication Sig Dispense Refill    acetaminophen (TYLENOL) 650 MG 8 hr tablet Take 2 tablets by mouth Every 8 (Eight) Hours As Needed for Mild Pain.      allopurinol (ZYLOPRIM) 300 MG tablet Take 1 tablet by mouth Daily.      amiodarone (PACERONE) 200 MG tablet Take 1 tablet by mouth Daily.      apixaban (ELIQUIS) 5 MG tablet tablet Take 1 tablet by mouth 2 (Two) Times a Day.      carvedilol (COREG) 25 MG tablet TAKE ONE (1) TABLET BY MOUTH TWO (2) (TWO) TIMES A DAY WITH MEALS. 60 tablet 3    cholecalciferol (VITAMIN D3) 1000 units tablet Take 1 tablet by mouth Daily.      Coenzyme Q10 10 MG capsule Take 10 mg by mouth Daily.      furosemide (LASIX) 40 MG tablet Take 1 tablet by mouth Daily. 30 tablet 2    metFORMIN (GLUCOPHAGE) 500 MG tablet Take 1 tablet by mouth 2 (Two) Times a Day With Meals.      QUEtiapine  (SEROquel) 25 MG tablet Take 1 tablet by mouth Every Night.      irbesartan (Avapro) 75 MG tablet Take 1 tablet by mouth Every Night. (Patient not taking: Reported on 8/13/2024) 30 tablet 11    pravastatin (PRAVACHOL) 40 MG tablet Take 1 tablet by mouth Every Night. (Patient not taking: Reported on 8/13/2024)       No facility-administered medications prior to visit.     No opioid medication identified on active medication list. I have reviewed chart for other potential  high risk medication/s and harmful drug interactions in the elderly.      Aspirin is not on active medication list.  Aspirin use is indicated based on review of current medical condition/s. Pros and cons of this therapy have been discussed with this patient. Benefits of this medication outweigh potential harm.  Patient has been instructed to start taking this medication..    Patient Active Problem List   Diagnosis    Hypertension    Diabetes mellitus    Hyperlipidemia    Anxiety and depression    Medicare annual wellness visit, initial    Chest wall discomfort    Needs flu shot    Right wrist pain    Elevated uric acid in blood    Breast cancer screening    Colon cancer screening    Actinic keratitis    Acute intractable tension-type headache    Family history of brain aneurysm    Age-related cataract    Ankle edema, bilateral    Hypertensive heart and chronic kidney disease with heart failure and stage 1 through stage 4 chronic kidney disease, or unspecified chronic kidney disease    Orthopnea    Right bundle branch block with left anterior fascicular block    Postmenopausal    PSVT (paroxysmal supraventricular tachycardia)    Nonrheumatic aortic valve insufficiency    Osteoarthrosis multiple sites, not specified as generalized    Actinic keratosis    Body mass index (BMI) of 25.0 to 25.9 in adult    Weakness    High risk medication use    Insomnia    Obesity, unspecified    Other osteoporosis    Sprain and strain of knee and leg    Syncope and  "collapse    Vitamin D deficiency    Bronchitis    Edema of lower extremity    Other specified circulatory system disorders    Hypertensive disorder    Encounter for long-term (current) use of other medications    Other depressive disorder    Mixed hyperlipidemia    Benign essential hypertension    Supraventricular tachycardia    Varicose veins of left lower extremity with pain    Essential hypertension    Dizziness    Serum potassium elevated    Medicare annual wellness visit, subsequent    At high risk for falls    Leg edema    Atrial fibrillation with RVR    Acute on chronic heart failure with preserved ejection fraction (HFpEF)    Hypoxia    A-fib     Advance Care Planning Advance Directive is on file.  ACP discussion was held with the patient during this visit. Patient has an advance directive in EMR which is still valid.             Objective   Vitals:    08/13/24 1012   BP: 122/74   BP Location: Right arm   Patient Position: Sitting   Cuff Size: Adult   Pulse: 50   Resp: 20   Temp: 98.9 °F (37.2 °C)   TempSrc: Infrared   SpO2: 90%   Weight: 57.8 kg (127 lb 6.4 oz)   Height: 162.6 cm (64.02\")       Estimated body mass index is 21.85 kg/m² as calculated from the following:    Height as of this encounter: 162.6 cm (64.02\").    Weight as of this encounter: 57.8 kg (127 lb 6.4 oz).    BMI is within normal parameters. No other follow-up for BMI required.       Does the patient have evidence of cognitive impairment? Yes  Lab Results   Component Value Date    HGBA1C 5.0 08/13/2024                                                                                               Health  Risk Assessment    Smoking Status:  Social History     Tobacco Use   Smoking Status Never    Passive exposure: Never   Smokeless Tobacco Never     Alcohol Consumption:  Social History     Substance and Sexual Activity   Alcohol Use No       Fall Risk Screen  STEADI Fall Risk Assessment was completed, and patient is at LOW risk for " falls.Assessment completed on:2024    Depression Screenin/13/2024    10:10 AM   PHQ-2/PHQ-9 Depression Screening   Little Interest or Pleasure in Doing Things 0-->not at all   Feeling Down, Depressed or Hopeless 0-->not at all   Trouble Falling or Staying Asleep, or Sleeping Too Much 0-->not at all   Feeling Tired or Having Little Energy 1-->several days   Poor Appetite or Overeating 0-->not at all   Feeling Bad about Yourself - or that You are a Failure or Have Let Yourself or Your Family Down 0-->not at all   Trouble Concentrating on Things, Such as Reading the Newspaper or Watching Television 0-->not at all   Moving or Speaking So Slowly that Other People Could Have Noticed? Or the Opposite - Being So Fidgety 0-->not at all   Thoughts that You Would be Better Off Dead or of Hurting Yourself in Some Way 0-->not at all   PHQ-9: Brief Depression Severity Measure Score 1   If You Checked Off Any Problems, How Difficult Have These Problems Made It For You to Do Your Work, Take Care of Things at Home, or Get Along with Other People? not difficult at all     Health Habits and Functional and Cognitive Screenin/13/2024    10:08 AM   Functional & Cognitive Status   Do you have difficulty preparing food and eating? No   Do you have difficulty bathing yourself, getting dressed or grooming yourself? No   Do you have difficulty using the toilet? No   Do you have difficulty moving around from place to place? No   Do you have trouble with steps or getting out of a bed or a chair? Yes   Current Diet Well Balanced Diet   Dental Exam Up to date   Eye Exam Up to date   Exercise (times per week) 0 times per week   Current Exercises Include No Regular Exercise   Do you need help using the phone?  No   Are you deaf or do you have serious difficulty hearing?  No   Do you need help to go to places out of walking distance? Yes   Do you need help shopping? Yes   Do you need help preparing meals?  No   Do you need  help with housework?  No   Do you need help with laundry? No   Do you need help taking your medications? No   Do you need help managing money? No   Do you ever drive or ride in a car without wearing a seat belt? No   Have you felt unusual stress, anger or loneliness in the last month? No   Who do you live with? Alone   If you need help, do you have trouble finding someone available to you? No   Have you been bothered in the last four weeks by sexual problems? No   Do you have difficulty concentrating, remembering or making decisions? No           Age-appropriate Screening Schedule:  Refer to the list below for future screening recommendations based on patient's age, sex and/or medical conditions. Orders for these recommended tests are listed in the plan section. The patient has been provided with a written plan.    Health Maintenance List  Health Maintenance   Topic Date Due    ANNUAL WELLNESS VISIT  11/30/2023    LIPID PANEL  11/30/2023    INFLUENZA VACCINE  08/01/2024    DXA SCAN  08/13/2024 (Originally 7/7/2023)    COVID-19 Vaccine (7 - 2023-24 season) 11/02/2024 (Originally 9/1/2023)    RSV Vaccine - Adults (1 - 1-dose 60+ series) 04/02/2025 (Originally 4/12/2002)    HEMOGLOBIN A1C  02/13/2025    URINE MICROALBUMIN  04/02/2025    DIABETIC EYE EXAM  06/28/2025    COLORECTAL CANCER SCREENING  01/04/2026    Pneumococcal Vaccine 65+  Completed    TDAP/TD VACCINES  Discontinued    ZOSTER VACCINE  Discontinued                                                                                                                                                CMS Preventative Services Quick Reference  Risk Factors Identified During Encounter  Polypharmacy: Medication List reviewed    The above risks/problems have been discussed with the patient.  Pertinent information has been shared with the patient in the After Visit Summary.  An After Visit Summary and PPPS were made available to the patient.    Follow Up:   Next Medicare  Wellness visit to be scheduled in 1 year.     Assessment & Plan  Type 2 diabetes mellitus without complication, without long-term current use of insulin  Diabetes is improving with treatment.   Continue current treatment regimen.  Diabetes will be reassessed in 6 months  At risk for falling    Gout, unspecified cause, unspecified chronicity, unspecified site    Memory changes    Chronic atrial fibrillation    Benign essential hypertension  Hypertension is stable and controlled  Continue current treatment regimen.  Blood pressure will be reassessed in 6 months.  Mixed hyperlipidemia   Lipid abnormalities are stable    Plan:  Continue same medication/s without change.      Discussed medication dosage, use, side effects, and goals of treatment in detail.    Counseled patient on lifestyle modifications to help control hyperlipidemia.     Patient Treatment Goals:   LDL goal is less than 70    Followup in 6 months.  Hypertensive heart and chronic kidney disease with heart failure and stage 1 through stage 4 chronic kidney disease, or unspecified chronic kidney disease  Renal condition is stable.  Continue current treatment regimen.  Renal condition will be reassessed in 6 months.  Vitamin D deficiency      Orders Placed This Encounter   Procedures    Ambulatory Referral to Physical Therapy for Evaluation & Treatment    POC Glycosylated Hemoglobin (Hb A1C)             Follow Up:   No follow-ups on file.    Poct A1c 5.0    Mini cog done today 2/5  Refer neurology.  She is agreeable to do pt.   6 mo fu recheck A1c fu memory.   [facility] is doing her pills.

## 2024-09-30 ENCOUNTER — TELEPHONE (OUTPATIENT)
Dept: FAMILY MEDICINE CLINIC | Facility: CLINIC | Age: 84
End: 2024-09-30
Payer: MEDICARE

## 2024-09-30 NOTE — TELEPHONE ENCOUNTER
Caller: [address] - [phone] [facility] [phone]    Relationship: Pharmacy/ MURPHY Rodriguez call back number: [phone]     Requested Prescriptions:   Requested Prescriptions     Pending Prescriptions Disp Refills    metFORMIN (GLUCOPHAGE) 500 MG tablet       Sig: Take 1 tablet by mouth 2 (Two) Times a Day With Meals.        Pharmacy where request should be sent: [address] - [phone] [facility] [phone]     Last office visit with prescribing clinician: 8/13/2024   Last telemedicine visit with prescribing clinician: Visit date not found   Next office visit with prescribing clinician: 10/1/2024     Additional details provided by patient: PLEASE SEND 90 DAY SUPPLY WITH REFILLS    Does the patient have less than a 3 day supply:  [x] Yes  A FEW DAYS LEFT    Would you like a call back once the refill request has been completed: [] Yes [x] No    If the office needs to give you a call back, can they leave a voicemail: [x] Yes [] No    Lucio Bernstein Rep   09/30/24 13:05 EDT

## 2024-10-01 ENCOUNTER — OFFICE VISIT (OUTPATIENT)
Dept: FAMILY MEDICINE CLINIC | Facility: CLINIC | Age: 84
End: 2024-10-01
Payer: MEDICARE

## 2024-10-01 VITALS
HEIGHT: 64 IN | TEMPERATURE: 97.7 F | WEIGHT: 123.6 LBS | SYSTOLIC BLOOD PRESSURE: 142 MMHG | HEART RATE: 58 BPM | OXYGEN SATURATION: 97 % | BODY MASS INDEX: 21.1 KG/M2 | DIASTOLIC BLOOD PRESSURE: 84 MMHG

## 2024-10-01 DIAGNOSIS — R30.9 PAINFUL URINATION: ICD-10-CM
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
## 2024-10-01 DIAGNOSIS — F03.918 DEMENTIA WITH OTHER BEHAVIORAL DISTURBANCE, UNSPECIFIED DEMENTIA SEVERITY, UNSPECIFIED DEMENTIA TYPE: Primary | ICD-10-CM

## 2024-10-01 DIAGNOSIS — Z23 NEED FOR IMMUNIZATION AGAINST INFLUENZA: ICD-10-CM

## 2024-10-01 PROBLEM — F03.90 DEMENTIA: Status: ACTIVE | Noted: 2024-10-01

## 2024-10-01 LAB
BILIRUB BLD-MCNC: ABNORMAL MG/DL
CLARITY, POC: CLEAR
COLOR UR: YELLOW
EXPIRATION DATE: ABNORMAL
GLUCOSE UR STRIP-MCNC: NEGATIVE MG/DL
KETONES UR QL: NEGATIVE
LEUKOCYTE EST, POC: NEGATIVE
Lab: ABNORMAL
NITRITE UR-MCNC: NEGATIVE MG/ML
PH UR: 6 [PH] (ref 5–8)
PROT UR STRIP-MCNC: ABNORMAL MG/DL
RBC # UR STRIP: NEGATIVE /UL
SP GR UR: 1.03 (ref 1–1.03)
UROBILINOGEN UR QL: ABNORMAL

## 2024-10-01 PROCEDURE — 81003 URINALYSIS AUTO W/O SCOPE: CPT | Performed by: FAMILY MEDICINE

## 2024-10-01 PROCEDURE — 1159F MED LIST DOCD IN RCRD: CPT | Performed by: FAMILY MEDICINE

## 2024-10-01 PROCEDURE — 3079F DIAST BP 80-89 MM HG: CPT | Performed by: FAMILY MEDICINE

## 2024-10-01 PROCEDURE — 99214 OFFICE O/P EST MOD 30 MIN: CPT | Performed by: FAMILY MEDICINE

## 2024-10-01 PROCEDURE — G0008 ADMIN INFLUENZA VIRUS VAC: HCPCS | Performed by: FAMILY MEDICINE

## 2024-10-01 PROCEDURE — 3077F SYST BP >= 140 MM HG: CPT | Performed by: FAMILY MEDICINE

## 2024-10-01 PROCEDURE — 1160F RVW MEDS BY RX/DR IN RCRD: CPT | Performed by: FAMILY MEDICINE

## 2024-10-01 PROCEDURE — 90662 IIV NO PRSV INCREASED AG IM: CPT | Performed by: FAMILY MEDICINE

## 2024-10-01 PROCEDURE — 1125F AMNT PAIN NOTED PAIN PRSNT: CPT | Performed by: FAMILY MEDICINE

## 2024-10-01 PROCEDURE — 87086 URINE CULTURE/COLONY COUNT: CPT | Performed by: FAMILY MEDICINE

## 2024-10-01 RX ORDER — NITROFURANTOIN 25; 75 MG/1; MG/1
100 CAPSULE ORAL 2 TIMES DAILY
Qty: 20 CAPSULE | Refills: 0 | Status: SHIPPED | OUTPATIENT
Start: 2024-10-01 | End: 2024-10-01

## 2024-10-01 RX ORDER — DONEPEZIL HYDROCHLORIDE 5 MG/1
5 TABLET, FILM COATED ORAL NIGHTLY
Qty: 90 TABLET | Refills: 3 | Status: SHIPPED | OUTPATIENT
Start: 2024-10-01

## 2024-10-01 RX ORDER — NITROFURANTOIN 25; 75 MG/1; MG/1
100 CAPSULE ORAL 2 TIMES DAILY
Qty: 20 CAPSULE | Refills: 0 | Status: SHIPPED | OUTPATIENT
Start: 2024-10-01

## 2024-10-01 NOTE — PROGRESS NOTES
Subjective   Cheryl Tomas is a 84 y.o. female.     History of Present Illness was seen on 8/13/2024 for a Medicare wellness.  She scored 2 out of 5 on her mini cog we placed a referral to neurology.  We did go ahead and try her on some Aricept to see if that helps a little bit.  Her last hemoglobin A1c was 5.0.    Right lower back pain 1 week into hip.  She thinks its a uti.      No nausea or vomiting.  Decreased appetite.     She had a fall 2-3 weeks ago fell forward and ems called.  Now she is having right lateral hip pain.     She reports she feels like she has in the past when she has had UTIs.    The following portions of the patient's history were reviewed and updated as appropriate: allergies, current medications, past family history, past medical history, past social history, past surgical history, and problem list.    Review of Systems   Constitutional:  Negative for chills, fatigue, fever and unexpected weight change.   HENT:  Negative for congestion, ear pain, nosebleeds, rhinorrhea, sinus pressure, sneezing, sore throat and trouble swallowing.    Eyes:  Negative for itching and visual disturbance.   Respiratory:  Negative for cough, chest tightness, shortness of breath and wheezing.    Cardiovascular:  Negative for chest pain, palpitations and leg swelling.   Gastrointestinal:  Negative for abdominal pain, anal bleeding, blood in stool, constipation, diarrhea, nausea and vomiting.   Endocrine: Negative for cold intolerance, heat intolerance, polydipsia and polyuria.   Genitourinary:  Positive for urgency. Negative for difficulty urinating, frequency and hematuria.   Musculoskeletal:  Positive for back pain and gait problem. Negative for myalgias.   Skin:  Negative for rash and wound.   Neurological:  Negative for dizziness, weakness, numbness and headaches.   Hematological:  Negative for adenopathy. Does not bruise/bleed easily.   Psychiatric/Behavioral:  Negative for agitation, confusion, decreased  "concentration and suicidal ideas. The patient is not nervous/anxious.        Objective     Vitals:    10/01/24 1327   BP: 142/84   BP Location: Left arm   Patient Position: Sitting   Cuff Size: Adult   Pulse: 58   Temp: 97.7 °F (36.5 °C)   TempSrc: Infrared   SpO2: 97%   Weight: 56.1 kg (123 lb 9.6 oz)   Height: 162.6 cm (64.02\")       Physical Exam  Vitals and nursing note reviewed.   Constitutional:       General: She is not in acute distress.     Appearance: She is well-developed. She is not diaphoretic.   HENT:      Head: Normocephalic and atraumatic.      Right Ear: External ear normal.      Left Ear: External ear normal.   Eyes:      General: Lids are normal. No scleral icterus.        Right eye: No discharge.         Left eye: No discharge.      Conjunctiva/sclera: Conjunctivae normal.      Pupils: Pupils are equal, round, and reactive to light.   Neck:      Thyroid: No thyroid mass or thyromegaly.      Vascular: No carotid bruit or JVD.      Trachea: No tracheal deviation.   Cardiovascular:      Rate and Rhythm: Normal rate and regular rhythm.      Heart sounds: Normal heart sounds. No murmur heard.     No friction rub. No gallop.   Pulmonary:      Effort: Pulmonary effort is normal. No respiratory distress.      Breath sounds: Normal breath sounds. No decreased breath sounds, wheezing, rhonchi or rales.   Chest:      Chest wall: No tenderness.   Abdominal:      General: Bowel sounds are normal. There is no distension.      Palpations: Abdomen is soft. There is no mass.      Tenderness: There is no abdominal tenderness. There is no guarding or rebound.      Hernia: No hernia is present.   Musculoskeletal:         General: Tenderness present. Normal range of motion.      Comments: Thank she has some tenderness to palpation over the right SI joint no greater trochanteric bursitis pain full range of motion of the hip she is able to ambulate   Lymphadenopathy:      Cervical: No cervical adenopathy.      Upper " Body:      Right upper body: No supraclavicular adenopathy.      Left upper body: No supraclavicular adenopathy.   Skin:     Findings: No bruising, erythema or rash.      Nails: There is no clubbing.   Neurological:      Mental Status: She is alert and oriented to person, place, and time.      Cranial Nerves: No cranial nerve deficit.      Deep Tendon Reflexes: Reflexes are normal and symmetric. Reflexes normal.   Psychiatric:         Speech: Speech normal.         Behavior: Behavior normal.         Thought Content: Thought content normal.         Judgment: Judgment normal.         Assessment & Plan     Problem List Items Addressed This Visit          Genitourinary and Reproductive     Painful urination    Relevant Orders    POCT urinalysis dipstick, automated       Infectious Diseases    Need for immunization against influenza    Relevant Orders    Fluzone High-Dose 65+yrs ([id number])       Neuro    Dementia - Primary    Relevant Medications    donepezil (Aricept) 5 MG tablet     Other Visit Diagnoses       Fall, initial encounter        Relevant Orders    XR Spine Lumbar 2 or 3 View (In Office)    XR Hip With or Without Pelvis 2 - 3 View Right

## 2024-10-02 LAB — BACTERIA SPEC AEROBE CULT: NO GROWTH

## 2024-10-09 ENCOUNTER — TELEPHONE (OUTPATIENT)
Dept: FAMILY MEDICINE CLINIC | Facility: CLINIC | Age: 84
End: 2024-10-09
Payer: MEDICARE

## 2024-10-09 NOTE — TELEPHONE ENCOUNTER
Hub to Relay  Notify the patient that the x-ray results show multiple levels of arthritis but no acute fractures or loss of height of the vertebrae.  There is some arthritis of the hips.  No acute pelvic or hip fracture.    Called patient and left message to have her return our call.

## 2024-10-09 NOTE — PROGRESS NOTES
Notify the patient that the x-ray results show multiple levels of arthritis but no acute fractures or loss of height of the vertebrae.  There is some arthritis of the hips.  No acute pelvic or hip fracture.

## 2024-10-09 NOTE — TELEPHONE ENCOUNTER
Name: [facility]    Relationship: Emergency Contact    Best Callback Number: [phone]    HUB PROVIDED THE RELAY MESSAGE FROM THE OFFICE   PATIENT VOICED UNDERSTANDING AND HAS NO FURTHER QUESTIONS AT THIS TIME    ADDITIONAL INFORMATION:

## 2024-11-22 ENCOUNTER — OFFICE VISIT (OUTPATIENT)
Dept: CARDIOLOGY | Facility: CLINIC | Age: 84
End: 2024-11-22
Payer: MEDICARE

## 2024-11-22 VITALS
BODY MASS INDEX: 21.37 KG/M2 | DIASTOLIC BLOOD PRESSURE: 70 MMHG | HEART RATE: 50 BPM | OXYGEN SATURATION: 98 % | WEIGHT: 125.2 LBS | SYSTOLIC BLOOD PRESSURE: 152 MMHG | HEIGHT: 64 IN

## 2024-11-22 DIAGNOSIS — I35.1 NONRHEUMATIC AORTIC VALVE INSUFFICIENCY: ICD-10-CM

## 2024-11-22 DIAGNOSIS — I10 ESSENTIAL HYPERTENSION: ICD-10-CM

## 2024-11-22 DIAGNOSIS — I50.32 CHRONIC DIASTOLIC CONGESTIVE HEART FAILURE: ICD-10-CM

## 2024-11-22 DIAGNOSIS — I48.0 PAROXYSMAL ATRIAL FIBRILLATION: Primary | ICD-10-CM

## 2024-11-22 RX ORDER — IRBESARTAN 300 MG/1
300 TABLET ORAL NIGHTLY
Qty: 90 TABLET | Refills: 3 | Status: SHIPPED | OUTPATIENT
Start: 2024-11-22

## 2024-11-22 NOTE — PROGRESS NOTES
Established Patient Office Visit    Patient Name: Cheryl Tomas  : 1940   MRN: [id number]   Care Team: Patient Care Team:  Marian Perea MD as PCP - General (Family Medicine)  Sharon Salas PA as Physician Assistant (Cardiology)  Jani Epperson MD as Cardiologist (Cardiology)    Chief Complaint   Patient presents with    Paroxysmal atrial fibrillation     Patient ID: Cheryl Tomas is a 84 y.o.  white female from [place], [place], retired .    PAF  Dx 2024 through [facility]  REGINE-ECV during admission 2024 [facility], started on amiodarone  PSVT  Abnormal holter monitor study with frequent PSVT consistent with nonsustained atrial tachycardia with maximum heart rate 173 bpm, 2021    Chronic Hypertension - probable essential  Remote stress test apparently acceptable-data deficit  Echocardiogram 2019: EF 60%, mild concentric LVH, moderate aortic insufficiency.  LV end-diastolic dimension 4.3 cm.  Lexiscan myocardial perfusion imaging ordered but declined 2021  Echocardiogram 10/11/2021: LVEF 61-65%, LV diastolic function consistent with grade 2 with high LAP pseudonormalization, LA mildly increased, moderate AR, RVSP 35-45 mmHg, small less than 1 cm pericardial effusion adjacent to the RV and LV  Residual class I symptoms 2022  Non-rheumatic aortic insufficiency  Mixed hyperlipidemia; on statin therapy  Type 2 diabetes mellitus; hemoglobin A1c 5.8% 2022  Dizziness  Stage III chronic kidney disease  History of PIH and preeclampsia  Surgical history:  Appendectomy  Lithotripsy  Hysterectomy    HPI: Cheryl Tomas is a 84 y.o. female with a history of PAF, moderate aortic regurgitation, grade II diastolic dysfunction, hypertension. who presents today for follow-up.  Since her last office visit in April she has had worsened control of her hypertension, having systolic readings above 200 at home at times.  She denies any new symptoms  of shortness of breath or exertional chest pain however does report having chronic pain in her upper back and through to the chest that is stable and has been present for a period of months.  This will wax and wane and is not related to position or exertion.    Subjective   Review of Systems   Constitutional:  Negative for activity change and fatigue.   Respiratory:  Negative for chest tightness and shortness of breath.    Cardiovascular:  Negative for palpitations.   Neurological:  Negative for dizziness and syncope.       Social History     Tobacco Use   Smoking Status Never    Passive exposure: Never   Smokeless Tobacco Never        Allergies   Allergen Reactions    Cephalexin Itching and Rash    Bactrim [Sulfamethoxazole-Trimethoprim] GI Intolerance       Current Outpatient Medications:     acetaminophen (TYLENOL) 650 MG 8 hr tablet, Take 2 tablets by mouth Every 8 (Eight) Hours As Needed for Mild Pain., Disp: , Rfl:     allopurinol (ZYLOPRIM) 300 MG tablet, Take 1 tablet by mouth Daily., Disp: , Rfl:     amiodarone (PACERONE) 200 MG tablet, Take 1 tablet by mouth Daily., Disp: , Rfl:     apixaban (ELIQUIS) 2.5 MG tablet tablet, Take 1 tablet by mouth Every 12 (Twelve) Hours., Disp: 180 tablet, Rfl: 3    carvedilol (COREG) 25 MG tablet, TAKE ONE (1) TABLET BY MOUTH TWO (2) (TWO) TIMES A DAY WITH MEALS., Disp: 60 tablet, Rfl: 3    cholecalciferol (VITAMIN D3) 1000 units tablet, Take 1 tablet by mouth Daily., Disp: , Rfl:     Coenzyme Q10 10 MG capsule, Take 10 mg by mouth Daily., Disp: , Rfl:     donepezil (Aricept) 5 MG tablet, Take 1 tablet by mouth Every Night., Disp: 90 tablet, Rfl: 3    furosemide (LASIX) 40 MG tablet, Take 1 tablet by mouth Daily., Disp: 30 tablet, Rfl: 2    irbesartan (Avapro) 300 MG tablet, Take 1 tablet by mouth Every Night., Disp: 90 tablet, Rfl: 3    metFORMIN (GLUCOPHAGE) 500 MG tablet, Take 1 tablet by mouth 2 (Two) Times a Day With Meals., Disp: 180 tablet, Rfl: 1    nitrofurantoin,  "macrocrystal-monohydrate, (Macrobid) 100 MG capsule, Take 1 capsule by mouth 2 (Two) Times a Day., Disp: 20 capsule, Rfl: 0    QUEtiapine (SEROquel) 25 MG tablet, Take 1 tablet by mouth Every Night., Disp: , Rfl:     pravastatin (PRAVACHOL) 40 MG tablet, Take 1 tablet by mouth Every Night. (Patient not taking: Reported on 11/22/2024), Disp: , Rfl:     Objective     Vitals:    11/22/24 1023 11/22/24 1028   BP: (!) 222/72 152/70   BP Location: Left arm Right arm   Patient Position: Sitting Sitting   Cuff Size: Adult    Pulse:  50   SpO2: 98%    Weight: 56.8 kg (125 lb 3.2 oz)    Height: 162.6 cm (64\")    Body mass index is 21.49 kg/m².  Gen: well developed, older WF sitting on exam table, comfortable appearing  HEENT: MMM, sclera anicteric, conjunctiva normal, no carotid bruits  CV: regular rate, regular rhythm, II/VI diastolic murmur at RUSB, normal radial and DP pulses  Pulm: RA, normal work of breathing, no wheezes, rales, rhonchi  Ext: normal bulk for age, normal tone, trace dependent edema  Neuro: alert, oriented, face symmetrical, moving all extremities well  Psych: normal mood, appropriate affect    RESULTS:     ECG 12 Lead    Date/Time: 11/22/2024 1:29 PM  Performed by: Jani Epperson MD    Authorized by: Jani Epperson MD  Comparison: compared with previous ECG from 5/13/2024  Similar to previous ECG  Comparison to previous ECG: LAFB now present  Rhythm: sinus rhythm  Rate: bradycardic  BPM: 50  Conduction: right bundle branch block and left anterior fascicular block  Q waves: V1 and V2    Other findings: left ventricular hypertrophy    Clinical impression: abnormal EKG          1/31/24 - REGINE    LVEF 55%    RV normal in size and function    Aortic valve thickened and calcified.  Vena contracta .32 cm and  consistent with moderate AI    Mitral valve has mild MAC and mild MR    Pulmonic valve and tricuspid valve not well assessed.    No left atrial appendage thrombus appreciated on multiple views " with normal velocities    Moderate pericardial effusion.  no RV diastolic collapse.    IVC dilated but collapses well.  RA pressure estimated 8 mmHg    10/11/21 - Transthoracic Echo Complete  · Left ventricular ejection fraction appears to be 61 - 65%. Left ventricular systolic function is normal.  · Left ventricular diastolic function is consistent with (grade II w/high LAP) pseudonormalization.  · Left atrial volume is mildly increased.  · Moderate aortic valve regurgitation is present.  · Estimated right ventricular systolic pressure from tricuspid regurgitation is mildly elevated (35-45 mmHg).  · Mild pulmonary hypertension is present.  · There is a small (<1cm) pericardial effusion adjacent to the right ventricle and left ventricle.    5/5/22 - Renal duplex US  Impression:  No sonographic evidence of clinically significant stenosis of the right or the left renal artery on this exam.      There is suspicion of mild left hydronephrosis. This can be further evaluated with a dedicated renal ultrasound.    4/27/22 - 24 blood pressure monitor  Overall acceptable 24 ambulatory blood pressure monitor with somewhat elevated nocturnal blood pressure readings; defer additional treatment at this time and follow-up in office as scheduled.    Labs:  Lab Results   Component Value Date    WBC 7.12 05/13/2024    HGB 12.2 05/13/2024    HCT 39.0 05/13/2024    MCV 89.7 05/13/2024     05/13/2024     Lab Results   Component Value Date    GLUCOSE 113 (H) 01/31/2024    BUN 27 (H) 01/31/2024    CREATININE 1.05 (H) 01/31/2024    EGFRIFNONA 43 (L) 11/15/2021    EGFRIFAFRI 52 (L) 11/15/2021    BCR 25.7 (H) 01/31/2024    K 4.4 01/31/2024    CO2 30.0 (H) 01/31/2024    CALCIUM 9.6 01/31/2024    PROTENTOTREF 6.7 11/30/2022    ALBUMIN 4.4 01/26/2024    LABIL2 2.2 11/30/2022    AST 31 01/26/2024    ALT 60 (H) 01/26/2024     Lab Results   Component Value Date    HGBA1C 5.0 08/13/2024     Lab Results   Component Value Date    CHLPL 168  11/30/2022    TRIG 136 11/30/2022    HDL 45 11/30/2022    LDL 99 11/30/2022     Most recent PCP note, imaging tests, and labs reviewed.    Assessment & Plan       ICD-10-CM ICD-9-CM   1. Paroxysmal atrial fibrillation  I48.0 427.31   2. Chronic diastolic congestive heart failure  I50.32 428.32     428.0   3. Nonrheumatic aortic valve insufficiency  I35.1 424.1   4. Essential hypertension  I10 401.9          PAF   - Sinus rhythm on exam today   - Continue anticoagulation with apixaban, decrease dose to 2.5 mg twice daily due to weight and age   - Continue carvedilol and amiodarone      Chronic heart failure preserved ejection fraction   - ARB, empagliflozin   - Will monitor for recurrent UTI, if so would have to consider cessation of SGLT2 inhibitor    Primary hypertension   - Uncontrolled, will increase irbesartan back to 300 mg daily    Moderate AR   - no new symptoms    HLD   - continue pravastatin, acceptable control based on lipid panel from November 2022     Palpitations / PSVT / AT  Bifascicular block - AZ minimally prolonged but stable previously    - BB, amio as above    Return in about 2 months (around 1/22/2025).    DUARTE Epperson MD, MS  11/22/24    [facility]  [address] [address] [phone]

## 2024-12-09 RX ORDER — FUROSEMIDE 40 MG/1
40 TABLET ORAL DAILY
Qty: 60 TABLET | Refills: 5 | Status: SHIPPED | OUTPATIENT
Start: 2024-12-09

## 2024-12-21 ENCOUNTER — OFFICE VISIT (OUTPATIENT)
Dept: FAMILY MEDICINE CLINIC | Facility: CLINIC | Age: 84
End: 2024-12-21
Payer: MEDICARE

## 2024-12-21 VITALS
WEIGHT: 125.8 LBS | SYSTOLIC BLOOD PRESSURE: 176 MMHG | RESPIRATION RATE: 22 BRPM | DIASTOLIC BLOOD PRESSURE: 64 MMHG | HEART RATE: 54 BPM | HEIGHT: 64 IN | BODY MASS INDEX: 21.48 KG/M2 | OXYGEN SATURATION: 95 % | TEMPERATURE: 98.4 F

## 2024-12-21 DIAGNOSIS — I10 ESSENTIAL HYPERTENSION: ICD-10-CM

## 2024-12-21 DIAGNOSIS — R05.1 ACUTE COUGH: ICD-10-CM

## 2024-12-21 LAB
EXPIRATION DATE: NORMAL
INTERNAL CONTROL: NORMAL
Lab: NORMAL
SARS-COV-2 AG UPPER RESP QL IA.RAPID: NORMAL

## 2024-12-21 PROCEDURE — 3078F DIAST BP <80 MM HG: CPT | Performed by: STUDENT IN AN ORGANIZED HEALTH CARE EDUCATION/TRAINING PROGRAM

## 2024-12-21 PROCEDURE — 99214 OFFICE O/P EST MOD 30 MIN: CPT | Performed by: STUDENT IN AN ORGANIZED HEALTH CARE EDUCATION/TRAINING PROGRAM

## 2024-12-21 PROCEDURE — 1125F AMNT PAIN NOTED PAIN PRSNT: CPT | Performed by: STUDENT IN AN ORGANIZED HEALTH CARE EDUCATION/TRAINING PROGRAM

## 2024-12-21 PROCEDURE — 87426 SARSCOV CORONAVIRUS AG IA: CPT | Performed by: STUDENT IN AN ORGANIZED HEALTH CARE EDUCATION/TRAINING PROGRAM

## 2024-12-21 PROCEDURE — 3077F SYST BP >= 140 MM HG: CPT | Performed by: STUDENT IN AN ORGANIZED HEALTH CARE EDUCATION/TRAINING PROGRAM

## 2024-12-21 RX ORDER — BENZONATATE 100 MG/1
100 CAPSULE ORAL 3 TIMES DAILY PRN
Qty: 30 CAPSULE | Refills: 1 | Status: SHIPPED | OUTPATIENT
Start: 2024-12-21

## 2024-12-21 RX ORDER — AMOXICILLIN 500 MG/1
500 CAPSULE ORAL 3 TIMES DAILY
Qty: 21 CAPSULE | Refills: 0 | Status: SHIPPED | OUTPATIENT
Start: 2024-12-21

## 2024-12-21 RX ORDER — DOXYCYCLINE 100 MG/1
100 CAPSULE ORAL 2 TIMES DAILY
Qty: 14 CAPSULE | Refills: 0 | Status: SHIPPED | OUTPATIENT
Start: 2024-12-21 | End: 2024-12-28

## 2024-12-21 RX ORDER — GUAIFENESIN 600 MG/1
1200 TABLET, EXTENDED RELEASE ORAL 2 TIMES DAILY
COMMUNITY

## 2024-12-21 NOTE — PROGRESS NOTES
Established Patient Office Visit        Subjective      Chief Complaint:  Cough (Productive Cough and weakness, denies fever, for four days, shortness of breath with exertion, patient said she had pain yesterday in between shoulder blades but think this was from laying too long)      History of Present Illness: Cheryl Tomas is a 84 y.o. female who presents for productive cough.     I reviewed recent labs including CBC and TSH in May and BMP from January,.  GFR 53.  Reviewed last cardiology note.  EKG from that visit reviewed.  Qtc mildly prolonged   Patient Active Problem List   Diagnosis    Hypertension    Diabetes mellitus    Hyperlipidemia    Anxiety and depression    Medicare annual wellness visit, initial    Chest wall discomfort    Need for immunization against influenza    Right wrist pain    Elevated uric acid in blood    Breast cancer screening    Colon cancer screening    Actinic keratitis    Acute intractable tension-type headache    Family history of brain aneurysm    Age-related cataract    Ankle edema, bilateral    Hypertensive heart and chronic kidney disease with heart failure and stage 1 through stage 4 chronic kidney disease, or unspecified chronic kidney disease    Orthopnea    Right bundle branch block with left anterior fascicular block    Postmenopausal    PSVT (paroxysmal supraventricular tachycardia)    Nonrheumatic aortic valve insufficiency    Osteoarthrosis multiple sites, not specified as generalized    Actinic keratosis    Body mass index (BMI) of 25.0 to 25.9 in adult    Weakness    High risk medication use    Insomnia    Obesity, unspecified    Other osteoporosis    Sprain and strain of knee and leg    Syncope and collapse    Vitamin D deficiency    Bronchitis    Edema of lower extremity    Other specified circulatory system disorders    Hypertensive disorder    Encounter for long-term (current) use of other medications    Other depressive disorder    Mixed hyperlipidemia     Benign essential hypertension    Supraventricular tachycardia    Varicose veins of left lower extremity with pain    Essential hypertension    Dizziness    Serum potassium elevated    Medicare annual wellness visit, subsequent    At high risk for falls    Leg edema    Atrial fibrillation with RVR    Acute on chronic heart failure with preserved ejection fraction (HFpEF)    Hypoxia    A-fib    Dementia    Painful urination         Current Outpatient Medications:     acetaminophen (TYLENOL) 650 MG 8 hr tablet, Take 2 tablets by mouth Every 8 (Eight) Hours As Needed for Mild Pain., Disp: , Rfl:     allopurinol (ZYLOPRIM) 300 MG tablet, Take 1 tablet by mouth Daily., Disp: , Rfl:     amiodarone (PACERONE) 200 MG tablet, Take 1 tablet by mouth Daily., Disp: , Rfl:     apixaban (ELIQUIS) 2.5 MG tablet tablet, Take 1 tablet by mouth Every 12 (Twelve) Hours., Disp: 180 tablet, Rfl: 3    carvedilol (COREG) 25 MG tablet, TAKE ONE (1) TABLET BY MOUTH TWO (2) (TWO) TIMES A DAY WITH MEALS., Disp: 60 tablet, Rfl: 3    cholecalciferol (VITAMIN D3) 1000 units tablet, Take 1 tablet by mouth Daily., Disp: , Rfl:     Coenzyme Q10 10 MG capsule, Take 10 mg by mouth Daily., Disp: , Rfl:     furosemide (LASIX) 40 MG tablet, TAKE ONE (1) TABLET BY MOUTH ONCE DAILY (Patient taking differently: Take 1 tablet by mouth Daily. As needed), Disp: 60 tablet, Rfl: 5    guaiFENesin (MUCINEX) 600 MG 12 hr tablet, Take 2 tablets by mouth 2 (Two) Times a Day. Otc as needed, Disp: , Rfl:     irbesartan (Avapro) 300 MG tablet, Take 1 tablet by mouth Every Night., Disp: 90 tablet, Rfl: 3    metFORMIN (GLUCOPHAGE) 500 MG tablet, Take 1 tablet by mouth 2 (Two) Times a Day With Meals., Disp: 180 tablet, Rfl: 1    pravastatin (PRAVACHOL) 40 MG tablet, Take 1 tablet by mouth Every Night., Disp: , Rfl:     QUEtiapine (SEROquel) 25 MG tablet, Take 1 tablet by mouth Every Night., Disp: , Rfl:     amoxicillin (AMOXIL) 500 MG capsule, Take 1 capsule by mouth 3  "(Three) Times a Day., Disp: 21 capsule, Rfl: 0    benzonatate (Tessalon Perles) 100 MG capsule, Take 1 capsule by mouth 3 (Three) Times a Day As Needed for Cough., Disp: 30 capsule, Rfl: 1    donepezil (Aricept) 5 MG tablet, Take 1 tablet by mouth Every Night. (Patient not taking: Reported on 12/21/2024), Disp: 90 tablet, Rfl: 3    doxycycline (VIBRAMYCIN) 100 MG capsule, Take 1 capsule by mouth 2 (Two) Times a Day for 7 days., Disp: 14 capsule, Rfl: 0    nitrofurantoin, macrocrystal-monohydrate, (Macrobid) 100 MG capsule, Take 1 capsule by mouth 2 (Two) Times a Day. (Patient not taking: Reported on 12/21/2024), Disp: 20 capsule, Rfl: 0       Objective     Physical Exam:   Vital Signs:   /64 (BP Location: Left arm, Patient Position: Sitting, Cuff Size: Adult)   Pulse 54   Temp 98.4 °F (36.9 °C) (Temporal)   Resp 22   Ht 162.6 cm (64\")   Wt 57.1 kg (125 lb 12.8 oz)   SpO2 95%   BMI 21.59 kg/m²      Physical Exam  Constitutional:       General: She is not in acute distress.     Appearance: She is not ill-appearing.   Cardiovascular:      Rate and Rhythm: Normal rate and regular rhythm.   Pulmonary:      Effort: Pulmonary effort is normal.      Breath sounds: Normal breath sounds.   Neurological:      Mental Status: She is alert.   Psychiatric:         Thought Content: Thought content normal.            Assessment / Plan      Assessment/Plan:   Diagnoses and all orders for this visit:    1. Acute cough  -     benzonatate (Tessalon Perles) 100 MG capsule; Take 1 capsule by mouth 3 (Three) Times a Day As Needed for Cough.  Dispense: 30 capsule; Refill: 1  -     amoxicillin (AMOXIL) 500 MG capsule; Take 1 capsule by mouth 3 (Three) Times a Day.  Dispense: 21 capsule; Refill: 0  -     doxycycline (VIBRAMYCIN) 100 MG capsule; Take 1 capsule by mouth 2 (Two) Times a Day for 7 days.  Dispense: 14 capsule; Refill: 0  -     POCT SARS-CoV-2 Antigen HERB    2. Essential hypertension  Assessment & Plan:  Above goal but " acutely sick   Monitor pressure at home  Has scheduled follow-up in January         She has some faint rales to the left base but overall looks well we will do delayed antibiotic prescribing as I do not have access to chest x-ray today.  If she has worsening trouble breathing she is feeling worse or has new fever I have instructed her to start the antibiotics for treatment of potential pneumonia most likely URI at this time    Follow Up:   No follow-ups on file.    MDM: Prescription drugs data review per HPI    Miguel Celis MD  Family Medicine - [facility]

## 2025-01-02 DIAGNOSIS — R05.1 ACUTE COUGH: ICD-10-CM

## 2025-01-06 DIAGNOSIS — I13.0 HYPERTENSIVE HEART AND CHRONIC KIDNEY DISEASE WITH HEART FAILURE AND STAGE 1 THROUGH STAGE 4 CHRONIC KIDNEY DISEASE, OR UNSPECIFIED CHRONIC KIDNEY DISEASE: Chronic | ICD-10-CM

## 2025-01-07 RX ORDER — CARVEDILOL 25 MG/1
TABLET ORAL
Qty: 60 TABLET | Refills: 2 | Status: SHIPPED | OUTPATIENT
Start: 2025-01-07

## 2025-01-07 RX ORDER — AMOXICILLIN 500 MG/1
500 CAPSULE ORAL 3 TIMES DAILY
Qty: 21 CAPSULE | Refills: 0 | OUTPATIENT
Start: 2025-01-07

## 2025-01-08 ENCOUNTER — LAB (OUTPATIENT)
Dept: LAB | Facility: HOSPITAL | Age: 85
End: 2025-01-08
Payer: MEDICARE

## 2025-01-08 ENCOUNTER — OFFICE VISIT (OUTPATIENT)
Dept: FAMILY MEDICINE CLINIC | Facility: CLINIC | Age: 85
End: 2025-01-08
Payer: MEDICARE

## 2025-01-08 VITALS
OXYGEN SATURATION: 96 % | BODY MASS INDEX: 20.35 KG/M2 | TEMPERATURE: 98 F | SYSTOLIC BLOOD PRESSURE: 180 MMHG | HEART RATE: 64 BPM | HEIGHT: 64 IN | DIASTOLIC BLOOD PRESSURE: 56 MMHG | WEIGHT: 119.2 LBS

## 2025-01-08 DIAGNOSIS — I13.0 HYPERTENSIVE HEART AND CHRONIC KIDNEY DISEASE WITH HEART FAILURE AND STAGE 1 THROUGH STAGE 4 CHRONIC KIDNEY DISEASE, OR UNSPECIFIED CHRONIC KIDNEY DISEASE: ICD-10-CM

## 2025-01-08 DIAGNOSIS — I50.33 ACUTE ON CHRONIC HEART FAILURE WITH PRESERVED EJECTION FRACTION (HFPEF): ICD-10-CM

## 2025-01-08 DIAGNOSIS — E11.9 TYPE 2 DIABETES MELLITUS WITHOUT COMPLICATION, WITHOUT LONG-TERM CURRENT USE OF INSULIN: ICD-10-CM

## 2025-01-08 DIAGNOSIS — E11.9 TYPE 2 DIABETES MELLITUS WITHOUT COMPLICATION, WITHOUT LONG-TERM CURRENT USE OF INSULIN: Primary | ICD-10-CM

## 2025-01-08 LAB
ANION GAP SERPL CALCULATED.3IONS-SCNC: 12.1 MMOL/L (ref 5–15)
BASOPHILS # BLD AUTO: 0.04 10*3/MM3 (ref 0–0.2)
BASOPHILS NFR BLD AUTO: 0.5 % (ref 0–1.5)
BUN SERPL-MCNC: 24 MG/DL (ref 8–23)
BUN/CREAT SERPL: 15 (ref 7–25)
CALCIUM SPEC-SCNC: 9.9 MG/DL (ref 8.6–10.5)
CHLORIDE SERPL-SCNC: 97 MMOL/L (ref 98–107)
CO2 SERPL-SCNC: 33.9 MMOL/L (ref 22–29)
CREAT SERPL-MCNC: 1.6 MG/DL (ref 0.57–1)
DEPRECATED RDW RBC AUTO: 46.1 FL (ref 37–54)
EGFRCR SERPLBLD CKD-EPI 2021: 32.1 ML/MIN/1.73
EOSINOPHIL # BLD AUTO: 0.18 10*3/MM3 (ref 0–0.4)
EOSINOPHIL NFR BLD AUTO: 2.3 % (ref 0.3–6.2)
ERYTHROCYTE [DISTWIDTH] IN BLOOD BY AUTOMATED COUNT: 14.5 % (ref 12.3–15.4)
EXPIRATION DATE: NORMAL
GLUCOSE SERPL-MCNC: 106 MG/DL (ref 65–99)
HBA1C MFR BLD: 5.7 % (ref 4.5–5.7)
HCT VFR BLD AUTO: 38.2 % (ref 34–46.6)
HGB BLD-MCNC: 12 G/DL (ref 12–15.9)
IMM GRANULOCYTES # BLD AUTO: 0.02 10*3/MM3 (ref 0–0.05)
IMM GRANULOCYTES NFR BLD AUTO: 0.3 % (ref 0–0.5)
LYMPHOCYTES # BLD AUTO: 1.42 10*3/MM3 (ref 0.7–3.1)
LYMPHOCYTES NFR BLD AUTO: 18.4 % (ref 19.6–45.3)
Lab: NORMAL
MCH RBC QN AUTO: 27.4 PG (ref 26.6–33)
MCHC RBC AUTO-ENTMCNC: 31.4 G/DL (ref 31.5–35.7)
MCV RBC AUTO: 87.2 FL (ref 79–97)
MONOCYTES # BLD AUTO: 0.52 10*3/MM3 (ref 0.1–0.9)
MONOCYTES NFR BLD AUTO: 6.7 % (ref 5–12)
NEUTROPHILS NFR BLD AUTO: 5.54 10*3/MM3 (ref 1.7–7)
NEUTROPHILS NFR BLD AUTO: 71.8 % (ref 42.7–76)
NRBC BLD AUTO-RTO: 0 /100 WBC (ref 0–0.2)
PLATELET # BLD AUTO: 239 10*3/MM3 (ref 140–450)
PMV BLD AUTO: 11.7 FL (ref 6–12)
POTASSIUM SERPL-SCNC: 3.5 MMOL/L (ref 3.5–5.2)
RBC # BLD AUTO: 4.38 10*6/MM3 (ref 3.77–5.28)
SODIUM SERPL-SCNC: 143 MMOL/L (ref 136–145)
WBC NRBC COR # BLD AUTO: 7.72 10*3/MM3 (ref 3.4–10.8)

## 2025-01-08 PROCEDURE — 1160F RVW MEDS BY RX/DR IN RCRD: CPT | Performed by: FAMILY MEDICINE

## 2025-01-08 PROCEDURE — 80048 BASIC METABOLIC PNL TOTAL CA: CPT

## 2025-01-08 PROCEDURE — 3044F HG A1C LEVEL LT 7.0%: CPT | Performed by: FAMILY MEDICINE

## 2025-01-08 PROCEDURE — 3077F SYST BP >= 140 MM HG: CPT | Performed by: FAMILY MEDICINE

## 2025-01-08 PROCEDURE — 1126F AMNT PAIN NOTED NONE PRSNT: CPT | Performed by: FAMILY MEDICINE

## 2025-01-08 PROCEDURE — 3078F DIAST BP <80 MM HG: CPT | Performed by: FAMILY MEDICINE

## 2025-01-08 PROCEDURE — 99214 OFFICE O/P EST MOD 30 MIN: CPT | Performed by: FAMILY MEDICINE

## 2025-01-08 PROCEDURE — 83036 HEMOGLOBIN GLYCOSYLATED A1C: CPT | Performed by: FAMILY MEDICINE

## 2025-01-08 PROCEDURE — 1159F MED LIST DOCD IN RCRD: CPT | Performed by: FAMILY MEDICINE

## 2025-01-08 PROCEDURE — 85025 COMPLETE CBC W/AUTO DIFF WBC: CPT

## 2025-01-08 RX ORDER — FUROSEMIDE 40 MG/1
40 TABLET ORAL DAILY
Qty: 60 TABLET | Refills: 5 | Status: SHIPPED | OUTPATIENT
Start: 2025-01-08

## 2025-01-08 NOTE — PROGRESS NOTES
Subjective   Cheryl Tomas is a 84 y.o. female.     Cough  Pertinent negatives include no chest pain, chills, ear pain, fever, headaches, myalgias, rash, rhinorrhea, sore throat, shortness of breath or wheezing.    she is present in the office today with her son she reports that she continues to have a cough with some production but that she overall is feeling better she denies any chest pain or shortness of breath.  We discussed her most recent and her upcoming cardiology appointments.  She does not need any refills on anything right now other than Lasix which she takes on an as-needed basis for when she has extra fluid in her ankles.  Will go ahead and check kidney function I have reviewed her most recent cardiology notes.    She reports that she finished up her antibiotics and her cough has improved.  Continuing to take Mucinex.    The following portions of the patient's history were reviewed and updated as appropriate: allergies, current medications, past family history, past medical history, past social history, past surgical history, and problem list.    Review of Systems   Constitutional:  Negative for chills, fatigue, fever and unexpected weight change.   HENT:  Negative for congestion, ear pain, nosebleeds, rhinorrhea, sinus pressure, sneezing, sore throat and trouble swallowing.    Eyes:  Negative for itching and visual disturbance.   Respiratory:  Positive for cough. Negative for chest tightness, shortness of breath and wheezing.    Cardiovascular:  Negative for chest pain, palpitations and leg swelling.   Gastrointestinal:  Negative for abdominal pain, anal bleeding, blood in stool, constipation, diarrhea, nausea and vomiting.   Endocrine: Negative for cold intolerance, heat intolerance, polydipsia and polyuria.   Genitourinary:  Negative for difficulty urinating, frequency, hematuria and urgency.   Musculoskeletal:  Negative for back pain, gait problem and myalgias.   Skin:  Negative for rash and  "wound.   Neurological:  Negative for dizziness, weakness, numbness and headaches.   Hematological:  Negative for adenopathy. Does not bruise/bleed easily.   Psychiatric/Behavioral:  Negative for agitation, confusion, decreased concentration and suicidal ideas. The patient is not nervous/anxious.        Objective     Vitals:    01/08/25 1056   BP: 180/56   Pulse: 64   Temp: 98 °F (36.7 °C)   TempSrc: Infrared   SpO2: 96%   Weight: 54.1 kg (119 lb 3.2 oz)   Height: 162.6 cm (64.02\")       Physical Exam  Vitals and nursing note reviewed.   Constitutional:       General: She is not in acute distress.     Appearance: She is well-developed. She is not diaphoretic.   HENT:      Head: Normocephalic and atraumatic.      Right Ear: External ear normal.      Left Ear: External ear normal.   Eyes:      General: Lids are normal. No scleral icterus.        Right eye: No discharge.         Left eye: No discharge.      Conjunctiva/sclera: Conjunctivae normal.      Pupils: Pupils are equal, round, and reactive to light.   Neck:      Thyroid: No thyroid mass or thyromegaly.      Vascular: No carotid bruit or JVD.      Trachea: No tracheal deviation.   Cardiovascular:      Rate and Rhythm: Normal rate and regular rhythm.      Heart sounds: Normal heart sounds. No murmur heard.     No friction rub. No gallop.   Pulmonary:      Effort: Pulmonary effort is normal. No respiratory distress.      Breath sounds: Normal breath sounds. No decreased breath sounds, wheezing, rhonchi or rales.   Chest:      Chest wall: No tenderness.   Abdominal:      General: Bowel sounds are normal. There is no distension.      Palpations: Abdomen is soft. There is no mass.      Tenderness: There is no abdominal tenderness. There is no guarding or rebound.      Hernia: No hernia is present.   Musculoskeletal:         General: Normal range of motion.   Lymphadenopathy:      Cervical: No cervical adenopathy.      Upper Body:      Right upper body: No " supraclavicular adenopathy.      Left upper body: No supraclavicular adenopathy.   Skin:     Findings: No bruising, erythema or rash.      Nails: There is no clubbing.   Neurological:      Mental Status: She is alert and oriented to person, place, and time.      Cranial Nerves: No cranial nerve deficit.      Deep Tendon Reflexes: Reflexes are normal and symmetric. Reflexes normal.   Psychiatric:         Speech: Speech normal.         Behavior: Behavior normal.         Thought Content: Thought content normal.         Judgment: Judgment normal.         Assessment & Plan     Problem List Items Addressed This Visit          Cardiac and Vasculature    Hypertensive heart and chronic kidney disease with heart failure and stage 1 through stage 4 chronic kidney disease, or unspecified chronic kidney disease (Chronic)    Relevant Medications    furosemide (LASIX) 40 MG tablet    Acute on chronic heart failure with preserved ejection fraction (HFpEF)    Relevant Medications    furosemide (LASIX) 40 MG tablet       Endocrine and Metabolic    Diabetes mellitus - Primary (Chronic)    Relevant Orders    POC Glycosylated Hemoglobin (Hb A1C) (Completed)    CBC & Differential    Basic Metabolic Panel

## 2025-01-20 RX ORDER — QUETIAPINE FUMARATE 25 MG/1
25 TABLET, FILM COATED ORAL
Qty: 30 TABLET | Refills: 1 | Status: SHIPPED | OUTPATIENT
Start: 2025-01-20

## 2025-02-05 ENCOUNTER — TELEPHONE (OUTPATIENT)
Dept: FAMILY MEDICINE CLINIC | Facility: CLINIC | Age: 85
End: 2025-02-05
Payer: MEDICARE

## 2025-02-05 DIAGNOSIS — I13.0 HYPERTENSIVE HEART AND CHRONIC KIDNEY DISEASE WITH HEART FAILURE AND STAGE 1 THROUGH STAGE 4 CHRONIC KIDNEY DISEASE, OR UNSPECIFIED CHRONIC KIDNEY DISEASE: Primary | ICD-10-CM

## 2025-02-05 NOTE — TELEPHONE ENCOUNTER
Hub and front can read    Tried to reach the pt in reference to message from the provider.    Message from Marian Perea  Notify the patient there is slight worsening of the kidney function we will go ahead and place another order to recheck make sure she is drinking plenty of fluids.  We will make further recommendations or decisions based on the recheck of the kidney function.

## 2025-02-18 ENCOUNTER — OFFICE VISIT (OUTPATIENT)
Dept: FAMILY MEDICINE CLINIC | Facility: CLINIC | Age: 85
End: 2025-02-18
Payer: MEDICARE

## 2025-02-18 ENCOUNTER — LAB (OUTPATIENT)
Dept: LAB | Facility: HOSPITAL | Age: 85
End: 2025-02-18
Payer: MEDICARE

## 2025-02-18 VITALS
HEART RATE: 52 BPM | OXYGEN SATURATION: 96 % | BODY MASS INDEX: 21.34 KG/M2 | WEIGHT: 125 LBS | SYSTOLIC BLOOD PRESSURE: 190 MMHG | HEIGHT: 64 IN | DIASTOLIC BLOOD PRESSURE: 60 MMHG | TEMPERATURE: 98 F | RESPIRATION RATE: 18 BRPM

## 2025-02-18 DIAGNOSIS — E78.2 MIXED HYPERLIPIDEMIA: Chronic | ICD-10-CM

## 2025-02-18 DIAGNOSIS — I13.0 HYPERTENSIVE HEART AND CHRONIC KIDNEY DISEASE WITH HEART FAILURE AND STAGE 1 THROUGH STAGE 4 CHRONIC KIDNEY DISEASE, OR UNSPECIFIED CHRONIC KIDNEY DISEASE: Chronic | ICD-10-CM

## 2025-02-18 DIAGNOSIS — F03.90 DEMENTIA, UNSPECIFIED DEMENTIA SEVERITY, UNSPECIFIED DEMENTIA TYPE, UNSPECIFIED WHETHER BEHAVIORAL, PSYCHOTIC, OR MOOD DISTURBANCE OR ANXIETY: ICD-10-CM

## 2025-02-18 DIAGNOSIS — I10 BENIGN ESSENTIAL HYPERTENSION: Chronic | ICD-10-CM

## 2025-02-18 DIAGNOSIS — E11.9 TYPE 2 DIABETES MELLITUS WITHOUT COMPLICATION, WITHOUT LONG-TERM CURRENT USE OF INSULIN: Chronic | ICD-10-CM

## 2025-02-18 DIAGNOSIS — E11.9 TYPE 2 DIABETES MELLITUS WITHOUT COMPLICATION, WITHOUT LONG-TERM CURRENT USE OF INSULIN: Primary | Chronic | ICD-10-CM

## 2025-02-18 DIAGNOSIS — I50.33 ACUTE ON CHRONIC HEART FAILURE WITH PRESERVED EJECTION FRACTION (HFPEF): ICD-10-CM

## 2025-02-18 DIAGNOSIS — I48.91 ATRIAL FIBRILLATION WITH RVR: Chronic | ICD-10-CM

## 2025-02-18 DIAGNOSIS — I13.0 HYPERTENSIVE HEART AND CHRONIC KIDNEY DISEASE WITH HEART FAILURE AND STAGE 1 THROUGH STAGE 4 CHRONIC KIDNEY DISEASE, OR UNSPECIFIED CHRONIC KIDNEY DISEASE: ICD-10-CM

## 2025-02-18 LAB
ALBUMIN UR-MCNC: 52.7 MG/DL
ANION GAP SERPL CALCULATED.3IONS-SCNC: 10.1 MMOL/L (ref 5–15)
BUN SERPL-MCNC: 23 MG/DL (ref 8–23)
BUN/CREAT SERPL: 15.2 (ref 7–25)
CALCIUM SPEC-SCNC: 9.5 MG/DL (ref 8.6–10.5)
CHLORIDE SERPL-SCNC: 108 MMOL/L (ref 98–107)
CHOLEST SERPL-MCNC: 168 MG/DL (ref 0–200)
CO2 SERPL-SCNC: 27.9 MMOL/L (ref 22–29)
CREAT SERPL-MCNC: 1.51 MG/DL (ref 0.57–1)
EGFRCR SERPLBLD CKD-EPI 2021: 34.4 ML/MIN/1.73
GLUCOSE SERPL-MCNC: 103 MG/DL (ref 65–99)
HDLC SERPL-MCNC: 51 MG/DL (ref 40–60)
LDLC SERPL CALC-MCNC: 101 MG/DL (ref 0–100)
LDLC/HDLC SERPL: 1.97 {RATIO}
POTASSIUM SERPL-SCNC: 3.7 MMOL/L (ref 3.5–5.2)
SODIUM SERPL-SCNC: 146 MMOL/L (ref 136–145)
TRIGL SERPL-MCNC: 83 MG/DL (ref 0–150)
VLDLC SERPL-MCNC: 16 MG/DL (ref 5–40)

## 2025-02-18 PROCEDURE — 82043 UR ALBUMIN QUANTITATIVE: CPT

## 2025-02-18 PROCEDURE — 1160F RVW MEDS BY RX/DR IN RCRD: CPT | Performed by: FAMILY MEDICINE

## 2025-02-18 PROCEDURE — G2211 COMPLEX E/M VISIT ADD ON: HCPCS | Performed by: FAMILY MEDICINE

## 2025-02-18 PROCEDURE — 1159F MED LIST DOCD IN RCRD: CPT | Performed by: FAMILY MEDICINE

## 2025-02-18 PROCEDURE — 80048 BASIC METABOLIC PNL TOTAL CA: CPT

## 2025-02-18 PROCEDURE — 80061 LIPID PANEL: CPT

## 2025-02-18 PROCEDURE — 36415 COLL VENOUS BLD VENIPUNCTURE: CPT

## 2025-02-18 PROCEDURE — 3078F DIAST BP <80 MM HG: CPT | Performed by: FAMILY MEDICINE

## 2025-02-18 PROCEDURE — 99214 OFFICE O/P EST MOD 30 MIN: CPT | Performed by: FAMILY MEDICINE

## 2025-02-18 PROCEDURE — 3077F SYST BP >= 140 MM HG: CPT | Performed by: FAMILY MEDICINE

## 2025-02-18 PROCEDURE — 1126F AMNT PAIN NOTED NONE PRSNT: CPT | Performed by: FAMILY MEDICINE

## 2025-02-18 RX ORDER — DAPAGLIFLOZIN 10 MG/1
1 TABLET, FILM COATED ORAL DAILY
Qty: 30 TABLET | Refills: 3 | Status: SHIPPED | OUTPATIENT
Start: 2025-02-18 | End: 2025-02-18

## 2025-02-18 NOTE — PROGRESS NOTES
"Subjective   Cheryl Tomas is a 84 y.o. female.     Hypertension  This is a chronic problem. The current episode started more than 1 year ago. The problem has been worse since onset. The problem is uncontrolled. Pertinent negatives include no anxiety, blurred vision, chest pain, headaches, palpitations or shortness of breath.      She is followed cardiology.  Has appt tomorrow.  Last appt Avapro was increased.  No cp or soa.  Only taking lasix as needed.      She was tried on jardiance when dc from hospital and that caused uti like symptoms.      Ok to dc metformin    Labs from January 8 showed slightly increased creatinine to 1.6 with a GFR decreased to 32.1.      The following portions of the patient's history were reviewed and updated as appropriate: allergies, current medications, past family history, past medical history, past social history, past surgical history, and problem list.    Review of Systems   Eyes:  Negative for blurred vision.   Respiratory:  Negative for shortness of breath.    Cardiovascular:  Negative for chest pain and palpitations.   Neurological:  Negative for headaches.       Objective     Vitals:    02/18/25 0940 02/18/25 1007   BP: (!) 200/60 (!) 190/60   BP Location:  Right arm   Patient Position:  Sitting   Cuff Size:  Adult   Pulse: 52    Resp: 18    Temp: 98 °F (36.7 °C)    TempSrc: Temporal    SpO2: 96%    Weight: 56.7 kg (125 lb)    Height: 162.6 cm (64.02\")        Physical Exam  Constitutional:       General: She is not in acute distress.     Appearance: She is well-developed. She is not diaphoretic.   HENT:      Head: Normocephalic and atraumatic.      Right Ear: External ear normal.      Left Ear: External ear normal.   Eyes:      General: Lids are normal. No scleral icterus.        Right eye: No discharge.         Left eye: No discharge.      Conjunctiva/sclera: Conjunctivae normal.      Pupils: Pupils are equal, round, and reactive to light.   Neck:      Thyroid: No thyroid " mass or thyromegaly.      Vascular: No carotid bruit or JVD.      Trachea: No tracheal deviation.   Cardiovascular:      Rate and Rhythm: Normal rate and regular rhythm.      Heart sounds: Normal heart sounds. No murmur heard.     No friction rub. No gallop.   Pulmonary:      Effort: Pulmonary effort is normal. No respiratory distress.      Breath sounds: Normal breath sounds. No decreased breath sounds, wheezing, rhonchi or rales.   Chest:      Chest wall: No tenderness.   Abdominal:      General: Bowel sounds are normal. There is no distension.      Palpations: Abdomen is soft. There is no mass.      Tenderness: There is no abdominal tenderness. There is no guarding or rebound.      Hernia: No hernia is present.   Musculoskeletal:         General: Normal range of motion.   Lymphadenopathy:      Cervical: No cervical adenopathy.      Upper Body:      Right upper body: No supraclavicular adenopathy.      Left upper body: No supraclavicular adenopathy.   Skin:     Findings: No bruising, erythema or rash.      Nails: There is no clubbing.   Neurological:      Mental Status: She is alert and oriented to person, place, and time.      Cranial Nerves: No cranial nerve deficit.      Deep Tendon Reflexes: Reflexes are normal and symmetric. Reflexes normal.   Psychiatric:         Speech: Speech normal.         Behavior: Behavior normal.         Thought Content: Thought content normal.         Judgment: Judgment normal.         Assessment & Plan     Problem List Items Addressed This Visit          Cardiac and Vasculature    Hyperlipidemia (Chronic)    Relevant Orders    Lipid Panel    Hypertensive heart and chronic kidney disease with heart failure and stage 1 through stage 4 chronic kidney disease, or unspecified chronic kidney disease (Chronic)    Benign essential hypertension (Chronic)    Atrial fibrillation with RVR (Chronic)    Acute on chronic heart failure with preserved ejection fraction (HFpEF)       Endocrine and  Metabolic    Diabetes mellitus - Primary (Chronic)    Relevant Medications    empagliflozin (JARDIANCE) 10 MG tablet tablet    Other Relevant Orders    MicroAlbumin, Urine, Random - Urine, Clean Catch       Neuro    Dementia     She was tried on Jardiance when she was discharged from the hospital and had recurrent urinary symptoms so we may be able to try to get Farxiga covered and see if she tolerates that a little better we will recheck her kidney function today she is not taking Lasix daily recheck of blood pressure shows it still elevated I have reviewed her most recent cardiology note we are going to go ahead and stop the metformin.  She has an appointment to see cardiology tomorrow.  For now we will stop the metformin and see how her point-of-care test glucose levels are at home.  I would like to get her on an SGLT 2 for the cardiovascular and renal protection.

## 2025-02-19 NOTE — PROGRESS NOTES
Notify the patient that her kidney function looks stable.  Cholesterol levels look good we will see her back in 3 months to recheck her hemoglobin A1c.  She was tried on Jardiance in the past and had some urinary symptoms.  We discussed Farxiga but for now we will just stop the metformin and recheck her A1c.

## 2025-03-03 RX ORDER — AMIODARONE HYDROCHLORIDE 200 MG/1
TABLET ORAL
Qty: 90 TABLET | Refills: 3 | Status: SHIPPED | OUTPATIENT
Start: 2025-03-03

## 2025-03-13 NOTE — TELEPHONE ENCOUNTER
Rx Refill Note  Requested Prescriptions     Pending Prescriptions Disp Refills    QUEtiapine (SEROquel) 25 MG tablet [Pharmacy Med Name: QUETIAPINE FUMARATE 25MG TABLET] 30 tablet 1     Sig: TAKE ONE (1) TABLET BY MOUTH AT BEDTIME      Last office visit with prescribing clinician: 2/18/2025   Last telemedicine visit with prescribing clinician: Visit date not found   Next office visit with prescribing clinician: 5/20/2025                         Would you like a call back once the refill request has been completed: [] Yes [] No    If the office needs to give you a call back, can they leave a voicemail: [] Yes [] No    Coco Moralez MA  03/13/25, 08:47 EDT

## 2025-03-17 ENCOUNTER — TELEPHONE (OUTPATIENT)
Dept: CARDIOLOGY | Facility: CLINIC | Age: 85
End: 2025-03-17
Payer: MEDICARE

## 2025-03-17 ENCOUNTER — HOSPITAL ENCOUNTER (INPATIENT)
Facility: HOSPITAL | Age: 85
LOS: 4 days | Discharge: HOME | DRG: 291 | End: 2025-03-21
Attending: EMERGENCY MEDICINE | Admitting: INTERNAL MEDICINE
Payer: MEDICARE

## 2025-03-17 ENCOUNTER — APPOINTMENT (OUTPATIENT)
Dept: GENERAL RADIOLOGY | Facility: HOSPITAL | Age: 85
DRG: 291 | End: 2025-03-17
Payer: MEDICARE

## 2025-03-17 DIAGNOSIS — I50.33 ACUTE ON CHRONIC HEART FAILURE WITH PRESERVED EJECTION FRACTION (HFPEF): ICD-10-CM

## 2025-03-17 DIAGNOSIS — J96.01 ACUTE RESPIRATORY FAILURE WITH HYPOXIA: ICD-10-CM

## 2025-03-17 DIAGNOSIS — I50.9 ACUTE ON CHRONIC CONGESTIVE HEART FAILURE, UNSPECIFIED HEART FAILURE TYPE: Primary | ICD-10-CM

## 2025-03-17 DIAGNOSIS — E87.6 HYPOKALEMIA: ICD-10-CM

## 2025-03-17 DIAGNOSIS — N18.9 CHRONIC KIDNEY DISEASE, UNSPECIFIED CKD STAGE: ICD-10-CM

## 2025-03-17 PROBLEM — R06.01 ORTHOPNEA: Status: RESOLVED | Noted: 2019-10-30 | Resolved: 2025-03-17

## 2025-03-17 PROBLEM — E87.5 SERUM POTASSIUM ELEVATED: Status: RESOLVED | Noted: 2021-11-03 | Resolved: 2025-03-17

## 2025-03-17 PROBLEM — H16.139 ACTINIC KERATITIS: Status: RESOLVED | Noted: 2019-06-05 | Resolved: 2025-03-17

## 2025-03-17 PROBLEM — I48.91 ATRIAL FIBRILLATION WITH RVR: Chronic | Status: RESOLVED | Noted: 2024-01-26 | Resolved: 2025-03-17

## 2025-03-17 PROBLEM — E66.9 OBESITY, UNSPECIFIED: Status: RESOLVED | Noted: 2017-10-12 | Resolved: 2025-03-17

## 2025-03-17 PROBLEM — Z78.0 POSTMENOPAUSAL: Status: RESOLVED | Noted: 2020-09-16 | Resolved: 2025-03-17

## 2025-03-17 PROBLEM — R09.02 HYPOXIA: Status: RESOLVED | Noted: 2024-01-26 | Resolved: 2025-03-17

## 2025-03-17 PROBLEM — E79.0 ELEVATED URIC ACID IN BLOOD: Status: RESOLVED | Noted: 2019-04-22 | Resolved: 2025-03-17

## 2025-03-17 PROBLEM — R55 SYNCOPE AND COLLAPSE: Status: RESOLVED | Noted: 2020-11-10 | Resolved: 2025-03-17

## 2025-03-17 PROBLEM — M25.471 ANKLE EDEMA, BILATERAL: Status: RESOLVED | Noted: 2019-10-30 | Resolved: 2025-03-17

## 2025-03-17 PROBLEM — I83.812 VARICOSE VEINS OF LEFT LOWER EXTREMITY WITH PAIN: Status: RESOLVED | Noted: 2021-04-14 | Resolved: 2025-03-17

## 2025-03-17 PROBLEM — R07.89 CHEST WALL DISCOMFORT: Status: RESOLVED | Noted: 2018-08-07 | Resolved: 2025-03-17

## 2025-03-17 PROBLEM — G44.201 ACUTE INTRACTABLE TENSION-TYPE HEADACHE: Status: RESOLVED | Noted: 2019-06-05 | Resolved: 2025-03-17

## 2025-03-17 PROBLEM — Z79.899 HIGH RISK MEDICATION USE: Status: RESOLVED | Noted: 2017-12-29 | Resolved: 2025-03-17

## 2025-03-17 PROBLEM — Z12.11 COLON CANCER SCREENING: Status: RESOLVED | Noted: 2019-04-22 | Resolved: 2025-03-17

## 2025-03-17 PROBLEM — Z00.00 MEDICARE ANNUAL WELLNESS VISIT, SUBSEQUENT: Status: RESOLVED | Noted: 2021-11-15 | Resolved: 2025-03-17

## 2025-03-17 PROBLEM — Z79.899 ENCOUNTER FOR LONG-TERM (CURRENT) USE OF OTHER MEDICATIONS: Status: RESOLVED | Noted: 2020-11-10 | Resolved: 2025-03-17

## 2025-03-17 PROBLEM — I10 ESSENTIAL HYPERTENSION: Status: RESOLVED | Noted: 2021-10-18 | Resolved: 2025-03-17

## 2025-03-17 PROBLEM — G47.00 INSOMNIA: Status: RESOLVED | Noted: 2020-11-10 | Resolved: 2025-03-17

## 2025-03-17 PROBLEM — R53.1 WEAKNESS: Status: RESOLVED | Noted: 2017-12-29 | Resolved: 2025-03-17

## 2025-03-17 PROBLEM — IMO0002 SPRAIN AND STRAIN OF KNEE AND LEG: Status: RESOLVED | Noted: 2020-11-10 | Resolved: 2025-03-17

## 2025-03-17 PROBLEM — I47.10 PSVT (PAROXYSMAL SUPRAVENTRICULAR TACHYCARDIA): Status: RESOLVED | Noted: 2020-10-19 | Resolved: 2025-03-17

## 2025-03-17 PROBLEM — R60.0 LEG EDEMA: Status: RESOLVED | Noted: 2023-05-03 | Resolved: 2025-03-17

## 2025-03-17 PROBLEM — E78.2 MIXED HYPERLIPIDEMIA: Status: RESOLVED | Noted: 2017-10-12 | Resolved: 2025-03-17

## 2025-03-17 PROBLEM — J40 BRONCHITIS: Status: RESOLVED | Noted: 2020-11-10 | Resolved: 2025-03-17

## 2025-03-17 PROBLEM — M25.531 RIGHT WRIST PAIN: Status: RESOLVED | Noted: 2019-02-26 | Resolved: 2025-03-17

## 2025-03-17 PROBLEM — Z00.00 MEDICARE ANNUAL WELLNESS VISIT, INITIAL: Status: RESOLVED | Noted: 2018-06-27 | Resolved: 2025-03-17

## 2025-03-17 PROBLEM — R42 DIZZINESS: Status: RESOLVED | Noted: 2021-11-03 | Resolved: 2025-03-17

## 2025-03-17 PROBLEM — M25.472 ANKLE EDEMA, BILATERAL: Status: RESOLVED | Noted: 2019-10-30 | Resolved: 2025-03-17

## 2025-03-17 PROBLEM — R60.0 EDEMA OF LOWER EXTREMITY: Status: RESOLVED | Noted: 2019-11-04 | Resolved: 2025-03-17

## 2025-03-17 PROBLEM — Z12.39 BREAST CANCER SCREENING: Status: RESOLVED | Noted: 2019-04-22 | Resolved: 2025-03-17

## 2025-03-17 PROBLEM — Z91.81 AT HIGH RISK FOR FALLS: Status: RESOLVED | Noted: 2022-11-30 | Resolved: 2025-03-17

## 2025-03-17 PROBLEM — I99.8 OTHER SPECIFIED CIRCULATORY SYSTEM DISORDERS: Status: RESOLVED | Noted: 2020-11-10 | Resolved: 2025-03-17

## 2025-03-17 PROBLEM — Z23 NEED FOR IMMUNIZATION AGAINST INFLUENZA: Status: RESOLVED | Noted: 2018-10-01 | Resolved: 2025-03-17

## 2025-03-17 LAB
ALBUMIN SERPL-MCNC: 4 G/DL (ref 3.5–5.2)
ALBUMIN/GLOB SERPL: 2 G/DL
ALP SERPL-CCNC: 96 U/L (ref 39–117)
ALT SERPL W P-5'-P-CCNC: 40 U/L (ref 1–33)
ANION GAP SERPL CALCULATED.3IONS-SCNC: 12 MMOL/L (ref 5–15)
AST SERPL-CCNC: 26 U/L (ref 1–32)
BASOPHILS # BLD AUTO: 0.03 10*3/MM3 (ref 0–0.2)
BASOPHILS NFR BLD AUTO: 0.5 % (ref 0–1.5)
BILIRUB SERPL-MCNC: 0.5 MG/DL (ref 0–1.2)
BUN SERPL-MCNC: 26 MG/DL (ref 8–23)
BUN/CREAT SERPL: 16.8 (ref 7–25)
CALCIUM SPEC-SCNC: 9.2 MG/DL (ref 8.6–10.5)
CHLORIDE SERPL-SCNC: 106 MMOL/L (ref 98–107)
CO2 SERPL-SCNC: 28 MMOL/L (ref 22–29)
CREAT SERPL-MCNC: 1.55 MG/DL (ref 0.57–1)
DEPRECATED RDW RBC AUTO: 48.9 FL (ref 37–54)
EGFRCR SERPLBLD CKD-EPI 2021: 33.3 ML/MIN/1.73
EOSINOPHIL # BLD AUTO: 0.12 10*3/MM3 (ref 0–0.4)
EOSINOPHIL NFR BLD AUTO: 2 % (ref 0.3–6.2)
ERYTHROCYTE [DISTWIDTH] IN BLOOD BY AUTOMATED COUNT: 15.2 % (ref 12.3–15.4)
FERRITIN SERPL-MCNC: 20.05 NG/ML (ref 13–150)
GEN 5 1HR TROPONIN T REFLEX: 31 NG/L
GLOBULIN UR ELPH-MCNC: 2 GM/DL
GLUCOSE SERPL-MCNC: 174 MG/DL (ref 65–99)
HCT VFR BLD AUTO: 34.6 % (ref 34–46.6)
HGB BLD-MCNC: 10.6 G/DL (ref 12–15.9)
HOLD SPECIMEN: NORMAL
IMM GRANULOCYTES # BLD AUTO: 0.01 10*3/MM3 (ref 0–0.05)
IMM GRANULOCYTES NFR BLD AUTO: 0.2 % (ref 0–0.5)
IRON 24H UR-MRATE: 21 MCG/DL (ref 37–145)
IRON SATN MFR SERPL: 5 % (ref 20–50)
LYMPHOCYTES # BLD AUTO: 0.88 10*3/MM3 (ref 0.7–3.1)
LYMPHOCYTES NFR BLD AUTO: 15 % (ref 19.6–45.3)
MAGNESIUM SERPL-MCNC: 2 MG/DL (ref 1.6–2.4)
MCH RBC QN AUTO: 26.8 PG (ref 26.6–33)
MCHC RBC AUTO-ENTMCNC: 30.6 G/DL (ref 31.5–35.7)
MCV RBC AUTO: 87.4 FL (ref 79–97)
MONOCYTES # BLD AUTO: 0.4 10*3/MM3 (ref 0.1–0.9)
MONOCYTES NFR BLD AUTO: 6.8 % (ref 5–12)
NEUTROPHILS NFR BLD AUTO: 4.43 10*3/MM3 (ref 1.7–7)
NEUTROPHILS NFR BLD AUTO: 75.5 % (ref 42.7–76)
NRBC BLD AUTO-RTO: 0 /100 WBC (ref 0–0.2)
NT-PROBNP SERPL-MCNC: ABNORMAL PG/ML (ref 0–1800)
PLATELET # BLD AUTO: 197 10*3/MM3 (ref 140–450)
PMV BLD AUTO: 10.9 FL (ref 6–12)
POTASSIUM SERPL-SCNC: 3.1 MMOL/L (ref 3.5–5.2)
PROT SERPL-MCNC: 6 G/DL (ref 6–8.5)
RBC # BLD AUTO: 3.96 10*6/MM3 (ref 3.77–5.28)
SODIUM SERPL-SCNC: 146 MMOL/L (ref 136–145)
TIBC SERPL-MCNC: 451 MCG/DL (ref 298–536)
TRANSFERRIN SERPL-MCNC: 303 MG/DL (ref 200–360)
TROPONIN T % DELTA: -9
TROPONIN T NUMERIC DELTA: -3 NG/L
TROPONIN T SERPL HS-MCNC: 34 NG/L
TSH SERPL DL<=0.05 MIU/L-ACNC: 1.87 UIU/ML (ref 0.27–4.2)
WBC NRBC COR # BLD AUTO: 5.87 10*3/MM3 (ref 3.4–10.8)
WHOLE BLOOD HOLD COAG: NORMAL
WHOLE BLOOD HOLD SPECIMEN: NORMAL

## 2025-03-17 PROCEDURE — 25010000002 NITROGLYCERIN 200 MCG/ML SOLUTION: Performed by: PHYSICIAN ASSISTANT

## 2025-03-17 PROCEDURE — 99222 1ST HOSP IP/OBS MODERATE 55: CPT | Performed by: INTERNAL MEDICINE

## 2025-03-17 PROCEDURE — 84443 ASSAY THYROID STIM HORMONE: CPT | Performed by: INTERNAL MEDICINE

## 2025-03-17 PROCEDURE — 99285 EMERGENCY DEPT VISIT HI MDM: CPT

## 2025-03-17 PROCEDURE — 85025 COMPLETE CBC W/AUTO DIFF WBC: CPT | Performed by: EMERGENCY MEDICINE

## 2025-03-17 PROCEDURE — 82728 ASSAY OF FERRITIN: CPT | Performed by: INTERNAL MEDICINE

## 2025-03-17 PROCEDURE — 93005 ELECTROCARDIOGRAM TRACING: CPT | Performed by: EMERGENCY MEDICINE

## 2025-03-17 PROCEDURE — 84466 ASSAY OF TRANSFERRIN: CPT | Performed by: INTERNAL MEDICINE

## 2025-03-17 PROCEDURE — 84484 ASSAY OF TROPONIN QUANT: CPT | Performed by: EMERGENCY MEDICINE

## 2025-03-17 PROCEDURE — 36415 COLL VENOUS BLD VENIPUNCTURE: CPT

## 2025-03-17 PROCEDURE — 82607 VITAMIN B-12: CPT | Performed by: INTERNAL MEDICINE

## 2025-03-17 PROCEDURE — 71045 X-RAY EXAM CHEST 1 VIEW: CPT

## 2025-03-17 PROCEDURE — 80053 COMPREHEN METABOLIC PANEL: CPT | Performed by: EMERGENCY MEDICINE

## 2025-03-17 PROCEDURE — 99223 1ST HOSP IP/OBS HIGH 75: CPT | Performed by: INTERNAL MEDICINE

## 2025-03-17 PROCEDURE — 25010000002 NICARDIPINE 2.5 MG/ML SOLUTION 10 ML VIAL: Performed by: INTERNAL MEDICINE

## 2025-03-17 PROCEDURE — 25010000002 FUROSEMIDE PER 20 MG: Performed by: PHYSICIAN ASSISTANT

## 2025-03-17 PROCEDURE — 82746 ASSAY OF FOLIC ACID SERUM: CPT | Performed by: INTERNAL MEDICINE

## 2025-03-17 PROCEDURE — 83540 ASSAY OF IRON: CPT | Performed by: INTERNAL MEDICINE

## 2025-03-17 PROCEDURE — 83735 ASSAY OF MAGNESIUM: CPT | Performed by: PHYSICIAN ASSISTANT

## 2025-03-17 PROCEDURE — 83880 ASSAY OF NATRIURETIC PEPTIDE: CPT | Performed by: EMERGENCY MEDICINE

## 2025-03-17 PROCEDURE — 25810000003 SODIUM CHLORIDE 0.9 % SOLUTION 250 ML FLEX CONT: Performed by: INTERNAL MEDICINE

## 2025-03-17 RX ORDER — FUROSEMIDE 10 MG/ML
80 INJECTION INTRAMUSCULAR; INTRAVENOUS ONCE
Status: COMPLETED | OUTPATIENT
Start: 2025-03-17 | End: 2025-03-17

## 2025-03-17 RX ORDER — NITROGLYCERIN 20 MG/100ML
5-200 INJECTION INTRAVENOUS
Status: DISCONTINUED | OUTPATIENT
Start: 2025-03-17 | End: 2025-03-17

## 2025-03-17 RX ORDER — CHOLECALCIFEROL (VITAMIN D3) 25 MCG
1000 TABLET ORAL DAILY
Status: DISCONTINUED | OUTPATIENT
Start: 2025-03-17 | End: 2025-03-21 | Stop reason: HOSPADM

## 2025-03-17 RX ORDER — SODIUM CHLORIDE 0.9 % (FLUSH) 0.9 %
10 SYRINGE (ML) INJECTION AS NEEDED
Status: DISCONTINUED | OUTPATIENT
Start: 2025-03-17 | End: 2025-03-21 | Stop reason: HOSPADM

## 2025-03-17 RX ORDER — QUETIAPINE FUMARATE 25 MG/1
25 TABLET, FILM COATED ORAL NIGHTLY
Qty: 30 TABLET | Refills: 1 | Status: SHIPPED | OUTPATIENT
Start: 2025-03-17

## 2025-03-17 RX ORDER — POTASSIUM CHLORIDE 1500 MG/1
40 TABLET, EXTENDED RELEASE ORAL ONCE
Status: COMPLETED | OUTPATIENT
Start: 2025-03-17 | End: 2025-03-17

## 2025-03-17 RX ORDER — CHLORTHALIDONE 25 MG/1
25 TABLET ORAL DAILY
Qty: 30 TABLET | Refills: 5 | Status: SHIPPED | OUTPATIENT
Start: 2025-03-17

## 2025-03-17 RX ORDER — LOSARTAN POTASSIUM 50 MG/1
100 TABLET ORAL NIGHTLY
Status: DISCONTINUED | OUTPATIENT
Start: 2025-03-17 | End: 2025-03-17

## 2025-03-17 RX ORDER — PRAVASTATIN SODIUM 40 MG
40 TABLET ORAL NIGHTLY
Status: DISCONTINUED | OUTPATIENT
Start: 2025-03-17 | End: 2025-03-21 | Stop reason: HOSPADM

## 2025-03-17 RX ORDER — CARVEDILOL 12.5 MG/1
25 TABLET ORAL 2 TIMES DAILY WITH MEALS
Status: DISCONTINUED | OUTPATIENT
Start: 2025-03-17 | End: 2025-03-20

## 2025-03-17 RX ORDER — AMIODARONE HYDROCHLORIDE 200 MG/1
200 TABLET ORAL DAILY
Status: DISCONTINUED | OUTPATIENT
Start: 2025-03-18 | End: 2025-03-20

## 2025-03-17 RX ORDER — LOSARTAN POTASSIUM 50 MG/1
100 TABLET ORAL NIGHTLY
Status: DISCONTINUED | OUTPATIENT
Start: 2025-03-17 | End: 2025-03-21 | Stop reason: HOSPADM

## 2025-03-17 RX ORDER — FUROSEMIDE 10 MG/ML
40 INJECTION INTRAMUSCULAR; INTRAVENOUS
Status: DISCONTINUED | OUTPATIENT
Start: 2025-03-18 | End: 2025-03-20

## 2025-03-17 RX ORDER — ALLOPURINOL 100 MG/1
50 TABLET ORAL DAILY
Status: DISCONTINUED | OUTPATIENT
Start: 2025-03-17 | End: 2025-03-21 | Stop reason: HOSPADM

## 2025-03-17 RX ORDER — QUETIAPINE FUMARATE 25 MG/1
25 TABLET, FILM COATED ORAL NIGHTLY
Status: DISCONTINUED | OUTPATIENT
Start: 2025-03-17 | End: 2025-03-21 | Stop reason: HOSPADM

## 2025-03-17 RX ORDER — NIFEDIPINE 60 MG/1
60 TABLET, EXTENDED RELEASE ORAL
Status: DISCONTINUED | OUTPATIENT
Start: 2025-03-17 | End: 2025-03-20

## 2025-03-17 RX ADMIN — NITROGLYCERIN 5 MCG/MIN: 20 INJECTION INTRAVENOUS at 15:35

## 2025-03-17 RX ADMIN — APIXABAN 2.5 MG: 2.5 TABLET, FILM COATED ORAL at 21:03

## 2025-03-17 RX ADMIN — SODIUM CHLORIDE 5 MG/HR: 9 INJECTION, SOLUTION INTRAVENOUS at 19:25

## 2025-03-17 RX ADMIN — QUETIAPINE FUMARATE 25 MG: 25 TABLET ORAL at 21:03

## 2025-03-17 RX ADMIN — POTASSIUM CHLORIDE 40 MEQ: 1500 TABLET, EXTENDED RELEASE ORAL at 14:27

## 2025-03-17 RX ADMIN — PRAVASTATIN SODIUM 40 MG: 40 TABLET ORAL at 21:02

## 2025-03-17 RX ADMIN — LOSARTAN POTASSIUM 100 MG: 50 TABLET, FILM COATED ORAL at 19:25

## 2025-03-17 RX ADMIN — NITROGLYCERIN 1 INCH: 20 OINTMENT TOPICAL at 15:06

## 2025-03-17 RX ADMIN — Medication 1000 UNITS: at 21:02

## 2025-03-17 RX ADMIN — FUROSEMIDE 80 MG: 10 INJECTION, SOLUTION INTRAMUSCULAR; INTRAVENOUS at 12:28

## 2025-03-17 RX ADMIN — ALLOPURINOL 50 MG: 100 TABLET ORAL at 21:02

## 2025-03-17 NOTE — TELEPHONE ENCOUNTER
Caller: [facility]    Relationship to patient: Emergency Contact    Best call back number: [phone]     Chief complaint: SWELLING IN THE LOWER EXTREMITIES    Type of visit: F/U APPT    Requested date: NEXT AVAILABLE       Additional notes:PLEASE CONTACT PATIENT WITH A SUITABLE DATE.

## 2025-03-17 NOTE — TELEPHONE ENCOUNTER
"Spoke to pt daughter Levi, who is on HIPAA. She states that for the last couple of weeks, pts ankles have been \"swelling over her shoes.\" She states that she has been taking the Furosemide pretty much daily instead of as needed. She said pt is not really elevating her legs at all, and pt has been very sedentary too. She also states that pt blood pressure is still staying very high. Pt had appt with pcp on 2/18/25 and blood pressure at that visit was 190/60.    I have sent a message to the  to call with appt for pt sooner than the June appt. Pt missed appt in February due to weather.    Dr. Epperson, is there anything you would like pt to do until she gets in to see you?      "

## 2025-03-17 NOTE — CONSULTS
[facility] Cardiology  Initial Consult Note      Patient Identification:  Cheryl Tomas        84 y.o.        female  1940  [id number]       Date of Consultation:  03/17/25    Reason for Consultation:  CHF Exacerbation    PCP: Marian Perea MD  Primary cardiologist: Jani Epperson MD   Referring physician: Josemanuel Celeste DO    History of Present Illness:     Cheryl Tomas is a 84 y.o. with a history of CKD, difficult to control BP, HFpEF, and PAF who presented to the ED today with worsened leg swelling for a period of weeks.  I had last seen the patient in November with titration of her ARB.  However, she continues to have uncontrolled blood pressure on her home monitor as well as at her PCP office on 2/18 with a blood pressure of 190/60.  No medication changes were made at this appointment seeingly because she was due to see me in the office on 2/19 however that had to be canceled due to weather.  She called the office this morning with complaints of uncontrolled blood pressure as well as persistent swelling despite taking daily Lasix.    Ms. Christiansen also reports having weight gain from about 117 pounds 230 pounds over multiple weeks.  She also has new orthopnea.    Past History:  Past Medical History:   Diagnosis Date    Bilateral renal cysts     noted CT abdomen 12/31/22    Diabetes mellitus     Diverticulosis     Hyperlipidemia     Hypertension     Medicare annual wellness visit, subsequent 11/15/2021    Osteoporosis     Ovarian cyst     noted on CT abdomen 12/31/22 (left ovary)    Pulmonary nodule less than 6 mm determined by computed tomography of lung     Noted on CT Abdomen from ER visit 12/31/22 (right lung)     Past Surgical History:   Procedure Laterality Date    APPENDECTOMY      BREAST BIOPSY      CATARACT EXTRACTION, BILATERAL      02/23 and 03/23    FIBULA FRACTURE SURGERY      HYSTERECTOMY      KIDNEY STONE SURGERY       Allergies   Allergen  Reactions    Cephalexin Itching and Rash    Bactrim [Sulfamethoxazole-Trimethoprim] GI Intolerance     Social History     Socioeconomic History    Marital status:    Tobacco Use    Smoking status: Never     Passive exposure: Never    Smokeless tobacco: Never   Vaping Use    Vaping status: Never Used    Passive vaping exposure: Yes   Substance and Sexual Activity    Alcohol use: No    Drug use: No    Sexual activity: Defer     Family History   Problem Relation Age of Onset    Heart disease Mother     Hypertension Mother     Breast cancer Mother         70's    Heart disease Father     Stroke Father     Breast cancer Other         great aunt     Medications:  (Not in a hospital admission)    Current medications:  potassium chloride ER, 40 mEq, Oral, Once      Current IV drips:       Review of Systems   Constitutional: Positive for malaise/fatigue. Negative for decreased appetite.   Cardiovascular:  Positive for dyspnea on exertion, leg swelling and orthopnea. Negative for chest pain.   Respiratory:  Positive for shortness of breath.        Physical exam:  Temp:  [98.9 °F (37.2 °C)] 98.9 °F (37.2 °C)  Heart Rate:  [43-50] 50  Resp:  [16] 16  BP: (192-226)/(72-80) 192/72  Flow (L/min) (Oxygen Therapy):  [2] 2  Body mass index is 22.31 kg/m².    Gen: well developed, lying in bed, comfortable appearing  HEENT: MMM, sclerae anicteric, conjunctivae normal, elevated JVD  CV: bradycardia, regular rhythm, II/VI systolic murmur at apex, II/IV diastolic murmur at LLSB, normal S1, S2. 2+ radial and DP pulses  Pulm: Cannula oxygen, normal work of breathing, mild basilar rales  Abd: soft, nontender, nondistended  Ext: normal bulk for age, normal tone, 1+ dependent edema  Neuro: alert, oriented, face symmetrical, moving all extremities well  Psych: normal mood, appropriate affect    Cardiac Testing:    ECG  (personally reviewed) SB, bifascicular block    Telemetry:  (personally reviewed) SB    ECHO:   Results for orders  placed during the hospital encounter of 01/26/24    Adult Transesophageal Echo (REGINE) W/ Cont if Necessary Per Protocol (Cardiology Department)    Interpretation Summary    LVEF 55%    RV normal in size and function    Aortic valve thickened and calcified.  Vena contracta .32 cm and  consistent with moderate AI    Mitral valve has mild MAC and mild MR    Pulmonic valve and tricuspid valve not well assessed.    No left atrial appendage thrombus appreciated on multiple views with normal velocities    Moderate pericardial effusion.  no RV diastolic collapse.    IVC dilated but collapses well.  RA pressure estimated 8 mmHg    Lab Review:    Lab Results   Component Value Date    WBC 5.87 03/17/2025    HGB 10.6 (L) 03/17/2025    HCT 34.6 03/17/2025    MCV 87.4 03/17/2025     03/17/2025     Lab Results   Component Value Date    GLUCOSE 174 (H) 03/17/2025    BUN 26 (H) 03/17/2025    CREATININE 1.55 (H) 03/17/2025     (H) 03/17/2025    K 3.1 (L) 03/17/2025     03/17/2025    CALCIUM 9.2 03/17/2025    PROTEINTOT 6.0 03/17/2025    ALBUMIN 4.0 03/17/2025    ALT 40 (H) 03/17/2025    AST 26 03/17/2025    ALKPHOS 96 03/17/2025    BILITOT 0.5 03/17/2025    GLOB 2.0 03/17/2025    AGRATIO 2.0 03/17/2025    BCR 16.8 03/17/2025    ANIONGAP 12.0 03/17/2025    EGFR 33.3 (L) 03/17/2025      Lab Results   Component Value Date    TROPONINT 34 (H) 03/17/2025    TROPONINT 16 (H) 01/26/2024    TROPONINT 19 (H) 01/26/2024     Lab Results   Component Value Date    PROBNP 10,701.0 (H) 03/17/2025     Lab Results   Component Value Date    HGBA1C 5.7 01/08/2025      Lab Results   Component Value Date    CHOL 168 02/18/2025    CHLPL 168 11/30/2022    TRIG 83 02/18/2025    HDL 51 02/18/2025     (H) 02/18/2025       Imaging:  All pertinent imaging studies were personally reviewed.    Assessment & Plan    Hypertension    Acute on chronic diastolic CHF (congestive heart failure)    hCeryl Tomas is a 84 y.o. with a  history of difficult to control hypertension, HFpEF, aortic regurgitation, PAF who presents to the emergency department with increased leg swelling, weight gain, and orthopnea concerning for exacerbation of her HFpEF.  Additionally, she has had ongoing issues with uncontrolled blood pressure.    Acute on Chronic heart failure preserved ejection fraction   - ARB, empagliflozin   - IV furosemide given in the emergency department     Primary hypertension /hypertensive urgency   - Continue home medication regimen, monitor blood pressure with aggressive diuretics   - recheck renal artery duplex   - add nifedipine     Moderate AR   -Repeat echocardiogram      PAF   - Sinus bradycardia   - Continue anticoagulation with apixaban 2.5 mg twice daily   - Continue carvedilol and amiodarone    Palpitations / PSVT / AT  Bifascicular block   - BB, amio as above     Thank you for allowing us to share in the care of Cheryl Tomas, cardiology will follow while admitted      DUARTE Epperson MD  03/17/25   14:26 EDT

## 2025-03-17 NOTE — ED NOTES
Cheryl Tomas    Nursing Report ED to Floor:  Mental status: A/Ox4  Ambulatory status: Standby  Oxygen Therapy:  2L NC (Baseline no oxygen requirements)   Cardiac Rhythm: Sinus elva   Admitted from: [facility]   Safety Concerns:  NA  Precautions: NA  Social Issues: NA  ED Room #:  16    ED Nurse Phone Extension - [phone] or may call [phone].      HPI:   Chief Complaint   Patient presents with    Shortness of Breath       Past Medical History:  Past Medical History:   Diagnosis Date    Bilateral renal cysts     noted CT abdomen 12/31/22    Diabetes mellitus     Diverticulosis     Hyperlipidemia     Hypertension     Medicare annual wellness visit, subsequent 11/15/2021    Osteoporosis     Ovarian cyst     noted on CT abdomen 12/31/22 (left ovary)    Pulmonary nodule less than 6 mm determined by computed tomography of lung     Noted on CT Abdomen from ER visit 12/31/22 (right lung)        Past Surgical History:  Past Surgical History:   Procedure Laterality Date    APPENDECTOMY      BREAST BIOPSY      CATARACT EXTRACTION, BILATERAL      02/23 and 03/23    FIBULA FRACTURE SURGERY      HYSTERECTOMY      KIDNEY STONE SURGERY          Admitting Doctor:   Suzie Gould MD    Consulting Provider(s):  Consults       Date and Time Order Name Status Description    3/17/2025  2:12 PM Inpatient Cardiology Consult Completed              Admitting Diagnosis:   The primary encounter diagnosis was Acute on chronic congestive heart failure, unspecified heart failure type. Diagnoses of Hypokalemia, Chronic kidney disease, unspecified CKD stage, and Acute respiratory failure with hypoxia were also pertinent to this visit.    Most Recent Vitals:   Vitals:    03/17/25 1409 03/17/25 1429 03/17/25 1541 03/17/25 1601   BP:  (!) 221/77 (!) 215/78 (!) 215/78   Pulse:  (!) 46 (!) 48 55   Resp:       Temp:       TempSrc:       SpO2: 92% 97% 98%    Weight:       Height:           Active LDAs/IV Access:   Lines, Drains & Airways        Active LDAs       Name Placement date Placement time Site Days    Peripheral IV 03/17/25 1206 Right;Posterior Forearm 03/17/25  1206  Forearm  less than 1                    Labs (abnormal labs have a star):   Labs Reviewed   COMPREHENSIVE METABOLIC PANEL - Abnormal; Notable for the following components:       Result Value    Glucose 174 (*)     BUN 26 (*)     Creatinine 1.55 (*)     Sodium 146 (*)     Potassium 3.1 (*)     ALT (SGPT) 40 (*)     eGFR 33.3 (*)     All other components within normal limits    Narrative:     GFR Categories in Chronic Kidney Disease (CKD)      GFR Category          GFR (mL/min/1.73)    Interpretation  G1                     90 or greater         Normal or high (1)  G2                      60-89                Mild decrease (1)  G3a                   45-59                Mild to moderate decrease  G3b                   30-44                Moderate to severe decrease  G4                    15-29                Severe decrease  G5                    14 or less           Kidney failure          (1)In the absence of evidence of kidney disease, neither GFR category G1 or G2 fulfill the criteria for CKD.    eGFR calculation 2021 CKD-EPI creatinine equation, which does not include race as a factor   BNP (IN-HOUSE) - Abnormal; Notable for the following components:    proBNP 10,701.0 (*)     All other components within normal limits    Narrative:     This assay is used as an aid in the diagnosis of individuals suspected of having heart failure. It can be used as an aid in the diagnosis of acute decompensated heart failure (ADHF) in patients presenting with signs and symptoms of ADHF to the emergency department (ED). In addition, NT-proBNP of <300 pg/mL indicates ADHF is not likely.    Age Range Result Interpretation  NT-proBNP Concentration (pg/mL:      <50             Positive            >450                   Gray                 300-450                    Negative              <300    50-75           Positive            >900                  Gray                300-900                  Negative            <300      >75             Positive            >1800                  Gray                300-1800                  Negative            <300   TROPONIN - Abnormal; Notable for the following components:    HS Troponin T 34 (*)     All other components within normal limits    Narrative:     High Sensitive Troponin T Reference Range:  <14.0 ng/L- Negative Female for AMI  <22.0 ng/L- Negative Male for AMI  >=14 - Abnormal Female indicating possible myocardial injury.  >=22 - Abnormal Male indicating possible myocardial injury.   Clinicians would have to utilize clinical acumen, EKG, Troponin, and serial changes to determine if it is an Acute Myocardial Infarction or myocardial injury due to an underlying chronic condition.        CBC WITH AUTO DIFFERENTIAL - Abnormal; Notable for the following components:    Hemoglobin 10.6 (*)     MCHC 30.6 (*)     Lymphocyte % 15.0 (*)     All other components within normal limits   HIGH SENSITIVITIY TROPONIN T 1HR - Abnormal; Notable for the following components:    HS Troponin T 31 (*)     All other components within normal limits    Narrative:     High Sensitive Troponin T Reference Range:  <14.0 ng/L- Negative Female for AMI  <22.0 ng/L- Negative Male for AMI  >=14 - Abnormal Female indicating possible myocardial injury.  >=22 - Abnormal Male indicating possible myocardial injury.   Clinicians would have to utilize clinical acumen, EKG, Troponin, and serial changes to determine if it is an Acute Myocardial Infarction or myocardial injury due to an underlying chronic condition.        MAGNESIUM - Normal   RAINBOW DRAW    Narrative:     The following orders were created for panel order [place] Draw.  Procedure                               Abnormality         Status                     ---------                               -----------         ------                      Green Top (Gel)[[id number]]                                  Final result               Lavender Top[[id number]]                                     Final result               Gold Top - SST[[id number]]                                   Final result               Ernandez Top[[id number]]                                         Final result               Light Blue Top[[id number]]                                   Final result                 Please view results for these tests on the individual orders.   TSH   CBC AND DIFFERENTIAL    Narrative:     The following orders were created for panel order CBC & Differential.  Procedure                               Abnormality         Status                     ---------                               -----------         ------                     CBC Auto Differential[[id number]]        Abnormal            Final result                 Please view results for these tests on the individual orders.   GREEN TOP   LAVENDER TOP   GOLD TOP - SST   GRAY TOP   LIGHT BLUE TOP       Meds Given in ED:   Medications   sodium chloride 0.9 % flush 10 mL (has no administration in time range)   Potassium Replacement - Follow Nurse / BPA Driven Protocol (has no administration in time range)   Magnesium Standard Dose Replacement - Follow Nurse / BPA Driven Protocol (has no administration in time range)   nitroglycerin (TRIDIL) 200 mcg/ml infusion (10 mcg/min Intravenous Rate/Dose Change 3/17/25 1601)   furosemide (LASIX) injection 80 mg (80 mg Intravenous Given 3/17/25 1228)   potassium chloride (KLOR-CON M20) CR tablet 40 mEq (40 mEq Oral Given 3/17/25 1427)   nitroglycerin (NITROSTAT) ointment 1 inch (1 inch Topical Given 3/17/25 1506)     nitroglycerin, 5-200 mcg/min, Last Rate: 10 mcg/min (03/17/25 1601)         Last NIH score:                                                          Dysphagia screening results:  Patient Factors Component (Dysphagia:Stroke or Rule-out)  Best Eye  Response: 4-->(E4) spontaneous (03/17/25 1155)  Best Motor Response: 6-->(M6) obeys commands (03/17/25 1155)  Best Verbal Response: 5-->(V5) oriented (03/17/25 1155)  [place] Coma Scale Score: 15 (03/17/25 1155)     [place] Coma Scale:  No data recorded     CIWA:        Restraint Type:            Isolation Status:  No active isolations

## 2025-03-17 NOTE — CASE MANAGEMENT/SOCIAL WORK
Discharge Planning Assessment  [facility]     Patient Name: Cheryl Tomas  MRN: [id number]  Today's Date: 3/17/2025    Admit Date: 3/17/2025    Plan: IDP   Discharge Needs Assessment       Row Name 03/17/25 1416       Living Environment    People in Home facility resident    Current Living Arrangements independent [facility]    Primary Care Provided by self    Quality of Family Relationships involved    Able to Return to Prior Arrangements yes       Transition Planning    Patient/Family Anticipates Transition to home    Transportation Anticipated family or friend will provide       Discharge Needs Assessment    Readmission Within the Last 30 Days no previous admission in last 30 days    Equipment Currently Used at Home none    Concerns to be Addressed denies needs/concerns at this time                   Discharge Plan       Row Name 03/17/25 1417       Plan    Plan IDP    Plan Comments MSW met with Pt at bedside to complete IDP. Pt is resident at [facility]. Pt’s PCP is Dr. Perea and Pt has [employer] Medicare insurance coverage. Pt independent at baseline; Pt has no DME, HH, or home O2. Pt denied needs or concerns. CM will continue to follow.                       Demographic Summary       Row Name 03/17/25 1416       General Information    Arrived From home    Referral Source admission list;emergency department    Reason for Consult discharge planning    Preferred Language English                   Functional Status       Row Name 03/17/25 1416       Functional Status    Usual Activity Tolerance good    Current Activity Tolerance good       Functional Status, IADL    Medications independent    Meal Preparation independent    Housekeeping independent    Laundry independent    Shopping independent                               MAURY Diop

## 2025-03-17 NOTE — H&P
[facility] Medicine Services  HISTORY AND PHYSICAL    Patient Name: Cheryl Tomas  : 1940  MRN: [id number]  Primary Care Physician: Marian Perea MD    Subjective   Subjective     Chief Complaint:    HPI:  Cheryl Tomas is a 84 y.o. female who  has a past medical history of Bilateral renal cysts, Diabetes mellitus, Diverticulosis, Hyperlipidemia, Hypertension, atrial fibrillation, osteoporosis, and Pulmonary nodule who presented to the ED with leg edema worse over the last few weeks, as well as elevated BP at home and increase in weight.    In the ED her BP was elevated to 226/80 and she required 2 LPM of oxygen. She was seen by cardiology who recommend admission by hospitalist.     Patient reports chest increasing edema in her feet, about a 5-10 pound weight gain, she denies any chest pain heaviness or tightness.  She has had significant shortness of breath making it impossible to sleep through the night.  She reports compliance with her medications    Personal History         PMH: She  has a past medical history of Bilateral renal cysts, Diabetes mellitus, Diverticulosis, Hyperlipidemia, Hypertension, Medicare annual wellness visit, subsequent (11/15/2021), Osteoporosis, Ovarian cyst, and Pulmonary nodule less than 6 mm determined by computed tomography of lung.   PSxH: She  has a past surgical history that includes Appendectomy; Hysterectomy; Fibula Fracture Surgery; Kidney stone surgery; Breast biopsy; and Cataract extraction, bilateral.         FH: Her family history includes Breast cancer in her mother and another family member; Heart disease in her father and mother; Hypertension in her mother; Stroke in her father.   SH: She  reports that she has never smoked. She has never been exposed to tobacco smoke. She has never used smokeless tobacco. She reports that she does not drink alcohol and does not use drugs.     Medications:  Coenzyme Q10, QUEtiapine,  acetaminophen, allopurinol, amiodarone, apixaban, benzonatate, carvedilol, chlorthalidone, cholecalciferol, donepezil, empagliflozin, furosemide, guaiFENesin, irbesartan, and pravastatin    Allergies   Allergen Reactions    Cephalexin Itching and Rash    Bactrim [Sulfamethoxazole-Trimethoprim] GI Intolerance       Objective   Objective     Vital Signs:   Temp:  [98.9 °F (37.2 °C)] 98.9 °F (37.2 °C)  Heart Rate:  [43-50] 46  Resp:  [16] 16  BP: (192-226)/(72-80) 221/77  Flow (L/min) (Oxygen Therapy):  [2] 2    Constitutional: Awake, alert, interactive and pleasant, short of breath at rest on oxygen  Eyes: clear sclerae, no conjunctival injection  HENT: NCAT, mucous membranes moist  Neck: no masses or lymphadenopathy, trachea midline  Respiratory: Shallow breath sounds bilaterally, respirations increased  Cardiovascular: RRR, no murmurs appreciated, palpable peripheral pulses  Abdomen:  soft, no HSM or masses palpable, not tender or distended  Musculoskeletal: No peripheral edema, clubbing or cyanosis  Neurologic: Oriented x 3,                       Strength symmetric in all extremities                     Cranial Nerves grossly intact, speech clear  Skin: No rashes or jaundice  Psychiatric: Appropriate mood, insight      Result Review:  I have personally reviewed the results from the time of this admission   to 3/17/2025 15:37 EDT and agree with these findings:  [x]  Laboratory  [x]  Microbiology  [x]  Radiology  [x]  EKG/Telemetry   [x]  Cardiology/Vascular   []  Pathology  [x]  Old records  []  Other:  Most notable findings include: slight anemia, low K, CKD , elevated glucose, BNP, chest x-ray on hold echo results reviewed      LAB RESULTS:      Lab 03/17/25  1203   WBC 5.87   HEMOGLOBIN 10.6*   HEMATOCRIT 34.6   PLATELETS 197   NEUTROS ABS 4.43   IMMATURE GRANS (ABS) 0.01   LYMPHS ABS 0.88   MONOS ABS 0.40   EOS ABS 0.12   MCV 87.4         Lab 03/17/25  1203   SODIUM 146*   POTASSIUM 3.1*   CHLORIDE 106   CO2  28.0   ANION GAP 12.0   BUN 26*   CREATININE 1.55*   EGFR 33.3*   GLUCOSE 174*   CALCIUM 9.2   MAGNESIUM 2.0         Lab 03/17/25  1203   TOTAL PROTEIN 6.0   ALBUMIN 4.0   GLOBULIN 2.0   ALT (SGPT) 40*   AST (SGOT) 26   BILIRUBIN 0.5   ALK PHOS 96         Lab 03/17/25  1420 03/17/25  1203   PROBNP  --  10,701.0*   HSTROP T 31* 34*                 Brief Urine Lab Results  (Last result in the past 365 days)        Color   Clarity   Blood   Leuk Est   Nitrite   Protein   CREAT   Urine HCG        10/01/24 1404 Yellow   Clear   Negative   Negative   Negative   Trace                 COVID19   Date Value Ref Range Status   01/26/2024 Not Detected Not Detected - Ref. Range Final       XR Chest 1 View  Result Date: 3/17/2025  XR CHEST 1 VW Date of Exam: 3/17/2025 12:01 PM EDT Indication: SOA triage protocol Comparison: Chest radiograph 1/26/2024. Findings: Markedly enlarged cardiac silhouette, unchanged. Pulmonary vascular congestion. No overt pulmonary edema. No focal consolidation. No significant pleural effusion. No pneumothorax. Osseous structures are unchanged.     Impression: Impression: Marked cardiomegaly with pulmonary vascular congestion. No overt pulmonary edema or focal consolidation. Electronically Signed: Pedro Arevalo MD  3/17/2025 12:44 PM EDT  Workstation ID: [id number]      Results for orders placed during the hospital encounter of 01/26/24    Adult Transesophageal Echo (RGEINE) W/ Cont if Necessary Per Protocol (Cardiology Department)    Interpretation Summary    LVEF 55%    RV normal in size and function    Aortic valve thickened and calcified.  Vena contracta .32 cm and  consistent with moderate AI    Mitral valve has mild MAC and mild MR    Pulmonic valve and tricuspid valve not well assessed.    No left atrial appendage thrombus appreciated on multiple views with normal velocities    Moderate pericardial effusion.  no RV diastolic collapse.    IVC dilated but collapses well.  RA pressure estimated 8  mmHg    EKG  inus bradycardia with 1st degree AV block  Right bundle branch block  Left anterior fascicular block  ** Bifascicular block **      Assessment & Plan   Assessment / Plan       Acute on chronic heart failure with preserved ejection fraction (HFpEF)    Hypertension    Diabetes mellitus    Hyperlipidemia    Hypertensive heart and chronic kidney disease with heart failure and stage 1 through stage 4 chronic kidney disease, or unspecified chronic kidney disease    Right bundle branch block with left anterior fascicular block    Benign essential hypertension    A-fib    Dementia      Assessment & Plan:  83 yo with HO HTN, HFpEF, CKD 4, PAfib, Dementia, T2DM, who presents with shortness of breath, edema and     A/C HfpEF  Hypertensive emergency with bradycardia  Acute hypoxic Respiratory failure  HLP  -cont eliquis  -hold coreg, amiodarone for low heart rate  -IV lasix  -cont statin    T2DM  -SSI, hold home medications    Gout  -cont allopurinol at lower dose due to her creatinine clearance 26    Dementia  -cont seroquel at bedtime     CKD 4  -at baseline  -replete potassium    Anemia  -check indices and occult stool    VTE Prophylaxis:  Pharmacologic VTE prophylaxis orders are signed & held.      CODE STATUS: No CPR     Expected Discharge  Expected Discharge Date: 3/19/2025; Expected Discharge Time:     Electronically signed by Suzie Gould MD 03/17/25 15:37 EDT

## 2025-03-17 NOTE — ED PROVIDER NOTES
Subjective   History of Present Illness  Pt is a 85 yo female presenting to ED by EMS with complaints of SOB. PMHx significant for DM, HTN, HLD, PAfib on Eliquis and CKD. Pt reports increased SOB and swelling to lower leg for the past week. She notices the SOB in worse when laying flat. She has a mild non productive cough but denies worse than baseline. Denies fever, syncope, dizziness, N/V/D, abdominal pain or urinary sx. She reports chest pressure that radiates into her back but states that has been present since last year after cardiac ablation and denies new / worsening pain. She does not weigh herself regularly. She has been taking her Lasix 40mg daily instead of PRN. She is followed by Cards Dr. Epperson and admits she has missed last appointments due to weather. Noted her elevated BP and she reports her blood pressure always runs high. No reports of recent admission. No tobacco or ETOH use.     History provided by:  Patient, medical records and EMS personnel      Review of Systems   Constitutional:  Negative for chills and fever.   HENT:  Negative for congestion.    Eyes:  Negative for visual disturbance.   Respiratory:  Positive for cough and shortness of breath.    Cardiovascular:  Positive for chest pain (chronic) and leg swelling. Negative for palpitations.   Gastrointestinal:  Negative for abdominal pain, diarrhea, nausea and vomiting.   Musculoskeletal:  Negative for back pain.   Neurological:  Negative for dizziness, syncope, speech difficulty, weakness, numbness and headaches.   Psychiatric/Behavioral:  Negative for confusion.        Past Medical History:   Diagnosis Date    Bilateral renal cysts     noted CT abdomen 12/31/22    Diabetes mellitus     Diverticulosis     Hyperlipidemia     Hypertension     Medicare annual wellness visit, subsequent 11/15/2021    Osteoporosis     Ovarian cyst     noted on CT abdomen 12/31/22 (left ovary)    Pulmonary nodule less than 6 mm determined by computed tomography  of lung     Noted on CT Abdomen from ER visit 22 (right lung)       Allergies   Allergen Reactions    Cephalexin Itching and Rash    Bactrim [Sulfamethoxazole-Trimethoprim] GI Intolerance       Past Surgical History:   Procedure Laterality Date    APPENDECTOMY      BREAST BIOPSY      CATARACT EXTRACTION, BILATERAL       and     FIBULA FRACTURE SURGERY      HYSTERECTOMY      KIDNEY STONE SURGERY         Family History   Problem Relation Age of Onset    Heart disease Mother     Hypertension Mother     Breast cancer Mother         70's    Heart disease Father     Stroke Father     Breast cancer Other         great aunt       Social History     Socioeconomic History    Marital status:    Tobacco Use    Smoking status: Never     Passive exposure: Never    Smokeless tobacco: Never   Vaping Use    Vaping status: Never Used    Passive vaping exposure: Yes   Substance and Sexual Activity    Alcohol use: No    Drug use: No    Sexual activity: Defer           Objective   Physical Exam  Vitals and nursing note reviewed.   Constitutional:       General: She is not in acute distress.     Appearance: She is well-developed.   HENT:      Head: Atraumatic.      Nose: Nose normal.   Eyes:      General: Lids are normal.      Conjunctiva/sclera: Conjunctivae normal.      Pupils: Pupils are equal, round, and reactive to light.   Cardiovascular:      Rate and Rhythm: Normal rate and regular rhythm.      Heart sounds: Normal heart sounds.   Pulmonary:      Effort: Pulmonary effort is normal.      Breath sounds: Normal breath sounds. No wheezing.   Abdominal:      General: There is no distension.      Palpations: Abdomen is soft.      Tenderness: There is no abdominal tenderness. There is no guarding or rebound.   Musculoskeletal:         General: No tenderness. Normal range of motion.      Cervical back: Normal range of motion and neck supple.      Right lower le+ Pitting Edema present.      Left lower le+  Pitting Edema present.   Skin:     General: Skin is warm and dry.      Findings: No erythema or rash.   Neurological:      Mental Status: She is alert and oriented to person, place, and time.      Sensory: No sensory deficit.   Psychiatric:         Speech: Speech normal.         Behavior: Behavior normal.         Procedures           ED Course  ED Course as of 03/17/25 1606   Mon Mar 17, 2025   1202  I personally reviewed and interpreted labs results and went over with patient. [RT]   1202 BP(!): 226/80  Noted HTN [RT]   1216 Noted /56 on office visit 1/25/25             /60 on office visit 2/18/25 [RT]   1234 EKG personally reviewed and interpreted with findings of sinus bradycardia with rate 49. [RT]   1246 proBNP(!): 10,701.0  Noted elevation and about a year ago BNP 3,266 [RT]   1246 Creatinine(!): 1.55  Baseline renal disease.  [RT]   1246 Potassium(!): 3.1  Ordered replacement [RT]   1409 SpO2(!): 88 %  On RA and does not wear O2 at home.  [RT]   1414 Discussed patient with Dr. Celeste who is agreeable with ED course and tx plan. Plan for cardiology consult / admission for CHF exacerbation.  [RT]   1416 Contacted cardiology regarding admission / consult.  [RT]   1416 I personally and independently reviewed CXR and agree with the radiology interpretation specifically cardiomegaly and vascular congestion.  [RT]   1416 Reviewed old records and last ECHO 1-2024 with EF 55%.  [RT]   1452 Noted HTN. Attempted to order Nitro drip but not available / out of stock. Ordered nitro ointment.  [RT]   1501 Spoke to cardiologist Dr. Epperson and he recommends admission to hospitalist.  [RT]   1512 Discussed admission with hospitalist team.  [RT]   1513 Pharmacy able to place Nitro drip order. Notified nurse to discontinue the Nitro paste.  [RT]   1515 Re-examined patient and updated on results with plan for admission.  [RT]      ED Course User Index  [RT] Yola Browne PA      Recent Results (from the past 24  hours)   Comprehensive Metabolic Panel    Collection Time: 03/17/25 12:03 PM    Specimen: Blood   Result Value Ref Range    Glucose 174 (H) 65 - 99 mg/dL    BUN 26 (H) 8 - 23 mg/dL    Creatinine 1.55 (H) 0.57 - 1.00 mg/dL    Sodium 146 (H) 136 - 145 mmol/L    Potassium 3.1 (L) 3.5 - 5.2 mmol/L    Chloride 106 98 - 107 mmol/L    CO2 28.0 22.0 - 29.0 mmol/L    Calcium 9.2 8.6 - 10.5 mg/dL    Total Protein 6.0 6.0 - 8.5 g/dL    Albumin 4.0 3.5 - 5.2 g/dL    ALT (SGPT) 40 (H) 1 - 33 U/L    AST (SGOT) 26 1 - 32 U/L    Alkaline Phosphatase 96 39 - 117 U/L    Total Bilirubin 0.5 0.0 - 1.2 mg/dL    Globulin 2.0 gm/dL    A/G Ratio 2.0 g/dL    BUN/Creatinine Ratio 16.8 7.0 - 25.0    Anion Gap 12.0 5.0 - 15.0 mmol/L    eGFR 33.3 (L) >60.0 mL/min/1.73   BNP    Collection Time: 03/17/25 12:03 PM    Specimen: Blood   Result Value Ref Range    proBNP 10,701.0 (H) 0.0 - 1,800.0 pg/mL   High Sensitivity Troponin T    Collection Time: 03/17/25 12:03 PM    Specimen: Blood   Result Value Ref Range    HS Troponin T 34 (H) <14 ng/L   Green Top (Gel)    Collection Time: 03/17/25 12:03 PM   Result Value Ref Range    Extra Tube Hold for add-ons.    Lavender Top    Collection Time: 03/17/25 12:03 PM   Result Value Ref Range    Extra Tube hold for add-on    Gold Top - SST    Collection Time: 03/17/25 12:03 PM   Result Value Ref Range    Extra Tube Hold for add-ons.    Gray Top    Collection Time: 03/17/25 12:03 PM   Result Value Ref Range    Extra Tube Hold for add-ons.    Light Blue Top    Collection Time: 03/17/25 12:03 PM   Result Value Ref Range    Extra Tube Hold for add-ons.    CBC Auto Differential    Collection Time: 03/17/25 12:03 PM    Specimen: Blood   Result Value Ref Range    WBC 5.87 3.40 - 10.80 10*3/mm3    RBC 3.96 3.77 - 5.28 10*6/mm3    Hemoglobin 10.6 (L) 12.0 - 15.9 g/dL    Hematocrit 34.6 34.0 - 46.6 %    MCV 87.4 79.0 - 97.0 fL    MCH 26.8 26.6 - 33.0 pg    MCHC 30.6 (L) 31.5 - 35.7 g/dL    RDW 15.2 12.3 - 15.4 %     RDW-SD 48.9 37.0 - 54.0 fl    MPV 10.9 6.0 - 12.0 fL    Platelets 197 140 - 450 10*3/mm3    Neutrophil % 75.5 42.7 - 76.0 %    Lymphocyte % 15.0 (L) 19.6 - 45.3 %    Monocyte % 6.8 5.0 - 12.0 %    Eosinophil % 2.0 0.3 - 6.2 %    Basophil % 0.5 0.0 - 1.5 %    Immature Grans % 0.2 0.0 - 0.5 %    Neutrophils, Absolute 4.43 1.70 - 7.00 10*3/mm3    Lymphocytes, Absolute 0.88 0.70 - 3.10 10*3/mm3    Monocytes, Absolute 0.40 0.10 - 0.90 10*3/mm3    Eosinophils, Absolute 0.12 0.00 - 0.40 10*3/mm3    Basophils, Absolute 0.03 0.00 - 0.20 10*3/mm3    Immature Grans, Absolute 0.01 0.00 - 0.05 10*3/mm3    nRBC 0.0 0.0 - 0.2 /100 WBC   Magnesium    Collection Time: 03/17/25 12:03 PM    Specimen: Blood   Result Value Ref Range    Magnesium 2.0 1.6 - 2.4 mg/dL   ECG 12 Lead ED Triage Standing Order; SOA    Collection Time: 03/17/25 12:03 PM   Result Value Ref Range    QT Interval 484 ms    QTC Interval 437 ms   High Sensitivity Troponin T 1Hr    Collection Time: 03/17/25  2:20 PM    Specimen: Blood   Result Value Ref Range    HS Troponin T 31 (H) <14 ng/L    Troponin T Numeric Delta -3 ng/L    Troponin T % Delta -9 Abnormal if >/= 20%     Note: In addition to lab results from this visit, the labs listed above may include labs taken at another facility or during a different encounter within the last 24 hours. Please correlate lab times with ED admission and discharge times for further clarification of the services performed during this visit.    XR Chest 1 View   Final Result   Impression:   Marked cardiomegaly with pulmonary vascular congestion. No overt pulmonary edema or focal consolidation.         Electronically Signed: Pedro Arevalo MD     3/17/2025 12:44 PM EDT     Workstation ID: [id number]        Vitals:    03/17/25 1409 03/17/25 1429 03/17/25 1541 03/17/25 1601   BP:  (!) 221/77 (!) 215/78 (!) 215/78   Pulse:  (!) 46 (!) 48 55   Resp:       Temp:       TempSrc:       SpO2: 92% 97% 98%    Weight:       Height:          Medications   sodium chloride 0.9 % flush 10 mL (has no administration in time range)   Potassium Replacement - Follow Nurse / BPA Driven Protocol (has no administration in time range)   Magnesium Standard Dose Replacement - Follow Nurse / BPA Driven Protocol (has no administration in time range)   nitroglycerin (TRIDIL) 200 mcg/ml infusion (10 mcg/min Intravenous Rate/Dose Change 3/17/25 1601)   furosemide (LASIX) injection 80 mg (80 mg Intravenous Given 3/17/25 1228)   potassium chloride (KLOR-CON M20) CR tablet 40 mEq (40 mEq Oral Given 3/17/25 1427)   nitroglycerin (NITROSTAT) ointment 1 inch (1 inch Topical Given 3/17/25 1506)     ECG/EMG Results (last 24 hours)       Procedure Component Value Units Date/Time    ECG 12 Lead ED Triage Standing Order; SOA [[id number]] Collected: 03/17/25 1203     Updated: 03/17/25 1203     QT Interval 484 ms      QTC Interval 437 ms     Narrative:      Test Reason : SOB  Blood Pressure :   */*   mmHG  Vent. Rate :  49 BPM     Atrial Rate :  49 BPM     P-R Int : 220 ms          QRS Dur : 178 ms      QT Int : 484 ms       P-R-T Axes :  59 -48 -34 degrees    QTcB Int : 437 ms    Sinus bradycardia with 1st degree AV block  Right bundle branch block  Left anterior fascicular block  ** Bifascicular block **  Voltage criteria for left ventricular hypertrophy  Cannot rule out Septal infarct , age undetermined  Abnormal ECG  When compared with ECG of 01-Feb-2024 07:46,  Minimal criteria for Septal infarct are now present  Nonspecific T wave abnormality has replaced inverted T waves in Inferior  leads  T wave inversion now evident in Anterior leads  Nonspecific T wave abnormality no longer evident in Lateral leads    Referred By: EDMD           Confirmed By:           ECG 12 Lead ED Triage Standing Order; SOA   Preliminary Result   Test Reason : SOB   Blood Pressure :   */*   mmHG   Vent. Rate :  49 BPM     Atrial Rate :  49 BPM      P-R Int : 220 ms          QRS Dur : 178 ms       QT  Int : 484 ms       P-R-T Axes :  59 -48 -34 degrees     QTcB Int : 437 ms      Sinus bradycardia with 1st degree AV block   Right bundle branch block   Left anterior fascicular block   ** Bifascicular block **   Voltage criteria for left ventricular hypertrophy   Cannot rule out Septal infarct , age undetermined   Abnormal ECG   When compared with ECG of 01-Feb-2024 07:46,   Minimal criteria for Septal infarct are now present   Nonspecific T wave abnormality has replaced inverted T waves in Inferior   leads   T wave inversion now evident in Anterior leads   Nonspecific T wave abnormality no longer evident in Lateral leads      Referred By: EDMD           Confirmed By:                                                            Medical Decision Making  Pt is a 85 yo female presenting to ED with complaints of SOB and edema. I had a discussion with the patient / family regarding ED course, diagnosis, diagnostic results and treatment plan including medications and admission    DDx  CHF exacerbation, ACS, NSTEMI, LEYLA, Pneumonia, Resp failure    Problems Addressed:  Acute on chronic congestive heart failure, unspecified heart failure type: complicated acute illness or injury  Acute respiratory failure with hypoxia: complicated acute illness or injury  Chronic kidney disease, unspecified CKD stage: complicated acute illness or injury  Hypokalemia: complicated acute illness or injury    Amount and/or Complexity of Data Reviewed  External Data Reviewed: notes.     Details: Reviewed previous non ED visits including prior labs, imaging, available notes, medications, allergies and surgical hx.   PCP notes 1/25 and 2/8 for HTN and cough  Labs: ordered. Decision-making details documented in ED Course.  Radiology: ordered and independent interpretation performed. Decision-making details documented in ED Course.  ECG/medicine tests: ordered and independent interpretation performed. Decision-making details documented in ED  Course.    Risk  OTC drugs.  Prescription drug management.  Decision regarding hospitalization.        Final diagnoses:   Acute on chronic congestive heart failure, unspecified heart failure type   Hypokalemia   Chronic kidney disease, unspecified CKD stage   Acute respiratory failure with hypoxia       ED Disposition  ED Disposition       ED Disposition   Decision to Admit    Condition   --    Comment   Level of Care: Telemetry [5]   Diagnosis: Acute on chronic heart failure with preserved ejection fraction (HFpEF) [[id number]]                 No follow-up provider specified.       Medication List        New Prescriptions      chlorthalidone 25 MG tablet  Commonly known as: HYGROTON  Take 1 tablet by mouth Daily.               Where to Get Your Medications        These medications were sent to [facility] - [place], [place] - [facility] - [phone]  - [phone] [address] [address]      Phone: [phone]   chlorthalidone 25 MG tablet            Yola Browne PA  03/17/25 1325

## 2025-03-17 NOTE — Clinical Note
Level of Care: Telemetry [5]   Diagnosis: Acute on chronic heart failure with preserved ejection fraction (HFpEF) [[id number]]   Certification: I Certify That Inpatient Hospital Services Are Medically Necessary For Greater Than 2 Midnights

## 2025-03-17 NOTE — TELEPHONE ENCOUNTER
Spoke to pt daughter Levi and informed her what Dr. Epperson said. Medication has been sent to pharmacy.

## 2025-03-18 ENCOUNTER — APPOINTMENT (OUTPATIENT)
Dept: CARDIOLOGY | Facility: HOSPITAL | Age: 85
DRG: 291 | End: 2025-03-18
Payer: MEDICARE

## 2025-03-18 LAB
ANION GAP SERPL CALCULATED.3IONS-SCNC: 10 MMOL/L (ref 5–15)
BASOPHILS # BLD AUTO: 0.04 10*3/MM3 (ref 0–0.2)
BASOPHILS NFR BLD AUTO: 0.6 % (ref 0–1.5)
BH CV ECHO MEAS - DIST REN A EDV LEFT: 7.6 CM/S
BH CV ECHO MEAS - DIST REN A PSV LEFT: 61.1 CM/S
BH CV ECHO MEAS - MID REN A EDV LEFT: 8.7 CM/S
BH CV ECHO MEAS - MID REN A PSV LEFT: 52.3 CM/S
BH CV ECHO MEAS - PROX REN A EDV LEFT: 4.5 CM/S
BH CV ECHO MEAS - PROX REN A PSV LEFT: 62.1 CM/S
BH CV VAS BP LEFT ARM: NORMAL MMHG
BH CV VAS KIDNEY HEIGHT LEFT: 3.9 CM
BH CV VAS RENAL AORTIC MID EDV: 14.2 CM/S
BH CV VAS RENAL AORTIC MID PSV: 108.9 CM/S
BH CV VAS RENAL HILUM LEFT EDV: 6.9 CM/S
BH CV VAS RENAL HILUM LEFT PSV: 37.4 CM/S
BH CV VAS RENAL HILUM RIGHT EDV: 4.2 CM/S
BH CV VAS RENAL HILUM RIGHT PSV: 28.1 CM/S
BH CV XLRA MEAS - KID L LEFT: 10.1 CM
BH CV XLRA MEAS DIST REN A EDV RIGHT: 6.7 CM/S
BH CV XLRA MEAS DIST REN A PSV RIGHT: 49.8 CM/S
BH CV XLRA MEAS KID H RIGHT: 3.4 CM
BH CV XLRA MEAS KID L RIGHT: 10.1 CM
BH CV XLRA MEAS KID W RIGHT: 3.5 CM
BH CV XLRA MEAS MID REN A EDV RIGHT: 7 CM/S
BH CV XLRA MEAS MID REN A PSV RIGHT: 61 CM/S
BH CV XLRA MEAS PROX REN A EDV RIGHT: 8.4 CM/S
BH CV XLRA MEAS PROX REN A PSV RIGHT: 82.7 CM/S
BH CV XLRA MEAS RAR LEFT: 0.6
BH CV XLRA MEAS RAR RIGHT: 0.83
BH CV XLRA MEAS RENAL A ORG EDV LEFT: 5.8 CM/S
BH CV XLRA MEAS RENAL A ORG EDV RIGHT: 7 CM/S
BH CV XLRA MEAS RENAL A ORG PSV LEFT: 65.2 CM/S
BH CV XLRA MEAS RENAL A ORG PSV RIGHT: 90.2 CM/S
BUN SERPL-MCNC: 28 MG/DL (ref 8–23)
BUN/CREAT SERPL: 17.3 (ref 7–25)
CALCIUM SPEC-SCNC: 8.8 MG/DL (ref 8.6–10.5)
CHLORIDE SERPL-SCNC: 105 MMOL/L (ref 98–107)
CO2 SERPL-SCNC: 31 MMOL/L (ref 22–29)
CREAT SERPL-MCNC: 1.62 MG/DL (ref 0.57–1)
DEPRECATED RDW RBC AUTO: 48.7 FL (ref 37–54)
EGFRCR SERPLBLD CKD-EPI 2021: 31.6 ML/MIN/1.73
EOSINOPHIL # BLD AUTO: 0.19 10*3/MM3 (ref 0–0.4)
EOSINOPHIL NFR BLD AUTO: 2.7 % (ref 0.3–6.2)
ERYTHROCYTE [DISTWIDTH] IN BLOOD BY AUTOMATED COUNT: 15.1 % (ref 12.3–15.4)
FOLATE SERPL-MCNC: 14 NG/ML (ref 4.78–24.2)
GLUCOSE SERPL-MCNC: 127 MG/DL (ref 65–99)
HCT VFR BLD AUTO: 32.8 % (ref 34–46.6)
HGB BLD-MCNC: 10.1 G/DL (ref 12–15.9)
IMM GRANULOCYTES # BLD AUTO: 0.02 10*3/MM3 (ref 0–0.05)
IMM GRANULOCYTES NFR BLD AUTO: 0.3 % (ref 0–0.5)
LEFT KIDNEY WIDTH: 3.3 CM
LEFT RENAL UPPER PARENCHYMA MAX: 13.2 CM/S
LEFT RENAL UPPER PARENCHYMA MIN: 5.4 CM/S
LEFT RENAL UPPER PARENCHYMA RI: 0.59
LYMPHOCYTES # BLD AUTO: 1.36 10*3/MM3 (ref 0.7–3.1)
LYMPHOCYTES NFR BLD AUTO: 19 % (ref 19.6–45.3)
MCH RBC QN AUTO: 26.9 PG (ref 26.6–33)
MCHC RBC AUTO-ENTMCNC: 30.8 G/DL (ref 31.5–35.7)
MCV RBC AUTO: 87.2 FL (ref 79–97)
MONOCYTES # BLD AUTO: 0.58 10*3/MM3 (ref 0.1–0.9)
MONOCYTES NFR BLD AUTO: 8.1 % (ref 5–12)
NEUTROPHILS NFR BLD AUTO: 4.97 10*3/MM3 (ref 1.7–7)
NEUTROPHILS NFR BLD AUTO: 69.3 % (ref 42.7–76)
NRBC BLD AUTO-RTO: 0 /100 WBC (ref 0–0.2)
PLATELET # BLD AUTO: 202 10*3/MM3 (ref 140–450)
PMV BLD AUTO: 11.6 FL (ref 6–12)
POTASSIUM SERPL-SCNC: 2.9 MMOL/L (ref 3.5–5.2)
QT INTERVAL: 484 MS
QTC INTERVAL: 437 MS
RBC # BLD AUTO: 3.76 10*6/MM3 (ref 3.77–5.28)
RIGHT RENAL UPPER PARENCHYMA MAX: 11.8 CM/S
RIGHT RENAL UPPER PARENCHYMA MIN: 3.6 CM/S
RIGHT RENAL UPPER PARENCHYMA RI: 0.69
SODIUM SERPL-SCNC: 146 MMOL/L (ref 136–145)
VIT B12 BLD-MCNC: 346 PG/ML (ref 211–946)
WBC NRBC COR # BLD AUTO: 7.16 10*3/MM3 (ref 3.4–10.8)

## 2025-03-18 PROCEDURE — 93306 TTE W/DOPPLER COMPLETE: CPT | Performed by: INTERNAL MEDICINE

## 2025-03-18 PROCEDURE — 93306 TTE W/DOPPLER COMPLETE: CPT

## 2025-03-18 PROCEDURE — 85025 COMPLETE CBC W/AUTO DIFF WBC: CPT | Performed by: INTERNAL MEDICINE

## 2025-03-18 PROCEDURE — 93975 VASCULAR STUDY: CPT

## 2025-03-18 PROCEDURE — 80048 BASIC METABOLIC PNL TOTAL CA: CPT | Performed by: INTERNAL MEDICINE

## 2025-03-18 PROCEDURE — 97161 PT EVAL LOW COMPLEX 20 MIN: CPT

## 2025-03-18 PROCEDURE — 99232 SBSQ HOSP IP/OBS MODERATE 35: CPT | Performed by: STUDENT IN AN ORGANIZED HEALTH CARE EDUCATION/TRAINING PROGRAM

## 2025-03-18 PROCEDURE — 25010000002 FUROSEMIDE PER 20 MG: Performed by: INTERNAL MEDICINE

## 2025-03-18 RX ORDER — POTASSIUM CHLORIDE 1500 MG/1
40 TABLET, EXTENDED RELEASE ORAL ONCE
Status: COMPLETED | OUTPATIENT
Start: 2025-03-18 | End: 2025-03-18

## 2025-03-18 RX ADMIN — APIXABAN 2.5 MG: 2.5 TABLET, FILM COATED ORAL at 09:36

## 2025-03-18 RX ADMIN — LOSARTAN POTASSIUM 100 MG: 50 TABLET, FILM COATED ORAL at 21:23

## 2025-03-18 RX ADMIN — FUROSEMIDE 40 MG: 10 INJECTION, SOLUTION INTRAMUSCULAR; INTRAVENOUS at 09:35

## 2025-03-18 RX ADMIN — EMPAGLIFLOZIN 10 MG: 10 TABLET, FILM COATED ORAL at 09:35

## 2025-03-18 RX ADMIN — APIXABAN 2.5 MG: 2.5 TABLET, FILM COATED ORAL at 21:22

## 2025-03-18 RX ADMIN — QUETIAPINE FUMARATE 25 MG: 25 TABLET ORAL at 21:23

## 2025-03-18 RX ADMIN — Medication 1000 UNITS: at 09:36

## 2025-03-18 RX ADMIN — PRAVASTATIN SODIUM 40 MG: 40 TABLET ORAL at 21:23

## 2025-03-18 RX ADMIN — FUROSEMIDE 40 MG: 10 INJECTION, SOLUTION INTRAMUSCULAR; INTRAVENOUS at 17:52

## 2025-03-18 RX ADMIN — ALLOPURINOL 50 MG: 100 TABLET ORAL at 09:35

## 2025-03-18 RX ADMIN — POTASSIUM CHLORIDE 40 MEQ: 1500 TABLET, EXTENDED RELEASE ORAL at 11:57

## 2025-03-18 NOTE — PLAN OF CARE
Called  spoke with anthony. Questioned on supply order not sent out yet. Angelina Anderson said it still needed reviewed. He was sending it as an urgent request.    Informed Vince Prior that request was being sent. Gave shannon tobias number to call for further questions. Goal Outcome Evaluation:  Plan of Care Reviewed With: patient           Outcome Evaluation: Pt. presents below baseline function w/generalized weakness affecting her ability to safely participate in functional mobility. She performed bed mobility, transfers and ambulated 100' w/contact guard assist. Activity limited by fatigue. Pt. would benefit from acute PT services to address stated deficits.    Anticipated Discharge Disposition (PT): home with assist, home with home health ([facility])

## 2025-03-18 NOTE — PROGRESS NOTES
[facility] Medicine Services  PROGRESS NOTE    Patient Name: Cheryl Tomas  : 1940  MRN: [id number]    Date of Admission: 3/17/2025  Primary Care Physician: Marian Perea MD    Subjective   Subjective     CC:  F/u dyspnea    HPI:  States she is breathing much better today. Denies CP      Objective   Objective     Vital Signs:   Temp:  [97.9 °F (36.6 °C)-98.7 °F (37.1 °C)] 98.1 °F (36.7 °C)  Heart Rate:  [45-70] 63  Resp:  [16-17] 16  BP: (139-234)/() 144/59  Flow (L/min) (Oxygen Therapy):  [2] 2     Physical Exam:  Constitutional: No acute distress, awake, alert, elderly/frail  HENT: NCAT, mucous membranes moist  Respiratory: Bibasilar crackles, no wheezing. Resp effort fair on 2L NC  Cardiovascular: RRR, no murmurs, rubs, or gallops  Gastrointestinal: Positive bowel sounds, soft, nontender, nondistended  Musculoskeletal: No bilateral ankle edema  Psychiatric: Appropriate affect, cooperative  Neurologic: Oriented x 3, strength symmetric, no focal deficits  Skin: No rashes      Results Reviewed:  LAB RESULTS:      Lab 25  0455 25  1420 25  1203   WBC 7.16  --  5.87   HEMOGLOBIN 10.1*  --  10.6*   HEMATOCRIT 32.8*  --  34.6   PLATELETS 202  --  197   NEUTROS ABS 4.97  --  4.43   IMMATURE GRANS (ABS) 0.02  --  0.01   LYMPHS ABS 1.36  --  0.88   MONOS ABS 0.58  --  0.40   EOS ABS 0.19  --  0.12   MCV 87.2  --  87.4   HSTROP T  --  31* 34*         Lab 25  0455 25  1420 25  1203   SODIUM 146*  --  146*   POTASSIUM 2.9*  --  3.1*   CHLORIDE 105  --  106   CO2 31.0*  --  28.0   ANION GAP 10.0  --  12.0   BUN 28*  --  26*   CREATININE 1.62*  --  1.55*   EGFR 31.6*  --  33.3*   GLUCOSE 127*  --  174*   CALCIUM 8.8  --  9.2   MAGNESIUM  --   --  2.0   TSH  --  1.870  --          Lab 25  1203   TOTAL PROTEIN 6.0   ALBUMIN 4.0   GLOBULIN 2.0   ALT (SGPT) 40*   AST (SGOT) 26   BILIRUBIN 0.5   ALK PHOS 96         Lab 25  1420  03/17/25  1203   PROBNP  --  10,701.0*   HSTROP T 31* 34*             Lab 03/17/25  1420 03/17/25  1203   IRON 21*  --    IRON SATURATION (TSAT) 5*  --    TIBC 451  --    TRANSFERRIN 303  --    FERRITIN 20.05  --    FOLATE  --  14.00   VITAMIN B 12  --  346         Brief Urine Lab Results  (Last result in the past 365 days)        Color   Clarity   Blood   Leuk Est   Nitrite   Protein   CREAT   Urine HCG        10/01/24 1404 Yellow   Clear   Negative   Negative   Negative   Trace                   Microbiology Results Abnormal       None            Duplex Renal Artery - Bilateral Complete CAR  Result Date: 3/18/2025  DUPLEX RENAL ARTERY BILATERAL COMPLETE CAR Date of Exam: 3/18/2025 8:14 AM EDT Indication: renovascular hypertension. Comparison: No comparisons available. Technique: Grayscale, color-flow, Doppler spectral waveform analysis was performed of the kidneys, renal arteries, and aorta. Findings: The right kidney measures 10.1 x 3.4 x 3.5 cm. Peak systolic velocity is 90 cm/s. The renal artery to aortic ratio is less than 1. Hilar acceleration time is 46 ms. Resistive indices are normal. The left kidney measures 10.1 x 3.9 x 3.3 cm. Peak systolic velocity on the left is 65 cm/s. The renal artery to aortic ratio is less than 1. Hilar acceleration time is 41 ms. Resistive indices were unremarkable.     Impression: Impression: No evidence of hemodynamically significant renal artery stenosis. Electronically Signed: Stef Mayes MD  3/18/2025 3:16 PM EDT  Workstation ID: [id number]    XR Chest 1 View  Result Date: 3/17/2025  XR CHEST 1 VW Date of Exam: 3/17/2025 12:01 PM EDT Indication: SOA triage protocol Comparison: Chest radiograph 1/26/2024. Findings: Markedly enlarged cardiac silhouette, unchanged. Pulmonary vascular congestion. No overt pulmonary edema. No focal consolidation. No significant pleural effusion. No pneumothorax. Osseous structures are unchanged.     Impression: Impression: Marked cardiomegaly  with pulmonary vascular congestion. No overt pulmonary edema or focal consolidation. Electronically Signed: Pedro Arevalo MD  3/17/2025 12:44 PM EDT  Workstation ID: [id number]      Results for orders placed during the hospital encounter of 01/26/24    Adult Transesophageal Echo (REGINE) W/ Cont if Necessary Per Protocol (Cardiology Department)    Interpretation Summary    LVEF 55%    RV normal in size and function    Aortic valve thickened and calcified.  Vena contracta .32 cm and  consistent with moderate AI    Mitral valve has mild MAC and mild MR    Pulmonic valve and tricuspid valve not well assessed.    No left atrial appendage thrombus appreciated on multiple views with normal velocities    Moderate pericardial effusion.  no RV diastolic collapse.    IVC dilated but collapses well.  RA pressure estimated 8 mmHg      Current medications:  Scheduled Meds:allopurinol, 50 mg, Oral, Daily  [Held by provider] amiodarone, 200 mg, Oral, Daily  apixaban, 2.5 mg, Oral, Q12H  [Held by provider] carvedilol, 25 mg, Oral, BID With Meals  cholecalciferol, 1,000 Units, Oral, Daily  empagliflozin, 10 mg, Oral, Daily  furosemide, 40 mg, Intravenous, BID AC  losartan, 100 mg, Oral, Nightly  NIFEdipine XL, 60 mg, Oral, Q24H  pharmacy consult - MTM, , Not Applicable, Q12H  pravastatin, 40 mg, Oral, Nightly  QUEtiapine, 25 mg, Oral, Nightly      Continuous Infusions:niCARdipine, 5-15 mg/hr, Last Rate: Stopped (03/17/25 2115)      PRN Meds:.  Magnesium Standard Dose Replacement - Follow Nurse / BPA Driven Protocol    Potassium Replacement - Follow Nurse / BPA Driven Protocol    sodium chloride    Assessment & Plan   Assessment & Plan     Active Hospital Problems    Diagnosis  POA    **Acute on chronic heart failure with preserved ejection fraction (HFpEF) [I50.33]  Yes    Dementia [F03.90]  Yes    A-fib [I48.91]  Yes    Hypertensive heart and chronic kidney disease with heart failure and stage 1 through stage 4 chronic kidney  disease, or unspecified chronic kidney disease [I13.0]  Yes    Right bundle branch block with left anterior fascicular block [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 female w hx of HTN, HFpEF, afib, CKD, DM admitted w acute HFpEF exacerbation and hypertensive emergency. Cardiology follows    Acute on chronic HFpEF exacerbation  Acute hypoxemic respiratory failure  --s/p IV lasix. Further diuresis per cardiology. Already improving  --cont ARB, jardiance  --nifedipine added per cardiology to home med regimen. Renal artery duplex in process  --repeat echo pending  --reviewed I/O    Afib  --cont eliquis  --holding amiodarone and coreg d/t bradycardia    CKD 4  --Cr around baseline, monitor w diuresis      T2DM  --cont SSI    Gout  --low dose allopurinol    Dementia  --seroquel at bedtime  --complicates all aspects of care      Expected Discharge Location and Transportation: home  Expected Discharge   Expected Discharge Date: 3/19/2025; Expected Discharge Time:      VTE Prophylaxis:  Pharmacologic VTE prophylaxis orders are present.         AM-PAC 6 Clicks Score (PT): 19 (03/18/25 1502)    CODE STATUS:   Code Status and Medical Interventions: No CPR (Do Not Attempt to Resuscitate); Full   Ordered at: 03/17/25 0444     Code Status (Patient has no pulse and is not breathing):    No CPR (Do Not Attempt to Resuscitate)     Medical Interventions (Patient has pulse or is breathing):    Full       Marine Burger MD  03/18/25

## 2025-03-18 NOTE — THERAPY EVALUATION
Patient Name: Cheryl Tomas  : 1940    MRN: [id number]                              Today's Date: 3/18/2025       Admit Date: 3/17/2025    Visit Dx:     ICD-10-CM ICD-9-CM   1. Acute on chronic congestive heart failure, unspecified heart failure type  I50.9 428.0   2. Hypokalemia  E87.6 276.8   3. Chronic kidney disease, unspecified CKD stage  N18.9 585.9   4. Acute respiratory failure with hypoxia  J96.01 518.81     Patient Active Problem List   Diagnosis    Hypertension    Diabetes mellitus    Hyperlipidemia    Anxiety and depression    Family history of brain aneurysm    Age-related cataract    Hypertensive heart and chronic kidney disease with heart failure and stage 1 through stage 4 chronic kidney disease, or unspecified chronic kidney disease    Right bundle branch block with left anterior fascicular block    Nonrheumatic aortic valve insufficiency    Osteoarthrosis multiple sites, not specified as generalized    Actinic keratosis    Other osteoporosis    Vitamin D deficiency    Other depressive disorder    Benign essential hypertension    A-fib    Dementia    Painful urination    Acute on chronic heart failure with preserved ejection fraction (HFpEF)     Past Medical History:   Diagnosis Date    Bilateral renal cysts     noted CT abdomen 22    Diabetes mellitus     Diverticulosis     Hyperlipidemia     Hypertension     Medicare annual wellness visit, subsequent 11/15/2021    Osteoporosis     Ovarian cyst     noted on CT abdomen 22 (left ovary)    Pulmonary nodule less than 6 mm determined by computed tomography of lung     Noted on CT Abdomen from ER visit 22 (right lung)     Past Surgical History:   Procedure Laterality Date    APPENDECTOMY      BREAST BIOPSY      CATARACT EXTRACTION, BILATERAL       and     FIBULA FRACTURE SURGERY      HYSTERECTOMY      KIDNEY STONE SURGERY        General Information       Row Name 25 [id number]          Physical Therapy Time and  Intention    Document Type evaluation  -SS     Mode of Treatment physical therapy  -       Row Name 03/18/25 1447          General Information    Patient Profile Reviewed yes  -SS     Prior Level of Function independent:;all household mobility;community mobility;gait;transfer;driving;bed mobility  pt declines use of DME or any acute falls, community ambulator  -     Existing Precautions/Restrictions cardiac;fall  -     Barriers to Rehab medically complex  -       Row Name 03/18/25 1447          Living Environment    Current Living Arrangements independent living facility  -     People in Home facility resident  -SS       Row Name 03/18/25 1447          Home Main Entrance    Number of Stairs, Main Entrance none  -SS       Row Name 03/18/25 1447          Stairs Within Home, Primary    Number of Stairs, Within Home, Primary none  -SS       Row Name 03/18/25 1447          Cognition    Orientation Status (Cognition) oriented x 4  -       Row Name 03/18/25 1447          Safety Issues/Impairments Affecting Functional Mobility    Safety Issues Affecting Function (Mobility) awareness of need for assistance;insight into deficits/self-awareness;safety precaution awareness;safety precautions follow-through/compliance;sequencing abilities  -     Impairments Affecting Function (Mobility) endurance/activity tolerance;postural/trunk control;balance;strength  -               User Key  (r) = Recorded By, (t) = Taken By, (c) = Cosigned By      Initials Name Provider Type     Mahsa Mesa PT Physical Therapist                   Mobility       Row Name 03/18/25 1456          Bed Mobility    Bed Mobility scooting/bridging;supine-sit  -     Scooting/Bridging [place] (Bed Mobility) contact guard;verbal cues  -     Supine-Sit [place] (Bed Mobility) minimum assist (75% patient effort);verbal cues  -     Assistive Device (Bed Mobility) bed rails  -     Comment, (Bed Mobility) VC for sequencing; pt.  asymptomatic  -SS       Row Name 03/18/25 1450          Sit-Stand Transfer    Sit-Stand [place] (Transfers) contact guard;verbal cues  -     Assistive Device (Sit-Stand Transfers) other (see comments)  none  -SS     Comment, (Sit-Stand Transfer) VC for hand placement, appropriate alignment, lowering with eccentric control  -       Row Name 03/18/25 1450          Gait/Stairs (Locomotion)    [place] Level (Gait) contact guard;verbal cues  -SS     Patient was able to Ambulate yes  -     Distance in Feet (Gait) 100  -SS     Deviations/Abnormal Patterns (Gait) bilateral deviations;[place] decreased;gait speed decreased;stride length decreased  -     Bilateral Gait Deviations forward flexed posture;heel strike decreased  -     Comment, (Gait/Stairs) Pt. ambulated with a step through gait pattern. VC for upright posture, scanning environment. Activity limited by fatigue. No LOB, however, pt. demonstrates mild guarding due to verbalized fear of falling.  -               User Key  (r) = Recorded By, (t) = Taken By, (c) = Cosigned By      Initials Name Provider Type     Mahsa Mesa, PT Physical Therapist                   Obj/Interventions       Row Name 03/18/25 1458          Range of Motion Comprehensive    General Range of Motion bilateral lower extremity ROM WFL  -       Row Name 03/18/25 145          Strength Comprehensive (MMT)    Comment, General Manual Muscle Testing (MMT) Assessment BLE gross 3+/5  -       Row Name 03/18/25 1458          Balance    Balance Assessment sitting static balance;sitting dynamic balance;standing static balance;standing dynamic balance  -     Static Sitting Balance independent  -     Dynamic Sitting Balance modified independence  -     Position, Sitting Balance unsupported;sitting edge of bed  -     Static Standing Balance standby assist  -     Dynamic Standing Balance contact guard  -     Position/Device Used, Standing Balance unsupported  -      Balance Interventions sitting;standing;sit to stand;supported;static;dynamic  -               User Key  (r) = Recorded By, (t) = Taken By, (c) = Cosigned By      Initials Name Provider Type    SS Mahsa Mesa, PT Physical Therapist                   Goals/Plan       Row Name 03/18/25 1501          Bed Mobility Goal 1 (PT)    Activity/Assistive Device (Bed Mobility Goal 1, PT) bed mobility activities, all  -SS     [place] Level/Cues Needed (Bed Mobility Goal 1, PT) independent  -SS     Time Frame (Bed Mobility Goal 1, PT) short term goal (STG);5 days  -       Row Name 03/18/25 1501          Transfer Goal 1 (PT)    Activity/Assistive Device (Transfer Goal 1, PT) sit-to-stand/stand-to-sit;bed-to-chair/chair-to-bed  -SS     [place] Level/Cues Needed (Transfer Goal 1, PT) independent  -SS     Time Frame (Transfer Goal 1, PT) short term goal (STG);5 days  -[facility] Name 03/18/25 1501          Gait Training Goal 1 (PT)    Activity/Assistive Device (Gait Training Goal 1, PT) gait (walking locomotion)  -SS     [place] Level (Gait Training Goal 1, PT) independent  -SS     Distance (Gait Training Goal 1, PT) 500  -SS     Time Frame (Gait Training Goal 1, PT) long term goal (LTG);10 days  -[facility] Name 03/18/25 1501          Balance Goal 1 (PT)    Activity/Assistive Device (Balance Goal) standing static balance;standing dynamic balance  -SS     [place] Level/Cues Needed (Balance Goal 1, PT) independent  -SS     Time Frame (Balance Goal 1, PT) long-term goal (LTG);1 week  -[facility] Name 03/18/25 1501          Therapy Assessment/Plan (PT)    Planned Therapy Interventions (PT) balance training;bed mobility training;gait training;home exercise program;neuromuscular re-education;patient/family education;postural re-education;ROM (range of motion);stair training;strengthening;stretching;transfer training  -               User Key  (r) = Recorded By, (t) = Taken By, (c) = Cosigned By       Initials Name Provider Type    SS Mahsa Mesa, PT Physical Therapist                   Clinical Impression       Row Name 03/18/25 1457          Pain    Pretreatment Pain Rating 0/10 - no pain  -     Posttreatment Pain Rating 0/10 - no pain  -       Row Name 03/18/25 1457          Plan of Care Review    Plan of Care Reviewed With patient  -     Outcome Evaluation Pt. presents below baseline function w/generalized weakness affecting her ability to safely participate in functional mobility. She performed bed mobility, transfers and ambulated 100' w/contact guard assist. Activity limited by fatigue. Pt. would benefit from acute PT services to address stated deficits.  -       Row Name 03/18/25 1457          Therapy Assessment/Plan (PT)    Patient/Family Therapy Goals Statement (PT) to return home  -     Rehab Potential (PT) good  -     Criteria for Skilled Interventions Met (PT) yes;meets criteria;skilled treatment is necessary  -     Therapy Frequency (PT) daily  -     Predicted Duration of Therapy Intervention (PT) 10 days  -       Row Name 03/18/25 1457          Vital Signs    Pre Systolic BP Rehab 141  -SS     Pre Treatment Diastolic BP 54  -SS     Post Systolic BP Rehab 144  -SS     Post Treatment Diastolic BP 59  -SS     Pretreatment Heart Rate (beats/min) 52  -SS     Posttreatment Heart Rate (beats/min) 57  -SS     Pre SpO2 (%) 93  -SS     Post SpO2 (%) 91  -SS     Pre Patient Position Supine  -SS     Post Patient Position Sitting  -       Row Name 03/18/25 1457          Positioning and Restraints    Pre-Treatment Position in bed  -SS     Post Treatment Position chair  -SS     In Chair notified nsg;reclined;call light within reach;encouraged to call for assist;exit alarm on;with family/caregiver;legs elevated  -               User Key  (r) = Recorded By, (t) = Taken By, (c) = Cosigned By      Initials Name Provider Type    SS Mahsa Mesa, PT Physical Therapist                    Outcome Measures       Row Name 03/18/25 1502 03/18/25 0800       How much help from another person do you currently need...    Turning from your back to your side while in flat bed without using bedrails? 4  -SS 3  -AP    Moving from lying on back to sitting on the side of a flat bed without bedrails? 3  -SS 3  -AP    Moving to and from a bed to a chair (including a wheelchair)? 3  -SS 3  -AP    Standing up from a chair using your arms (e.g., wheelchair, bedside chair)? 3  -SS 3  -AP    Climbing 3-5 steps with a railing? 3  -SS 3  -AP    To walk in hospital room? 3  -SS 3  -AP    AM-PAC 6 Clicks Score (PT) 19  -SS 18  -AP    Highest Level of Mobility Goal 6 --> Walk 10 steps or more  -SS 6 --> Walk 10 steps or more  -AP      Row Name 03/18/25 1502          Functional Assessment    Outcome Measure Options AM-PAC 6 Clicks Basic Mobility (PT)  -               User Key  (r) = Recorded By, (t) = Taken By, (c) = Cosigned By      Initials Name Provider Type    AP Lyle Carl, RN Registered Nurse    Mahsa Mariscal, PT Physical Therapist                                 Physical Therapy Education       Title: PT OT SLP Therapies (In Progress)       Topic: Physical Therapy (In Progress)       Point: Mobility training (Done)       Learning Progress Summary            Patient CASPER Rodgers, VU,DU,NR by  at 3/18/2025 1502    Comment: Educated pt. safety/technique w/bed mobility, transfers, ambulation, PT POC   Family CASPER Rodgers, VU,DU,NR by  at 3/18/2025 1502    Comment: Educated pt. safety/technique w/bed mobility, transfers, ambulation, PT POC                      Point: Home exercise program (Not Started)       Learner Progress:  Not documented in this visit.              Point: Body mechanics (Done)       Learning Progress Summary            Patient CASPER Rodgers, VU,DU,NR by  at 3/18/2025 1502    Comment: Educated pt. safety/technique w/bed mobility, transfers, ambulation, PT POC   Family CASPER Rodgers, VU,DU,NR by SS at  3/18/2025 1502    Comment: Educated pt. safety/technique w/bed mobility, transfers, ambulation, PT POC                      Point: Precautions (Done)       Learning Progress Summary            Patient Eager, E, VU,DU,NR by  at 3/18/2025 1502    Comment: Educated pt. safety/technique w/bed mobility, transfers, ambulation, PT POC   Family Eager, E, VU,DU,NR by  at 3/18/2025 1502    Comment: Educated pt. safety/technique w/bed mobility, transfers, ambulation, PT POC                                      User Key       Initials Effective Dates Name Provider Type Discipline     06/01/21 -  Mahsa Mesa, PT Physical Therapist PT                  PT Recommendation and Plan  Planned Therapy Interventions (PT): balance training, bed mobility training, gait training, home exercise program, neuromuscular re-education, patient/family education, postural re-education, ROM (range of motion), stair training, strengthening, stretching, transfer training  Outcome Evaluation: Pt. presents below baseline function w/generalized weakness affecting her ability to safely participate in functional mobility. She performed bed mobility, transfers and ambulated 100' w/contact guard assist. Activity limited by fatigue. Pt. would benefit from acute PT services to address stated deficits.     Time Calculation:   PT Evaluation Complexity  History, PT Evaluation Complexity: 3 or more personal factors and/or comorbidities  Examination of Body Systems (PT Eval Complexity): total of 4 or more elements  Clinical Presentation (PT Evaluation Complexity): evolving  Clinical Decision Making (PT Evaluation Complexity): low complexity  Overall Complexity (PT Evaluation Complexity): low complexity     PT Charges       Row Name 03/18/25 1504             Time Calculation    Start Time 1415  -SS      PT Received On 03/18/25  -      PT Goal Re-Cert Due Date 03/28/25  -         Untimed Charges    PT Eval/Re-eval Minutes 50  -SS         Total Minutes     Untimed Charges Total Minutes 50  -SS       Total Minutes 50  -SS                User Key  (r) = Recorded By, (t) = Taken By, (c) = Cosigned By      Initials Name Provider Type    SS Mahsa Mesa, PT Physical Therapist                  Therapy Charges for Today       Code Description Service Date Service Provider Modifiers Qty    [id number] HC PT EVAL LOW COMPLEXITY 4 3/18/2025 Mahsa Mesa, BETSEY GP 1            PT G-Codes  Outcome Measure Options: AM-PAC 6 Clicks Basic Mobility (PT)  AM-PAC 6 Clicks Score (PT): 19  PT Discharge Summary  Anticipated Discharge Disposition (PT): home with assist, home with home health ([facility])    Mahsa Mesa, BETSEY  3/18/2025

## 2025-03-19 LAB
ANION GAP SERPL CALCULATED.3IONS-SCNC: 12 MMOL/L (ref 5–15)
AORTIC DIMENSIONLESS INDEX: 0.37 (DI)
AV MEAN PRESS GRAD SYS DOP V1V2: 11 MMHG
AV VMAX SYS DOP: 226.7 CM/SEC
BASOPHILS # BLD AUTO: 0.03 10*3/MM3 (ref 0–0.2)
BASOPHILS NFR BLD AUTO: 0.4 % (ref 0–1.5)
BH CV ECHO MEAS - AI P1/2T: 473.2 MSEC
BH CV ECHO MEAS - AO MAX PG: 20.7 MMHG
BH CV ECHO MEAS - AO ROOT DIAM: 3.6 CM
BH CV ECHO MEAS - AO V2 VTI: 45 CM
BH CV ECHO MEAS - AVA(I,D): 1.17 CM2
BH CV ECHO MEAS - EDV(CUBED): 140.6 ML
BH CV ECHO MEAS - EDV(MOD-SP2): 74.2 ML
BH CV ECHO MEAS - EDV(MOD-SP4): 76 ML
BH CV ECHO MEAS - EF(MOD-SP2): 53.8 %
BH CV ECHO MEAS - EF(MOD-SP4): 53.7 %
BH CV ECHO MEAS - ESV(CUBED): 46.7 ML
BH CV ECHO MEAS - ESV(MOD-SP2): 34.3 ML
BH CV ECHO MEAS - ESV(MOD-SP4): 35.2 ML
BH CV ECHO MEAS - FS: 30.8 %
BH CV ECHO MEAS - IVS/LVPW: 1 CM
BH CV ECHO MEAS - IVSD: 1.3 CM
BH CV ECHO MEAS - LA DIMENSION: 4.3 CM
BH CV ECHO MEAS - LAT PEAK E' VEL: 6.4 CM/SEC
BH CV ECHO MEAS - LV MASS(C)D: 278.4 GRAMS
BH CV ECHO MEAS - LV MAX PG: 1.8 MMHG
BH CV ECHO MEAS - LV MEAN PG: 1 MMHG
BH CV ECHO MEAS - LV V1 MAX: 67.1 CM/SEC
BH CV ECHO MEAS - LV V1 VTI: 16.7 CM
BH CV ECHO MEAS - LVIDD: 5.2 CM
BH CV ECHO MEAS - LVIDS: 3.6 CM
BH CV ECHO MEAS - LVOT AREA: 3.1 CM2
BH CV ECHO MEAS - LVOT DIAM: 2 CM
BH CV ECHO MEAS - LVPWD: 1.3 CM
BH CV ECHO MEAS - MED PEAK E' VEL: 3.5 CM/SEC
BH CV ECHO MEAS - MR MAX PG: 157.9 MMHG
BH CV ECHO MEAS - MR MAX VEL: 628 CM/SEC
BH CV ECHO MEAS - MR MEAN PG: 100 MMHG
BH CV ECHO MEAS - MR MEAN VEL: 468 CM/SEC
BH CV ECHO MEAS - MR VTI: 191.5 CM
BH CV ECHO MEAS - MV A MAX VEL: 32 CM/SEC
BH CV ECHO MEAS - MV DEC SLOPE: 441 CM/SEC2
BH CV ECHO MEAS - MV DEC TIME: 0.35 SEC
BH CV ECHO MEAS - MV E MAX VEL: 86.5 CM/SEC
BH CV ECHO MEAS - MV E/A: 2.7
BH CV ECHO MEAS - MV MAX PG: 4.8 MMHG
BH CV ECHO MEAS - MV MEAN PG: 1 MMHG
BH CV ECHO MEAS - MV P1/2T: 74.4 MSEC
BH CV ECHO MEAS - MV V2 VTI: 37.3 CM
BH CV ECHO MEAS - MVA(P1/2T): 3 CM2
BH CV ECHO MEAS - MVA(VTI): 1.41 CM2
BH CV ECHO MEAS - PA ACC TIME: 0.13 SEC
BH CV ECHO MEAS - PI END-D VEL: 121 CM/SEC
BH CV ECHO MEAS - RAP SYSTOLE: 3 MMHG
BH CV ECHO MEAS - RVSP: 45.8 MMHG
BH CV ECHO MEAS - SV(LVOT): 52.5 ML
BH CV ECHO MEAS - SV(MOD-SP2): 39.9 ML
BH CV ECHO MEAS - SV(MOD-SP4): 40.8 ML
BH CV ECHO MEAS - TAPSE (>1.6): 1.78 CM
BH CV ECHO MEAS - TR MAX PG: 42.8 MMHG
BH CV ECHO MEAS - TR MAX VEL: 327 CM/SEC
BH CV ECHO MEASUREMENTS AVERAGE E/E' RATIO: 17.47
BH CV VAS BP RIGHT ARM: NORMAL MMHG
BH CV XLRA - RV BASE: 3.6 CM
BH CV XLRA - RV LENGTH: 7.4 CM
BH CV XLRA - RV MID: 2.7 CM
BH CV XLRA - TDI S': 11.3 CM/SEC
BUN SERPL-MCNC: 25 MG/DL (ref 8–23)
BUN/CREAT SERPL: 16.2 (ref 7–25)
CALCIUM SPEC-SCNC: 9.6 MG/DL (ref 8.6–10.5)
CHLORIDE SERPL-SCNC: 101 MMOL/L (ref 98–107)
CO2 SERPL-SCNC: 36 MMOL/L (ref 22–29)
CREAT SERPL-MCNC: 1.54 MG/DL (ref 0.57–1)
DEPRECATED RDW RBC AUTO: 48.1 FL (ref 37–54)
EGFRCR SERPLBLD CKD-EPI 2021: 33.6 ML/MIN/1.73
EOSINOPHIL # BLD AUTO: 0.15 10*3/MM3 (ref 0–0.4)
EOSINOPHIL NFR BLD AUTO: 1.9 % (ref 0.3–6.2)
ERYTHROCYTE [DISTWIDTH] IN BLOOD BY AUTOMATED COUNT: 15.1 % (ref 12.3–15.4)
GLUCOSE SERPL-MCNC: 109 MG/DL (ref 65–99)
HCT VFR BLD AUTO: 38.2 % (ref 34–46.6)
HGB BLD-MCNC: 11.5 G/DL (ref 12–15.9)
IMM GRANULOCYTES # BLD AUTO: 0.03 10*3/MM3 (ref 0–0.05)
IMM GRANULOCYTES NFR BLD AUTO: 0.4 % (ref 0–0.5)
IVRT: 102 MS
LEFT ATRIUM VOLUME INDEX: 50.7 ML/M2
LEFT ATRIUM VOLUME: 82.6 ML
LV EF BIPLANE MOD: 55.3 %
LYMPHOCYTES # BLD AUTO: 1.06 10*3/MM3 (ref 0.7–3.1)
LYMPHOCYTES NFR BLD AUTO: 13.5 % (ref 19.6–45.3)
MCH RBC QN AUTO: 26.4 PG (ref 26.6–33)
MCHC RBC AUTO-ENTMCNC: 30.1 G/DL (ref 31.5–35.7)
MCV RBC AUTO: 87.6 FL (ref 79–97)
MONOCYTES # BLD AUTO: 0.52 10*3/MM3 (ref 0.1–0.9)
MONOCYTES NFR BLD AUTO: 6.6 % (ref 5–12)
NEUTROPHILS NFR BLD AUTO: 6.06 10*3/MM3 (ref 1.7–7)
NEUTROPHILS NFR BLD AUTO: 77.2 % (ref 42.7–76)
NRBC BLD AUTO-RTO: 0 /100 WBC (ref 0–0.2)
PLATELET # BLD AUTO: 243 10*3/MM3 (ref 140–450)
PMV BLD AUTO: 11.3 FL (ref 6–12)
POTASSIUM SERPL-SCNC: 3.2 MMOL/L (ref 3.5–5.2)
POTASSIUM SERPL-SCNC: 3.4 MMOL/L (ref 3.5–5.2)
RBC # BLD AUTO: 4.36 10*6/MM3 (ref 3.77–5.28)
SODIUM SERPL-SCNC: 149 MMOL/L (ref 136–145)
WBC NRBC COR # BLD AUTO: 7.85 10*3/MM3 (ref 3.4–10.8)

## 2025-03-19 PROCEDURE — 25010000002 FUROSEMIDE PER 20 MG: Performed by: INTERNAL MEDICINE

## 2025-03-19 PROCEDURE — 99232 SBSQ HOSP IP/OBS MODERATE 35: CPT | Performed by: INTERNAL MEDICINE

## 2025-03-19 PROCEDURE — 85025 COMPLETE CBC W/AUTO DIFF WBC: CPT | Performed by: STUDENT IN AN ORGANIZED HEALTH CARE EDUCATION/TRAINING PROGRAM

## 2025-03-19 PROCEDURE — 80048 BASIC METABOLIC PNL TOTAL CA: CPT | Performed by: INTERNAL MEDICINE

## 2025-03-19 PROCEDURE — 97110 THERAPEUTIC EXERCISES: CPT

## 2025-03-19 PROCEDURE — 84132 ASSAY OF SERUM POTASSIUM: CPT | Performed by: INTERNAL MEDICINE

## 2025-03-19 PROCEDURE — 97116 GAIT TRAINING THERAPY: CPT

## 2025-03-19 PROCEDURE — 99233 SBSQ HOSP IP/OBS HIGH 50: CPT | Performed by: INTERNAL MEDICINE

## 2025-03-19 RX ORDER — POTASSIUM CHLORIDE 1500 MG/1
20 TABLET, EXTENDED RELEASE ORAL ONCE
Status: COMPLETED | OUTPATIENT
Start: 2025-03-19 | End: 2025-03-19

## 2025-03-19 RX ADMIN — FUROSEMIDE 40 MG: 10 INJECTION, SOLUTION INTRAMUSCULAR; INTRAVENOUS at 09:22

## 2025-03-19 RX ADMIN — Medication 10 ML: at 09:22

## 2025-03-19 RX ADMIN — NIFEDIPINE 60 MG: 60 TABLET, EXTENDED RELEASE ORAL at 11:40

## 2025-03-19 RX ADMIN — ALLOPURINOL 50 MG: 100 TABLET ORAL at 09:22

## 2025-03-19 RX ADMIN — PRAVASTATIN SODIUM 40 MG: 40 TABLET ORAL at 21:53

## 2025-03-19 RX ADMIN — QUETIAPINE FUMARATE 25 MG: 25 TABLET ORAL at 21:53

## 2025-03-19 RX ADMIN — LOSARTAN POTASSIUM 100 MG: 50 TABLET, FILM COATED ORAL at 21:53

## 2025-03-19 RX ADMIN — APIXABAN 2.5 MG: 2.5 TABLET, FILM COATED ORAL at 21:53

## 2025-03-19 RX ADMIN — POTASSIUM CHLORIDE 20 MEQ: 1500 TABLET, EXTENDED RELEASE ORAL at 16:32

## 2025-03-19 RX ADMIN — APIXABAN 2.5 MG: 2.5 TABLET, FILM COATED ORAL at 09:22

## 2025-03-19 RX ADMIN — Medication 1000 UNITS: at 09:22

## 2025-03-19 NOTE — PROGRESS NOTES
[facility]  PROGRESS NOTE    Patient Name: Cheryl Tomas  : 1940  MRN: [id number]    Date of Admission: 3/17/2025  Primary Care Physician: Marian Perea MD    Subjective   Subjective     CC:  F/u dyspnea    HPI:  Feeling better today, breathing more comfortably. States that she lives alone, so wouldn't be opposed to rehab if it were needed.       Objective   Objective     Vital Signs:   Temp:  [98.1 °F (36.7 °C)-98.7 °F (37.1 °C)] 98.2 °F (36.8 °C)  Heart Rate:  [52-63] 61  Resp:  [16-18] 18  BP: (144-206)/(54-72) 206/72  Flow (L/min) (Oxygen Therapy):  [2] 2     Physical Exam:  Constitutional: No acute distress, awake, alert, elderly/frail  HENT: NCAT, mucous membranes moist  Respiratory: CTAB,  no wheezing. Resp effort normal on RA   Cardiovascular: RRR, no murmurs, rubs, or gallops  Gastrointestinal: Positive bowel sounds, soft, nontender, nondistended  Musculoskeletal: No bilateral ankle edema  Psychiatric: Appropriate affect, cooperative  Neurologic: Oriented x 3, strength symmetric, no focal deficits  Skin: No rashes      Results Reviewed:  LAB RESULTS:      Lab 25  1420 25  1203   WBC 7.16  --  5.87   HEMOGLOBIN 10.1*  --  10.6*   HEMATOCRIT 32.8*  --  34.6   PLATELETS 202  --  197   NEUTROS ABS 4.97  --  4.43   IMMATURE GRANS (ABS) 0.02  --  0.01   LYMPHS ABS 1.36  --  0.88   MONOS ABS 0.58  --  0.40   EOS ABS 0.19  --  0.12   MCV 87.2  --  87.4   HSTROP T  --  31* 34*         Lab 25  1420 25  1203   SODIUM 146*  --  146*   POTASSIUM 2.9*  --  3.1*   CHLORIDE 105  --  106   CO2 31.0*  --  28.0   ANION GAP 10.0  --  12.0   BUN 28*  --  26*   CREATININE 1.62*  --  1.55*   EGFR 31.6*  --  33.3*   GLUCOSE 127*  --  174*   CALCIUM 8.8  --  9.2   MAGNESIUM  --   --  2.0   TSH  --  1.870  --          Lab 25  1203   TOTAL PROTEIN 6.0   ALBUMIN 4.0   GLOBULIN 2.0   ALT (SGPT) 40*   AST (SGOT) 26    BILIRUBIN 0.5   ALK PHOS 96         Lab 03/17/25  1420 03/17/25  1203   PROBNP  --  10,701.0*   HSTROP T 31* 34*             Lab 03/17/25  1420 03/17/25  1203   IRON 21*  --    IRON SATURATION (TSAT) 5*  --    TIBC 451  --    TRANSFERRIN 303  --    FERRITIN 20.05  --    FOLATE  --  14.00   VITAMIN B 12  --  346         Brief Urine Lab Results  (Last result in the past 365 days)        Color   Clarity   Blood   Leuk Est   Nitrite   Protein   CREAT   Urine HCG        10/01/24 1404 Yellow   Clear   Negative   Negative   Negative   Trace                   Microbiology Results Abnormal       None            Duplex Renal Artery - Bilateral Complete CAR  Result Date: 3/18/2025  DUPLEX RENAL ARTERY BILATERAL COMPLETE CAR Date of Exam: 3/18/2025 8:14 AM EDT Indication: renovascular hypertension. Comparison: No comparisons available. Technique: Grayscale, color-flow, Doppler spectral waveform analysis was performed of the kidneys, renal arteries, and aorta. Findings: The right kidney measures 10.1 x 3.4 x 3.5 cm. Peak systolic velocity is 90 cm/s. The renal artery to aortic ratio is less than 1. Hilar acceleration time is 46 ms. Resistive indices are normal. The left kidney measures 10.1 x 3.9 x 3.3 cm. Peak systolic velocity on the left is 65 cm/s. The renal artery to aortic ratio is less than 1. Hilar acceleration time is 41 ms. Resistive indices were unremarkable.     Impression: Impression: No evidence of hemodynamically significant renal artery stenosis. Electronically Signed: Stef Mayes MD  3/18/2025 3:16 PM EDT  Workstation ID: [id number]    XR Chest 1 View  Result Date: 3/17/2025  XR CHEST 1 VW Date of Exam: 3/17/2025 12:01 PM EDT Indication: SOA triage protocol Comparison: Chest radiograph 1/26/2024. Findings: Markedly enlarged cardiac silhouette, unchanged. Pulmonary vascular congestion. No overt pulmonary edema. No focal consolidation. No significant pleural effusion. No pneumothorax. Osseous structures are  unchanged.     Impression: Impression: Marked cardiomegaly with pulmonary vascular congestion. No overt pulmonary edema or focal consolidation. Electronically Signed: Pedro Arevalo MD  3/17/2025 12:44 PM EDT  Workstation ID: [id number]      Results for orders placed during the hospital encounter of 03/17/25    Adult Transthoracic Echo Complete W/ Cont if Necessary Per Protocol    Interpretation Summary    Left ventricular systolic function is normal. Calculated left ventricular EF = 55.3% Normal left ventricular cavity size noted. Left ventricular wall thickness is consistent with mild concentric hypertrophy. Left ventricular diastolic function is consistent with grade III restrictive pattern.    The aortic valve exhibits sclerosis. The aortic valve appears trileaflet. Moderate to severe aortic valve regurgitation is present. No hemodynamically significant aortic valve stenosis is present.    Mild mitral annular calcification is present. Mild mitral valve regurgitation is present. No significant mitral valve stenosis is present.    Moderate tricuspid valve regurgitation is present. Estimated right ventricular systolic pressure from tricuspid regurgitation is moderately elevated (45-55 mmHg).      Current medications:  Scheduled Meds:allopurinol, 50 mg, Oral, Daily  [Held by provider] amiodarone, 200 mg, Oral, Daily  apixaban, 2.5 mg, Oral, Q12H  [Held by provider] carvedilol, 25 mg, Oral, BID With Meals  cholecalciferol, 1,000 Units, Oral, Daily  empagliflozin, 10 mg, Oral, Daily  [Held by provider] furosemide, 40 mg, Intravenous, BID AC  losartan, 100 mg, Oral, Nightly  NIFEdipine XL, 60 mg, Oral, Q24H  pharmacy consult - MTM, , Not Applicable, Q12H  pravastatin, 40 mg, Oral, Nightly  QUEtiapine, 25 mg, Oral, Nightly      Continuous Infusions:niCARdipine, 5-15 mg/hr, Last Rate: Stopped (03/17/25 2115)      PRN Meds:.  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 female w hx of HTN, HFpEF, afib, CKD, DM admitted w acute HFpEF exacerbation and hypertensive emergency. Cardiology follows    Acute on chronic HFpEF exacerbation  Acute hypoxemic respiratory failure  Severe AS   --s/p IV lasix. Further diuresis per cardiology. Already improving  --cont ARB, pt reports that she doesn't take jardiance so refused it this am   --nifedipine added to home med regimen per cardiology. Renal artery duplex negative for any stenosis  --repeat echo as noted above (normal EF, grade III diastolic dysfunction, severe AS)   --reviewed I/O, no accurate Is/Os for the last 24 hours    HTN  -- holding Coreg due to bradycardia  -- continue ARB, nifedipine   -- cardene gtt as needed    Afib  --cont eliquis  --holding amiodarone and coreg d/t bradycardia    CKD 4  --Cr around baseline, monitor w diuresis      T2DM  --cont SSI    Gout  --low dose allopurinol    Dementia  --seroquel at bedtime  --complicates all aspects of care      Expected Discharge Location and Transportation: home with home health once BP is better controlled/if okay  with Cardiology   Expected Discharge   Expected Discharge Date: 3/20/2025; Expected Discharge Time:      VTE Prophylaxis:  Pharmacologic VTE prophylaxis orders are present.         AM-PAC 6 Clicks Score (PT): 19 (03/18/25 1502)    CODE STATUS:   Code Status and Medical Interventions: No CPR (Do Not Attempt to Resuscitate); Full   Ordered at: 03/17/25 8155     Code Status (Patient has no pulse and is not breathing):    No CPR (Do Not Attempt to Resuscitate)     Medical Interventions (Patient has pulse or is breathing):    Full       Pam Juarez MD  03/19/25

## 2025-03-19 NOTE — THERAPY TREATMENT NOTE
Patient Name: Cheryl Tomas  : 1940    MRN: [id number]                              Today's Date: 3/19/2025       Admit Date: 3/17/2025    Visit Dx:     ICD-10-CM ICD-9-CM   1. Acute on chronic congestive heart failure, unspecified heart failure type  I50.9 428.0   2. Hypokalemia  E87.6 276.8   3. Chronic kidney disease, unspecified CKD stage  N18.9 585.9   4. Acute respiratory failure with hypoxia  J96.01 518.81     Patient Active Problem List   Diagnosis    Hypertension    Diabetes mellitus    Hyperlipidemia    Anxiety and depression    Family history of brain aneurysm    Age-related cataract    Hypertensive heart and chronic kidney disease with heart failure and stage 1 through stage 4 chronic kidney disease, or unspecified chronic kidney disease    Right bundle branch block with left anterior fascicular block    Nonrheumatic aortic valve insufficiency    Osteoarthrosis multiple sites, not specified as generalized    Actinic keratosis    Other osteoporosis    Vitamin D deficiency    Other depressive disorder    Benign essential hypertension    A-fib    Dementia    Painful urination    Acute on chronic heart failure with preserved ejection fraction (HFpEF)     Past Medical History:   Diagnosis Date    Bilateral renal cysts     noted CT abdomen 22    Diabetes mellitus     Diverticulosis     Hyperlipidemia     Hypertension     Medicare annual wellness visit, subsequent 11/15/2021    Osteoporosis     Ovarian cyst     noted on CT abdomen 22 (left ovary)    Pulmonary nodule less than 6 mm determined by computed tomography of lung     Noted on CT Abdomen from ER visit 22 (right lung)     Past Surgical History:   Procedure Laterality Date    APPENDECTOMY      BREAST BIOPSY      CATARACT EXTRACTION, BILATERAL       and     FIBULA FRACTURE SURGERY      HYSTERECTOMY      KIDNEY STONE SURGERY        General Information       Row Name 25 3401          Physical Therapy Time and  Intention    Document Type evaluation  -CD     Mode of Treatment physical therapy  -CD       Row Name 03/19/25 1547          General Information    Patient Profile Reviewed yes  -CD     Existing Precautions/Restrictions cardiac;fall  -CD     Barriers to Rehab medically complex  -CD       Row Name 03/19/25 1547          Cognition    Orientation Status (Cognition) oriented x 4  -CD       Row Name 03/19/25 1547          Safety Issues/Impairments Affecting Functional Mobility    Impairments Affecting Function (Mobility) endurance/activity tolerance;postural/trunk control;strength;balance  -CD               User Key  (r) = Recorded By, (t) = Taken By, (c) = Cosigned By      Initials Name Provider Type    CD Elham Alves, PT Physical Therapist                   Mobility       Row Name 03/19/25 1547          Bed Mobility    Comment, (Bed Mobility) PT SITTING EOB UPON ARRIVAL, NEEDING TO GO TO THE BATHROOM.  -CD       Row Name 03/19/25 1547          Transfers    Comment, (Transfers) STS FROM EOB AND COMMODE WITH SBA, NO LOB.  -CD       Row Name 03/19/25 1547          Sit-Stand Transfer    Sit-Stand [place] (Transfers) standby assist  -CD     Assistive Device (Sit-Stand Transfers) --  NO A.D.  -CD     Comment, (Sit-Stand Transfer) DENIES DIZZINESS.  -CD       Row Name 03/19/25 1547          Gait/Stairs (Locomotion)    [place] Level (Gait) standby assist;verbal cues  -CD     Assistive Device (Gait) --  NO A.D.  -CD     Distance in Feet (Gait) 300  -CD     Deviations/Abnormal Patterns (Gait) gait speed decreased;stride length decreased;[place] decreased  -CD     Bilateral Gait Deviations forward flexed posture;heel strike decreased  -CD     Comment, (Gait/Stairs) NO OVERT LOB. GAIT INITIALLY GUARDED WITH SHORTENED STEP LENGTH BUT IMPROVED WITH INCREASED TIME UP AND CUES.  -CD               User Key  (r) = Recorded By, (t) = Taken By, (c) = Cosigned By      Initials Name Provider Type    CD Elham Alves PT  Physical Therapist                   Obj/Interventions       Row Name 03/19/25 6405          Motor Skills    Therapeutic Exercise --  COMPLETED STANDING LE THER EX AND BALANCE ACTIVITY: HEEL RAISES, HIGH MARCHES, BACKWARDS GAIT AND SIDE STEPPING WITH CGA.  -CD       Row Name 03/19/25 5180          Balance    Static Sitting Balance independent  -CD     Dynamic Sitting Balance independent  -CD     Position, Sitting Balance unsupported;sitting in chair;sitting edge of bed  -CD     Static Standing Balance standby assist  -CD     Dynamic Standing Balance contact guard  -CD     Position/Device Used, Standing Balance unsupported  -CD     Balance Interventions sitting;standing;sit to stand;supported;static;dynamic  -CD     Comment, Balance NO OVERT LOB WITH BALANCE ACTIVITY BUT IS SLOW AND GUARDED. DELINED USE OF A.D. REPORTS TO HAVE CANE AT HOME IF NEEDED.  -CD               User Key  (r) = Recorded By, (t) = Taken By, (c) = Cosigned By      Initials Name Provider Type    CD Elham Alves, PT Physical Therapist                   Goals/Plan    No documentation.                  Clinical Impression       Row Name 03/19/25 6094          Pain    Pretreatment Pain Rating 0/10 - no pain  -CD     Posttreatment Pain Rating 0/10 - no pain  -CD       Row Name 03/19/25 2745          Plan of Care Review    Plan of Care Reviewed With patient  -CD     Outcome Evaluation PT PROGRESSING WITH MOBILITY. INCREASED DISTANCE AMBULATED  FEET WITH SBA. REQUIRED CGA FOR DYNAMIC BALANCE ACTIVITY AND STANDING LE THER EX. PT IS A&O AND FOLLOWS ALL COMMANDS. PLANS ARE TO D/C HOME WITH HHPT.  -CD       Row Name 03/19/25 3380          Therapy Assessment/Plan (PT)    Patient/Family Therapy Goals Statement (PT) TO GO HOME.  -CD     Rehab Potential (PT) good  -CD     Criteria for Skilled Interventions Met (PT) yes;meets criteria;skilled treatment is necessary  -CD     Therapy Frequency (PT) daily  -CD       Row Name 03/19/25 4074          Vital  Signs    Post Systolic BP Rehab 128  -CD     Post Treatment Diastolic BP 68  -CD     Posttreatment Heart Rate (beats/min) 80  -CD     Pre SpO2 (%) 94  -CD     O2 Delivery Pre Treatment supplemental O2  -CD     O2 Delivery Intra Treatment supplemental O2  -CD     Post SpO2 (%) 98  -CD     O2 Delivery Post Treatment supplemental O2  -CD     Pre Patient Position Supine  -CD     Intra Patient Position Standing  -CD     Post Patient Position Sitting  -CD       Row Name 03/19/25 1552          Positioning and Restraints    Pre-Treatment Position in bed  -CD     Post Treatment Position chair  -CD     In Chair reclined;call light within reach;encouraged to call for assist;exit alarm on;with family/caregiver;notified nsg;legs elevated  -CD               User Key  (r) = Recorded By, (t) = Taken By, (c) = Cosigned By      Initials Name Provider Type    Elham Waters PT Physical Therapist                   Outcome Measures       Row Name 03/19/25 0434          How much help from another person do you currently need...    Turning from your back to your side while in flat bed without using bedrails? 4  -CD     Moving from lying on back to sitting on the side of a flat bed without bedrails? 4  -CD     Moving to and from a bed to a chair (including a wheelchair)? 3  -CD     Standing up from a chair using your arms (e.g., wheelchair, bedside chair)? 4  -CD     Climbing 3-5 steps with a railing? 3  -CD     To walk in hospital room? 3  -CD     AM-PAC 6 Clicks Score (PT) 21  -CD     Highest Level of Mobility Goal 6 --> Walk 10 steps or more  -CD               User Key  (r) = Recorded By, (t) = Taken By, (c) = Cosigned By      Initials Name Provider Type    Elham Waters PT Physical Therapist                                 Physical Therapy Education       Title: PT OT SLP Therapies (Done)       Topic: Physical Therapy (Done)       Point: Mobility training (Done)       Learning Progress Summary            Patient Acceptance, E,  VU,NR by CD at 3/19/2025 1605    Comment: SEE FLOWSHEET    Eager, E, VU,DU,NR by  at 3/18/2025 1502    Comment: Educated pt. safety/technique w/bed mobility, transfers, ambulation, PT POC   Family Eager, E, VU,DU,NR by  at 3/18/2025 1502    Comment: Educated pt. safety/technique w/bed mobility, transfers, ambulation, PT POC                      Point: Home exercise program (Done)       Learning Progress Summary            Patient Acceptance, E, VU,NR by CD at 3/19/2025 1605    Comment: SEE FLOWSHEET                      Point: Body mechanics (Done)       Learning Progress Summary            Patient Acceptance, E, VU,NR by CD at 3/19/2025 1605    Comment: SEE FLOWSHEET    Eager, E, VU,DU,NR by  at 3/18/2025 1502    Comment: Educated pt. safety/technique w/bed mobility, transfers, ambulation, PT POC   Family Eager, E, VU,DU,NR by  at 3/18/2025 1502    Comment: Educated pt. safety/technique w/bed mobility, transfers, ambulation, PT POC                      Point: Precautions (Done)       Learning Progress Summary            Patient Acceptance, E, VU,NR by CD at 3/19/2025 1605    Comment: SEE FLOWSHEET    Eager, E, VU,DU,NR by  at 3/18/2025 1502    Comment: Educated pt. safety/technique w/bed mobility, transfers, ambulation, PT POC   Family Eager, E, VU,DU,NR by  at 3/18/2025 1502    Comment: Educated pt. safety/technique w/bed mobility, transfers, ambulation, PT POC                                      User Key       Initials Effective Dates Name Provider Type Discipline    CD 02/03/23 -  Elham Alves PT Physical Therapist PT     06/01/21 -  Mahsa Mesa PT Physical Therapist PT                  PT Recommendation and Plan     Outcome Evaluation: PT PROGRESSING WITH MOBILITY. INCREASED DISTANCE AMBULATED  FEET WITH SBA. REQUIRED CGA FOR DYNAMIC BALANCE ACTIVITY AND STANDING LE THER EX. PT IS A&O AND FOLLOWS ALL COMMANDS. PLANS ARE TO D/C HOME WITH HHPT.     Time Calculation:         PT Charges        Row Name 03/19/25 1607             Time Calculation    Start Time 1522  -CD      PT Received On 03/19/25  -CD         Time Calculation- PT    Total Timed Code Minutes- PT 23 minute(s)  -CD         Timed Charges    41018 - PT Therapeutic Exercise Minutes 8  -CD      07483 - Gait Training Minutes  15  -CD         Total Minutes    Timed Charges Total Minutes 23  -CD       Total Minutes 23  -CD                User Key  (r) = Recorded By, (t) = Taken By, (c) = Cosigned By      Initials Name Provider Type    CD Elham Alves, PT Physical Therapist                  Therapy Charges for Today       Code Description Service Date Service Provider Modifiers Qty    37212005976 HC PT THER PROC EA 15 MIN 3/19/2025 Elham Alves, PT GP 1    61882379763 HC GAIT TRAINING EA 15 MIN 3/19/2025 Elham Alves, PT GP 1            PT G-Codes  Outcome Measure Options: AM-PAC 6 Clicks Basic Mobility (PT)  AM-PAC 6 Clicks Score (PT): 21  PT Discharge Summary  Anticipated Discharge Disposition (PT): home with assist, home with home health    Elham Alves, PT  3/19/2025

## 2025-03-19 NOTE — PLAN OF CARE
Goal Outcome Evaluation:  Plan of Care Reviewed With: patient           Outcome Evaluation: PT PROGRESSING WITH MOBILITY. INCREASED DISTANCE AMBULATED  FEET WITH SBA. REQUIRED CGA FOR DYNAMIC BALANCE ACTIVITY AND STANDING LE THER EX. PT IS A&O AND FOLLOWS ALL COMMANDS. PLANS ARE TO D/C HOME WITH HHPT.    Anticipated Discharge Disposition (PT): home with assist, home with home health

## 2025-03-19 NOTE — CASE MANAGEMENT/SOCIAL WORK
Continued Stay Note  [facility]     Patient Name: Cheryl Tomas  MRN: [id number]  Today's Date: 3/19/2025    Admit Date: 3/17/2025    Plan: Home    Discharge Plan       Row Name 03/19/25 1203       Plan    Plan Home     Patient/Family in Agreement with Plan yes    Plan Comments Spoke with patient at bedside r/t therapy recs with pt agreeable to HH. State recently had  but unsure of provider. Discussed with daughter per pt request. [facility] to [facility]. Daughter states son will transport at discharge. CM will cont to follow.    Final Discharge Disposition Code 06 - home with home health care                   Discharge Codes    No documentation.                 Expected Discharge Date and Time       Expected Discharge Date Expected Discharge Time    Mar 19, 2025               Angélica Meng RN

## 2025-03-19 NOTE — PROGRESS NOTES
[facility] Cardiology  Inpatient Progress Note      Chief Complaint/Reason for consult:    Chief Complaint   Patient presents with    Shortness of Breath            Subjective  BP controlled again today.  Nifedipine was not given yesterday for mild diastolic hypotension despite ongoing systolic hypertension.  Carvedilol has been held since admission due to bradycardia.  No lab work performed this morning.    Feeling much better with regards to her breathing compared to on presentation       Vital Sign Min/Max for last 24 hours  Temp  Min: 98.1 °F (36.7 °C)  Max: 98.7 °F (37.1 °C)   BP  Min: 144/59  Max: 198/71   Pulse  Min: 52  Max: 63   Resp  Min: 16  Max: 18   SpO2  Min: 90 %  Max: 95 %   Flow (L/min) (Oxygen Therapy)  Min: 2  Max: 2      Intake/Output Summary (Last 24 hours) at 3/19/2025 1020  Last data filed at 3/18/2025 1757  Gross per 24 hour   Intake 360 ml   Output --   Net 360 ml           Gen: well developed, lying in bed, comfortable appearing  HEENT: MMM, sclerae anicteric, conjunctivae normal  CV: regular rate, regular rhythm, no murmurs or rubs, normal S1, S2. 2+ radial and DP pulses  Pulm: RA, normal work of breathing, no wheezes, rales, rhonchi  Abd: soft, nontender, nondistended  Ext: normal bulk for age, normal tone, no dependent edema  Neuro: alert, oriented, face symmetrical, moving all extremities well  Psych: normal mood, appropriate affect    Tele:  SR, no alarms    Results Review (reviewed the patient's recent labs in the electronic medical record):     EKG:  SB, bifascicular block     Results for orders placed during the hospital encounter of 03/17/25    Adult Transthoracic Echo Complete W/ Cont if Necessary Per Protocol    Interpretation Summary    Left ventricular systolic function is normal. Calculated left ventricular EF = 55.3% Normal left ventricular cavity size noted. Left ventricular wall thickness is consistent with mild concentric hypertrophy. Left ventricular  diastolic function is consistent with grade III restrictive pattern.    The aortic valve exhibits sclerosis. The aortic valve appears trileaflet. Moderate to severe aortic valve regurgitation is present. No hemodynamically significant aortic valve stenosis is present.    Mild mitral annular calcification is present. Mild mitral valve regurgitation is present. No significant mitral valve stenosis is present.    Moderate tricuspid valve regurgitation is present. Estimated right ventricular systolic pressure from tricuspid regurgitation is moderately elevated (45-55 mmHg).       Radiology:    Duplex Renal Artery - Bilateral Complete CAR  Result Date: 3/18/2025  Impression: No evidence of hemodynamically significant renal artery stenosis. Electronically Signed: Stef Mayes MD  3/18/2025 3:16 PM EDT  Workstation ID: [id number]    XR Chest 1 View  Result Date: 3/17/2025  Impression: Marked cardiomegaly with pulmonary vascular congestion. No overt pulmonary edema or focal consolidation. Electronically Signed: Pedro Arevalo MD  3/17/2025 12:44 PM EDT  Workstation ID: [id number]      Lab Results   Component Value Date    WBC 7.16 03/18/2025    HGB 10.1 (L) 03/18/2025    HCT 32.8 (L) 03/18/2025    MCV 87.2 03/18/2025     03/18/2025     Lab Results   Component Value Date    GLUCOSE 127 (H) 03/18/2025    CALCIUM 8.8 03/18/2025     (H) 03/18/2025    K 2.9 (L) 03/18/2025    CO2 31.0 (H) 03/18/2025     03/18/2025    BUN 28 (H) 03/18/2025    CREATININE 1.62 (H) 03/18/2025    EGFRIFAFRI 52 (L) 11/15/2021    EGFRIFNONA 43 (L) 11/15/2021    BCR 17.3 03/18/2025    ANIONGAP 10.0 03/18/2025     Lab Results   Component Value Date    TROPONINT 31 (H) 03/17/2025    TROPONINT 34 (H) 03/17/2025    TROPONINT 16 (H) 01/26/2024      Lab Results   Component Value Date    PROBNP 10,701.0 (H) 03/17/2025     Lab Results   Component Value Date    HGBA1C 5.7 01/08/2025      Lab Results   Component Value Date    CHOL 168 02/18/2025     CHLPL 168 11/30/2022    TRIG 83 02/18/2025    HDL 51 02/18/2025     (H) 02/18/2025        Assessment     Cheryl Tomas is a 84 y.o. with a history of difficult to control hypertension, HFpEF, aortic regurgitation, PAF who presents to the emergency department with increased leg swelling, weight gain, and orthopnea concerning for exacerbation of her HFpEF.  Additionally, she has had ongoing issues with uncontrolled blood pressure.     Acute on Chronic heart failure preserved ejection fraction   - ARB, empagliflozin   - IV furosemide held until updated BMP is reviewed     Primary hypertension /hypertensive urgency   - recheck renal artery with no evidence of significant stenosis   - added nifedipine which was for some reason held yesterday, recommend resumption today and discussion with physician prior to holding antihypertensives   - Continue losartan     Moderate-Sev AR   -Repeat echocardiogram with progression of AR to mod-sev      PAF   - Sinus bradycardia   - Continue anticoagulation with apixaban 2.5 mg twice daily   - carvedilol and amiodarone held     Palpitations / PSVT / AT  Bifascicular block   - BB, amio held as above       DUARTE Epperson MD  3/19/2025  10:20 EDT

## 2025-03-20 ENCOUNTER — TELEPHONE (OUTPATIENT)
Dept: CARDIOLOGY | Facility: CLINIC | Age: 85
End: 2025-03-20

## 2025-03-20 LAB
ANION GAP SERPL CALCULATED.3IONS-SCNC: 11 MMOL/L (ref 5–15)
BASOPHILS # BLD AUTO: 0.04 10*3/MM3 (ref 0–0.2)
BASOPHILS NFR BLD AUTO: 0.6 % (ref 0–1.5)
BUN SERPL-MCNC: 37 MG/DL (ref 8–23)
BUN/CREAT SERPL: 20.6 (ref 7–25)
CALCIUM SPEC-SCNC: 9.3 MG/DL (ref 8.6–10.5)
CHLORIDE SERPL-SCNC: 101 MMOL/L (ref 98–107)
CO2 SERPL-SCNC: 31 MMOL/L (ref 22–29)
CREAT SERPL-MCNC: 1.8 MG/DL (ref 0.57–1)
DEPRECATED RDW RBC AUTO: 47.2 FL (ref 37–54)
EGFRCR SERPLBLD CKD-EPI 2021: 27.8 ML/MIN/1.73
EOSINOPHIL # BLD AUTO: 0.18 10*3/MM3 (ref 0–0.4)
EOSINOPHIL NFR BLD AUTO: 2.5 % (ref 0.3–6.2)
ERYTHROCYTE [DISTWIDTH] IN BLOOD BY AUTOMATED COUNT: 14.7 % (ref 12.3–15.4)
GLUCOSE SERPL-MCNC: 130 MG/DL (ref 65–99)
HCT VFR BLD AUTO: 35.7 % (ref 34–46.6)
HGB BLD-MCNC: 10.8 G/DL (ref 12–15.9)
IMM GRANULOCYTES # BLD AUTO: 0.02 10*3/MM3 (ref 0–0.05)
IMM GRANULOCYTES NFR BLD AUTO: 0.3 % (ref 0–0.5)
LYMPHOCYTES # BLD AUTO: 1.42 10*3/MM3 (ref 0.7–3.1)
LYMPHOCYTES NFR BLD AUTO: 19.6 % (ref 19.6–45.3)
MCH RBC QN AUTO: 26.5 PG (ref 26.6–33)
MCHC RBC AUTO-ENTMCNC: 30.3 G/DL (ref 31.5–35.7)
MCV RBC AUTO: 87.5 FL (ref 79–97)
MONOCYTES # BLD AUTO: 0.61 10*3/MM3 (ref 0.1–0.9)
MONOCYTES NFR BLD AUTO: 8.4 % (ref 5–12)
NEUTROPHILS NFR BLD AUTO: 4.99 10*3/MM3 (ref 1.7–7)
NEUTROPHILS NFR BLD AUTO: 68.6 % (ref 42.7–76)
NRBC BLD AUTO-RTO: 0 /100 WBC (ref 0–0.2)
PLATELET # BLD AUTO: 236 10*3/MM3 (ref 140–450)
PMV BLD AUTO: 11.1 FL (ref 6–12)
POTASSIUM SERPL-SCNC: 3.3 MMOL/L (ref 3.5–5.2)
POTASSIUM SERPL-SCNC: 3.7 MMOL/L (ref 3.5–5.2)
RBC # BLD AUTO: 4.08 10*6/MM3 (ref 3.77–5.28)
SODIUM SERPL-SCNC: 143 MMOL/L (ref 136–145)
WBC NRBC COR # BLD AUTO: 7.26 10*3/MM3 (ref 3.4–10.8)

## 2025-03-20 PROCEDURE — 99232 SBSQ HOSP IP/OBS MODERATE 35: CPT | Performed by: INTERNAL MEDICINE

## 2025-03-20 PROCEDURE — 97116 GAIT TRAINING THERAPY: CPT

## 2025-03-20 PROCEDURE — 85025 COMPLETE CBC W/AUTO DIFF WBC: CPT | Performed by: INTERNAL MEDICINE

## 2025-03-20 PROCEDURE — 84132 ASSAY OF SERUM POTASSIUM: CPT | Performed by: INTERNAL MEDICINE

## 2025-03-20 PROCEDURE — 80048 BASIC METABOLIC PNL TOTAL CA: CPT | Performed by: INTERNAL MEDICINE

## 2025-03-20 RX ORDER — AMIODARONE HYDROCHLORIDE 200 MG/1
100 TABLET ORAL DAILY
Status: DISCONTINUED | OUTPATIENT
Start: 2025-03-21 | End: 2025-03-21 | Stop reason: HOSPADM

## 2025-03-20 RX ORDER — POTASSIUM CHLORIDE 1500 MG/1
20 TABLET, EXTENDED RELEASE ORAL ONCE
Status: COMPLETED | OUTPATIENT
Start: 2025-03-20 | End: 2025-03-20

## 2025-03-20 RX ORDER — FUROSEMIDE 40 MG/1
40 TABLET ORAL DAILY
Status: DISCONTINUED | OUTPATIENT
Start: 2025-03-20 | End: 2025-03-21 | Stop reason: HOSPADM

## 2025-03-20 RX ADMIN — QUETIAPINE FUMARATE 25 MG: 25 TABLET ORAL at 20:53

## 2025-03-20 RX ADMIN — NIFEDIPINE 60 MG: 60 TABLET, EXTENDED RELEASE ORAL at 11:21

## 2025-03-20 RX ADMIN — POTASSIUM CHLORIDE 20 MEQ: 1500 TABLET, EXTENDED RELEASE ORAL at 08:47

## 2025-03-20 RX ADMIN — APIXABAN 2.5 MG: 2.5 TABLET, FILM COATED ORAL at 20:53

## 2025-03-20 RX ADMIN — LOSARTAN POTASSIUM 100 MG: 50 TABLET, FILM COATED ORAL at 20:53

## 2025-03-20 RX ADMIN — ALLOPURINOL 50 MG: 100 TABLET ORAL at 08:47

## 2025-03-20 RX ADMIN — Medication 10 ML: at 08:47

## 2025-03-20 RX ADMIN — PRAVASTATIN SODIUM 40 MG: 40 TABLET ORAL at 20:53

## 2025-03-20 RX ADMIN — APIXABAN 2.5 MG: 2.5 TABLET, FILM COATED ORAL at 08:47

## 2025-03-20 RX ADMIN — Medication 1000 UNITS: at 08:47

## 2025-03-20 NOTE — TELEPHONE ENCOUNTER
Caller: LUIS CARLOS WESTIN     Relationship: SELF    Best call back number: [phone]    What is your medical concern? PT IS CURRENTLY ADMITTED TO [facility] ROOM 326. THE DR WAS WITH HER TODAY AND SUGGESTED SHE START JARDIANCE. THE PT REFUSED AT THAT TIME. THEY HAVE CHANGED THEIR MINDS AND WOULD LIKE TO START HER ON THIS. PLEASE REACH OUT TO HER DAUGHTER TO ADDRESS THIS.

## 2025-03-20 NOTE — PROGRESS NOTES
[facility]  PROGRESS NOTE    Patient Name: Cheryl Tomas  : 1940  MRN: [id number]    Date of Admission: 3/17/2025  Primary Care Physician: Marian Perea MD    Subjective   Subjective     CC:  F/u dyspnea    HPI:  Pt didn't sleep well at all last night, states that she couldn't get comfortable. Otherwise, denies any SOA.        Objective   Objective     Vital Signs:   Temp:  [98 °F (36.7 °C)-98.2 °F (36.8 °C)] 98 °F (36.7 °C)  Heart Rate:  [58-71] 71  Resp:  [18] 18  BP: (141-206)/(54-72) 143/56     Physical Exam:  Constitutional: No acute distress, awake, alert, elderly/frail  HENT: NCAT, mucous membranes moist  Respiratory: CTAB,  no wheezing. Resp effort normal on RA   Cardiovascular: RRR, no murmurs, rubs, or gallops  Gastrointestinal: Positive bowel sounds, soft, nontender, nondistended  Musculoskeletal: No bilateral ankle edema  Psychiatric: Appropriate affect, cooperative  Neurologic: Oriented x 3, strength symmetric, no focal deficits  Skin: No rashes      Results Reviewed:  LAB RESULTS:      Lab 25  1240 25  0455 25  1420 25  1203   WBC 7.26 7.85 7.16  --  5.87   HEMOGLOBIN 10.8* 11.5* 10.1*  --  10.6*   HEMATOCRIT 35.7 38.2 32.8*  --  34.6   PLATELETS 236 243 202  --  197   NEUTROS ABS 4.99 6.06 4.97  --  4.43   IMMATURE GRANS (ABS) 0.02 0.03 0.02  --  0.01   LYMPHS ABS 1.42 1.06 1.36  --  0.88   MONOS ABS 0.61 0.52 0.58  --  0.40   EOS ABS 0.18 0.15 0.19  --  0.12   MCV 87.5 87.6 87.2  --  87.4   HSTROP T  --   --   --  31* 34*         Lab 258 25  2145 25  1240 25  0455 25  1420 25  1203   SODIUM 143  --  149* 146*  --  146*   POTASSIUM 3.3* 3.4* 3.2* 2.9*  --  3.1*   CHLORIDE 101  --  101 105  --  106   CO2 31.0*  --  36.0* 31.0*  --  28.0   ANION GAP 11.0  --  12.0 10.0  --  12.0   BUN 37*  --  25* 28*  --  26*   CREATININE 1.80*  --  1.54* 1.62*  --  1.55*   EGFR  27.8*  --  33.6* 31.6*  --  33.3*   GLUCOSE 130*  --  109* 127*  --  174*   CALCIUM 9.3  --  9.6 8.8  --  9.2   MAGNESIUM  --   --   --   --   --  2.0   TSH  --   --   --   --  1.870  --          Lab 03/17/25  1203   TOTAL PROTEIN 6.0   ALBUMIN 4.0   GLOBULIN 2.0   ALT (SGPT) 40*   AST (SGOT) 26   BILIRUBIN 0.5   ALK PHOS 96         Lab 03/17/25  1420 03/17/25  1203   PROBNP  --  10,701.0*   HSTROP T 31* 34*             Lab 03/17/25  1420 03/17/25  1203   IRON 21*  --    IRON SATURATION (TSAT) 5*  --    TIBC 451  --    TRANSFERRIN 303  --    FERRITIN 20.05  --    FOLATE  --  14.00   VITAMIN B 12  --  346         Brief Urine Lab Results  (Last result in the past 365 days)        Color   Clarity   Blood   Leuk Est   Nitrite   Protein   CREAT   Urine HCG        10/01/24 1404 Yellow   Clear   Negative   Negative   Negative   Trace                   Microbiology Results Abnormal       None            Duplex Renal Artery - Bilateral Complete CAR  Result Date: 3/18/2025  DUPLEX RENAL ARTERY BILATERAL COMPLETE CAR Date of Exam: 3/18/2025 8:14 AM EDT Indication: renovascular hypertension. Comparison: No comparisons available. Technique: Grayscale, color-flow, Doppler spectral waveform analysis was performed of the kidneys, renal arteries, and aorta. Findings: The right kidney measures 10.1 x 3.4 x 3.5 cm. Peak systolic velocity is 90 cm/s. The renal artery to aortic ratio is less than 1. Hilar acceleration time is 46 ms. Resistive indices are normal. The left kidney measures 10.1 x 3.9 x 3.3 cm. Peak systolic velocity on the left is 65 cm/s. The renal artery to aortic ratio is less than 1. Hilar acceleration time is 41 ms. Resistive indices were unremarkable.     Impression: Impression: No evidence of hemodynamically significant renal artery stenosis. Electronically Signed: Stef Mayes MD  3/18/2025 3:16 PM EDT  Workstation ID: [id number]      Results for orders placed during the hospital encounter of 03/17/25    Adult  Transthoracic Echo Complete W/ Cont if Necessary Per Protocol    Interpretation Summary    Left ventricular systolic function is normal. Calculated left ventricular EF = 55.3% Normal left ventricular cavity size noted. Left ventricular wall thickness is consistent with mild concentric hypertrophy. Left ventricular diastolic function is consistent with grade III restrictive pattern.    The aortic valve exhibits sclerosis. The aortic valve appears trileaflet. Moderate to severe aortic valve regurgitation is present. No hemodynamically significant aortic valve stenosis is present.    Mild mitral annular calcification is present. Mild mitral valve regurgitation is present. No significant mitral valve stenosis is present.    Moderate tricuspid valve regurgitation is present. Estimated right ventricular systolic pressure from tricuspid regurgitation is moderately elevated (45-55 mmHg).      Current medications:  Scheduled Meds:allopurinol, 50 mg, Oral, Daily  [Held by provider] amiodarone, 200 mg, Oral, Daily  apixaban, 2.5 mg, Oral, Q12H  [Held by provider] carvedilol, 25 mg, Oral, BID With Meals  cholecalciferol, 1,000 Units, Oral, Daily  empagliflozin, 10 mg, Oral, Daily  [Held by provider] furosemide, 40 mg, Intravenous, BID AC  losartan, 100 mg, Oral, Nightly  NIFEdipine XL, 60 mg, Oral, Q24H  pravastatin, 40 mg, Oral, Nightly  QUEtiapine, 25 mg, Oral, Nightly      Continuous Infusions:niCARdipine, 5-15 mg/hr, Last Rate: Stopped (03/17/25 2115)      PRN Meds:.  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 female w hx of HTN, HFpEF, afib, CKD, DM admitted w acute HFpEF exacerbation and hypertensive emergency. Cardiology follows    Acute on chronic HFpEF exacerbation  Acute hypoxemic respiratory failure  Severe AS   --Held pm dose of IV lasix on 3/19. Further diuresis per cardiology but suspect may hold today due to bump in Cr  --cont ARB, pt reports that she doesn't take jardiance so refused it this am   --nifedipine added to home med regimen per cardiology. Renal artery duplex negative for any stenosis  --repeat echo as noted above (normal EF, grade III diastolic dysfunction, severe AS)   --reviewed I/O, no accurate Is/Os for the last 24 hours    HTN  -- holding Coreg due to bradycardia  -- continue ARB, nifedipine (was held by nursing on 3/18, but given yesterday am).    -- cardene gtt as needed    Afib  --cont eliquis  --holding amiodarone and coreg d/t bradycardia    CKD 4  --Cr up from baseline today at 1.8 (from 1.54 yesterday) monitor w diuresis (received one dose of IV Lasix yesterday am, but then held per Cards until repeat labs were done).  Continue to hold diuretics until Cardiology evaluates today       T2DM  --cont SSI    Gout  --low dose allopurinol    Dementia  --seroquel at bedtime  --complicates all aspects of care      Expected Discharge Location and Transportation: home with home health once BP is better controlled/if okay with Cardiology and if Cr improves  Expected Discharge   Expected  Discharge Date: 3/21/2025; Expected Discharge Time:      VTE Prophylaxis:  Pharmacologic VTE prophylaxis orders are present.         AM-PAC 6 Clicks Score (PT): 21 (03/19/25 2000)    CODE STATUS:   Code Status and Medical Interventions: No CPR (Do Not Attempt to Resuscitate); Full   Ordered at: 03/17/25 9195     Code Status (Patient has no pulse and is not breathing):    No CPR (Do Not Attempt to Resuscitate)     Medical Interventions (Patient has pulse or is breathing):    Full       Pam Juarez MD  03/20/25

## 2025-03-20 NOTE — THERAPY TREATMENT NOTE
Patient Name: Cheryl Tomas  : 1940    MRN: [id number]                              Today's Date: 3/20/2025       Admit Date: 3/17/2025    Visit Dx:     ICD-10-CM ICD-9-CM   1. Acute on chronic congestive heart failure, unspecified heart failure type  I50.9 428.0   2. Hypokalemia  E87.6 276.8   3. Chronic kidney disease, unspecified CKD stage  N18.9 585.9   4. Acute respiratory failure with hypoxia  J96.01 518.81     Patient Active Problem List   Diagnosis    Hypertension    Diabetes mellitus    Hyperlipidemia    Anxiety and depression    Family history of brain aneurysm    Age-related cataract    Hypertensive heart and chronic kidney disease with heart failure and stage 1 through stage 4 chronic kidney disease, or unspecified chronic kidney disease    Right bundle branch block with left anterior fascicular block    Nonrheumatic aortic valve insufficiency    Osteoarthrosis multiple sites, not specified as generalized    Actinic keratosis    Other osteoporosis    Vitamin D deficiency    Other depressive disorder    Benign essential hypertension    A-fib    Dementia    Painful urination    Acute on chronic heart failure with preserved ejection fraction (HFpEF)     Past Medical History:   Diagnosis Date    Bilateral renal cysts     noted CT abdomen 22    Diabetes mellitus     Diverticulosis     Hyperlipidemia     Hypertension     Medicare annual wellness visit, subsequent 11/15/2021    Osteoporosis     Ovarian cyst     noted on CT abdomen 22 (left ovary)    Pulmonary nodule less than 6 mm determined by computed tomography of lung     Noted on CT Abdomen from ER visit 22 (right lung)     Past Surgical History:   Procedure Laterality Date    APPENDECTOMY      BREAST BIOPSY      CATARACT EXTRACTION, BILATERAL       and     FIBULA FRACTURE SURGERY      HYSTERECTOMY      KIDNEY STONE SURGERY        General Information       Row Name 25 5826          Physical Therapy Time and  Intention    Document Type therapy note (daily note) (P)   -AP     Mode of Treatment physical therapy;individual therapy (P)   -AP       Row Name 03/20/25 1644          General Information    Patient Profile Reviewed yes (P)   -AP     Existing Precautions/Restrictions cardiac;fall (P)   -AP     Barriers to Rehab medically complex (P)   -AP       Row Name 03/20/25 1644          Cognition    Orientation Status (Cognition) oriented x 4 (P)   -AP       Row Name 03/20/25 1644          Safety Issues/Impairments Affecting Functional Mobility    Safety Issues Affecting Function (Mobility) awareness of need for assistance;insight into deficits/self-awareness;safety precaution awareness;safety precautions follow-through/compliance (P)   -AP     Impairments Affecting Function (Mobility) endurance/activity tolerance;postural/trunk control;strength;balance (P)   -AP               User Key  (r) = Recorded By, (t) = Taken By, (c) = Cosigned By      Initials Name Provider Type    AP Kay Emanuel, PT Student PT Student                   Mobility       Row Name 03/20/25 1645          Bed Mobility    Bed Mobility supine-sit;sit-supine (P)   -AP     Supine-Sit [place] (Bed Mobility) standby assist;1 person assist (P)   -AP     Sit-Supine [place] (Bed Mobility) standby assist;1 person assist (P)   -AP       Row Name 03/20/25 1645          Transfers    Comment, (Transfers) Completes transfers with excellent sequencing, timing, and safety awareness (P)   -AP       Row Name 03/20/25 1645          Sit-Stand Transfer    Sit-Stand [place] (Transfers) standby assist;1 person assist (P)   -AP       Row Name 03/20/25 1645          Gait/Stairs (Locomotion)    [place] Level (Gait) verbal cues;contact guard;1 person assist (P)   -AP     Patient was able to Ambulate yes (P)   -AP     Distance in Feet (Gait) 340 (P)   -AP     Deviations/Abnormal Patterns (Gait) gait speed decreased;stride length decreased;[place]  decreased;ataxic;base of support, wide (P)   -AP     Bilateral Gait Deviations forward flexed posture;heel strike decreased (P)   -AP     Comment, (Gait/Stairs) Ambulated ~340' unsupported, demonstrating step-through gait pattern with occasional lateral drift throughout gait training. Pt steady with no overt LOB or knee buckling. VCs for forward eye gaze and upright trunk posture. (P)   -AP               User Key  (r) = Recorded By, (t) = Taken By, (c) = Cosigned By      Initials Name Provider Type    Kay Page, PT Student PT Student                   Obj/Interventions       Row Name 03/20/25 1647          Balance    Balance Assessment sitting static balance;sitting dynamic balance;standing static balance;standing dynamic balance (P)   -AP     Static Sitting Balance standby assist;1-person assist (P)   -AP     Dynamic Sitting Balance standby assist;1-person assist (P)   -AP     Position, Sitting Balance unsupported;sitting edge of bed (P)   -AP     Static Standing Balance standby assist;1-person assist (P)   -AP     Dynamic Standing Balance contact guard;1-person assist (P)   -AP     Position/Device Used, Standing Balance unsupported (P)   -AP     Balance Interventions sitting;standing;sit to stand;supported;dynamic;minimal challenge;occupation based/functional task (P)   -AP     Comment, Balance No overt LOB or knee buckling with gait training this date. Pt demonstrates occasional lateral drift during ambulation in hallway, but able to self-correct as needed. Static standing ~2 min at window with good stability and standby assist (P)   -AP               User Key  (r) = Recorded By, (t) = Taken By, (c) = Cosigned By      Initials Name Provider Type    Kay Page, PT Student PT Student                   Goals/Plan    No documentation.                  Clinical Impression       Row Name 03/20/25 1649          Pain    Pretreatment Pain Rating 0/10 - no pain (P)   -AP     Posttreatment Pain Rating  0/10 - no pain (P)   -AP       Row Name 03/20/25 1649          Plan of Care Review    Plan of Care Reviewed With patient (P)   -AP     Progress no change (P)   -AP     Outcome Evaluation Pt continues to present slightly below functional baseline with mobility, transfer, and gait deficits. Ambulated ~340' unsupported with CGA x1, progressing to good step-through gait pattern. Occasional lateral drift noted during ambulation with no overt LOB or knee buckling. Further participation in today's session limited by pt-reported generalized fatigue. When medically appropriate, recommend d/c home with assist and home health PT services; pt agreeable. (P)   -AP       Row Name 03/20/25 1649          Vital Signs    Pretreatment Heart Rate (beats/min) -- (P)   Pulse ox sensor not reading  -AP       Row Name 03/20/25 1649          Positioning and Restraints    Pre-Treatment Position in bed (P)   -AP     Post Treatment Position bed (P)   -AP     In Bed notified nsg;supine;call light within reach;encouraged to call for assist;exit alarm on;side rails up x2 (P)   -AP               User Key  (r) = Recorded By, (t) = Taken By, (c) = Cosigned By      Initials Name Provider Type    AP Kay Emanuel, PT Student PT Student                   Outcome Measures       Row Name 03/20/25 2152          How much help from another person do you currently need...    Turning from your back to your side while in flat bed without using bedrails? 4 (P)   -AP     Moving from lying on back to sitting on the side of a flat bed without bedrails? 4 (P)   -AP     Moving to and from a bed to a chair (including a wheelchair)? 3 (P)   -AP     Standing up from a chair using your arms (e.g., wheelchair, bedside chair)? 3 (P)   -AP     Climbing 3-5 steps with a railing? 3 (P)   -AP     To walk in hospital room? 3 (P)   -AP     AM-PAC 6 Clicks Score (PT) 20 (P)   -AP     Highest Level of Mobility Goal 6 --> Walk 10 steps or more (P)   -AP       Row Name 03/20/25  1652          Functional Assessment    Outcome Measure Options AM-PAC 6 Clicks Basic Mobility (PT) (P)   -AP               User Key  (r) = Recorded By, (t) = Taken By, (c) = Cosigned By      Initials Name Provider Type    Kay Page, PT Student PT Student                                 Physical Therapy Education       Title: PT OT SLP Therapies (Done)       Topic: Physical Therapy (Done)       Point: Mobility training (Done)       Learning Progress Summary            Patient Acceptance, E,TB, VU,DU by AP at 3/20/2025 1653    Acceptance, E, VU,NR by CD at 3/19/2025 1605    Comment: SEE FLOWSHEET    Eager, E, VU,DU,NR by  at 3/18/2025 1502    Comment: Educated pt. safety/technique w/bed mobility, transfers, ambulation, PT POC   Family Eager, E, VU,DU,NR by  at 3/18/2025 1502    Comment: Educated pt. safety/technique w/bed mobility, transfers, ambulation, PT POC                      Point: Home exercise program (Done)       Learning Progress Summary            Patient Acceptance, E,TB, VU,DU by AP at 3/20/2025 1653    Acceptance, E, VU,NR by CD at 3/19/2025 1605    Comment: SEE FLOWSHEET                      Point: Body mechanics (Done)       Learning Progress Summary            Patient Acceptance, E,TB, VU,DU by AP at 3/20/2025 1653    Acceptance, E, VU,NR by CD at 3/19/2025 1605    Comment: SEE FLOWSHEET    Eager, E, VU,DU,NR by  at 3/18/2025 1502    Comment: Educated pt. safety/technique w/bed mobility, transfers, ambulation, PT POC   Family Eager, E, VU,DU,NR by  at 3/18/2025 1502    Comment: Educated pt. safety/technique w/bed mobility, transfers, ambulation, PT POC                      Point: Precautions (Done)       Learning Progress Summary            Patient Acceptance, E,TB, VU,DU by AP at 3/20/2025 1653    Acceptance, E, VU,NR by CD at 3/19/2025 1605    Comment: SEE FLOWSHEET    Eager, E, VU,DU,NR by  at 3/18/2025 1502    Comment: Educated pt. safety/technique w/bed mobility, transfers,  ambulation, PT POC   Family CASPER Rodgers, LEIGHA RANKIN,NR by  at 3/18/2025 1502    Comment: Educated pt. safety/technique w/bed mobility, transfers, ambulation, PT POC                                      User Key       Initials Effective Dates Name Provider Type Discipline    CD 02/03/23 -  Elham Alves, PT Physical Therapist PT    SS 06/01/21 -  Mahsa [place], PT Physical Therapist PT    AP 01/09/25 -  Kay Emanuel, PT Student PT Student PT                  PT Recommendation and Plan     Progress: (P) no change  Outcome Evaluation: (P) Pt continues to present slightly below functional baseline with mobility, transfer, and gait deficits. Ambulated ~340' unsupported with CGA x1, progressing to good step-through gait pattern. Occasional lateral drift noted during ambulation with no overt LOB or knee buckling. Further participation in today's session limited by pt-reported generalized fatigue. When medically appropriate, recommend d/c home with assist and home health PT services; pt agreeable.     Time Calculation:         PT Charges       Row Name 03/20/25 1653             Time Calculation    Start Time 1620 (P)   -AP      PT Received On 03/20/25 (P)   -AP      PT Goal Re-Cert Due Date 03/28/25 (P)   -AP         Timed Charges    11385 - Gait Training Minutes  16 (P)   -AP         Total Minutes    Timed Charges Total Minutes 16 (P)   -AP       Total Minutes 16 (P)   -AP                User Key  (r) = Recorded By, (t) = Taken By, (c) = Cosigned By      Initials Name Provider Type    AP Kay Emanuel, PT Student PT Student                  Therapy Charges for Today       Code Description Service Date Service Provider Modifiers Qty    48202111247 HC GAIT TRAINING EA 15 MIN 3/20/2025 Kay Emanuel, PT Student GP 1            PT G-Codes  Outcome Measure Options: (P) AM-PAC 6 Clicks Basic Mobility (PT)  AM-PAC 6 Clicks Score (PT): (P) 20  PT Discharge Summary  Anticipated Discharge Disposition (PT): (P) home with  assist, home with home health    Kay Emanuel, PT Student  3/20/2025

## 2025-03-20 NOTE — PROGRESS NOTES
[facility]  Inpatient Progress Note      Chief Complaint/Reason for consult:    Chief Complaint   Patient presents with    Shortness of Breath            Subjective  Didn't sleep well but breathing better, less orthopnea       Vital Sign Min/Max for last 24 hours  Temp  Min: 98 °F (36.7 °C)  Max: 98.2 °F (36.8 °C)   BP  Min: 141/54  Max: 164/66   Pulse  Min: 58  Max: 71   Resp  Min: 18  Max: 18   SpO2  Min: 93 %  Max: 96 %   No data recorded    No intake or output data in the 24 hours ending 03/20/25 1131          Gen: well developed, lying in bed, comfortable appearing  HEENT: MMM, sclerae anicteric, conjunctivae normal  CV: regular rate, regular rhythm, no murmurs or rubs, normal S1, S2. 2+ radial and DP pulses  Pulm: RA, normal work of breathing, no wheezes, rales, rhonchi  Abd: soft, nontender, nondistended  Ext: normal bulk for age, normal tone, no dependent edema  Neuro: alert, oriented, face symmetrical, moving all extremities well  Psych: normal mood, appropriate affect    Tele:  SR, no alarms    Results Review (reviewed the patient's recent labs in the electronic medical record):     EKG:  SB, bifascicular block     Results for orders placed during the hospital encounter of 03/17/25    Adult Transthoracic Echo Complete W/ Cont if Necessary Per Protocol    Interpretation Summary    Left ventricular systolic function is normal. Calculated left ventricular EF = 55.3% Normal left ventricular cavity size noted. Left ventricular wall thickness is consistent with mild concentric hypertrophy. Left ventricular diastolic function is consistent with grade III restrictive pattern.    The aortic valve exhibits sclerosis. The aortic valve appears trileaflet. Moderate to severe aortic valve regurgitation is present. No hemodynamically significant aortic valve stenosis is present.    Mild mitral annular calcification is present. Mild mitral valve regurgitation is present. No significant mitral  valve stenosis is present.    Moderate tricuspid valve regurgitation is present. Estimated right ventricular systolic pressure from tricuspid regurgitation is moderately elevated (45-55 mmHg).       Radiology:    No radiology results for the last day      Lab Results   Component Value Date    WBC 7.26 03/20/2025    HGB 10.8 (L) 03/20/2025    HCT 35.7 03/20/2025    MCV 87.5 03/20/2025     03/20/2025     Lab Results   Component Value Date    GLUCOSE 130 (H) 03/20/2025    CALCIUM 9.3 03/20/2025     03/20/2025    K 3.3 (L) 03/20/2025    CO2 31.0 (H) 03/20/2025     03/20/2025    BUN 37 (H) 03/20/2025    CREATININE 1.80 (H) 03/20/2025    EGFRIFAFRI 52 (L) 11/15/2021    EGFRIFNONA 43 (L) 11/15/2021    BCR 20.6 03/20/2025    ANIONGAP 11.0 03/20/2025     Lab Results   Component Value Date    TROPONINT 31 (H) 03/17/2025    TROPONINT 34 (H) 03/17/2025    TROPONINT 16 (H) 01/26/2024      Lab Results   Component Value Date    PROBNP 10,701.0 (H) 03/17/2025     Lab Results   Component Value Date    HGBA1C 5.7 01/08/2025      Lab Results   Component Value Date    CHOL 168 02/18/2025    CHLPL 168 11/30/2022    TRIG 83 02/18/2025    HDL 51 02/18/2025     (H) 02/18/2025        Assessment     Cheryl Tomas is a 84 y.o. with a history of difficult to control hypertension, HFpEF, aortic regurgitation, PAF who presents to the emergency department with increased leg swelling, weight gain, and orthopnea concerning for exacerbation of her HFpEF.  Additionally, she has had ongoing issues with uncontrolled blood pressure.     Acute on Chronic heart failure preserved ejection fraction   - ARB   - transition to oral diuretics tomorrow   - if renal function is stable as an outpatient would consider spironolactone   - previously on empagliflozin but stopped due to urinary Sx per chart      Primary hypertension /hypertensive urgency   - recheck renal artery with no evidence of significant stenosis   - added nifedipine  increase dose   - Continue losartan     Moderate-Sev AR   -Repeat echocardiogram with progression of AR to mod-sev      PAF   - Sinus bradycardia improved   - Continue anticoagulation with apixaban 2.5 mg twice daily   - resume amio at decreased dose of 100mg      Palpitations / PSVT / AT  Bifascicular block   - BB, amio held as above       DUARTE Epperson MD  3/20/2025  11:31 EDT

## 2025-03-20 NOTE — PLAN OF CARE
Goal Outcome Evaluation:  Plan of Care Reviewed With: (P) patient        Progress: (P) no change  Outcome Evaluation: (P) Pt continues to present slightly below functional baseline with mobility, transfer, and gait deficits. Ambulated ~340' unsupported with CGA x1, progressing to good step-through gait pattern. Occasional lateral drift noted during ambulation with no overt LOB or knee buckling. Further participation in today's session limited by pt-reported generalized fatigue. When medically appropriate, recommend d/c home with assist and home health PT services; pt agreeable.    Anticipated Discharge Disposition (PT): (P) home with assist, home with home health

## 2025-03-21 ENCOUNTER — READMISSION MANAGEMENT (OUTPATIENT)
Dept: CALL CENTER | Facility: HOSPITAL | Age: 85
End: 2025-03-21
Payer: MEDICARE

## 2025-03-21 VITALS
WEIGHT: 130.07 LBS | OXYGEN SATURATION: 97 % | HEIGHT: 64 IN | BODY MASS INDEX: 22.21 KG/M2 | DIASTOLIC BLOOD PRESSURE: 72 MMHG | TEMPERATURE: 98.5 F | HEART RATE: 69 BPM | SYSTOLIC BLOOD PRESSURE: 182 MMHG | RESPIRATION RATE: 15 BRPM

## 2025-03-21 LAB
ANION GAP SERPL CALCULATED.3IONS-SCNC: 10 MMOL/L (ref 5–15)
BASOPHILS # BLD AUTO: 0.05 10*3/MM3 (ref 0–0.2)
BASOPHILS NFR BLD AUTO: 0.8 % (ref 0–1.5)
BUN SERPL-MCNC: 38 MG/DL (ref 8–23)
BUN/CREAT SERPL: 27 (ref 7–25)
CALCIUM SPEC-SCNC: 9.1 MG/DL (ref 8.6–10.5)
CHLORIDE SERPL-SCNC: 105 MMOL/L (ref 98–107)
CO2 SERPL-SCNC: 30 MMOL/L (ref 22–29)
CREAT SERPL-MCNC: 1.41 MG/DL (ref 0.57–1)
DEPRECATED RDW RBC AUTO: 46.7 FL (ref 37–54)
EGFRCR SERPLBLD CKD-EPI 2021: 37.3 ML/MIN/1.73
EOSINOPHIL # BLD AUTO: 0.21 10*3/MM3 (ref 0–0.4)
EOSINOPHIL NFR BLD AUTO: 3.4 % (ref 0.3–6.2)
ERYTHROCYTE [DISTWIDTH] IN BLOOD BY AUTOMATED COUNT: 14.4 % (ref 12.3–15.4)
GLUCOSE SERPL-MCNC: 91 MG/DL (ref 65–99)
HCT VFR BLD AUTO: 34.8 % (ref 34–46.6)
HGB BLD-MCNC: 10.4 G/DL (ref 12–15.9)
IMM GRANULOCYTES # BLD AUTO: 0.01 10*3/MM3 (ref 0–0.05)
IMM GRANULOCYTES NFR BLD AUTO: 0.2 % (ref 0–0.5)
LYMPHOCYTES # BLD AUTO: 1.64 10*3/MM3 (ref 0.7–3.1)
LYMPHOCYTES NFR BLD AUTO: 26.4 % (ref 19.6–45.3)
MCH RBC QN AUTO: 26.3 PG (ref 26.6–33)
MCHC RBC AUTO-ENTMCNC: 29.9 G/DL (ref 31.5–35.7)
MCV RBC AUTO: 88.1 FL (ref 79–97)
MONOCYTES # BLD AUTO: 0.54 10*3/MM3 (ref 0.1–0.9)
MONOCYTES NFR BLD AUTO: 8.7 % (ref 5–12)
NEUTROPHILS NFR BLD AUTO: 3.77 10*3/MM3 (ref 1.7–7)
NEUTROPHILS NFR BLD AUTO: 60.5 % (ref 42.7–76)
NRBC BLD AUTO-RTO: 0 /100 WBC (ref 0–0.2)
PLATELET # BLD AUTO: 220 10*3/MM3 (ref 140–450)
PMV BLD AUTO: 10.5 FL (ref 6–12)
POTASSIUM SERPL-SCNC: 3.6 MMOL/L (ref 3.5–5.2)
RBC # BLD AUTO: 3.95 10*6/MM3 (ref 3.77–5.28)
SODIUM SERPL-SCNC: 145 MMOL/L (ref 136–145)
WBC NRBC COR # BLD AUTO: 6.22 10*3/MM3 (ref 3.4–10.8)

## 2025-03-21 PROCEDURE — 80048 BASIC METABOLIC PNL TOTAL CA: CPT | Performed by: INTERNAL MEDICINE

## 2025-03-21 PROCEDURE — 99239 HOSP IP/OBS DSCHRG MGMT >30: CPT | Performed by: INTERNAL MEDICINE

## 2025-03-21 PROCEDURE — 85025 COMPLETE CBC W/AUTO DIFF WBC: CPT | Performed by: INTERNAL MEDICINE

## 2025-03-21 PROCEDURE — 99232 SBSQ HOSP IP/OBS MODERATE 35: CPT | Performed by: INTERNAL MEDICINE

## 2025-03-21 RX ORDER — NIFEDIPINE 90 MG/1
90 TABLET, EXTENDED RELEASE ORAL
Qty: 90 TABLET | Refills: 3 | Status: SHIPPED | OUTPATIENT
Start: 2025-03-21

## 2025-03-21 RX ORDER — POTASSIUM CHLORIDE 1500 MG/1
20 TABLET, EXTENDED RELEASE ORAL ONCE
Status: COMPLETED | OUTPATIENT
Start: 2025-03-21 | End: 2025-03-21

## 2025-03-21 RX ORDER — FUROSEMIDE 40 MG/1
40 TABLET ORAL DAILY
Qty: 90 TABLET | Refills: 3 | Status: SHIPPED | OUTPATIENT
Start: 2025-03-21 | End: 2025-04-02

## 2025-03-21 RX ORDER — AMIODARONE HYDROCHLORIDE 100 MG/1
100 TABLET ORAL DAILY
Qty: 90 TABLET | Refills: 3 | Status: SHIPPED | OUTPATIENT
Start: 2025-03-21 | End: 2025-04-02

## 2025-03-21 RX ADMIN — FUROSEMIDE 40 MG: 40 TABLET ORAL at 11:17

## 2025-03-21 RX ADMIN — APIXABAN 2.5 MG: 2.5 TABLET, FILM COATED ORAL at 11:16

## 2025-03-21 RX ADMIN — AMIODARONE HYDROCHLORIDE 100 MG: 200 TABLET ORAL at 11:16

## 2025-03-21 RX ADMIN — Medication 1000 UNITS: at 11:16

## 2025-03-21 RX ADMIN — ALLOPURINOL 50 MG: 100 TABLET ORAL at 11:16

## 2025-03-21 RX ADMIN — POTASSIUM CHLORIDE 20 MEQ: 1500 TABLET, EXTENDED RELEASE ORAL at 11:47

## 2025-03-21 RX ADMIN — NIFEDIPINE 90 MG: 30 TABLET, FILM COATED, EXTENDED RELEASE ORAL at 11:16

## 2025-03-21 NOTE — PROGRESS NOTES
Patient admitted for CHF exacerbation. [facility] discharge counseling and medication calendar provided on 3/21/2025. Information provided includes indication for medication, drug-drug interactions, possible side effects, and importance of compliance. Patient verbalized understanding through teach back. All pertinent questions were answered.     Verified the following medications were ordered at discharged. If omitted, please document reason.    CHF  [x] ACE-I/ARB/ARNI  [x] Chlorthalidone  [x] nifedipine  Patient educated on current dose of eliquis, holding jardiance until retrial.

## 2025-03-21 NOTE — DISCHARGE SUMMARY
[facility]  DISCHARGE SUMMARY    Patient Name: Cheryl Tomas  : 1940  MRN: [id number]    Date of Admission: 3/17/2025 11:53 AM  Date of Discharge:  3/21/2025  Primary Care Physician: Marian Perea MD    Consults       Date and Time Order Name Status Description    3/17/2025  2:12 PM Inpatient Cardiology Consult Completed             Hospital Course     Presenting Problem: SOA    Active Hospital Problems    Diagnosis  POA    **Acute on chronic heart failure with preserved ejection fraction (HFpEF) [I50.33]  Yes    Dementia [F03.90]  Yes    A-fib [I48.91]  Yes    Hypertensive heart and chronic kidney disease with heart failure and stage 1 through stage 4 chronic kidney disease, or unspecified chronic kidney disease [I13.0]  Yes    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 female w hx of HTN, HFpEF, afib, CKD, DM admitted w acute HFpEF exacerbation and hypertensive emergency. Cardiology follows     Acute on chronic HFpEF exacerbation  Acute hypoxemic respiratory failure  Severe AS   --resume PO dose of Lasix, s/p IV Lasix while here (last dose 3/19)  --cont ARB, pt reports that she doesn't take jardiance so refused it this am   --nifedipine added to home med regimen per cardiology. Renal artery duplex negative for any stenosis  --repeat echo as noted above (normal EF, grade III diastolic dysfunction, severe AS)   --reviewed I/O, no accurate Is/Os for the last 24 hours     HTN  -- holding Coreg due to bradycardia  -- continue ARB, nifedipine      Afib  --cont eliquis  --holding amiodarone and  coreg d/t bradycardia     CKD 4  --Cr up from baseline yesterday at 1.8 (from 1.54 on 3/19). Better today after holding diuretics.  Resume Lasix 40 mg oral.         T2DM  --resume home meds      Gout  --low dose allopurinol     Dementia  --seroquel at bedtime  --complicates all aspects of care      Discharge Follow Up Recommendations for outpatient labs/diagnostics:   Follow up with PCP within one week  Follow up with Cardiology as per Dr. Epperson     Day of Discharge     HPI:   Doing much better today, states that she is ready to go home.     Review of Systems  Gen- No fevers, chills  CV- No chest pain, palpitations  Resp- No cough, dyspnea  GI- No N/V/D, abd pain     Vital Signs:   Temp:  [98.5 °F (36.9 °C)-98.6 °F (37 °C)] 98.5 °F (36.9 °C)  Heart Rate:  [51-63] 51  Resp:  [15-19] 15  BP: (138-158)/(57-62) 138/58  Flow (L/min) (Oxygen Therapy):  [2] 2      Physical Exam:  Constitutional: No acute distress, awake, alert, elderly/frail  HENT: NCAT, mucous membranes moist  Respiratory: CTAB,  no wheezing. Resp effort normal on RA   Cardiovascular: RRR, no murmurs, rubs, or gallops  Gastrointestinal: Positive bowel sounds, soft, nontender, nondistended  Musculoskeletal: No bilateral ankle edema  Psychiatric: Appropriate affect, cooperative  Neurologic: Oriented x 3, strength symmetric, no focal deficits  Skin: No rashes       Pertinent  and/or Most Recent Results     LAB RESULTS:      Lab 03/21/25  0510 03/20/25  0418 03/19/25  1240 03/18/25  0455 03/17/25  1203   WBC 6.22 7.26 7.85 7.16 5.87   HEMOGLOBIN 10.4* 10.8* 11.5* 10.1* 10.6*   HEMATOCRIT 34.8 35.7 38.2 32.8* 34.6   PLATELETS 220 236 243 202 197   NEUTROS ABS 3.77 4.99 6.06 4.97 4.43   IMMATURE GRANS (ABS) 0.01 0.02 0.03 0.02 0.01   LYMPHS ABS 1.64 1.42 1.06 1.36 0.88   MONOS ABS 0.54 0.61 0.52 0.58 0.40   EOS ABS 0.21 0.18 0.15 0.19 0.12   MCV 88.1 87.5 87.6 87.2 87.4         Lab 03/21/25  0510 03/20/25  1252 03/20/25  0418 03/19/25  2145 03/19/25  1240  03/18/25  0455 03/17/25  1420 03/17/25  1203   SODIUM 145  --  143  --  149* 146*  --  146*   POTASSIUM 3.6 3.7 3.3* 3.4* 3.2* 2.9*  --  3.1*   CHLORIDE 105  --  101  --  101 105  --  106   CO2 30.0*  --  31.0*  --  36.0* 31.0*  --  28.0   ANION GAP 10.0  --  11.0  --  12.0 10.0  --  12.0   BUN 38*  --  37*  --  25* 28*  --  26*   CREATININE 1.41*  --  1.80*  --  1.54* 1.62*  --  1.55*   EGFR 37.3*  --  27.8*  --  33.6* 31.6*  --  33.3*   GLUCOSE 91  --  130*  --  109* 127*  --  174*   CALCIUM 9.1  --  9.3  --  9.6 8.8  --  9.2   MAGNESIUM  --   --   --   --   --   --   --  2.0   TSH  --   --   --   --   --   --  1.870  --          Lab 03/17/25  1203   TOTAL PROTEIN 6.0   ALBUMIN 4.0   GLOBULIN 2.0   ALT (SGPT) 40*   AST (SGOT) 26   BILIRUBIN 0.5   ALK PHOS 96         Lab 03/17/25  1420 03/17/25  1203   PROBNP  --  10,701.0*   HSTROP T 31* 34*             Lab 03/17/25  1420 03/17/25  1203   IRON 21*  --    IRON SATURATION (TSAT) 5*  --    TIBC 451  --    TRANSFERRIN 303  --    FERRITIN 20.05  --    FOLATE  --  14.00   VITAMIN B 12  --  346         Brief Urine Lab Results  (Last result in the past 365 days)        Color   Clarity   Blood   Leuk Est   Nitrite   Protein   CREAT   Urine HCG        10/01/24 1404 Yellow   Clear   Negative   Negative   Negative   Trace                 Microbiology Results (last 10 days)       ** No results found for the last 240 hours. **            Duplex Renal Artery - Bilateral Complete CAR  Result Date: 3/18/2025  DUPLEX RENAL ARTERY BILATERAL COMPLETE CAR Date of Exam: 3/18/2025 8:14 AM EDT Indication: renovascular hypertension. Comparison: No comparisons available. Technique: Grayscale, color-flow, Doppler spectral waveform analysis was performed of the kidneys, renal arteries, and aorta. Findings: The right kidney measures 10.1 x 3.4 x 3.5 cm. Peak systolic velocity is 90 cm/s. The renal artery to aortic ratio is less than 1. Hilar acceleration time is 46 ms. Resistive indices are  normal. The left kidney measures 10.1 x 3.9 x 3.3 cm. Peak systolic velocity on the left is 65 cm/s. The renal artery to aortic ratio is less than 1. Hilar acceleration time is 41 ms. Resistive indices were unremarkable.     Impression: No evidence of hemodynamically significant renal artery stenosis. Electronically Signed: Stef Mayes MD  3/18/2025 3:16 PM EDT  Workstation ID: [id number]    XR Chest 1 View  Result Date: 3/17/2025  XR CHEST 1 VW Date of Exam: 3/17/2025 12:01 PM EDT Indication: SOA triage protocol Comparison: Chest radiograph 1/26/2024. Findings: Markedly enlarged cardiac silhouette, unchanged. Pulmonary vascular congestion. No overt pulmonary edema. No focal consolidation. No significant pleural effusion. No pneumothorax. Osseous structures are unchanged.     Impression: Marked cardiomegaly with pulmonary vascular congestion. No overt pulmonary edema or focal consolidation. Electronically Signed: Pedro Arevalo MD  3/17/2025 12:44 PM EDT  Workstation ID: [id number]      Results for orders placed during the hospital encounter of 03/17/25    Duplex Renal Artery - Bilateral Complete CAR    Interpretation Summary  DUPLEX RENAL ARTERY BILATERAL COMPLETE CAR    Date of Exam: 3/18/2025 8:14 AM EDT    Indication: renovascular hypertension.    Comparison: No comparisons available.    Technique: Grayscale, color-flow, Doppler spectral waveform analysis was performed of the kidneys, renal arteries, and aorta.      Findings:  The right kidney measures 10.1 x 3.4 x 3.5 cm. Peak systolic velocity is 90 cm/s. The renal artery to aortic ratio is less than 1. Hilar acceleration time is 46 ms. Resistive indices are normal.    The left kidney measures 10.1 x 3.9 x 3.3 cm. Peak systolic velocity on the left is 65 cm/s. The renal artery to aortic ratio is less than 1. Hilar acceleration time is 41 ms. Resistive indices were unremarkable.    Impression  Impression:  No evidence of hemodynamically significant renal artery  stenosis.        Electronically Signed: Stef Mayes MD  3/18/2025 3:16 PM EDT  Workstation ID: [id number]      Results for orders placed during the hospital encounter of 03/17/25    Duplex Renal Artery - Bilateral Complete CAR    Interpretation Summary  DUPLEX RENAL ARTERY BILATERAL COMPLETE CAR    Date of Exam: 3/18/2025 8:14 AM EDT    Indication: renovascular hypertension.    Comparison: No comparisons available.    Technique: Grayscale, color-flow, Doppler spectral waveform analysis was performed of the kidneys, renal arteries, and aorta.      Findings:  The right kidney measures 10.1 x 3.4 x 3.5 cm. Peak systolic velocity is 90 cm/s. The renal artery to aortic ratio is less than 1. Hilar acceleration time is 46 ms. Resistive indices are normal.    The left kidney measures 10.1 x 3.9 x 3.3 cm. Peak systolic velocity on the left is 65 cm/s. The renal artery to aortic ratio is less than 1. Hilar acceleration time is 41 ms. Resistive indices were unremarkable.    Impression  Impression:  No evidence of hemodynamically significant renal artery stenosis.        Electronically Signed: Stef Mayes MD  3/18/2025 3:16 PM EDT  Workstation ID: [id number]      Results for orders placed during the hospital encounter of 03/17/25    Adult Transthoracic Echo Complete W/ Cont if Necessary Per Protocol    Interpretation Summary    Left ventricular systolic function is normal. Calculated left ventricular EF = 55.3% Normal left ventricular cavity size noted. Left ventricular wall thickness is consistent with mild concentric hypertrophy. Left ventricular diastolic function is consistent with grade III restrictive pattern.    The aortic valve exhibits sclerosis. The aortic valve appears trileaflet. Moderate to severe aortic valve regurgitation is present. No hemodynamically significant aortic valve stenosis is present.    Mild mitral annular calcification is present. Mild mitral valve regurgitation is present. No significant mitral  valve stenosis is present.    Moderate tricuspid valve regurgitation is present. Estimated right ventricular systolic pressure from tricuspid regurgitation is moderately elevated (45-55 mmHg).      Plan for Follow-up of Pending Labs/Results:     Discharge Details        Discharge Medications        New Medications        Instructions Start Date   chlorthalidone 25 MG tablet  Commonly known as: HYGROTON   25 mg, Oral, Daily      NIFEdipine XL 90 MG 24 hr tablet  Commonly known as: PROCARDIA XL   90 mg, Oral, Every 24 Hours Scheduled             Changes to Medications        Instructions Start Date   amiodarone 100 MG tablet  Commonly known as: PACERONE  What changed:   medication strength  See the new instructions.   100 mg, Oral, Daily      apixaban 2.5 MG tablet tablet  Commonly known as: ELIQUIS  What changed: how much to take   2.5 mg, Oral, Every 12 Hours Scheduled      furosemide 40 MG tablet  Commonly known as: LASIX  What changed:   when to take this  reasons to take this   40 mg, Oral, Daily             Continue These Medications        Instructions Start Date   allopurinol 300 MG tablet  Commonly known as: ZYLOPRIM   300 mg, Daily      benzonatate 100 MG capsule  Commonly known as: Tessalon Perles   100 mg, Oral, 3 Times Daily PRN      cholecalciferol 25 MCG (1000 UT) tablet  Commonly known as: VITAMIN D3   1,000 Units, Daily      Coenzyme Q10 10 MG capsule   10 mg, Daily      irbesartan 300 MG tablet  Commonly known as: Avapro   300 mg, Oral, Nightly      pravastatin 40 MG tablet  Commonly known as: PRAVACHOL   40 mg, Nightly      QUEtiapine 25 MG tablet  Commonly known as: SEROquel   25 mg, Oral, Nightly             Stop These Medications      carvedilol 25 MG tablet  Commonly known as: COREG              Allergies   Allergen Reactions    Cephalexin Itching and Rash    Bactrim [Sulfamethoxazole-Trimethoprim] GI Intolerance         Discharge Disposition:      Diet:  Hospital:  Diet Order   Procedures     Diet: Regular/House; Texture: Regular (IDDSI 7); Fluid Consistency: Thin (IDDSI 0)            Activity:      Restrictions or Other Recommendations:         CODE STATUS:    Code Status and Medical Interventions: No CPR (Do Not Attempt to Resuscitate); Full   Ordered at: 03/17/25 1815     Code Status (Patient has no pulse and is not breathing):    No CPR (Do Not Attempt to Resuscitate)     Medical Interventions (Patient has pulse or is breathing):    Full       Future Appointments   Date Time Provider Department Center   5/20/2025  9:30 AM Marian Thurston MD [facility]   6/25/2025  3:30 PM Jani Epperson MD [facility]   7/8/2025 11:30 AM Marian Thurston MD [facility]       Additional Instructions for the Follow-ups that You Need to Schedule       Ambulatory Referral to Home Health   As directed      Face to Face Visit Date: 3/19/2025   Follow-up provider for Plan of Care?: I treated the patient in an acute care facility and will not continue treatment after discharge.   Follow-up provider: MARIAN THURSTON [[id number]]   Reason/Clinical Findings: HF   Describe mobility limitations that make leaving home difficult: impaired functional mobility, gait, balance and endurance   Nursing/Therapeutic Services Requested: Skilled Nursing Physical Therapy Occupational Therapy   Skilled nursing orders: Cardiopulmonary assessments Neurovascular assessments   PT orders: Home safety assessment Strengthening   Occupational orders: Strengthening Home safety assessment   Frequency: 1 Week 1                      Pam Juarez MD  03/21/25      Time Spent on Discharge:  I spent  35  minutes on this discharge activity which included: face-to-face encounter with the patient, reviewing the data in the system, coordination of the care with the nursing staff as well as consultants, documentation, and entering orders.

## 2025-03-21 NOTE — OUTREACH NOTE
Prep Survey      Flowsheet Row Responses   [facility] patient discharged from? [facility]   Is LACE score < 7 ? No   Eligibility [facility]   Date of Admission 03/17/25   Date of Discharge 03/21/25   Discharge Disposition Home-Health Care Sv   Discharge diagnosis A/C CHF   Does the patient have one of the following disease processes/diagnoses(primary or secondary)? CHF   Does the patient have Home health ordered? Yes   What is the Home health agency?  [facility]    Is there a DME ordered? No   Prep survey completed? Yes            Courtney THOMAS - Registered Nurse

## 2025-03-21 NOTE — CASE MANAGEMENT/SOCIAL WORK
Case Management Discharge Note      Final Note: Patient plans home today. I have notified Kayla HAYNES with [facility] of discharge.         Selected Continued Care - Admitted Since 3/17/2025       Destination    No services have been selected for the patient.                Durable Medical Equipment    No services have been selected for the patient.                Dialysis/Infusion    No services have been selected for the patient.                Home Medical Care Coordination complete.      Service Provider Services Address Phone Fax Patient Preferred    [facility] [address], [address], [place] [address] [phone] --              Therapy    No services have been selected for the patient.                Community Resources    No services have been selected for the patient.                Community & DME    No services have been selected for the patient.                    Transportation Services  Private: Car    Final Discharge Disposition Code: 06 - home with home health care

## 2025-03-21 NOTE — PROGRESS NOTES
[facility]  Inpatient Progress Note      Chief Complaint/Reason for consult:    Chief Complaint   Patient presents with    Shortness of Breath            Subjective  Feeling well today, breathing normal, eager to go home       Vital Sign Min/Max for last 24 hours  Temp  Min: 98.5 °F (36.9 °C)  Max: 98.6 °F (37 °C)   BP  Min: 138/58  Max: 158/62   Pulse  Min: 51  Max: 63   Resp  Min: 15  Max: 19   SpO2  Min: 87 %  Max: 98 %   Flow (L/min) (Oxygen Therapy)  Min: 2  Max: 2      Intake/Output Summary (Last 24 hours) at 3/21/2025 1045  Last data filed at 3/20/2025 2053  Gross per 24 hour   Intake 525 ml   Output 650 ml   Net -125 ml             Gen: well developed, sitting up on cough  HEENT: MMM, sclerae anicteric, conjunctivae normal  CV: regular rate, regular rhythm  Pulm: RA, normal work of breathing  Ext: normal bulk for age, normal tone, no dependent edema  Neuro: alert, oriented, face symmetrical, moving all extremities well  Psych: normal mood, appropriate affect    Tele:  SR, no alarms    Results Review (reviewed the patient's recent labs in the electronic medical record):     EKG:  SB, bifascicular block     Results for orders placed during the hospital encounter of 03/17/25    Adult Transthoracic Echo Complete W/ Cont if Necessary Per Protocol    Interpretation Summary    Left ventricular systolic function is normal. Calculated left ventricular EF = 55.3% Normal left ventricular cavity size noted. Left ventricular wall thickness is consistent with mild concentric hypertrophy. Left ventricular diastolic function is consistent with grade III restrictive pattern.    The aortic valve exhibits sclerosis. The aortic valve appears trileaflet. Moderate to severe aortic valve regurgitation is present. No hemodynamically significant aortic valve stenosis is present.    Mild mitral annular calcification is present. Mild mitral valve regurgitation is present. No significant mitral valve  stenosis is present.    Moderate tricuspid valve regurgitation is present. Estimated right ventricular systolic pressure from tricuspid regurgitation is moderately elevated (45-55 mmHg).       Radiology:    No radiology results for the last day      Lab Results   Component Value Date    WBC 6.22 03/21/2025    HGB 10.4 (L) 03/21/2025    HCT 34.8 03/21/2025    MCV 88.1 03/21/2025     03/21/2025     Lab Results   Component Value Date    GLUCOSE 91 03/21/2025    CALCIUM 9.1 03/21/2025     03/21/2025    K 3.6 03/21/2025    CO2 30.0 (H) 03/21/2025     03/21/2025    BUN 38 (H) 03/21/2025    CREATININE 1.41 (H) 03/21/2025    EGFRIFAFRI 52 (L) 11/15/2021    EGFRIFNONA 43 (L) 11/15/2021    BCR 27.0 (H) 03/21/2025    ANIONGAP 10.0 03/21/2025     Lab Results   Component Value Date    TROPONINT 31 (H) 03/17/2025    TROPONINT 34 (H) 03/17/2025    TROPONINT 16 (H) 01/26/2024      Lab Results   Component Value Date    PROBNP 10,701.0 (H) 03/17/2025     Lab Results   Component Value Date    HGBA1C 5.7 01/08/2025      Lab Results   Component Value Date    CHOL 168 02/18/2025    CHLPL 168 11/30/2022    TRIG 83 02/18/2025    HDL 51 02/18/2025     (H) 02/18/2025        Assessment     Cheryl Tomas is a 84 y.o. with a history of difficult to control hypertension, HFpEF, aortic regurgitation, PAF who presents to the emergency department with increased leg swelling, weight gain, and orthopnea concerning for exacerbation of her HFpEF.  Additionally, she has had ongoing issues with uncontrolled blood pressure.     Acute on Chronic heart failure preserved ejection fraction   - ARB   - furosemide 40mg daily    - if renal function is stable as an outpatient would consider spironolactone   - previously on empagliflozin but stopped due to urinary Sx per chart but reasonable to re-trial in the future     Primary hypertension /hypertensive urgency   - recheck renal artery with no evidence of significant stenosis   -  added nifedipine    - Continue losartan   - add chlorthalidone on d/c     Moderate-Sev AR   -Repeat echocardiogram with progression of AR to mod-sev      PAF   - Sinus bradycardia improved   - Continue anticoagulation with apixaban 2.5 mg twice daily   - resume amio at decreased dose of 100mg , stopped carvedilol     Palpitations / PSVT / AT  Bifascicular block   - BB, amio held as above     D/c home today, follow-up in Heart and Valve in 1 week for BP check, if renal function and K are stable would add low dose spironolactone    See me in 1 month     DUARTE Epperson MD  3/21/2025  10:45 EDT

## 2025-03-24 ENCOUNTER — TRANSITIONAL CARE MANAGEMENT TELEPHONE ENCOUNTER (OUTPATIENT)
Dept: CALL CENTER | Facility: HOSPITAL | Age: 85
End: 2025-03-24
Payer: MEDICARE

## 2025-03-24 NOTE — OUTREACH NOTE
Call Center TCM Note      Flowsheet Row Responses   [facility] patient discharged from? [place]   Does the patient have one of the following disease processes/diagnoses(primary or secondary)? CHF   TCM attempt successful? No   Unsuccessful attempts Attempt 1             Helga Wood RN    3/24/2025, 12:45 EDT

## 2025-03-24 NOTE — OUTREACH NOTE
Call Center TCM Note      Flowsheet Row Responses   [facility] [place] patient discharged from? [place]   Does the patient have one of the following disease processes/diagnoses(primary or secondary)? CHF   TCM attempt successful? No   Unsuccessful attempts Attempt 2             Helga Wood RN    3/24/2025, 15:48 EDT

## 2025-03-25 ENCOUNTER — TRANSITIONAL CARE MANAGEMENT TELEPHONE ENCOUNTER (OUTPATIENT)
Dept: CALL CENTER | Facility: HOSPITAL | Age: 85
End: 2025-03-25
Payer: MEDICARE

## 2025-03-25 NOTE — OUTREACH NOTE
Call Center TCM Note      Flowsheet Row Responses   [facility] [place] facility patient discharged from? [place]   Does the patient have one of the following disease processes/diagnoses(primary or secondary)? CHF   TCM attempt successful? No   Unsuccessful attempts Attempt 3             Negar THOMAS - Registered Nurse    3/25/2025, 15:55 EDT

## 2025-03-26 ENCOUNTER — OFFICE VISIT (OUTPATIENT)
Dept: FAMILY MEDICINE CLINIC | Facility: CLINIC | Age: 85
End: 2025-03-26
Payer: MEDICARE

## 2025-03-26 ENCOUNTER — LAB (OUTPATIENT)
Dept: LAB | Facility: HOSPITAL | Age: 85
End: 2025-03-26
Payer: MEDICARE

## 2025-03-26 VITALS
WEIGHT: 123.4 LBS | TEMPERATURE: 98.4 F | OXYGEN SATURATION: 97 % | DIASTOLIC BLOOD PRESSURE: 74 MMHG | HEIGHT: 64 IN | SYSTOLIC BLOOD PRESSURE: 184 MMHG | BODY MASS INDEX: 21.07 KG/M2 | HEART RATE: 78 BPM

## 2025-03-26 DIAGNOSIS — Z09 HOSPITAL DISCHARGE FOLLOW-UP: Primary | ICD-10-CM

## 2025-03-26 DIAGNOSIS — F03.90 DEMENTIA, UNSPECIFIED DEMENTIA SEVERITY, UNSPECIFIED DEMENTIA TYPE, UNSPECIFIED WHETHER BEHAVIORAL, PSYCHOTIC, OR MOOD DISTURBANCE OR ANXIETY: ICD-10-CM

## 2025-03-26 DIAGNOSIS — E78.2 MIXED HYPERLIPIDEMIA: Chronic | ICD-10-CM

## 2025-03-26 DIAGNOSIS — I48.91 ATRIAL FIBRILLATION WITH RVR: Chronic | ICD-10-CM

## 2025-03-26 DIAGNOSIS — I10 BENIGN ESSENTIAL HYPERTENSION: Primary | Chronic | ICD-10-CM

## 2025-03-26 DIAGNOSIS — Z09 HOSPITAL DISCHARGE FOLLOW-UP: ICD-10-CM

## 2025-03-26 DIAGNOSIS — N28.9 RENAL INSUFFICIENCY: ICD-10-CM

## 2025-03-26 DIAGNOSIS — I13.0 HYPERTENSIVE HEART AND CHRONIC KIDNEY DISEASE WITH HEART FAILURE AND STAGE 1 THROUGH STAGE 4 CHRONIC KIDNEY DISEASE, OR UNSPECIFIED CHRONIC KIDNEY DISEASE: ICD-10-CM

## 2025-03-26 DIAGNOSIS — E11.9 TYPE 2 DIABETES MELLITUS WITHOUT COMPLICATION, WITHOUT LONG-TERM CURRENT USE OF INSULIN: ICD-10-CM

## 2025-03-26 DIAGNOSIS — I48.91 ATRIAL FIBRILLATION WITH RVR: ICD-10-CM

## 2025-03-26 DIAGNOSIS — I50.33 ACUTE ON CHRONIC HEART FAILURE WITH PRESERVED EJECTION FRACTION (HFPEF): Chronic | ICD-10-CM

## 2025-03-26 DIAGNOSIS — M10.9 GOUT, UNSPECIFIED CAUSE, UNSPECIFIED CHRONICITY, UNSPECIFIED SITE: ICD-10-CM

## 2025-03-26 LAB
ANION GAP SERPL CALCULATED.3IONS-SCNC: 16.7 MMOL/L (ref 5–15)
BUN SERPL-MCNC: 30 MG/DL (ref 8–23)
BUN/CREAT SERPL: 14.4 (ref 7–25)
CALCIUM SPEC-SCNC: 9.9 MG/DL (ref 8.6–10.5)
CHLORIDE SERPL-SCNC: 104 MMOL/L (ref 98–107)
CO2 SERPL-SCNC: 24.3 MMOL/L (ref 22–29)
CREAT SERPL-MCNC: 2.08 MG/DL (ref 0.57–1)
EGFRCR SERPLBLD CKD-EPI 2021: 23.4 ML/MIN/1.73
GLUCOSE SERPL-MCNC: 89 MG/DL (ref 65–99)
POTASSIUM SERPL-SCNC: 3.7 MMOL/L (ref 3.5–5.2)
SODIUM SERPL-SCNC: 145 MMOL/L (ref 136–145)

## 2025-03-26 PROCEDURE — 80048 BASIC METABOLIC PNL TOTAL CA: CPT

## 2025-03-26 RX ORDER — ALLOPURINOL 100 MG/1
100 TABLET ORAL DAILY
Qty: 90 TABLET | Refills: 3 | Status: SHIPPED | OUTPATIENT
Start: 2025-03-26

## 2025-03-26 RX ORDER — APIXABAN 5 MG/1
5 TABLET, FILM COATED ORAL EVERY 12 HOURS SCHEDULED
COMMUNITY
Start: 2025-01-06

## 2025-03-26 NOTE — PROGRESS NOTES
Transitional Care Follow Up Visit  Subjective     Cheryl Tomas is a 84 y.o. female who presents for a transitional care management visit.    Within 48 business hours after discharge our office contacted her via telephone to coordinate her care and needs.      I reviewed and discussed the details of that call along with the discharge summary, hospital problems, inpatient lab results, inpatient diagnostic studies, and consultation reports with Cheryl.     Current outpatient and discharge medications have been reconciled for the patient.  Reviewed by: Marian Perea MD          3/21/2025     5:07 PM   Date of TCM Phone Call   [facility] [place] [facility]   Date of Admission 3/17/2025   Date of Discharge 3/21/2025   [place] Disposition [place] [facility] [place]     Risk for Readmission (LACE) Score: 13 (3/21/2025  6:00 AM)      History of Present Illness   Course During Hospital Stay:  admitted [facility] with SOA, a fib with CHF, dementia, RBBB, HTN, DM, HLP, CKD with HTN emergency.  Renal artery duplex neg.   ECHO normal EF, IIIdias dysfunction diuresed IV and transitioned to oral lasix 40 mg.  Coreg and amiodarone held due to bradycardia in hospital.  BNP 10,000 at admission.      Added nifedipine 90 XL and chlorthalidone 25 mg.     Creatinine decreased and allopurinol decreased.      The following portions of the patient's history were reviewed and updated as appropriate: allergies, current medications, past family history, past medical history, past social history, past surgical history, and problem list.    Review of Systems   Constitutional:  Negative for chills, fatigue, fever and unexpected weight change.   HENT:  Negative for congestion, ear pain, nosebleeds, rhinorrhea, sinus pressure, sneezing, sore throat and trouble swallowing.    Eyes:  Negative for itching and visual disturbance.   Respiratory:  Negative for cough, chest tightness, shortness of breath and wheezing.    Cardiovascular:  Negative for chest  "pain, palpitations and leg swelling.   Gastrointestinal:  Negative for abdominal pain, anal bleeding, blood in stool, constipation, diarrhea, nausea and vomiting.   Endocrine: Negative for cold intolerance, heat intolerance, polydipsia and polyuria.   Genitourinary:  Negative for difficulty urinating, frequency, hematuria and urgency.   Musculoskeletal:  Negative for back pain, gait problem and myalgias.   Skin:  Negative for rash and wound.   Neurological:  Negative for dizziness, weakness, numbness and headaches.   Hematological:  Negative for adenopathy. Does not bruise/bleed easily.   Psychiatric/Behavioral:  Negative for agitation, confusion, decreased concentration and suicidal ideas. The patient is not nervous/anxious.        Objective   BP (!) 184/74 Comment: x 2  Pulse 78   Temp 98.4 °F (36.9 °C) (Infrared)   Ht 162.6 cm (64.02\")   Wt 56 kg (123 lb 6.4 oz)   SpO2 97%   BMI 21.17 kg/m²   Physical Exam  Vitals and nursing note reviewed.   Constitutional:       General: She is not in acute distress.     Appearance: She is well-developed. She is not diaphoretic.   HENT:      Head: Normocephalic and atraumatic.      Right Ear: External ear normal.      Left Ear: External ear normal.   Eyes:      General: Lids are normal. No scleral icterus.        Right eye: No discharge.         Left eye: No discharge.      Conjunctiva/sclera: Conjunctivae normal.      Pupils: Pupils are equal, round, and reactive to light.   Neck:      Thyroid: No thyroid mass or thyromegaly.      Vascular: No carotid bruit or JVD.      Trachea: No tracheal deviation.   Cardiovascular:      Rate and Rhythm: Normal rate and regular rhythm.      Heart sounds: Normal heart sounds. No murmur heard.     No friction rub. No gallop.   Pulmonary:      Effort: Pulmonary effort is normal. No respiratory distress.      Breath sounds: Normal breath sounds. No decreased breath sounds, wheezing, rhonchi or rales.   Chest:      Chest wall: No " tenderness.   Abdominal:      General: Bowel sounds are normal. There is no distension.      Palpations: Abdomen is soft. There is no mass.      Tenderness: There is no abdominal tenderness. There is no guarding or rebound.      Hernia: No hernia is present.   Musculoskeletal:         General: Normal range of motion.   Lymphadenopathy:      Cervical: No cervical adenopathy.      Upper Body:      Right upper body: No supraclavicular adenopathy.      Left upper body: No supraclavicular adenopathy.   Skin:     Findings: No bruising, erythema or rash.      Nails: There is no clubbing.   Neurological:      Mental Status: She is alert and oriented to person, place, and time.      Cranial Nerves: No cranial nerve deficit.      Deep Tendon Reflexes: Reflexes are normal and symmetric. Reflexes normal.   Psychiatric:         Speech: Speech normal.         Behavior: Behavior normal.         Thought Content: Thought content normal.         Judgment: Judgment normal.         Assessment & Plan   Diagnoses and all orders for this visit:    1. Benign essential hypertension (Primary)    2. Mixed hyperlipidemia    3. Atrial fibrillation with RVR    4. Dementia, unspecified dementia severity, unspecified dementia type, unspecified whether behavioral, psychotic, or mood disturbance or anxiety    5. Gout, unspecified cause, unspecified chronicity, unspecified site  -     allopurinol (ZYLOPRIM) 100 MG tablet; Take 1 tablet by mouth Daily.  Dispense: 90 tablet; Refill: 3    6. Hospital discharge follow-up    7. Acute on chronic heart failure with preserved ejection fraction (HFpEF)    8. Renal insufficiency

## 2025-04-02 ENCOUNTER — LAB (OUTPATIENT)
Dept: LAB | Facility: HOSPITAL | Age: 85
End: 2025-04-02
Payer: MEDICARE

## 2025-04-02 ENCOUNTER — TELEPHONE (OUTPATIENT)
Dept: FAMILY MEDICINE CLINIC | Facility: CLINIC | Age: 85
End: 2025-04-02

## 2025-04-02 ENCOUNTER — OFFICE VISIT (OUTPATIENT)
Dept: FAMILY MEDICINE CLINIC | Facility: CLINIC | Age: 85
End: 2025-04-02
Payer: MEDICARE

## 2025-04-02 VITALS
BODY MASS INDEX: 22.02 KG/M2 | RESPIRATION RATE: 18 BRPM | OXYGEN SATURATION: 96 % | HEART RATE: 81 BPM | SYSTOLIC BLOOD PRESSURE: 152 MMHG | WEIGHT: 129 LBS | HEIGHT: 64 IN | TEMPERATURE: 98.2 F | DIASTOLIC BLOOD PRESSURE: 60 MMHG

## 2025-04-02 DIAGNOSIS — F03.90 DEMENTIA, UNSPECIFIED DEMENTIA SEVERITY, UNSPECIFIED DEMENTIA TYPE, UNSPECIFIED WHETHER BEHAVIORAL, PSYCHOTIC, OR MOOD DISTURBANCE OR ANXIETY: ICD-10-CM

## 2025-04-02 DIAGNOSIS — I50.33 ACUTE ON CHRONIC HEART FAILURE WITH PRESERVED EJECTION FRACTION (HFPEF): Chronic | ICD-10-CM

## 2025-04-02 DIAGNOSIS — G47.00 INSOMNIA, UNSPECIFIED TYPE: ICD-10-CM

## 2025-04-02 DIAGNOSIS — I13.0 HYPERTENSIVE HEART AND CHRONIC KIDNEY DISEASE WITH HEART FAILURE AND STAGE 1 THROUGH STAGE 4 CHRONIC KIDNEY DISEASE, OR UNSPECIFIED CHRONIC KIDNEY DISEASE: ICD-10-CM

## 2025-04-02 DIAGNOSIS — N28.9 RENAL INSUFFICIENCY: ICD-10-CM

## 2025-04-02 DIAGNOSIS — I10 BENIGN ESSENTIAL HYPERTENSION: Chronic | ICD-10-CM

## 2025-04-02 DIAGNOSIS — E11.9 TYPE 2 DIABETES MELLITUS WITHOUT COMPLICATION, WITHOUT LONG-TERM CURRENT USE OF INSULIN: ICD-10-CM

## 2025-04-02 DIAGNOSIS — I50.33 ACUTE ON CHRONIC HEART FAILURE WITH PRESERVED EJECTION FRACTION (HFPEF): Primary | Chronic | ICD-10-CM

## 2025-04-02 DIAGNOSIS — I48.91 ATRIAL FIBRILLATION WITH RVR: ICD-10-CM

## 2025-04-02 LAB
ANION GAP SERPL CALCULATED.3IONS-SCNC: 13 MMOL/L (ref 5–15)
BUN SERPL-MCNC: 29 MG/DL (ref 8–23)
BUN/CREAT SERPL: 15.3 (ref 7–25)
CALCIUM SPEC-SCNC: 9.9 MG/DL (ref 8.6–10.5)
CHLORIDE SERPL-SCNC: 108 MMOL/L (ref 98–107)
CO2 SERPL-SCNC: 22 MMOL/L (ref 22–29)
CREAT SERPL-MCNC: 1.9 MG/DL (ref 0.57–1)
EGFRCR SERPLBLD CKD-EPI 2021: 26.1 ML/MIN/1.73
GLUCOSE SERPL-MCNC: 102 MG/DL (ref 65–99)
POTASSIUM SERPL-SCNC: 3.6 MMOL/L (ref 3.5–5.2)
SODIUM SERPL-SCNC: 143 MMOL/L (ref 136–145)

## 2025-04-02 PROCEDURE — 80048 BASIC METABOLIC PNL TOTAL CA: CPT

## 2025-04-02 RX ORDER — QUETIAPINE FUMARATE 25 MG/1
50 TABLET, FILM COATED ORAL NIGHTLY
Qty: 60 TABLET | Refills: 1 | Status: SHIPPED | OUTPATIENT
Start: 2025-04-02

## 2025-04-02 RX ORDER — FUROSEMIDE 40 MG/1
20 TABLET ORAL DAILY
Qty: 90 TABLET | Refills: 3 | Status: SHIPPED | OUTPATIENT
Start: 2025-04-02

## 2025-04-02 NOTE — TELEPHONE ENCOUNTER
Raiza with [facility] called. Requesting verbal orders for nursing, PT and OT per hospital request.   Callback number is [phone],  is secure.

## 2025-04-02 NOTE — PROGRESS NOTES
Subjective   Cheryl Tomas is a 84 y.o. female.     History of Present Illness she is here for follow-up from her hospitalization she was admitted to the hospital on 3/17/2025 until 3/21/2025 with heart failure her BNP  Her BNP was 10,000.  At admission.  She was diuresed and medication changes were made she followed up with me last week from that hospitalization at that time we rechecked her BNP which showed an increase in her kidney function to 2.  I counseled the family on decreasing the Lasix dose to every other day and/or as needed.  She is here today for follow-up her blood pressure looks better.  It looks like she has gained6 pounds    Worse shortness of breath last night.      More swelling in legs.     Her medications are being managed a week at a time by the pharmacy and placed into blister packs.  We are not sure which today she got her last Lasix dose but based on her symptoms worsening last night it sounds like she may have taken a Lasix this morning.    The following portions of the patient's history were reviewed and updated as appropriate: allergies, current medications, past family history, past medical history, past social history, past surgical history, and problem list.    Review of Systems   Constitutional:  Positive for fatigue.   HENT: Negative.     Eyes: Negative.    Respiratory:  Positive for chest tightness and shortness of breath.         She does report some orthopnea.   Cardiovascular:  Positive for leg swelling. Negative for chest pain.   Gastrointestinal: Negative.    Endocrine: Negative.    Genitourinary: Negative.    Musculoskeletal: Negative.    Skin: Negative.    Allergic/Immunologic: Negative.    Neurological: Negative.    Hematological: Negative.    Psychiatric/Behavioral: Negative.     All other systems reviewed and are negative.      Objective     Vitals:    04/02/25 1018   BP: 152/60   Pulse: 81   Resp: 18   Temp: 98.2 °F (36.8 °C)   TempSrc: Temporal   SpO2: 96%   Weight:  "58.5 kg (129 lb)   Height: 162.6 cm (64.02\")       Physical Exam  Vitals and nursing note reviewed.   Constitutional:       General: She is not in acute distress.     Appearance: She is well-developed. She is not diaphoretic.   HENT:      Head: Normocephalic and atraumatic.      Right Ear: External ear normal.      Left Ear: External ear normal.   Eyes:      General: Lids are normal. No scleral icterus.        Right eye: No discharge.         Left eye: No discharge.      Conjunctiva/sclera: Conjunctivae normal.      Pupils: Pupils are equal, round, and reactive to light.   Neck:      Thyroid: No thyroid mass or thyromegaly.      Vascular: No carotid bruit or JVD.      Trachea: No tracheal deviation.   Cardiovascular:      Rate and Rhythm: Normal rate and regular rhythm.      Heart sounds: Normal heart sounds. Murmur heard.      No friction rub. No gallop.   Pulmonary:      Effort: Pulmonary effort is normal. No respiratory distress.      Breath sounds: Normal breath sounds. No decreased breath sounds, wheezing, rhonchi or rales.      Comments: Her lungs sound pretty clear I do not hear any crackles or rhonchi the lung bases.  She does have edema bilateral lower extremities.  Chest:      Chest wall: No tenderness.   Abdominal:      General: Bowel sounds are normal. There is no distension.      Palpations: Abdomen is soft. There is no mass.      Tenderness: There is no abdominal tenderness. There is no guarding or rebound.      Hernia: No hernia is present.   Musculoskeletal:         General: Normal range of motion.   Lymphadenopathy:      Cervical: No cervical adenopathy.      Upper Body:      Right upper body: No supraclavicular adenopathy.      Left upper body: No supraclavicular adenopathy.   Skin:     Findings: No bruising, erythema or rash.      Nails: There is no clubbing.   Neurological:      Mental Status: She is alert and oriented to person, place, and time.      Cranial Nerves: No cranial nerve deficit.     "  Deep Tendon Reflexes: Reflexes are normal and symmetric. Reflexes normal.   Psychiatric:         Speech: Speech normal.         Behavior: Behavior normal.         Thought Content: Thought content normal.         Judgment: Judgment normal.         Assessment & Plan     Problem List Items Addressed This Visit          Cardiac and Vasculature    Hypertensive heart and chronic kidney disease with heart failure and stage 1 through stage 4 chronic kidney disease, or unspecified chronic kidney disease (Chronic)    Relevant Medications    furosemide (LASIX) 40 MG tablet    Other Relevant Orders    Basic Metabolic Panel    Benign essential hypertension (Chronic)    Relevant Medications    furosemide (LASIX) 40 MG tablet    Acute on chronic heart failure with preserved ejection fraction (HFpEF) - Primary (Chronic)    Relevant Medications    furosemide (LASIX) 40 MG tablet    Other Relevant Orders    Basic Metabolic Panel       Endocrine and Metabolic    Diabetes mellitus (Chronic)       Genitourinary and Reproductive     Renal insufficiency    Relevant Medications    furosemide (LASIX) 40 MG tablet    Other Relevant Orders    Basic Metabolic Panel       Neuro    Dementia    Relevant Medications    QUEtiapine (SEROquel) 25 MG tablet     Other Visit Diagnoses         Atrial fibrillation with RVR          Insomnia, unspecified type        Relevant Medications    QUEtiapine (SEROquel) 25 MG tablet          We will try to decrease her Lasix dose to 20 mg daily recheck her BM P today    She is asked to increase her Seroquel dose to help her to sleep.  I will send that new prescription to her pharmacy.  She has an appointment in the next couple weeks with cardiology.  We will make any further adjustments to her medications once we get her BMP back and see how well her kidneys are doing with this every other day Lasix dose.  We will go ahead and discontinue the allopurinol altogether.  Her son reports that it has been years since  she has had a gout flareup.  At her last appointment we decreased allopurinol from 300 to do 100.

## 2025-04-16 ENCOUNTER — OFFICE VISIT (OUTPATIENT)
Dept: CARDIOLOGY | Facility: CLINIC | Age: 85
End: 2025-04-16
Payer: MEDICARE

## 2025-04-16 VITALS
HEIGHT: 63 IN | HEART RATE: 80 BPM | WEIGHT: 128.6 LBS | BODY MASS INDEX: 22.79 KG/M2 | DIASTOLIC BLOOD PRESSURE: 58 MMHG | OXYGEN SATURATION: 97 % | SYSTOLIC BLOOD PRESSURE: 128 MMHG

## 2025-04-16 DIAGNOSIS — I35.1 NONRHEUMATIC AORTIC VALVE INSUFFICIENCY: ICD-10-CM

## 2025-04-16 DIAGNOSIS — N18.4 CHRONIC RENAL DISEASE, STAGE IV: ICD-10-CM

## 2025-04-16 DIAGNOSIS — N28.9 RENAL INSUFFICIENCY: ICD-10-CM

## 2025-04-16 DIAGNOSIS — I48.0 PAROXYSMAL ATRIAL FIBRILLATION: ICD-10-CM

## 2025-04-16 DIAGNOSIS — I50.32 CHRONIC DIASTOLIC CONGESTIVE HEART FAILURE: ICD-10-CM

## 2025-04-16 DIAGNOSIS — I10 PRIMARY HYPERTENSION: Primary | ICD-10-CM

## 2025-04-16 RX ORDER — FUROSEMIDE 20 MG/1
20 TABLET ORAL DAILY PRN
Qty: 30 TABLET | Refills: 0 | Status: SHIPPED | OUTPATIENT
Start: 2025-04-16

## 2025-04-16 NOTE — PATIENT INSTRUCTIONS
Increase furosemide to 40mg for three days and then go back to 20mg per day    We will recheck your blood work on Monday of next week    Keep track of your weights

## 2025-04-16 NOTE — PROGRESS NOTES
Established Patient Office Visit    Patient Name: Cheryl Tomas  : 1940   MRN: [id number]   Care Team: Patient Care Team:  Marian Perea MD as PCP - General (Family Medicine)  Sharon Salas PA as Physician Assistant (Cardiology)  Jani Epperson MD as Cardiologist (Cardiology)    Chief Complaint   Patient presents with    Paroxysmal atrial fibrillation     Pt c/o BLE edema     Patient ID: Cheryl Tomas is a 85 y.o.  white female from [place], [place], retired .    PAF  Dx 2024 through [facility]  REGINE-ECV during admission 2024 [facility], started on amiodarone  PSVT  Abnormal holter monitor study with frequent PSVT consistent with nonsustained atrial tachycardia with maximum heart rate 173 bpm, 2021    Chronic Hypertension - probable essential  Remote stress test apparently acceptable-data deficit  Echocardiogram 2019: EF 60%, mild concentric LVH, moderate aortic insufficiency.  LV end-diastolic dimension 4.3 cm.  Lexiscan myocardial perfusion imaging ordered but declined 2021  Echocardiogram 10/11/2021: LVEF 61-65%, LV diastolic function consistent with grade 2 with high LAP pseudonormalization, LA mildly increased, moderate AR, RVSP 35-45 mmHg, small less than 1 cm pericardial effusion adjacent to the RV and LV  Residual class I symptoms 2022  Non-rheumatic aortic insufficiency  Mixed hyperlipidemia; on statin therapy  Type 2 diabetes mellitus; hemoglobin A1c 5.8% 2022  Dizziness  Stage III chronic kidney disease  History of PIH and preeclampsia  Surgical history:  Appendectomy  Lithotripsy  Hysterectomy    HPI: Cheryl Tomas is a 85 y.o. female with a history of PAF, moderate aortic regurgitation, grade II diastolic dysfunction, hypertension. who presents today for follow-up.  Last seen her in March during hospitalization for HFpEF.  Since discharge she has had mild weight gain of about 6 pounds total with persistent lower  "extremity swelling.  She has had follow-up with her PCP and her furosemide was adjusted to 20 mg every day.    Subjective   Review of Systems   Constitutional:  Negative for activity change and fatigue.   Respiratory:  Negative for chest tightness and shortness of breath.    Cardiovascular:  Positive for leg swelling. Negative for palpitations.   Neurological:  Negative for dizziness and syncope.       Social History     Tobacco Use   Smoking Status Never    Passive exposure: Never   Smokeless Tobacco Never        Allergies   Allergen Reactions    Cephalexin Itching and Rash    Bactrim [Sulfamethoxazole-Trimethoprim] GI Intolerance       Current Outpatient Medications:     chlorthalidone (HYGROTON) 25 MG tablet, Take 1 tablet by mouth Daily., Disp: 30 tablet, Rfl: 5    cholecalciferol (VITAMIN D3) 1000 units tablet, Take 1 tablet by mouth Daily., Disp: , Rfl:     Coenzyme Q10 10 MG capsule, Take 10 mg by mouth Daily., Disp: , Rfl:     furosemide (LASIX) 40 MG tablet, Take 0.5 tablets by mouth Daily., Disp: 90 tablet, Rfl: 3    irbesartan (Avapro) 300 MG tablet, Take 1 tablet by mouth Every Night., Disp: 90 tablet, Rfl: 3    NIFEdipine XL (PROCARDIA XL) 90 MG 24 hr tablet, Take 1 tablet by mouth Daily., Disp: 90 tablet, Rfl: 3    pravastatin (PRAVACHOL) 40 MG tablet, Take 1 tablet by mouth Every Night., Disp: , Rfl:     QUEtiapine (SEROquel) 25 MG tablet, Take 2 tablets by mouth Every Night., Disp: 60 tablet, Rfl: 1    apixaban (ELIQUIS) 2.5 MG tablet tablet, Take 1 tablet by mouth Every 12 (Twelve) Hours for 360 days., Disp: 180 tablet, Rfl: 3    furosemide (LASIX) 20 MG tablet, Take 1 tablet by mouth Daily As Needed for Leg swelling or weight gain., Disp: 30 tablet, Rfl: 0    Objective     Vitals:    04/16/25 1319   BP: 128/58   BP Location: Right arm   Patient Position: Sitting   Cuff Size: Adult   Pulse: 80   SpO2: 97%   Weight: 58.3 kg (128 lb 9.6 oz)   Height: 160 cm (63\")   Body mass index is 22.78 " kg/m².  Gen: well developed, older WF sitting on exam table, comfortable appearing  HEENT: MMM, sclera anicteric, conjunctiva normal, no carotid bruits  CV: regular rate, regular rhythm, II/VI diastolic murmur at RUSB, normal radial and DP pulses  Pulm: RA, normal work of breathing, no wheezes, rales, rhonchi  Ext: normal bulk for age, normal tone, 1+ dependent edema with mild pitting  Neuro: alert, oriented, face symmetrical, moving all extremities well  Psych: normal mood, appropriate affect    RESULTS:   Procedures    Results for orders placed during the hospital encounter of 03/17/25    Adult Transthoracic Echo Complete W/ Cont if Necessary Per Protocol    Interpretation Summary    Left ventricular systolic function is normal. Calculated left ventricular EF = 55.3% Normal left ventricular cavity size noted. Left ventricular wall thickness is consistent with mild concentric hypertrophy. Left ventricular diastolic function is consistent with grade III restrictive pattern.    The aortic valve exhibits sclerosis. The aortic valve appears trileaflet. Moderate to severe aortic valve regurgitation is present. No hemodynamically significant aortic valve stenosis is present.    Mild mitral annular calcification is present. Mild mitral valve regurgitation is present. No significant mitral valve stenosis is present.    Moderate tricuspid valve regurgitation is present. Estimated right ventricular systolic pressure from tricuspid regurgitation is moderately elevated (45-55 mmHg).     1/31/24 - REGINE    LVEF 55%    RV normal in size and function    Aortic valve thickened and calcified.  Vena contracta .32 cm and  consistent with moderate AI    Mitral valve has mild MAC and mild MR    Pulmonic valve and tricuspid valve not well assessed.    No left atrial appendage thrombus appreciated on multiple views with normal velocities    Moderate pericardial effusion.  no RV diastolic collapse.    IVC dilated but collapses well.   RA pressure estimated 8 mmHg    10/11/21 - Transthoracic Echo Complete  · Left ventricular ejection fraction appears to be 61 - 65%. Left ventricular systolic function is normal.  · Left ventricular diastolic function is consistent with (grade II w/high LAP) pseudonormalization.  · Left atrial volume is mildly increased.  · Moderate aortic valve regurgitation is present.  · Estimated right ventricular systolic pressure from tricuspid regurgitation is mildly elevated (35-45 mmHg).  · Mild pulmonary hypertension is present.  · There is a small (<1cm) pericardial effusion adjacent to the right ventricle and left ventricle.    5/5/22 - Renal duplex US  Impression:  No sonographic evidence of clinically significant stenosis of the right or the left renal artery on this exam.      There is suspicion of mild left hydronephrosis. This can be further evaluated with a dedicated renal ultrasound.    4/27/22 - 24 blood pressure monitor  Overall acceptable 24 ambulatory blood pressure monitor with somewhat elevated nocturnal blood pressure readings; defer additional treatment at this time and follow-up in office as scheduled.    Labs:  Lab Results   Component Value Date    WBC 6.22 03/21/2025    HGB 10.4 (L) 03/21/2025    HCT 34.8 03/21/2025    MCV 88.1 03/21/2025     03/21/2025     Lab Results   Component Value Date    GLUCOSE 102 (H) 04/02/2025    BUN 29 (H) 04/02/2025    CREATININE 1.90 (H) 04/02/2025    EGFRIFNONA 43 (L) 11/15/2021    EGFRIFAFRI 52 (L) 11/15/2021    BCR 15.3 04/02/2025    K 3.6 04/02/2025    CO2 22.0 04/02/2025    CALCIUM 9.9 04/02/2025    ALBUMIN 4.0 03/17/2025    AST 26 03/17/2025    ALT 40 (H) 03/17/2025     Lab Results   Component Value Date    HGBA1C 5.7 01/08/2025     Lab Results   Component Value Date    CHOL 168 02/18/2025    CHLPL 168 11/30/2022    TRIG 83 02/18/2025    HDL 51 02/18/2025     (H) 02/18/2025     Most recent PCP note, imaging tests, and labs reviewed.    Assessment & Plan        ICD-10-CM ICD-9-CM   1. Primary hypertension  I10 401.9   2. Renal insufficiency  N28.9 593.9   3. Chronic renal disease, stage IV  N18.4 585.4   4. Paroxysmal atrial fibrillation  I48.0 427.31   5. Chronic diastolic congestive heart failure  I50.32 428.32     428.0   6. Nonrheumatic aortic valve insufficiency  I35.1 424.1       PAF   - Sinus rhythm on exam today   - Continue anticoagulation with apixaban, decrease dose to 2.5 mg twice daily due to weight and age since CKD, updated prescription was sent   - Continue carvedilol and amiodarone    Chronic heart failure preserved ejection fraction   - ARB   - Crease cement to 40 mg daily for 3 days then go back to 20 mg daily.  Check BMP early next week   - Off SGLT2 with UTI    Primary hypertension   - Controlled today   - Continue current therapy   - If leg swelling persists, would consider adjustment of regimen as nifedipine may be contributing    Moderate-Severe AR   - no new symptoms, will monitor    HLD   - continue pravastatin     Palpitations / PSVT / AT  Bifascicular block - DC minimally prolonged but stable previously    - BB, amio as above    Return in about 2 months (around 6/16/2025).    DUARTE Epperson MD, MS  04/16/25    [facility]  [address] [address] [phone]

## 2025-04-18 ENCOUNTER — TELEPHONE (OUTPATIENT)
Dept: FAMILY MEDICINE CLINIC | Facility: CLINIC | Age: 85
End: 2025-04-18
Payer: MEDICARE

## 2025-04-18 NOTE — TELEPHONE ENCOUNTER
Caller: LUCY WITH [facility]    Relationship: Home Health    Best call back number: [phone]     What is the best time to reach you: M-F [phone]    Who are you requesting to speak with (clinical staff, provider,  specific staff member): CLINICAL    Do you know the name of the person who called: LUCY     What was the call regarding: CALLER STATES PATIENT WAS ADMITTED TO HOME HEALTH ON 4.2.25.  HOWEVER, PATIENT HAS REFUSED ALL VISITS SINCE THAT TIME AND HAS NOT TAKEN THEIR PHONE CALLS.  IF THEY CANNOT REACH THE PATIENT BY MONDAY, THEY WILL DISCHARGE HER.

## 2025-04-21 ENCOUNTER — LAB (OUTPATIENT)
Dept: LAB | Facility: HOSPITAL | Age: 85
End: 2025-04-21
Payer: MEDICARE

## 2025-04-21 DIAGNOSIS — I10 PRIMARY HYPERTENSION: ICD-10-CM

## 2025-04-21 DIAGNOSIS — N28.9 RENAL INSUFFICIENCY: ICD-10-CM

## 2025-04-21 LAB
ANION GAP SERPL CALCULATED.3IONS-SCNC: 12.2 MMOL/L (ref 5–15)
BUN SERPL-MCNC: 29 MG/DL (ref 8–23)
BUN/CREAT SERPL: 15.4 (ref 7–25)
CALCIUM SPEC-SCNC: 9.9 MG/DL (ref 8.6–10.5)
CHLORIDE SERPL-SCNC: 105 MMOL/L (ref 98–107)
CO2 SERPL-SCNC: 26.8 MMOL/L (ref 22–29)
CREAT SERPL-MCNC: 1.88 MG/DL (ref 0.57–1)
EGFRCR SERPLBLD CKD-EPI 2021: 26.3 ML/MIN/1.73
GLUCOSE SERPL-MCNC: 132 MG/DL (ref 65–99)
POTASSIUM SERPL-SCNC: 4.2 MMOL/L (ref 3.5–5.2)
SODIUM SERPL-SCNC: 144 MMOL/L (ref 136–145)

## 2025-04-21 PROCEDURE — 36415 COLL VENOUS BLD VENIPUNCTURE: CPT

## 2025-04-21 PROCEDURE — 80048 BASIC METABOLIC PNL TOTAL CA: CPT

## 2025-04-21 NOTE — TELEPHONE ENCOUNTER
Can we call to let patient know she is going to lose home health if she continues to ignore them and decline visits.  I recommend she tried to participate with some home health so that they can help her.

## 2025-04-21 NOTE — TELEPHONE ENCOUNTER
HUB TO RELAY    LVM. Per Dr. Perea: let patient know she is going to lose home health if she continues to ignore them and decline visits. I recommend she tried to participate with some home health so that they can help her.

## 2025-04-21 NOTE — TELEPHONE ENCOUNTER
[facility]      Relationship: Home Health      Best Callback Number: [phone]      HUB PROVIDED THE RELAY MESSAGE FROM THE OFFICE      PATIENT: VOICED UNDERSTANDING AND HAS NO FURTHER QUESTIONS AT THIS TIME    ADDITIONAL INFORMATION: PATIENT'S DAUGHTER STATED SHE WAS NOT INTERESTED IN CONTINUING HOME HEALTH AT THIS TIME.

## 2025-05-09 ENCOUNTER — EXTERNAL PBMM DATA (OUTPATIENT)
Dept: PHARMACY | Facility: OTHER | Age: 85
End: 2025-05-09
Payer: MEDICARE

## 2025-05-19 ENCOUNTER — TELEPHONE (OUTPATIENT)
Dept: FAMILY MEDICINE CLINIC | Facility: CLINIC | Age: 85
End: 2025-05-19
Payer: MEDICARE

## 2025-05-19 NOTE — TELEPHONE ENCOUNTER
Caller: [address] - [phone] [facility] [phone]    Relationship: Pharmacy    Best call back number: [phone]     Requested Prescriptions:   Requested Prescriptions     Pending Prescriptions Disp Refills    pravastatin (PRAVACHOL) 40 MG tablet 90 tablet      Sig: Take 1 tablet by mouth Every Night.        Pharmacy where request should be sent: [address] - [phone] [facility] [phone] FX     Last office visit with prescribing clinician: 4/2/2025   Last telemedicine visit with prescribing clinician: Visit date not found   Next office visit with prescribing clinician: 5/20/2025     Additional details provided by patient: PATIENT HAS SIX TABLETS LEFT    Does the patient have less than a 3 day supply:  [] Yes  [x] No    Would you like a call back once the refill request has been completed: [] Yes [x] No    If the office needs to give you a call back, can they leave a voicemail: [x] Yes [] No    Lucio Pascual Rep   05/19/25 16:48 EDT

## 2025-05-19 NOTE — TELEPHONE ENCOUNTER
Caller: [facility]    Relationship: Emergency Contact    Best call back number: [phone]     What is the medical concern/diagnosis: PLACE BEHIND EAR NOT HEALING    What specialty or service is being requested: DERMATOLOGY    What is the provider, practice or medical service name: SOMEWHERE CLOSE TO [facility]

## 2025-05-20 DIAGNOSIS — L98.9 NON-HEALING SKIN LESION: Primary | ICD-10-CM

## 2025-05-20 RX ORDER — PRAVASTATIN SODIUM 40 MG
40 TABLET ORAL NIGHTLY
Qty: 90 TABLET | Refills: 0 | Status: SHIPPED | OUTPATIENT
Start: 2025-05-20

## 2025-06-03 ENCOUNTER — EXTERNAL PBMM DATA (OUTPATIENT)
Dept: PHARMACY | Facility: OTHER | Age: 85
End: 2025-06-03
Payer: MEDICARE

## 2025-06-03 DIAGNOSIS — G47.00 INSOMNIA, UNSPECIFIED TYPE: ICD-10-CM

## 2025-06-03 RX ORDER — QUETIAPINE FUMARATE 25 MG/1
TABLET, FILM COATED ORAL
Qty: 60 TABLET | Refills: 1 | Status: SHIPPED | OUTPATIENT
Start: 2025-06-03

## 2025-06-19 ENCOUNTER — OFFICE VISIT (OUTPATIENT)
Dept: CARDIOLOGY | Facility: CLINIC | Age: 85
End: 2025-06-19
Payer: MEDICARE

## 2025-06-19 VITALS
HEART RATE: 80 BPM | SYSTOLIC BLOOD PRESSURE: 130 MMHG | HEIGHT: 64 IN | OXYGEN SATURATION: 97 % | DIASTOLIC BLOOD PRESSURE: 60 MMHG | BODY MASS INDEX: 23.22 KG/M2 | WEIGHT: 136 LBS

## 2025-06-19 DIAGNOSIS — I48.0 PAROXYSMAL ATRIAL FIBRILLATION: ICD-10-CM

## 2025-06-19 DIAGNOSIS — I50.32 CHRONIC DIASTOLIC CONGESTIVE HEART FAILURE: ICD-10-CM

## 2025-06-19 DIAGNOSIS — I35.1 NONRHEUMATIC AORTIC VALVE INSUFFICIENCY: ICD-10-CM

## 2025-06-19 DIAGNOSIS — I10 PRIMARY HYPERTENSION: Primary | ICD-10-CM

## 2025-06-19 DIAGNOSIS — N18.4 CHRONIC RENAL DISEASE, STAGE IV: ICD-10-CM

## 2025-06-19 RX ORDER — FUROSEMIDE 20 MG/1
20 TABLET ORAL DAILY PRN
Qty: 30 TABLET | Refills: 1 | Status: SHIPPED | OUTPATIENT
Start: 2025-06-19

## 2025-06-19 RX ORDER — AMIODARONE HYDROCHLORIDE 100 MG/1
100 TABLET ORAL DAILY
COMMUNITY
Start: 2025-06-04

## 2025-06-19 NOTE — PROGRESS NOTES
Established Patient Office Visit    Patient Name: Cheryl Tomas  : 1940   MRN: [id number]   Care Team: Patient Care Team:  Marian Perea MD as PCP - General (Family Medicine)  Sharon Salsa PA as Physician Assistant (Cardiology)  Jani Epperson MD as Cardiologist (Cardiology)    Chief Complaint   Patient presents with    Paroxysmal atrial fibrillation    Primary hypertension     Patient ID: Cheryl Tomas is a 85 y.o.  white female from [place], [place], retired .    PAF  Dx 2024 through [facility]  REGINE-ECV during admission 2024 [facility], started on amiodarone  PSVT  Abnormal holter monitor study with frequent PSVT consistent with nonsustained atrial tachycardia with maximum heart rate 173 bpm, 2021    Chronic Hypertension - probable essential  Remote stress test apparently acceptable-data deficit  Echocardiogram 2019: EF 60%, mild concentric LVH, moderate aortic insufficiency.  LV end-diastolic dimension 4.3 cm.  Lexiscan myocardial perfusion imaging ordered but declined 2021  Echocardiogram 10/11/2021: LVEF 61-65%, LV diastolic function consistent with grade 2 with high LAP pseudonormalization, LA mildly increased, moderate AR, RVSP 35-45 mmHg, small less than 1 cm pericardial effusion adjacent to the RV and LV  Residual class I symptoms 2022  Non-rheumatic aortic insufficiency  Mixed hyperlipidemia; on statin therapy  Type 2 diabetes mellitus; hemoglobin A1c 5.8% 2022  Dizziness  Stage III chronic kidney disease  History of PIH and preeclampsia  Surgical history:  Appendectomy  Lithotripsy  Hysterectomy    HPI: Cheryl Tomas is a 85 y.o. female with a history of PAF, moderate aortic regurgitation, grade II diastolic dysfunction, hypertension. who presents today for follow-up.  Since her last visit in April she has not had any ER visits or hospitalizations.  She has had increasing leg swelling recently with about a 10  pound weight gain.  This is not associated with any dyspnea or chest pain and she does not have palpitations.  She continues to live independently with no recent issues.    Subjective   Review of Systems   Constitutional:  Negative for activity change and fatigue.   Respiratory:  Negative for chest tightness and shortness of breath.    Cardiovascular:  Positive for leg swelling. Negative for palpitations.   Neurological:  Negative for dizziness and syncope.       Social History     Tobacco Use   Smoking Status Never    Passive exposure: Never   Smokeless Tobacco Never        Allergies   Allergen Reactions    Cephalexin Itching and Rash    Bactrim [Sulfamethoxazole-Trimethoprim] GI Intolerance       Current Outpatient Medications:     amiodarone (PACERONE) 100 MG tablet, Take 1 tablet by mouth Daily., Disp: , Rfl:     apixaban (ELIQUIS) 2.5 MG tablet tablet, Take 1 tablet by mouth Every 12 (Twelve) Hours for 360 days., Disp: 180 tablet, Rfl: 3    Coenzyme Q10 10 MG capsule, Take 10 mg by mouth Daily., Disp: , Rfl:     furosemide (LASIX) 20 MG tablet, Take 1 tablet by mouth Daily As Needed for Leg swelling or weight gain., Disp: 30 tablet, Rfl: 1    furosemide (LASIX) 40 MG tablet, Take 0.5 tablets by mouth Daily., Disp: 90 tablet, Rfl: 3    irbesartan (Avapro) 300 MG tablet, Take 1 tablet by mouth Every Night., Disp: 90 tablet, Rfl: 3    NIFEdipine XL (PROCARDIA XL) 90 MG 24 hr tablet, Take 1 tablet by mouth Daily., Disp: 90 tablet, Rfl: 3    pravastatin (PRAVACHOL) 40 MG tablet, Take 1 tablet by mouth Every Night., Disp: 90 tablet, Rfl: 0    QUEtiapine (SEROquel) 25 MG tablet, TAKE TWO (2) TABLETS BY MOUTH EVERY NIGHT., Disp: 60 tablet, Rfl: 1    Vitamin D-Vitamin K (VITAMIN K2-VITAMIN D3 PO), Take  by mouth Daily., Disp: , Rfl:     Objective     Vitals:    06/19/25 1504   BP: 130/60   BP Location: Left arm   Patient Position: Sitting   Cuff Size: Adult   Pulse: 80   SpO2: 97%   Weight: 61.7 kg (136 lb)   Height:  "162.6 cm (64\")     Body mass index is 23.34 kg/m².  Gen: well developed, older WF sitting on exam table, comfortable appearing  HEENT: MMM, sclera anicteric, conjunctiva normal, no carotid bruits  CV: regular rate, regular rhythm, II/VI diastolic murmur at RUSB, normal radial and DP pulses  Pulm: RA, normal work of breathing, no wheezes, rales, rhonchi  Ext: normal bulk for age, normal tone, 2+ dependent edema at ankles  Neuro: alert, oriented, face symmetrical, moving all extremities well  Psych: normal mood, appropriate affect    RESULTS:   Procedures    Results for orders placed during the hospital encounter of 03/17/25    Adult Transthoracic Echo Complete W/ Cont if Necessary Per Protocol    Interpretation Summary    Left ventricular systolic function is normal. Calculated left ventricular EF = 55.3% Normal left ventricular cavity size noted. Left ventricular wall thickness is consistent with mild concentric hypertrophy. Left ventricular diastolic function is consistent with grade III restrictive pattern.    The aortic valve exhibits sclerosis. The aortic valve appears trileaflet. Moderate to severe aortic valve regurgitation is present. No hemodynamically significant aortic valve stenosis is present.    Mild mitral annular calcification is present. Mild mitral valve regurgitation is present. No significant mitral valve stenosis is present.    Moderate tricuspid valve regurgitation is present. Estimated right ventricular systolic pressure from tricuspid regurgitation is moderately elevated (45-55 mmHg).     1/31/24 - REGINE    LVEF 55%    RV normal in size and function    Aortic valve thickened and calcified.  Vena contracta .32 cm and  consistent with moderate AI    Mitral valve has mild MAC and mild MR    Pulmonic valve and tricuspid valve not well assessed.    No left atrial appendage thrombus appreciated on multiple views with normal velocities    Moderate pericardial effusion.  no RV diastolic " collapse.    IVC dilated but collapses well.  RA pressure estimated 8 mmHg    10/11/21 - Transthoracic Echo Complete  · Left ventricular ejection fraction appears to be 61 - 65%. Left ventricular systolic function is normal.  · Left ventricular diastolic function is consistent with (grade II w/high LAP) pseudonormalization.  · Left atrial volume is mildly increased.  · Moderate aortic valve regurgitation is present.  · Estimated right ventricular systolic pressure from tricuspid regurgitation is mildly elevated (35-45 mmHg).  · Mild pulmonary hypertension is present.  · There is a small (<1cm) pericardial effusion adjacent to the right ventricle and left ventricle.    5/5/22 - Renal duplex US  Impression:  No sonographic evidence of clinically significant stenosis of the right or the left renal artery on this exam.      There is suspicion of mild left hydronephrosis. This can be further evaluated with a dedicated renal ultrasound.    4/27/22 - 24 blood pressure monitor  Overall acceptable 24 ambulatory blood pressure monitor with somewhat elevated nocturnal blood pressure readings; defer additional treatment at this time and follow-up in office as scheduled.    Labs:  Lab Results   Component Value Date    WBC 6.22 03/21/2025    HGB 10.4 (L) 03/21/2025    HCT 34.8 03/21/2025    MCV 88.1 03/21/2025     03/21/2025     Lab Results   Component Value Date    GLUCOSE 132 (H) 04/21/2025    BUN 29 (H) 04/21/2025    CREATININE 1.88 (H) 04/21/2025    EGFRIFNONA 43 (L) 11/15/2021    EGFRIFAFRI 52 (L) 11/15/2021    BCR 15.4 04/21/2025    K 4.2 04/21/2025    CO2 26.8 04/21/2025    CALCIUM 9.9 04/21/2025    ALBUMIN 4.0 03/17/2025    AST 26 03/17/2025    ALT 40 (H) 03/17/2025     Lab Results   Component Value Date    HGBA1C 5.7 01/08/2025     Lab Results   Component Value Date    CHOL 168 02/18/2025    CHLPL 168 11/30/2022    TRIG 83 02/18/2025    HDL 51 02/18/2025     (H) 02/18/2025     Most recent PCP note, imaging  tests, and labs reviewed.    Assessment & Plan       ICD-10-CM ICD-9-CM   1. Primary hypertension  I10 401.9   2. Chronic renal disease, stage IV  N18.4 585.4   3. Paroxysmal atrial fibrillation  I48.0 427.31   4. Chronic diastolic congestive heart failure  I50.32 428.32     428.0   5. Nonrheumatic aortic valve insufficiency  I35.1 424.1         PAF   - Sinus rhythm on exam today   - Continue anticoagulation with apixaban 2.5 mg twice daily   - Continue carvedilol and amiodarone    Chronic heart failure preserved ejection fraction   - ARB   - Increase furosemide to 40 mg for the next 4 days.  Continue weights and the patient was instructed to call the office on Monday or Tuesday with an update on how her swelling has been doing.  If it persists we can continue increased dose and recheck her metabolic panel.   - Off SGLT2 with UTI    Primary hypertension   - Controlled today   - Continue current therapy   - If leg swelling persists, would consider adjustment of regimen as nifedipine may be contributing    Moderate-Severe AR   - Monitor clinically, plan to repeat echocardiogram later this year.  Not much valve calcification would not be a good candidate for traditional TAVR.  Would have to consider  surgical AVR versus enrollment in clinical trial for transcatheter valve    HLD   - continue pravastatin     Palpitations / PSVT / AT  Bifascicular block - TN minimally prolonged but stable previously    - BB, amio as above    Return in about 3 months (around 9/19/2025).    DUARTE Epperson MD, MS  06/19/25    [facility]  [address] [place] [id number] [phone]

## 2025-06-30 ENCOUNTER — POP HEALTH PHARMACY (OUTPATIENT)
Dept: PHARMACY | Facility: OTHER | Age: 85
End: 2025-06-30
Payer: MEDICARE

## 2025-06-30 NOTE — PROGRESS NOTES
[facility] Outreach      Cheryl Tomas was called today to discuss medication adherence with IRBESARTAN , as she was identified as having care opportunities.    Program Details    [facility]  Status: Enrolled  Effective Dates: 6/9/2025 - present  Responsible Staff: Martine Rose RPH          Opportunities Identified    Adherence- Hypertension       Adherence and Medication Management    Adherence Questions   Patient Reported X Missed Doses in the Last 7 Days : 0  Reasons for Non-Adherence : Low health literacy  Adherence Tools Used: Medication list; Pill box  Interested in a 90 day supply? : Already receiving  Does this require clinical escalation to a pharmacist?: N         General Medication Management    Type of medication management: targeted medication review  Review Completed on: 6/30/25  Referred by: insurance group  Recipient: beneficiary  Provider: pharmacist - other  Visit type: initial           Medication Therapy Problems     Medication Therapy Recommendations  No medication therapy recommendations to display      Summary        Martine Rose RPH, 06/30/25, 1:17 PM EDT.

## 2025-07-08 ENCOUNTER — OFFICE VISIT (OUTPATIENT)
Dept: FAMILY MEDICINE CLINIC | Facility: CLINIC | Age: 85
End: 2025-07-08
Payer: MEDICARE

## 2025-07-08 VITALS
TEMPERATURE: 98.9 F | OXYGEN SATURATION: 96 % | WEIGHT: 136.6 LBS | BODY MASS INDEX: 23.45 KG/M2 | SYSTOLIC BLOOD PRESSURE: 122 MMHG | HEART RATE: 74 BPM | DIASTOLIC BLOOD PRESSURE: 58 MMHG

## 2025-07-08 DIAGNOSIS — I48.20 CHRONIC ATRIAL FIBRILLATION: ICD-10-CM

## 2025-07-08 DIAGNOSIS — E11.9 TYPE 2 DIABETES MELLITUS WITHOUT COMPLICATION, WITHOUT LONG-TERM CURRENT USE OF INSULIN: Primary | Chronic | ICD-10-CM

## 2025-07-08 DIAGNOSIS — I45.2 RIGHT BUNDLE BRANCH BLOCK WITH LEFT ANTERIOR FASCICULAR BLOCK: ICD-10-CM

## 2025-07-08 DIAGNOSIS — I50.33 ACUTE ON CHRONIC HEART FAILURE WITH PRESERVED EJECTION FRACTION (HFPEF): Chronic | ICD-10-CM

## 2025-07-08 DIAGNOSIS — M10.9 GOUT, UNSPECIFIED CAUSE, UNSPECIFIED CHRONICITY, UNSPECIFIED SITE: ICD-10-CM

## 2025-07-08 DIAGNOSIS — I10 BENIGN ESSENTIAL HYPERTENSION: Chronic | ICD-10-CM

## 2025-07-08 DIAGNOSIS — N18.4 CHRONIC RENAL DISEASE, STAGE IV: ICD-10-CM

## 2025-07-08 LAB
EXPIRATION DATE: ABNORMAL
EXPIRATION DATE: NORMAL
HBA1C MFR BLD: 5.9 % (ref 4.5–5.7)
Lab: ABNORMAL
Lab: NORMAL
POC ALBUMIN, URINE: 10 MG/L
POC CREATININE, URINE: 10 MG/DL
POC URINE ALB/CREA RATIO: <30

## 2025-07-08 PROCEDURE — 1126F AMNT PAIN NOTED NONE PRSNT: CPT | Performed by: FAMILY MEDICINE

## 2025-07-08 PROCEDURE — 1159F MED LIST DOCD IN RCRD: CPT | Performed by: FAMILY MEDICINE

## 2025-07-08 PROCEDURE — 99214 OFFICE O/P EST MOD 30 MIN: CPT | Performed by: FAMILY MEDICINE

## 2025-07-08 PROCEDURE — 3044F HG A1C LEVEL LT 7.0%: CPT | Performed by: FAMILY MEDICINE

## 2025-07-08 PROCEDURE — 83036 HEMOGLOBIN GLYCOSYLATED A1C: CPT | Performed by: FAMILY MEDICINE

## 2025-07-08 PROCEDURE — 82570 ASSAY OF URINE CREATININE: CPT | Performed by: FAMILY MEDICINE

## 2025-07-08 PROCEDURE — 1160F RVW MEDS BY RX/DR IN RCRD: CPT | Performed by: FAMILY MEDICINE

## 2025-07-08 PROCEDURE — 3078F DIAST BP <80 MM HG: CPT | Performed by: FAMILY MEDICINE

## 2025-07-08 PROCEDURE — 3074F SYST BP LT 130 MM HG: CPT | Performed by: FAMILY MEDICINE

## 2025-07-08 PROCEDURE — 82044 UR ALBUMIN SEMIQUANTITATIVE: CPT | Performed by: FAMILY MEDICINE

## 2025-07-08 NOTE — PROGRESS NOTES
Subjective   Cheryl Tomas is a 85 y.o. female.     History of Present Illness  The patient is an 85-year-old female here for follow-up on her chronic medical conditions, including atrial fibrillation, hypertension, chronic kidney disease, aortic valve insufficiency, right bundle branch block, and gout.    She reports feeling well overall and has no new health concerns. She is not experiencing any chest pain or shortness of breath. She does not require any medication refills at this time. She is currently taking Seroquel at night to help her sleep, 40 mg of pravastatin, Procardia XL 90 mg daily, Avapro 300 mg daily, half a tablet of 40 mg of Lasix daily, and Eliquis 2.5 mg every 12 hours. She also takes amiodarone 100 mg daily. Her cardiologist, Dr. Kelby Ann, last saw her on 06/19/2025. During that visit, her Lasix was increased to 40 mg daily for 4 days, and her SGLT2 was stopped due to recurrent UTIs. She has a 3-month follow-up scheduled with her cardiologist.      The following portions of the patient's history were reviewed and updated as appropriate: allergies, current medications, past family history, past medical history, past social history, past surgical history, and problem list.    Review of Systems   Constitutional:  Negative for chills, fatigue, fever and unexpected weight change.   HENT:  Negative for congestion, ear pain, nosebleeds, rhinorrhea, sinus pressure, sneezing, sore throat and trouble swallowing.    Eyes:  Negative for itching and visual disturbance.   Respiratory:  Negative for cough, chest tightness, shortness of breath and wheezing.    Cardiovascular:  Negative for chest pain, palpitations and leg swelling.   Gastrointestinal:  Negative for abdominal pain, anal bleeding, blood in stool, constipation, diarrhea, nausea and vomiting.   Endocrine: Negative for cold intolerance, heat intolerance, polydipsia and polyuria.   Genitourinary:  Negative for difficulty urinating, frequency,  hematuria and urgency.   Musculoskeletal:  Negative for back pain, gait problem and myalgias.   Skin:  Negative for rash and wound.   Neurological:  Negative for dizziness, weakness, numbness and headaches.   Hematological:  Negative for adenopathy. Does not bruise/bleed easily.   Psychiatric/Behavioral:  Negative for agitation, confusion, decreased concentration and suicidal ideas. The patient is not nervous/anxious.        Objective     Vitals:    07/08/25 1135   BP: 122/58   Pulse: 74   Temp: 98.9 °F (37.2 °C)   TempSrc: Temporal   SpO2: 96%   Weight: 62 kg (136 lb 9.6 oz)       Physical Exam  Vitals and nursing note reviewed.   Constitutional:       General: She is not in acute distress.     Appearance: She is well-developed. She is not diaphoretic.   HENT:      Head: Normocephalic and atraumatic.      Right Ear: External ear normal.      Left Ear: External ear normal.   Eyes:      General: Lids are normal. No scleral icterus.        Right eye: No discharge.         Left eye: No discharge.      Conjunctiva/sclera: Conjunctivae normal.      Pupils: Pupils are equal, round, and reactive to light.   Neck:      Thyroid: No thyroid mass or thyromegaly.      Vascular: No carotid bruit or JVD.      Trachea: No tracheal deviation.   Cardiovascular:      Rate and Rhythm: Normal rate and regular rhythm.      Heart sounds: Normal heart sounds. No murmur heard.     No friction rub. No gallop.   Pulmonary:      Effort: Pulmonary effort is normal. No respiratory distress.      Breath sounds: Normal breath sounds. No decreased breath sounds, wheezing, rhonchi or rales.   Chest:      Chest wall: No tenderness.   Abdominal:      General: Bowel sounds are normal. There is no distension.      Palpations: Abdomen is soft. There is no mass.      Tenderness: There is no abdominal tenderness. There is no guarding or rebound.      Hernia: No hernia is present.   Musculoskeletal:         General: Normal range of motion.    Lymphadenopathy:      Cervical: No cervical adenopathy.      Upper Body:      Right upper body: No supraclavicular adenopathy.      Left upper body: No supraclavicular adenopathy.   Skin:     Findings: No bruising, erythema or rash.      Nails: There is no clubbing.   Neurological:      Mental Status: She is alert and oriented to person, place, and time.      Cranial Nerves: No cranial nerve deficit.      Deep Tendon Reflexes: Reflexes are normal and symmetric. Reflexes normal.   Psychiatric:         Speech: Speech normal.         Behavior: Behavior normal.         Thought Content: Thought content normal.         Judgment: Judgment normal.           Assessment & Plan     Problem List Items Addressed This Visit          Cardiac and Vasculature    Benign essential hypertension (Chronic)    Acute on chronic heart failure with preserved ejection fraction (HFpEF) (Chronic)    Right bundle branch block with left anterior fascicular block    A-fib       Endocrine and Metabolic    Diabetes mellitus - Primary (Chronic)    Relevant Orders    POC Glycosylated Hemoglobin (Hb A1C) (Completed)    POC Albumin/Creatinine Ratio Urine (Completed)       Genitourinary and Reproductive     Chronic renal disease, stage IV       Musculoskeletal and Injuries    Gout       Assessment & Plan  1. Atrial Fibrillation.  - Currently taking Eliquis 2.5 mg every 12 hours and amiodarone 100 mg daily.  - Cardiologist visit on 06/19/2025 reviewed; no new issues reported.  - Cardiologist to recheck kidney function as needed.  - Follow-up with cardiologist in 3 months.    2. Hypertension.  - Blood pressure is well-controlled today.  - Currently on Procardia XL 90 mg daily and Avapro 300 mg daily.  - No changes to medication regimen at this time.  - Continue current management plan.    3. Chronic Kidney Disease.  - Kidney function to be monitored by cardiologist.  - SGLT2 inhibitor stopped due to recurrent UTIs.  - Lasix increased to 40 mg daily for 4  days as per cardiologist's recommendation.  - No new issues reported.    4. Insomnia.  - Taking Seroquel at night to help with sleep.  - Reports doing well with current regimen.  - No changes to medication at this time.  - Continue current management plan.    Follow-up  The patient will follow up in 3 months for a Medicare wellness visit.      Patient or patient representative verbalized consent for the use of Ambient Listening during the visit with  Marian Perea MD for chart documentation. 7/8/2025  11:57 EDT

## 2025-08-05 DIAGNOSIS — G47.00 INSOMNIA, UNSPECIFIED TYPE: ICD-10-CM

## 2025-08-05 RX ORDER — QUETIAPINE FUMARATE 25 MG/1
TABLET, FILM COATED ORAL
Qty: 60 TABLET | Refills: 1 | Status: SHIPPED | OUTPATIENT
Start: 2025-08-05

## 2025-08-14 RX ORDER — PRAVASTATIN SODIUM 40 MG
40 TABLET ORAL NIGHTLY
Qty: 90 TABLET | Refills: 0 | Status: SHIPPED | OUTPATIENT
Start: 2025-08-14